# Patient Record
Sex: MALE | Race: WHITE | NOT HISPANIC OR LATINO | Employment: OTHER | URBAN - METROPOLITAN AREA
[De-identification: names, ages, dates, MRNs, and addresses within clinical notes are randomized per-mention and may not be internally consistent; named-entity substitution may affect disease eponyms.]

---

## 2017-02-07 ENCOUNTER — OFFICE VISIT (OUTPATIENT)
Dept: INTERNAL MEDICINE | Facility: CLINIC | Age: 67
End: 2017-02-07

## 2017-02-07 VITALS
HEART RATE: 72 BPM | DIASTOLIC BLOOD PRESSURE: 74 MMHG | SYSTOLIC BLOOD PRESSURE: 134 MMHG | WEIGHT: 315 LBS | BODY MASS INDEX: 44.1 KG/M2 | HEIGHT: 71 IN

## 2017-02-07 DIAGNOSIS — I48.21 PERMANENT ATRIAL FIBRILLATION (HCC): Primary | ICD-10-CM

## 2017-02-07 DIAGNOSIS — J43.8 OTHER EMPHYSEMA (HCC): ICD-10-CM

## 2017-02-07 DIAGNOSIS — G47.33 OSA (OBSTRUCTIVE SLEEP APNEA): ICD-10-CM

## 2017-02-07 DIAGNOSIS — I25.10 CORONARY ARTERY DISEASE INVOLVING NATIVE CORONARY ARTERY OF NATIVE HEART WITHOUT ANGINA PECTORIS: ICD-10-CM

## 2017-02-07 DIAGNOSIS — E11.8 TYPE 2 DIABETES MELLITUS WITH COMPLICATION, WITHOUT LONG-TERM CURRENT USE OF INSULIN (HCC): ICD-10-CM

## 2017-02-07 DIAGNOSIS — E78.49 OTHER HYPERLIPIDEMIA: ICD-10-CM

## 2017-02-07 DIAGNOSIS — I10 ESSENTIAL HYPERTENSION: ICD-10-CM

## 2017-02-07 LAB
BILIRUB BLD-MCNC: ABNORMAL MG/DL
CLARITY, POC: CLEAR
COLOR UR: ABNORMAL
GLUCOSE UR STRIP-MCNC: ABNORMAL MG/DL
HBA1C MFR BLD: 10.9 %
KETONES UR QL: NEGATIVE
LEUKOCYTE EST, POC: NEGATIVE
NITRITE UR-MCNC: NEGATIVE MG/ML
PH UR: 5 [PH] (ref 5–8)
PROT UR STRIP-MCNC: ABNORMAL MG/DL
RBC # UR STRIP: NEGATIVE /UL
SP GR UR: 1.02 (ref 1–1.03)
UROBILINOGEN UR QL: NORMAL

## 2017-02-07 PROCEDURE — 93000 ELECTROCARDIOGRAM COMPLETE: CPT | Performed by: INTERNAL MEDICINE

## 2017-02-07 PROCEDURE — 99204 OFFICE O/P NEW MOD 45 MIN: CPT | Performed by: INTERNAL MEDICINE

## 2017-02-07 PROCEDURE — 83036 HEMOGLOBIN GLYCOSYLATED A1C: CPT | Performed by: INTERNAL MEDICINE

## 2017-02-07 PROCEDURE — 36415 COLL VENOUS BLD VENIPUNCTURE: CPT | Performed by: INTERNAL MEDICINE

## 2017-02-07 PROCEDURE — 81003 URINALYSIS AUTO W/O SCOPE: CPT | Performed by: INTERNAL MEDICINE

## 2017-02-07 RX ORDER — ASPIRIN 325 MG
325 TABLET, DELAYED RELEASE (ENTERIC COATED) ORAL DAILY
Qty: 90 TABLET | Refills: 3 | Status: SHIPPED | OUTPATIENT
Start: 2017-02-07 | End: 2017-07-26 | Stop reason: HOSPADM

## 2017-02-07 RX ORDER — METFORMIN HYDROCHLORIDE 500 MG/1
500 TABLET, EXTENDED RELEASE ORAL 2 TIMES DAILY
Qty: 180 TABLET | Refills: 3 | Status: SHIPPED | OUTPATIENT
Start: 2017-02-07 | End: 2017-03-27 | Stop reason: SDUPTHER

## 2017-02-07 RX ORDER — GLIMEPIRIDE 2 MG/1
2 TABLET ORAL
Qty: 90 TABLET | Refills: 3 | Status: SHIPPED | OUTPATIENT
Start: 2017-02-07 | End: 2017-03-27 | Stop reason: SDUPTHER

## 2017-02-07 RX ORDER — EXENATIDE 2 MG/.65ML
2 INJECTION, SUSPENSION, EXTENDED RELEASE SUBCUTANEOUS WEEKLY
COMMUNITY
Start: 2017-01-26 | End: 2017-05-01

## 2017-02-07 RX ORDER — CARVEDILOL 25 MG/1
25 TABLET ORAL 2 TIMES DAILY WITH MEALS
Qty: 180 TABLET | Refills: 3 | Status: SHIPPED | OUTPATIENT
Start: 2017-02-07 | End: 2019-04-18

## 2017-02-07 RX ORDER — DABIGATRAN ETEXILATE 150 MG/1
150 CAPSULE ORAL 2 TIMES DAILY
Qty: 60 CAPSULE | Refills: 5 | Status: SHIPPED | OUTPATIENT
Start: 2017-02-07 | End: 2017-03-27 | Stop reason: SDUPTHER

## 2017-02-07 RX ORDER — TRAZODONE HYDROCHLORIDE 50 MG/1
50 TABLET ORAL NIGHTLY
COMMUNITY
End: 2017-03-27 | Stop reason: SDUPTHER

## 2017-02-07 RX ORDER — LISINOPRIL 40 MG/1
40 TABLET ORAL DAILY
COMMUNITY
Start: 2016-11-17 | End: 2017-04-24 | Stop reason: HOSPADM

## 2017-02-07 NOTE — PROGRESS NOTES
Patient is a 66 y.o. male who is here to establish care.  Chief Complaint   Patient presents with   • Establish Care         HPI:  Here for est. Care.  Followed by Dr SALGUERO  When he checks his BS they are high.  Does not follow diabetic diet.  Unsure what is best to eat to control his diet.  Has some polyuria and dipsia.  Occasional CP.  Followed by Dr Giron.  No dizziness or lightheadedness.  Breathing is bad.  No longer smoking.  Not using cpap.  Poor sleep.      History:    Patient Active Problem List   Diagnosis   • FAUSTO (obstructive sleep apnea)   • Hypersomnia   • COPD (chronic obstructive pulmonary disease)   • Type 2 diabetes mellitus   • Hypertension   • CAD (coronary artery disease)   • Atrial fibrillation       Past Medical History   Diagnosis Date   • Atrial fibrillation    • COPD (chronic obstructive pulmonary disease)    • Coronary artery disease    • Diabetes mellitus    • Head trauma in child    • Hypertension        Past Surgical History   Procedure Laterality Date   • Appendectomy     • Multiple tooth extractions     • Knee arthroscopy Left    • Hand surgery Left        Current Outpatient Prescriptions on File Prior to Visit   Medication Sig   • hydrochlorothiazide (HYDRODIURIL) 25 MG tablet    • vitamin D (ERGOCALCIFEROL) 84060 UNITS capsule capsule    • [DISCONTINUED] pravastatin (PRAVACHOL) 40 MG tablet      No current facility-administered medications on file prior to visit.        Family History   Problem Relation Age of Onset   • Cancer Mother    • Diabetes Mother    • Diabetes Father    • Heart disease Father        Social History     Social History   • Marital status: Unknown     Spouse name: N/A   • Number of children: N/A   • Years of education: N/A     Occupational History   • Not on file.     Social History Main Topics   • Smoking status: Former Smoker     Packs/day: 1.00     Years: 47.00     Types: Cigarettes     Quit date: 2014   • Smokeless tobacco: Never Used   • Alcohol use No   •  "Drug use: No   • Sexual activity: Not on file     Other Topics Concern   • Not on file     Social History Narrative         ROS:    Review of Systems   Constitutional: Positive for fatigue. Negative for chills, fever and unexpected weight change.   HENT: Negative for congestion, ear pain, hearing loss, rhinorrhea, sinus pressure, sore throat and trouble swallowing.    Eyes: Negative for discharge and itching.   Respiratory: Positive for cough and shortness of breath. Negative for chest tightness.    Cardiovascular: Positive for leg swelling. Negative for chest pain and palpitations.   Gastrointestinal: Negative for abdominal pain, blood in stool, constipation, diarrhea and vomiting.   Endocrine: Negative for polydipsia and polyuria.   Genitourinary: Negative for difficulty urinating, dysuria, enuresis, frequency, hematuria and urgency.   Musculoskeletal: Positive for arthralgias and back pain. Negative for gait problem and joint swelling.   Skin: Negative for rash and wound.   Allergic/Immunologic: Negative for immunocompromised state.   Neurological: Positive for light-headedness and headaches. Negative for dizziness, syncope, weakness and numbness.   Hematological: Does not bruise/bleed easily.   Psychiatric/Behavioral: Positive for sleep disturbance. Negative for behavioral problems and dysphoric mood. The patient is not nervous/anxious.        Visit Vitals   • /74 (BP Location: Left arm, Patient Position: Sitting)   • Pulse 72   • Ht 71\" (180.3 cm)   • Wt (!) 318 lb (144 kg)   • BMI 44.35 kg/m2       Physical Exam:    Physical Exam   Constitutional: He is oriented to person, place, and time. He appears well-developed and well-nourished.   HENT:   Head: Normocephalic and atraumatic.   Right Ear: External ear normal.   Left Ear: External ear normal.   Mouth/Throat: Oropharynx is clear and moist.   Eyes: Conjunctivae and EOM are normal.   Neck: Normal range of motion. Neck supple.   Cardiovascular: Normal " rate and regular rhythm.    Distant heart sounds   Pulmonary/Chest: Effort normal. He has rales (bibasilar).   Distant lung sounds   Abdominal: Soft. Bowel sounds are normal.   Musculoskeletal: Normal range of motion.   Lymphadenopathy:     He has no cervical adenopathy.   Neurological: He is alert and oriented to person, place, and time.   Diminished BS   Skin: Skin is warm and dry.   Psychiatric: He has a normal mood and affect. His behavior is normal. Thought content normal.       Procedure:    ECG 12 Lead  Date/Time: 2/7/2017 9:55 AM  Performed by: NINO BE  Authorized by: NINO BE   Comparison: not compared with previous ECG   Rhythm: atrial fibrillation  Rate: tachycardic  ST Segments: ST segments normal  T Waves: T waves normal  QRS axis: normal  Clinical impression: abnormal ECG  Comments:                 Discussion/Summary:  htn-increase coreg  afib-add pradaxa  Cad-cont rf mod  Hyperlipidemia-labs today  Dm-change to amaryl and metformin  Insomnia-cont trazodone  Copd-stable   chana-f/u sleep clinic    Labs noted and dw patient, will change to lipitor      Current Outpatient Prescriptions:   •  hydrochlorothiazide (HYDRODIURIL) 25 MG tablet, , Disp: , Rfl:   •  lisinopril (PRINIVIL,ZESTRIL) 40 MG tablet, Daily., Disp: , Rfl:   •  traZODone (DESYREL) 50 MG tablet, Take 50 mg by mouth Every Night., Disp: , Rfl:   •  vitamin D (ERGOCALCIFEROL) 64001 UNITS capsule capsule, , Disp: , Rfl:   •  aspirin  MG tablet, Take 1 tablet by mouth Daily., Disp: 90 tablet, Rfl: 3  •  atorvastatin (LIPITOR) 20 MG tablet, Take 1 tablet by mouth Every Night. STOP PRAVASTATIN, Disp: 30 tablet, Rfl: 5  •  BYDUREON 2 MG pen-injector, , Disp: , Rfl:   •  carvedilol (COREG) 25 MG tablet, Take 1 tablet by mouth 2 (Two) Times a Day With Meals., Disp: 180 tablet, Rfl: 3  •  dabigatran etexilate (PRADAXA) 150 MG capsu, Take 1 capsule by mouth 2 (Two) Times a Day., Disp: 60 capsule, Rfl: 5  •  glimepiride (AMARYL)  2 MG tablet, Take 1 tablet by mouth Every Morning Before Breakfast., Disp: 90 tablet, Rfl: 3  •  metFORMIN ER (GLUCOPHAGE-XR) 500 MG 24 hr tablet, Take 1 tablet by mouth 2 (Two) Times a Day., Disp: 180 tablet, Rfl: 3        Chito was seen today for establish care.    Diagnoses and all orders for this visit:    Permanent atrial fibrillation  -     carvedilol (COREG) 25 MG tablet; Take 1 tablet by mouth 2 (Two) Times a Day With Meals.  -     dabigatran etexilate (PRADAXA) 150 MG capsu; Take 1 capsule by mouth 2 (Two) Times a Day.  -     ECG 12 Lead    Coronary artery disease involving native coronary artery of native heart without angina pectoris  -     aspirin  MG tablet; Take 1 tablet by mouth Daily.    Essential hypertension  -     CBC (No Diff)  -     carvedilol (COREG) 25 MG tablet; Take 1 tablet by mouth 2 (Two) Times a Day With Meals.  -     ECG 12 Lead    Other emphysema    FAUSTO (obstructive sleep apnea)  -     Ambulatory Referral to Sleep Medicine    Type 2 diabetes mellitus with complication, without long-term current use of insulin  -     Cancel: Hemoglobin A1c  -     TSH  -     Vitamin B12  -     POC Urinalysis Dipstick, Automated  -     Microalbumin / Creatinine Urine Ratio  -     POC Glycosylated Hemoglobin (Hb A1C)  -     metFORMIN ER (GLUCOPHAGE-XR) 500 MG 24 hr tablet; Take 1 tablet by mouth 2 (Two) Times a Day.  -     glimepiride (AMARYL) 2 MG tablet; Take 1 tablet by mouth Every Morning Before Breakfast.    Other hyperlipidemia  -     Comprehensive Metabolic Panel  -     Lipid Panel  -     atorvastatin (LIPITOR) 20 MG tablet; Take 1 tablet by mouth Every Night. STOP PRAVASTATIN

## 2017-02-08 LAB
ALBUMIN SERPL-MCNC: 4.4 G/DL (ref 3.2–4.8)
ALBUMIN/CREAT UR: 15.5 MG/G CREAT (ref 0–30)
ALBUMIN/GLOB SERPL: 1.4 G/DL (ref 1.5–2.5)
ALP SERPL-CCNC: 82 U/L (ref 25–100)
ALT SERPL-CCNC: 41 U/L (ref 7–40)
AST SERPL-CCNC: 36 U/L (ref 0–33)
BILIRUB SERPL-MCNC: 0.5 MG/DL (ref 0.3–1.2)
BUN SERPL-MCNC: 27 MG/DL (ref 9–23)
BUN/CREAT SERPL: 24.5 (ref 7–25)
CALCIUM SERPL-MCNC: 9.5 MG/DL (ref 8.7–10.4)
CHLORIDE SERPL-SCNC: 99 MMOL/L (ref 99–109)
CHOLEST SERPL-MCNC: 188 MG/DL (ref 0–200)
CO2 SERPL-SCNC: 26 MMOL/L (ref 20–31)
CREAT SERPL-MCNC: 1.1 MG/DL (ref 0.6–1.3)
CREAT UR-MCNC: 140.4 MG/DL
ERYTHROCYTE [DISTWIDTH] IN BLOOD BY AUTOMATED COUNT: 13.9 % (ref 11.3–14.5)
GLOBULIN SER CALC-MCNC: 3.2 GM/DL
GLUCOSE SERPL-MCNC: 311 MG/DL (ref 70–100)
HCT VFR BLD AUTO: 48.3 % (ref 38.9–50.9)
HDLC SERPL-MCNC: 47 MG/DL (ref 40–60)
HGB BLD-MCNC: 15.3 G/DL (ref 13.1–17.5)
LDLC SERPL CALC-MCNC: 116 MG/DL (ref 0–100)
MCH RBC QN AUTO: 31 PG (ref 27–31)
MCHC RBC AUTO-ENTMCNC: 31.7 G/DL (ref 32–36)
MCV RBC AUTO: 97.8 FL (ref 80–99)
MICROALBUMIN UR-MCNC: 21.7 UG/ML
PLATELET # BLD AUTO: 198 10*3/MM3 (ref 150–450)
POTASSIUM SERPL-SCNC: 5.4 MMOL/L (ref 3.5–5.5)
PROT SERPL-MCNC: 7.6 G/DL (ref 5.7–8.2)
RBC # BLD AUTO: 4.94 10*6/MM3 (ref 4.2–5.76)
SODIUM SERPL-SCNC: 137 MMOL/L (ref 132–146)
TRIGL SERPL-MCNC: 127 MG/DL (ref 0–150)
TSH SERPL DL<=0.005 MIU/L-ACNC: 1.81 MIU/ML (ref 0.35–5.35)
VIT B12 SERPL-MCNC: 516 PG/ML (ref 211–911)
VLDLC SERPL CALC-MCNC: 25.4 MG/DL
WBC # BLD AUTO: 8.11 10*3/MM3 (ref 3.5–10.8)

## 2017-02-08 RX ORDER — ATORVASTATIN CALCIUM 20 MG/1
20 TABLET, FILM COATED ORAL NIGHTLY
Qty: 30 TABLET | Refills: 5 | Status: SHIPPED | OUTPATIENT
Start: 2017-02-08 | End: 2017-03-27 | Stop reason: SDUPTHER

## 2017-02-27 ENCOUNTER — OFFICE VISIT (OUTPATIENT)
Dept: INTERNAL MEDICINE | Facility: CLINIC | Age: 67
End: 2017-02-27

## 2017-02-27 VITALS
HEART RATE: 72 BPM | SYSTOLIC BLOOD PRESSURE: 100 MMHG | WEIGHT: 315 LBS | DIASTOLIC BLOOD PRESSURE: 70 MMHG | BODY MASS INDEX: 44.1 KG/M2 | HEIGHT: 71 IN

## 2017-02-27 DIAGNOSIS — J43.8 OTHER EMPHYSEMA (HCC): ICD-10-CM

## 2017-02-27 DIAGNOSIS — E11.8 TYPE 2 DIABETES MELLITUS WITH COMPLICATION, WITHOUT LONG-TERM CURRENT USE OF INSULIN (HCC): ICD-10-CM

## 2017-02-27 DIAGNOSIS — I10 ESSENTIAL HYPERTENSION: ICD-10-CM

## 2017-02-27 DIAGNOSIS — G47.10 HYPERSOMNIA: ICD-10-CM

## 2017-02-27 DIAGNOSIS — I25.10 CORONARY ARTERY DISEASE INVOLVING NATIVE CORONARY ARTERY OF NATIVE HEART WITHOUT ANGINA PECTORIS: ICD-10-CM

## 2017-02-27 DIAGNOSIS — K21.9 GASTROESOPHAGEAL REFLUX DISEASE WITHOUT ESOPHAGITIS: ICD-10-CM

## 2017-02-27 DIAGNOSIS — I48.19 PERSISTENT ATRIAL FIBRILLATION (HCC): Primary | ICD-10-CM

## 2017-02-27 PROCEDURE — 99214 OFFICE O/P EST MOD 30 MIN: CPT | Performed by: INTERNAL MEDICINE

## 2017-02-27 RX ORDER — OMEPRAZOLE 40 MG/1
40 CAPSULE, DELAYED RELEASE ORAL DAILY
Qty: 30 CAPSULE | Refills: 2 | Status: SHIPPED | OUTPATIENT
Start: 2017-02-27 | End: 2017-07-06 | Stop reason: SDUPTHER

## 2017-02-27 NOTE — PROGRESS NOTES
Patient is a 66 y.o. male who is here for a follow up of chronic conditions and is having chest pain after laying down.  Chief Complaint   Patient presents with   • Hypertension   • Diabetes         HPI:  Here for f/u.  Patient c/o GERD, worst at night.  Improved by sitting up in bed.  Makes him SOB.  No CP.  No nausea or emesis.  Not using the bydureon.  Patient having polyuria and dipsia and nocturia.  Seeing his cardiologist 4/11.    History:    Patient Active Problem List   Diagnosis   • FAUSTO (obstructive sleep apnea)   • Hypersomnia   • COPD (chronic obstructive pulmonary disease)   • Type 2 diabetes mellitus   • Hypertension   • CAD (coronary artery disease)   • Atrial fibrillation   • Gastroesophageal reflux disease without esophagitis       Past Medical History   Diagnosis Date   • Atrial fibrillation    • COPD (chronic obstructive pulmonary disease)    • Coronary artery disease    • Diabetes mellitus    • Head trauma in child    • Hypertension        Past Surgical History   Procedure Laterality Date   • Appendectomy     • Multiple tooth extractions     • Knee arthroscopy Left    • Hand surgery Left        Current Outpatient Prescriptions on File Prior to Visit   Medication Sig   • aspirin  MG tablet Take 1 tablet by mouth Daily.   • atorvastatin (LIPITOR) 20 MG tablet Take 1 tablet by mouth Every Night. STOP PRAVASTATIN   • BYDUREON 2 MG pen-injector    • carvedilol (COREG) 25 MG tablet Take 1 tablet by mouth 2 (Two) Times a Day With Meals.   • dabigatran etexilate (PRADAXA) 150 MG capsu Take 1 capsule by mouth 2 (Two) Times a Day.   • glimepiride (AMARYL) 2 MG tablet Take 1 tablet by mouth Every Morning Before Breakfast.   • hydrochlorothiazide (HYDRODIURIL) 25 MG tablet    • lisinopril (PRINIVIL,ZESTRIL) 40 MG tablet Daily.   • metFORMIN ER (GLUCOPHAGE-XR) 500 MG 24 hr tablet Take 1 tablet by mouth 2 (Two) Times a Day.   • traZODone (DESYREL) 50 MG tablet Take 50 mg by mouth Every Night.   • vitamin  D (ERGOCALCIFEROL) 82183 UNITS capsule capsule      No current facility-administered medications on file prior to visit.        Family History   Problem Relation Age of Onset   • Cancer Mother    • Diabetes Mother    • Diabetes Father    • Heart disease Father        Social History     Social History   • Marital status: Unknown     Spouse name: N/A   • Number of children: N/A   • Years of education: N/A     Occupational History   • Not on file.     Social History Main Topics   • Smoking status: Former Smoker     Packs/day: 1.00     Years: 47.00     Types: Cigarettes     Quit date: 2014   • Smokeless tobacco: Never Used   • Alcohol use No   • Drug use: No   • Sexual activity: Not on file     Other Topics Concern   • Not on file     Social History Narrative         ROS:    Review of Systems   Constitutional: Positive for fatigue. Negative for chills, fever and unexpected weight change.   HENT: Negative for congestion, ear pain, hearing loss, rhinorrhea, sinus pressure, sore throat and trouble swallowing.    Eyes: Negative for discharge and itching.   Respiratory: Positive for cough and shortness of breath. Negative for chest tightness.    Cardiovascular: Positive for leg swelling. Negative for chest pain and palpitations.   Gastrointestinal: Negative for abdominal pain, blood in stool, constipation, diarrhea and vomiting.        Nocturnal GERD   Endocrine: Negative for polydipsia and polyuria.   Genitourinary: Negative for difficulty urinating, dysuria, enuresis, frequency, hematuria and urgency.   Musculoskeletal: Positive for arthralgias and back pain. Negative for gait problem and joint swelling.   Skin: Negative for rash and wound.   Allergic/Immunologic: Negative for immunocompromised state.   Neurological: Positive for light-headedness and headaches. Negative for dizziness, syncope, weakness and numbness.   Hematological: Does not bruise/bleed easily.   Psychiatric/Behavioral: Positive for sleep disturbance.  "Negative for behavioral problems and dysphoric mood. The patient is not nervous/anxious.        Visit Vitals   • /70   • Pulse 72   • Ht 71\" (180.3 cm)   • Wt (!) 319 lb (145 kg)   • BMI 44.49 kg/m2       Physical Exam:    Physical Exam   Constitutional: He is oriented to person, place, and time. He appears well-developed and well-nourished.   HENT:   Head: Normocephalic and atraumatic.   Right Ear: External ear normal.   Left Ear: External ear normal.   Mouth/Throat: Oropharynx is clear and moist.   Eyes: Conjunctivae and EOM are normal.   Neck: Normal range of motion. Neck supple.   Cardiovascular: Normal rate.    Distant heart sounds  IRIR   Pulmonary/Chest: Effort normal. He has rales (bibasilar).   Distant lung sounds   Abdominal: Soft. Bowel sounds are normal.   Musculoskeletal: Normal range of motion.   Lymphadenopathy:     He has no cervical adenopathy.   Neurological: He is alert and oriented to person, place, and time.   Diminished BS   Skin: Skin is warm and dry.   Psychiatric: He has a normal mood and affect. His behavior is normal. Thought content normal.       Procedure:      Discussion/Summary:  htn-improved  afib-cont pradaxa  Cad-cont rf mod, schedule stress test  Hyperlipidemia-labs noted  Dm-change to amaryl and metformin and cont bydureon  Insomnia-cont trazodone  Copd-stable   chana-f/u sleep clinic  GERD-trial of ppi     Labs noted and dw patient      Current Outpatient Prescriptions:   •  aspirin  MG tablet, Take 1 tablet by mouth Daily., Disp: 90 tablet, Rfl: 3  •  atorvastatin (LIPITOR) 20 MG tablet, Take 1 tablet by mouth Every Night. STOP PRAVASTATIN, Disp: 30 tablet, Rfl: 5  •  BYDUREON 2 MG pen-injector, , Disp: , Rfl:   •  carvedilol (COREG) 25 MG tablet, Take 1 tablet by mouth 2 (Two) Times a Day With Meals., Disp: 180 tablet, Rfl: 3  •  dabigatran etexilate (PRADAXA) 150 MG capsu, Take 1 capsule by mouth 2 (Two) Times a Day., Disp: 60 capsule, Rfl: 5  •  glimepiride (AMARYL) " 2 MG tablet, Take 1 tablet by mouth Every Morning Before Breakfast., Disp: 90 tablet, Rfl: 3  •  hydrochlorothiazide (HYDRODIURIL) 25 MG tablet, , Disp: , Rfl:   •  lisinopril (PRINIVIL,ZESTRIL) 40 MG tablet, Daily., Disp: , Rfl:   •  metFORMIN ER (GLUCOPHAGE-XR) 500 MG 24 hr tablet, Take 1 tablet by mouth 2 (Two) Times a Day., Disp: 180 tablet, Rfl: 3  •  omeprazole (priLOSEC) 40 MG capsule, Take 1 capsule by mouth Daily., Disp: 30 capsule, Rfl: 2  •  traZODone (DESYREL) 50 MG tablet, Take 50 mg by mouth Every Night., Disp: , Rfl:   •  vitamin D (ERGOCALCIFEROL) 32819 UNITS capsule capsule, , Disp: , Rfl:         Chito was seen today for hypertension and diabetes.    Diagnoses and all orders for this visit:    Persistent atrial fibrillation    Coronary artery disease involving native coronary artery of native heart without angina pectoris  -     Stress Test With Myocardial Perfusion - One Day    Essential hypertension    Other emphysema    Gastroesophageal reflux disease without esophagitis  -     omeprazole (priLOSEC) 40 MG capsule; Take 1 capsule by mouth Daily.    Type 2 diabetes mellitus with complication, without long-term current use of insulin    Hypersomnia

## 2017-03-27 ENCOUNTER — OFFICE VISIT (OUTPATIENT)
Dept: INTERNAL MEDICINE | Facility: CLINIC | Age: 67
End: 2017-03-27

## 2017-03-27 VITALS
WEIGHT: 315 LBS | BODY MASS INDEX: 44.1 KG/M2 | HEIGHT: 71 IN | DIASTOLIC BLOOD PRESSURE: 70 MMHG | HEART RATE: 90 BPM | SYSTOLIC BLOOD PRESSURE: 115 MMHG

## 2017-03-27 DIAGNOSIS — E11.8 TYPE 2 DIABETES MELLITUS WITH COMPLICATION, WITHOUT LONG-TERM CURRENT USE OF INSULIN (HCC): ICD-10-CM

## 2017-03-27 DIAGNOSIS — I25.10 CORONARY ARTERY DISEASE INVOLVING NATIVE CORONARY ARTERY OF NATIVE HEART WITHOUT ANGINA PECTORIS: ICD-10-CM

## 2017-03-27 DIAGNOSIS — J43.8 OTHER EMPHYSEMA (HCC): ICD-10-CM

## 2017-03-27 DIAGNOSIS — I48.21 PERMANENT ATRIAL FIBRILLATION (HCC): ICD-10-CM

## 2017-03-27 DIAGNOSIS — K21.9 GASTROESOPHAGEAL REFLUX DISEASE WITHOUT ESOPHAGITIS: ICD-10-CM

## 2017-03-27 DIAGNOSIS — E78.49 OTHER HYPERLIPIDEMIA: ICD-10-CM

## 2017-03-27 DIAGNOSIS — I10 ESSENTIAL HYPERTENSION: Primary | ICD-10-CM

## 2017-03-27 PROCEDURE — 93000 ELECTROCARDIOGRAM COMPLETE: CPT | Performed by: INTERNAL MEDICINE

## 2017-03-27 PROCEDURE — 99214 OFFICE O/P EST MOD 30 MIN: CPT | Performed by: INTERNAL MEDICINE

## 2017-03-27 RX ORDER — GLIMEPIRIDE 2 MG/1
2 TABLET ORAL
Qty: 90 TABLET | Refills: 3 | Status: ON HOLD | OUTPATIENT
Start: 2017-03-27 | End: 2019-04-24 | Stop reason: SDUPTHER

## 2017-03-27 RX ORDER — TRAZODONE HYDROCHLORIDE 50 MG/1
50 TABLET ORAL NIGHTLY
Qty: 30 TABLET | Refills: 5 | Status: SHIPPED | OUTPATIENT
Start: 2017-03-27 | End: 2017-10-04 | Stop reason: SDUPTHER

## 2017-03-27 RX ORDER — METFORMIN HYDROCHLORIDE 500 MG/1
500 TABLET, EXTENDED RELEASE ORAL 2 TIMES DAILY
Qty: 180 TABLET | Refills: 3 | Status: SHIPPED | OUTPATIENT
Start: 2017-03-27 | End: 2019-04-24 | Stop reason: HOSPADM

## 2017-03-27 RX ORDER — DABIGATRAN ETEXILATE 150 MG/1
150 CAPSULE ORAL 2 TIMES DAILY
Qty: 60 CAPSULE | Refills: 5 | Status: SHIPPED | OUTPATIENT
Start: 2017-03-27 | End: 2017-05-22 | Stop reason: SDUPTHER

## 2017-03-27 RX ORDER — ATORVASTATIN CALCIUM 20 MG/1
20 TABLET, FILM COATED ORAL NIGHTLY
Qty: 30 TABLET | Refills: 5 | Status: SHIPPED | OUTPATIENT
Start: 2017-03-27 | End: 2017-05-18 | Stop reason: SDUPTHER

## 2017-03-27 NOTE — PATIENT INSTRUCTIONS
Fall Prevention in the Home   Falls can cause injuries. They can happen to people of all ages. There are many things you can do to make your home safe and to help prevent falls.   WHAT CAN I DO ON THE OUTSIDE OF MY HOME?  · Regularly fix the edges of walkways and driveways and fix any cracks.  · Remove anything that might make you trip as you walk through a door, such as a raised step or threshold.  · Trim any bushes or trees on the path to your home.  · Use bright outdoor lighting.  · Clear any walking paths of anything that might make someone trip, such as rocks or tools.  · Regularly check to see if handrails are loose or broken. Make sure that both sides of any steps have handrails.  · Any raised decks and porches should have guardrails on the edges.  · Have any leaves, snow, or ice cleared regularly.  · Use sand or salt on walking paths during winter.  · Clean up any spills in your garage right away. This includes oil or grease spills.  WHAT CAN I DO IN THE BATHROOM?   · Use night lights.  · Install grab bars by the toilet and in the tub and shower. Do not use towel bars as grab bars.  · Use non-skid mats or decals in the tub or shower.  · If you need to sit down in the shower, use a plastic, non-slip stool.  · Keep the floor dry. Clean up any water that spills on the floor as soon as it happens.  · Remove soap buildup in the tub or shower regularly.  · Attach bath mats securely with double-sided non-slip rug tape.  · Do not have throw rugs and other things on the floor that can make you trip.  WHAT CAN I DO IN THE BEDROOM?  · Use night lights.  · Make sure that you have a light by your bed that is easy to reach.  · Do not use any sheets or blankets that are too big for your bed. They should not hang down onto the floor.  · Have a firm chair that has side arms. You can use this for support while you get dressed.  · Do not have throw rugs and other things on the floor that can make you trip.  WHAT CAN I DO IN  THE KITCHEN?  · Clean up any spills right away.  · Avoid walking on wet floors.  · Keep items that you use a lot in easy-to-reach places.  · If you need to reach something above you, use a strong step stool that has a grab bar.  · Keep electrical cords out of the way.  · Do not use floor polish or wax that makes floors slippery. If you must use wax, use non-skid floor wax.  · Do not have throw rugs and other things on the floor that can make you trip.  WHAT CAN I DO WITH MY STAIRS?  · Do not leave any items on the stairs.  · Make sure that there are handrails on both sides of the stairs and use them. Fix handrails that are broken or loose. Make sure that handrails are as long as the stairways.  · Check any carpeting to make sure that it is firmly attached to the stairs. Fix any carpet that is loose or worn.  · Avoid having throw rugs at the top or bottom of the stairs. If you do have throw rugs, attach them to the floor with carpet tape.  · Make sure that you have a light switch at the top of the stairs and the bottom of the stairs. If you do not have them, ask someone to add them for you.  WHAT ELSE CAN I DO TO HELP PREVENT FALLS?  · Wear shoes that:    Do not have high heels.    Have rubber bottoms.    Are comfortable and fit you well.    Are closed at the toe. Do not wear sandals.  · If you use a stepladder:    Make sure that it is fully opened. Do not climb a closed stepladder.    Make sure that both sides of the stepladder are locked into place.    Ask someone to hold it for you, if possible.  · Clearly joana and make sure that you can see:    Any grab bars or handrails.    First and last steps.    Where the edge of each step is.  · Use tools that help you move around (mobility aids) if they are needed. These include:    Canes.    Walkers.    Scooters.    Crutches.  · Turn on the lights when you go into a dark area. Replace any light bulbs as soon as they burn out.  · Set up your furniture so you have a clear  path. Avoid moving your furniture around.  · If any of your floors are uneven, fix them.  · If there are any pets around you, be aware of where they are.  · Review your medicines with your doctor. Some medicines can make you feel dizzy. This can increase your chance of falling.  Ask your doctor what other things that you can do to help prevent falls.     This information is not intended to replace advice given to you by your health care provider. Make sure you discuss any questions you have with your health care provider.     Document Released: 10/14/2010 Document Revised: 05/03/2016 Document Reviewed: 01/22/2016  [employer] Interactive Patient Education ©2016 [employer]

## 2017-03-27 NOTE — PROGRESS NOTES
Patient is a 66 y.o. male who is here for a follow up of chronic conditions.  Chief Complaint   Patient presents with   • Hypertension   • Atrial Fibrillation         HPI:  Here for f/u.  Did not get the stress test bc unable to stay entire time due to transportation.  He is unsure if he is on his oral diabetic meds.  Having ACOSTA.  No palpitations.  No nausea or emeis.  Sleep is not the best.    History:    Patient Active Problem List   Diagnosis   • FAUSTO (obstructive sleep apnea)   • Hypersomnia   • COPD (chronic obstructive pulmonary disease)   • Type 2 diabetes mellitus   • Hypertension   • CAD (coronary artery disease)   • Atrial fibrillation   • Gastroesophageal reflux disease without esophagitis       Past Medical History:   Diagnosis Date   • Atrial fibrillation    • COPD (chronic obstructive pulmonary disease)    • Coronary artery disease    • Diabetes mellitus    • Head trauma in child    • Hypertension        Past Surgical History:   Procedure Laterality Date   • APPENDECTOMY     • HAND SURGERY Left    • KNEE ARTHROSCOPY Left    • MULTIPLE TOOTH EXTRACTIONS         Current Outpatient Prescriptions on File Prior to Visit   Medication Sig   • aspirin  MG tablet Take 1 tablet by mouth Daily.   • BYDUREON 2 MG pen-injector    • carvedilol (COREG) 25 MG tablet Take 1 tablet by mouth 2 (Two) Times a Day With Meals.   • hydrochlorothiazide (HYDRODIURIL) 25 MG tablet    • lisinopril (PRINIVIL,ZESTRIL) 40 MG tablet Daily.   • omeprazole (priLOSEC) 40 MG capsule Take 1 capsule by mouth Daily.   • vitamin D (ERGOCALCIFEROL) 46873 UNITS capsule capsule    • [DISCONTINUED] atorvastatin (LIPITOR) 20 MG tablet Take 1 tablet by mouth Every Night. STOP PRAVASTATIN   • [DISCONTINUED] dabigatran etexilate (PRADAXA) 150 MG capsu Take 1 capsule by mouth 2 (Two) Times a Day.   • [DISCONTINUED] glimepiride (AMARYL) 2 MG tablet Take 1 tablet by mouth Every Morning Before Breakfast.   • [DISCONTINUED] metFORMIN ER  (GLUCOPHAGE-XR) 500 MG 24 hr tablet Take 1 tablet by mouth 2 (Two) Times a Day.   • [DISCONTINUED] traZODone (DESYREL) 50 MG tablet Take 50 mg by mouth Every Night.     No current facility-administered medications on file prior to visit.        Family History   Problem Relation Age of Onset   • Cancer Mother    • Diabetes Mother    • Diabetes Father    • Heart disease Father        Social History     Social History   • Marital status: Unknown     Spouse name: N/A   • Number of children: N/A   • Years of education: N/A     Occupational History   • Not on file.     Social History Main Topics   • Smoking status: Former Smoker     Packs/day: 1.00     Years: 47.00     Types: Cigarettes     Quit date: 2014   • Smokeless tobacco: Never Used   • Alcohol use No   • Drug use: No   • Sexual activity: Not on file     Other Topics Concern   • Not on file     Social History Narrative         ROS:    Review of Systems   Constitutional: Positive for fatigue. Negative for chills, fever and unexpected weight change.   HENT: Negative for congestion, ear pain, hearing loss, rhinorrhea, sinus pressure, sore throat and trouble swallowing.    Eyes: Negative for discharge and itching.   Respiratory: Positive for cough and shortness of breath. Negative for chest tightness.    Cardiovascular: Positive for leg swelling. Negative for chest pain and palpitations.   Gastrointestinal: Negative for abdominal pain, blood in stool, constipation, diarrhea and vomiting.   Endocrine: Negative for polydipsia and polyuria.   Genitourinary: Negative for difficulty urinating, dysuria, enuresis, frequency, hematuria and urgency.   Musculoskeletal: Positive for arthralgias and back pain. Negative for gait problem and joint swelling.   Skin: Negative for rash and wound.   Allergic/Immunologic: Negative for immunocompromised state.   Neurological: Positive for light-headedness and headaches. Negative for dizziness, syncope, weakness and numbness.  "  Hematological: Does not bruise/bleed easily.   Psychiatric/Behavioral: Positive for sleep disturbance. Negative for behavioral problems and dysphoric mood. The patient is not nervous/anxious.        /70  Pulse 90  Ht 71\" (180.3 cm)  Wt (!) 320 lb (145 kg)  BMI 44.63 kg/m2    Physical Exam:    Physical Exam   Constitutional: He is oriented to person, place, and time. He appears well-developed and well-nourished.   HENT:   Head: Normocephalic and atraumatic.   Right Ear: External ear normal.   Left Ear: External ear normal.   Mouth/Throat: Oropharynx is clear and moist.   Eyes: Conjunctivae and EOM are normal.   Neck: Normal range of motion. Neck supple.   Cardiovascular: Normal rate.    Distant heart sounds  IRIR   Pulmonary/Chest: Effort normal. He has rales (bibasilar).   Distant lung sounds   Abdominal: Soft. Bowel sounds are normal.   Musculoskeletal: Normal range of motion.   Lymphadenopathy:     He has no cervical adenopathy.   Neurological: He is alert and oriented to person, place, and time.   Diminished BS   Skin: Skin is warm and dry.   Psychiatric: He has a normal mood and affect. His behavior is normal. Thought content normal.         Procedure:  O2 sats w/o oxygen was 86 % improved to 95 % with deep inspiration      ECG 12 Lead  Date/Time: 3/27/2017 4:14 PM  Performed by: NINO BE  Authorized by: NINO BE   Comparison: compared with previous ECG from 2/8/2017  Similar to previous ECG  Rhythm: atrial fibrillation  Rate: tachycardic  ST Segments: ST segments normal  T Waves: T waves normal  QRS axis: normal  Clinical impression: abnormal ECG          Discussion/Summary:    htn-improved  afib-cont pradaxa  Cad-cont rf mod, advised stress test  Hyperlipidemia-labs noted  Dm-cont amaryl and metformin and cont bydureon  Insomnia-cont trazodone  Copd-stable   chana-f/u sleep clinic  GERD-stable off meds      Labs noted and dw patient  Will need  Portable O2    Have extensively " counseled on his meds and use of each one as directed and have given samples of bydureon         Current Outpatient Prescriptions:   •  aspirin  MG tablet, Take 1 tablet by mouth Daily., Disp: 90 tablet, Rfl: 3  •  atorvastatin (LIPITOR) 20 MG tablet, Take 1 tablet by mouth Every Night. STOP PRAVASTATIN, Disp: 30 tablet, Rfl: 5  •  BYDUREON 2 MG pen-injector, , Disp: , Rfl:   •  carvedilol (COREG) 25 MG tablet, Take 1 tablet by mouth 2 (Two) Times a Day With Meals., Disp: 180 tablet, Rfl: 3  •  dabigatran etexilate (PRADAXA) 150 MG capsu, Take 1 capsule by mouth 2 (Two) Times a Day., Disp: 60 capsule, Rfl: 5  •  glimepiride (AMARYL) 2 MG tablet, Take 1 tablet by mouth Every Morning Before Breakfast., Disp: 90 tablet, Rfl: 3  •  hydrochlorothiazide (HYDRODIURIL) 25 MG tablet, , Disp: , Rfl:   •  lisinopril (PRINIVIL,ZESTRIL) 40 MG tablet, Daily., Disp: , Rfl:   •  metFORMIN ER (GLUCOPHAGE-XR) 500 MG 24 hr tablet, Take 1 tablet by mouth 2 (Two) Times a Day., Disp: 180 tablet, Rfl: 3  •  omeprazole (priLOSEC) 40 MG capsule, Take 1 capsule by mouth Daily., Disp: 30 capsule, Rfl: 2  •  traZODone (DESYREL) 50 MG tablet, Take 1 tablet by mouth Every Night., Disp: 30 tablet, Rfl: 5  •  vitamin D (ERGOCALCIFEROL) 40160 UNITS capsule capsule, , Disp: , Rfl:         Chito was seen today for hypertension and atrial fibrillation.    Diagnoses and all orders for this visit:    Essential hypertension    Type 2 diabetes mellitus with complication, without long-term current use of insulin  -     metFORMIN ER (GLUCOPHAGE-XR) 500 MG 24 hr tablet; Take 1 tablet by mouth 2 (Two) Times a Day.  -     glimepiride (AMARYL) 2 MG tablet; Take 1 tablet by mouth Every Morning Before Breakfast.    Permanent atrial fibrillation  -     dabigatran etexilate (PRADAXA) 150 MG capsu; Take 1 capsule by mouth 2 (Two) Times a Day.  -     ECG 12 Lead    Other hyperlipidemia  -     atorvastatin (LIPITOR) 20 MG tablet; Take 1 tablet by mouth Every  Night. STOP PRAVASTATIN    Coronary artery disease involving native coronary artery of native heart without angina pectoris    Gastroesophageal reflux disease without esophagitis    Other emphysema    Other orders  -     traZODone (DESYREL) 50 MG tablet; Take 1 tablet by mouth Every Night.

## 2017-04-18 ENCOUNTER — APPOINTMENT (OUTPATIENT)
Dept: GENERAL RADIOLOGY | Facility: HOSPITAL | Age: 67
End: 2017-04-18

## 2017-04-18 ENCOUNTER — HOSPITAL ENCOUNTER (INPATIENT)
Facility: HOSPITAL | Age: 67
LOS: 6 days | Discharge: HOME HEALTH | End: 2017-04-24
Attending: EMERGENCY MEDICINE | Admitting: INTERNAL MEDICINE

## 2017-04-18 DIAGNOSIS — R73.9 HYPERGLYCEMIA: ICD-10-CM

## 2017-04-18 DIAGNOSIS — I50.9 ACUTE ON CHRONIC CONGESTIVE HEART FAILURE, UNSPECIFIED CONGESTIVE HEART FAILURE TYPE: Primary | ICD-10-CM

## 2017-04-18 DIAGNOSIS — R06.03 RESPIRATORY DISTRESS: ICD-10-CM

## 2017-04-18 DIAGNOSIS — R06.89 HYPERCAPNIA: ICD-10-CM

## 2017-04-18 DIAGNOSIS — E11.8 TYPE 2 DIABETES MELLITUS WITH COMPLICATION, WITHOUT LONG-TERM CURRENT USE OF INSULIN (HCC): ICD-10-CM

## 2017-04-18 DIAGNOSIS — R09.02 HYPOXIA: ICD-10-CM

## 2017-04-18 DIAGNOSIS — R06.02 SHORTNESS OF BREATH: ICD-10-CM

## 2017-04-18 DIAGNOSIS — J44.1 COPD EXACERBATION (HCC): ICD-10-CM

## 2017-04-18 PROBLEM — E66.01 MORBID OBESITY (HCC): Status: ACTIVE | Noted: 2017-04-18

## 2017-04-18 PROBLEM — Z91.199 MEDICALLY NONCOMPLIANT: Status: ACTIVE | Noted: 2017-04-18

## 2017-04-18 PROBLEM — R79.89 ELEVATED LFTS: Status: ACTIVE | Noted: 2017-04-18

## 2017-04-18 LAB
ALBUMIN SERPL-MCNC: 4.3 G/DL (ref 3.2–4.8)
ALBUMIN/GLOB SERPL: 1.3 G/DL (ref 1.5–2.5)
ALP SERPL-CCNC: 71 U/L (ref 25–100)
ALT SERPL W P-5'-P-CCNC: 70 U/L (ref 7–40)
ANION GAP SERPL CALCULATED.3IONS-SCNC: 0 MMOL/L (ref 3–11)
ARTERIAL PATENCY WRIST A: POSITIVE
AST SERPL-CCNC: 68 U/L (ref 0–33)
ATMOSPHERIC PRESS: ABNORMAL MMHG
BASE EXCESS BLDA CALC-SCNC: 5.5 MMOL/L (ref 0–2)
BASOPHILS # BLD AUTO: 0.01 10*3/MM3 (ref 0–0.2)
BASOPHILS NFR BLD AUTO: 0.1 % (ref 0–1)
BDY SITE: ABNORMAL
BILIRUB SERPL-MCNC: 0.4 MG/DL (ref 0.3–1.2)
BNP SERPL-MCNC: 194 PG/ML (ref 0–100)
BUN BLD-MCNC: 13 MG/DL (ref 9–23)
BUN/CREAT SERPL: 11.8 (ref 7–25)
CALCIUM SPEC-SCNC: 9.1 MG/DL (ref 8.7–10.4)
CHLORIDE SERPL-SCNC: 98 MMOL/L (ref 99–109)
CO2 BLDA-SCNC: 37.3 MMOL/L (ref 22–33)
CO2 SERPL-SCNC: 41 MMOL/L (ref 20–31)
COHGB MFR BLD: 1.5 % (ref 0–2)
CREAT BLD-MCNC: 1.1 MG/DL (ref 0.6–1.3)
D-LACTATE SERPL-SCNC: 1.2 MMOL/L (ref 0.5–2)
DEPRECATED RDW RBC AUTO: 55.9 FL (ref 37–54)
EOSINOPHIL # BLD AUTO: 0.16 10*3/MM3 (ref 0.1–0.3)
EOSINOPHIL NFR BLD AUTO: 2.2 % (ref 0–3)
ERYTHROCYTE [DISTWIDTH] IN BLOOD BY AUTOMATED COUNT: 14.8 % (ref 11.3–14.5)
GFR SERPL CREATININE-BSD FRML MDRD: 67 ML/MIN/1.73
GLOBULIN UR ELPH-MCNC: 3.4 GM/DL
GLUCOSE BLD-MCNC: 288 MG/DL (ref 70–100)
GLUCOSE BLDC GLUCOMTR-MCNC: 234 MG/DL (ref 70–130)
HBA1C MFR BLD: 10.2 % (ref 4.8–5.6)
HCO3 BLDA-SCNC: 34.7 MMOL/L (ref 20–26)
HCT VFR BLD AUTO: 49.1 % (ref 38.9–50.9)
HCT VFR BLD CALC: 45.9 %
HGB BLD-MCNC: 14.6 G/DL (ref 13.1–17.5)
HGB BLDA-MCNC: 15 G/DL (ref 13.5–17.5)
HOLD SPECIMEN: NORMAL
HOLD SPECIMEN: NORMAL
HOROWITZ INDEX BLD+IHG-RTO: 70 %
IMM GRANULOCYTES # BLD: 0.03 10*3/MM3 (ref 0–0.03)
IMM GRANULOCYTES NFR BLD: 0.4 % (ref 0–0.6)
LYMPHOCYTES # BLD AUTO: 2.09 10*3/MM3 (ref 0.6–4.8)
LYMPHOCYTES NFR BLD AUTO: 28.5 % (ref 24–44)
MACROCYTES BLD QL SMEAR: NORMAL
MCH RBC QN AUTO: 31.2 PG (ref 27–31)
MCHC RBC AUTO-ENTMCNC: 29.7 G/DL (ref 32–36)
MCV RBC AUTO: 104.9 FL (ref 80–99)
METHGB BLD QL: 1 % (ref 0–1.5)
MODALITY: ABNORMAL
MONOCYTES # BLD AUTO: 0.91 10*3/MM3 (ref 0–1)
MONOCYTES NFR BLD AUTO: 12.4 % (ref 0–12)
NEUTROPHILS # BLD AUTO: 4.14 10*3/MM3 (ref 1.5–8.3)
NEUTROPHILS NFR BLD AUTO: 56.4 % (ref 41–71)
OXYHGB MFR BLDV: 96.1 % (ref 94–99)
PCO2 BLDA: 83.5 MM HG (ref 35–48)
PH BLDA: 7.23 PH UNITS (ref 7.35–7.45)
PLAT MORPH BLD: NORMAL
PLATELET # BLD AUTO: 195 10*3/MM3 (ref 150–450)
PMV BLD AUTO: 11.5 FL (ref 6–12)
PO2 BLDA: 137 MM HG (ref 83–108)
POTASSIUM BLD-SCNC: 5 MMOL/L (ref 3.5–5.5)
PROT SERPL-MCNC: 7.7 G/DL (ref 5.7–8.2)
RBC # BLD AUTO: 4.68 10*6/MM3 (ref 4.2–5.76)
SODIUM BLD-SCNC: 139 MMOL/L (ref 132–146)
TROPONIN I SERPL-MCNC: 0 NG/ML (ref 0–0.07)
TROPONIN I SERPL-MCNC: 0.03 NG/ML (ref 0–0.07)
TROPONIN I SERPL-MCNC: <0.006 NG/ML
WBC MORPH BLD: NORMAL
WBC NRBC COR # BLD: 7.34 10*3/MM3 (ref 3.5–10.8)
WHOLE BLOOD HOLD SPECIMEN: NORMAL
WHOLE BLOOD HOLD SPECIMEN: NORMAL

## 2017-04-18 PROCEDURE — 84484 ASSAY OF TROPONIN QUANT: CPT | Performed by: INTERNAL MEDICINE

## 2017-04-18 PROCEDURE — 94799 UNLISTED PULMONARY SVC/PX: CPT

## 2017-04-18 PROCEDURE — 94760 N-INVAS EAR/PLS OXIMETRY 1: CPT

## 2017-04-18 PROCEDURE — 71010 HC CHEST PA OR AP: CPT

## 2017-04-18 PROCEDURE — 36600 WITHDRAWAL OF ARTERIAL BLOOD: CPT | Performed by: EMERGENCY MEDICINE

## 2017-04-18 PROCEDURE — 94640 AIRWAY INHALATION TREATMENT: CPT

## 2017-04-18 PROCEDURE — 93005 ELECTROCARDIOGRAM TRACING: CPT | Performed by: EMERGENCY MEDICINE

## 2017-04-18 PROCEDURE — 85007 BL SMEAR W/DIFF WBC COUNT: CPT | Performed by: EMERGENCY MEDICINE

## 2017-04-18 PROCEDURE — 80053 COMPREHEN METABOLIC PANEL: CPT | Performed by: EMERGENCY MEDICINE

## 2017-04-18 PROCEDURE — 25010000002 METHYLPREDNISOLONE PER 125 MG: Performed by: EMERGENCY MEDICINE

## 2017-04-18 PROCEDURE — 25010000002 FUROSEMIDE PER 20 MG: Performed by: EMERGENCY MEDICINE

## 2017-04-18 PROCEDURE — 82962 GLUCOSE BLOOD TEST: CPT

## 2017-04-18 PROCEDURE — 63710000001 INSULIN LISPRO (HUMAN) PER 5 UNITS: Performed by: INTERNAL MEDICINE

## 2017-04-18 PROCEDURE — 85025 COMPLETE CBC W/AUTO DIFF WBC: CPT | Performed by: EMERGENCY MEDICINE

## 2017-04-18 PROCEDURE — 99223 1ST HOSP IP/OBS HIGH 75: CPT | Performed by: INTERNAL MEDICINE

## 2017-04-18 PROCEDURE — 84484 ASSAY OF TROPONIN QUANT: CPT

## 2017-04-18 PROCEDURE — 99284 EMERGENCY DEPT VISIT MOD MDM: CPT

## 2017-04-18 PROCEDURE — 83036 HEMOGLOBIN GLYCOSYLATED A1C: CPT | Performed by: INTERNAL MEDICINE

## 2017-04-18 PROCEDURE — 83880 ASSAY OF NATRIURETIC PEPTIDE: CPT | Performed by: EMERGENCY MEDICINE

## 2017-04-18 PROCEDURE — 83605 ASSAY OF LACTIC ACID: CPT | Performed by: EMERGENCY MEDICINE

## 2017-04-18 PROCEDURE — 5A09357 ASSISTANCE WITH RESPIRATORY VENTILATION, LESS THAN 24 CONSECUTIVE HOURS, CONTINUOUS POSITIVE AIRWAY PRESSURE: ICD-10-PCS | Performed by: INTERNAL MEDICINE

## 2017-04-18 PROCEDURE — 82805 BLOOD GASES W/O2 SATURATION: CPT | Performed by: EMERGENCY MEDICINE

## 2017-04-18 PROCEDURE — 25010000002 METHYLPREDNISOLONE PER 40 MG: Performed by: INTERNAL MEDICINE

## 2017-04-18 RX ORDER — DEXTROSE MONOHYDRATE 25 G/50ML
25 INJECTION, SOLUTION INTRAVENOUS
Status: DISCONTINUED | OUTPATIENT
Start: 2017-04-18 | End: 2017-04-24 | Stop reason: HOSPADM

## 2017-04-18 RX ORDER — NICOTINE POLACRILEX 4 MG
15 LOZENGE BUCCAL
Status: DISCONTINUED | OUTPATIENT
Start: 2017-04-18 | End: 2017-04-24 | Stop reason: HOSPADM

## 2017-04-18 RX ORDER — METHYLPREDNISOLONE SODIUM SUCCINATE 40 MG/ML
40 INJECTION, POWDER, LYOPHILIZED, FOR SOLUTION INTRAMUSCULAR; INTRAVENOUS EVERY 12 HOURS
Status: DISCONTINUED | OUTPATIENT
Start: 2017-04-19 | End: 2017-04-19

## 2017-04-18 RX ORDER — SODIUM CHLORIDE 0.9 % (FLUSH) 0.9 %
1-10 SYRINGE (ML) INJECTION AS NEEDED
Status: DISCONTINUED | OUTPATIENT
Start: 2017-04-18 | End: 2017-04-24 | Stop reason: HOSPADM

## 2017-04-18 RX ORDER — FUROSEMIDE 10 MG/ML
60 INJECTION INTRAMUSCULAR; INTRAVENOUS ONCE
Status: COMPLETED | OUTPATIENT
Start: 2017-04-18 | End: 2017-04-18

## 2017-04-18 RX ORDER — BISACODYL 10 MG
10 SUPPOSITORY, RECTAL RECTAL DAILY PRN
Status: DISCONTINUED | OUTPATIENT
Start: 2017-04-18 | End: 2017-04-24 | Stop reason: HOSPADM

## 2017-04-18 RX ORDER — METHYLPREDNISOLONE SODIUM SUCCINATE 40 MG/ML
40 INJECTION, POWDER, LYOPHILIZED, FOR SOLUTION INTRAMUSCULAR; INTRAVENOUS EVERY 8 HOURS SCHEDULED
Status: DISCONTINUED | OUTPATIENT
Start: 2017-04-18 | End: 2017-04-18

## 2017-04-18 RX ORDER — IPRATROPIUM BROMIDE AND ALBUTEROL SULFATE 2.5; .5 MG/3ML; MG/3ML
3 SOLUTION RESPIRATORY (INHALATION) ONCE
Status: COMPLETED | OUTPATIENT
Start: 2017-04-18 | End: 2017-04-18

## 2017-04-18 RX ORDER — ASPIRIN 325 MG
325 TABLET, DELAYED RELEASE (ENTERIC COATED) ORAL DAILY
Status: DISCONTINUED | OUTPATIENT
Start: 2017-04-19 | End: 2017-04-24 | Stop reason: HOSPADM

## 2017-04-18 RX ORDER — LORAZEPAM 1 MG/1
1 TABLET ORAL EVERY 6 HOURS PRN
Status: DISCONTINUED | OUTPATIENT
Start: 2017-04-18 | End: 2017-04-24 | Stop reason: HOSPADM

## 2017-04-18 RX ORDER — IPRATROPIUM BROMIDE AND ALBUTEROL SULFATE 2.5; .5 MG/3ML; MG/3ML
3 SOLUTION RESPIRATORY (INHALATION)
Status: DISCONTINUED | OUTPATIENT
Start: 2017-04-18 | End: 2017-04-22

## 2017-04-18 RX ORDER — HYDRALAZINE HYDROCHLORIDE 20 MG/ML
20 INJECTION INTRAMUSCULAR; INTRAVENOUS EVERY 6 HOURS PRN
Status: DISCONTINUED | OUTPATIENT
Start: 2017-04-18 | End: 2017-04-24 | Stop reason: HOSPADM

## 2017-04-18 RX ORDER — ALBUTEROL SULFATE 2.5 MG/3ML
2.5 SOLUTION RESPIRATORY (INHALATION) ONCE
Status: COMPLETED | OUTPATIENT
Start: 2017-04-18 | End: 2017-04-18

## 2017-04-18 RX ORDER — CARVEDILOL 12.5 MG/1
25 TABLET ORAL 2 TIMES DAILY WITH MEALS
Status: DISCONTINUED | OUTPATIENT
Start: 2017-04-19 | End: 2017-04-24 | Stop reason: HOSPADM

## 2017-04-18 RX ORDER — FUROSEMIDE 10 MG/ML
40 INJECTION INTRAMUSCULAR; INTRAVENOUS 2 TIMES DAILY
Status: DISCONTINUED | OUTPATIENT
Start: 2017-04-19 | End: 2017-04-22

## 2017-04-18 RX ORDER — ACETAMINOPHEN 325 MG/1
650 TABLET ORAL EVERY 6 HOURS PRN
Status: DISCONTINUED | OUTPATIENT
Start: 2017-04-18 | End: 2017-04-24 | Stop reason: HOSPADM

## 2017-04-18 RX ORDER — BISACODYL 5 MG/1
5 TABLET, DELAYED RELEASE ORAL DAILY PRN
Status: DISCONTINUED | OUTPATIENT
Start: 2017-04-18 | End: 2017-04-24 | Stop reason: HOSPADM

## 2017-04-18 RX ORDER — IPRATROPIUM BROMIDE AND ALBUTEROL SULFATE 2.5; .5 MG/3ML; MG/3ML
SOLUTION RESPIRATORY (INHALATION)
Status: DISPENSED
Start: 2017-04-18 | End: 2017-04-19

## 2017-04-18 RX ORDER — SODIUM CHLORIDE 0.9 % (FLUSH) 0.9 %
10 SYRINGE (ML) INJECTION AS NEEDED
Status: DISCONTINUED | OUTPATIENT
Start: 2017-04-18 | End: 2017-04-24 | Stop reason: HOSPADM

## 2017-04-18 RX ORDER — METHYLPREDNISOLONE SODIUM SUCCINATE 125 MG/2ML
125 INJECTION, POWDER, LYOPHILIZED, FOR SOLUTION INTRAMUSCULAR; INTRAVENOUS ONCE
Status: COMPLETED | OUTPATIENT
Start: 2017-04-18 | End: 2017-04-18

## 2017-04-18 RX ORDER — ALBUTEROL SULFATE 2.5 MG/3ML
2.5 SOLUTION RESPIRATORY (INHALATION) EVERY 6 HOURS PRN
Status: DISCONTINUED | OUTPATIENT
Start: 2017-04-18 | End: 2017-04-24 | Stop reason: HOSPADM

## 2017-04-18 RX ADMIN — FUROSEMIDE 60 MG: 10 INJECTION, SOLUTION INTRAMUSCULAR; INTRAVENOUS at 12:29

## 2017-04-18 RX ADMIN — ALBUTEROL SULFATE 2.5 MG: 2.5 SOLUTION RESPIRATORY (INHALATION) at 17:29

## 2017-04-18 RX ADMIN — IPRATROPIUM BROMIDE AND ALBUTEROL SULFATE 3 ML: .5; 3 SOLUTION RESPIRATORY (INHALATION) at 20:53

## 2017-04-18 RX ADMIN — METHYLPREDNISOLONE SODIUM SUCCINATE 40 MG: 40 INJECTION, POWDER, FOR SOLUTION INTRAMUSCULAR; INTRAVENOUS at 23:02

## 2017-04-18 RX ADMIN — INSULIN LISPRO 3 UNITS: 100 INJECTION, SOLUTION INTRAVENOUS; SUBCUTANEOUS at 23:03

## 2017-04-18 RX ADMIN — METHYLPREDNISOLONE SODIUM SUCCINATE 125 MG: 125 INJECTION, POWDER, FOR SOLUTION INTRAMUSCULAR; INTRAVENOUS at 15:17

## 2017-04-18 RX ADMIN — IPRATROPIUM BROMIDE AND ALBUTEROL SULFATE 3 ML: .5; 3 SOLUTION RESPIRATORY (INHALATION) at 12:06

## 2017-04-18 RX ADMIN — NITROGLYCERIN 2 INCH: 20 OINTMENT TOPICAL at 12:03

## 2017-04-18 NOTE — ED PROVIDER NOTES
Subjective   HPI Comments: This patient is a very nice 66-year-old gentleman who states that he has a history of diabetes, CHF, COPD and CAD reports that he's been short of breath for several months but worse over the last 2 days.  He states that he came in today because he was cooking breakfast began having worsening shortness of breath.  His oxygen saturations were 87% at home.  He tells me he has recently filled a Lasix prescription but has not been taking his water pill as compliantly as he should.  He denies hemoptysis, hematemesis, chest pain, or new edema.  He states that at baseline he is generally short of breath and that a complaint of shortness of breath is not new but the degree of shortness of breath is new.  He denies fevers or chills.  He reports no real cough.  Although the patient was not initially sure whether or not he had COPD, review the past medical Dr. mentation indicates that he does.  Patient's primary care physician and cardiologist are at [facility] in [facility] but he prefers [facility] and tells us this is the reason for his presentation here today.  In summary this is a 66-year-old gentleman with shortness of breath chronically comes in today with a worsening of his shortness of breath over the last 1-2 days that is now rendered him nearly unable to ambulate without extreme shortness of breath.  Of interesting note, shortness of breath is much worse at night and when he lays flat.      Past medical history:  Positive for COPD, CHF, DM, HTN, CAD, atrial fibrillation.    Family history:  Positive for diabetes (mother and father), cardiac disease (father), cancer (mother).      Patient is a 66 y.o. male presenting with shortness of breath.   History provided by:  Patient  Shortness of Breath   Severity:  Moderate  Onset quality:  Sudden  Timing:  Constant  Progression:  Unchanged  Chronicity:  Recurrent  Relieved by:  Nothing  Ineffective treatments:  Rest and  oxygen  Associated symptoms: no abdominal pain, no chest pain, no cough, no fever, no headaches, no neck pain, no sputum production and no vomiting    Risk factors: obesity        Review of Systems   Constitutional: Negative for fatigue, fever and unexpected weight change.   HENT: Negative for dental problem, hearing loss and sinus pressure.    Eyes: Negative for visual disturbance.   Respiratory: Positive for shortness of breath. Negative for cough, sputum production and chest tightness.    Cardiovascular: Positive for leg swelling. Negative for chest pain and palpitations.   Gastrointestinal: Negative for abdominal pain, blood in stool, diarrhea, nausea and vomiting.   Genitourinary: Negative for difficulty urinating, dysuria, frequency, hematuria and urgency.   Musculoskeletal: Negative for myalgias, neck pain and neck stiffness.   Neurological: Negative for seizures, syncope, speech difficulty, light-headedness and headaches.   Psychiatric/Behavioral: Negative for confusion.   All other systems reviewed and are negative.      Past Medical History:   Diagnosis Date   • Atrial fibrillation    • CHF (congestive heart failure)    • COPD (chronic obstructive pulmonary disease)    • Coronary artery disease    • Diabetes mellitus    • GERD (gastroesophageal reflux disease)    • Head trauma in child    • Hyperlipemia    • Hypertension    • Insomnia    • FAUSTO (obstructive sleep apnea)        No Known Allergies    Past Surgical History:   Procedure Laterality Date   • APPENDECTOMY     • CORONARY STENT PLACEMENT  2010   • HAND SURGERY Left    • KNEE ARTHROSCOPY Left    • MULTIPLE TOOTH EXTRACTIONS         Family History   Problem Relation Age of Onset   • Cancer Mother    • Diabetes Mother    • Diabetes Father    • Heart disease Father        Social History     Social History   • Marital status: Unknown     Spouse name: N/A   • Number of children: N/A   • Years of education: N/A     Social History Main Topics   • Smoking  status: Former Smoker     Packs/day: 1.00     Years: 47.00     Types: Cigarettes     Quit date: 2014   • Smokeless tobacco: Never Used   • Alcohol use No   • Drug use: No   • Sexual activity: Defer     Other Topics Concern   • None     Social History Narrative   • None         Objective   Physical Exam   Constitutional: He is oriented to person, place, and time. He appears well-developed.  Non-toxic appearance. He appears distressed.   HENT:   Head: Normocephalic and atraumatic.   Right Ear: Tympanic membrane, external ear and ear canal normal.   Left Ear: Tympanic membrane, external ear and ear canal normal.   Nose: Nose normal. No nasal septal hematoma.   Mouth/Throat: Oropharynx is clear and moist. Mucous membranes are not dry. No oral lesions. No trismus in the jaw. No dental abscesses or uvula swelling. No posterior oropharyngeal erythema or tonsillar abscesses. No tonsillar exudate.   Eyes: EOM are normal. Pupils are equal, round, and reactive to light. Right conjunctiva is injected. Left conjunctiva is injected.   Neck: Normal range of motion. Neck supple. No JVD present. No tracheal tenderness present. No rigidity. Normal range of motion present.   Cardiovascular: Regular rhythm and normal heart sounds.  Tachycardia present.  Exam reveals no gallop and no friction rub.    Pulmonary/Chest: Tachypnea noted. He is in respiratory distress (mild). He has wheezes. He has rales. He exhibits no tenderness.   Abdominal: Soft. Bowel sounds are normal. He exhibits no distension, no pulsatile midline mass and no mass. There is no tenderness. There is no rigidity, no rebound, no guarding and no tenderness at McBurney's point.   No signs of acute abdomen.  No pain at McBurney's point.  No pulsatile abdominal mass.   Musculoskeletal: Normal range of motion. He exhibits edema (2+ edema in the bilateral lower extremities). He exhibits no tenderness or deformity.   Legs symmetrical in size.   Lymphadenopathy:     He has no  cervical adenopathy.   Neurological: He is alert and oriented to person, place, and time. He has normal strength. He displays no tremor. No cranial nerve deficit. He exhibits normal muscle tone.   5/5 strength bilaterally with flexion and extension of fingers, wrist, elbows, knees and hips as well as plantar and dorsiflexion of the foot.   Skin: Skin is warm. No rash noted. He is diaphoretic. No erythema.   Psychiatric: He has a normal mood and affect. His speech is normal and behavior is normal. Judgment and thought content normal. He is attentive.   Nursing note and vitals reviewed.      Procedures         ED Course  ED Course   Value Comment By Time    I had a long and detailed conversation with the patient about expected course as well as the potential differential diagnoses.  I think CHF exacerbation is very likely in this patient given the story.  I will order nitroglycerin paste to the patient's chest and also Lasix.  I have obtained a chest x-ray which shows positive vascular congestion, cardiomegaly, as well as CHF.  We'll see how the patient does with diuresis before making ultimate disposition determination.  Patient agreeable to the plan. Alexandre James MD 04/18 1209   Base Excess, Arterial: (!) 5.5 (Reviewed) Romeo Vargas 04/18 1355    I've been back to the patient's bedside on numerous occasions.  I've got to the laboratory studies as well as my clinical judgment medical decision making process in detail with the patient and his 2 daughters.  Although the patient is feeling much improved, he had prolonged hypoxia with oxygen saturations in the mid to low 80% range with oxygenation via nasal cannula at 4 L.  The patient finally leveled with us and let me know as well as his daughters, that he has not been taking any was medications compliantly for quite some time.  He tells me he has not been taking his medications for diabetes, CHF, or hypertension.  At this point, with the chest x-ray shows  early congestive heart failure coupled with an ABG that shows marketed elevation of pCO2 in addition to the patient's clinical picture, I think her dealing most likely with a mixed COPD/CHF exacerbation.  The patient is diuresing nicely here and is subjectively feeling better.  That being said, he continues to be hypoxic off of oxygenation and claims not to have oxygen at home.  I the best course of care is to bring him in for respiratory therapy, and some time to ensure that his medications are appropriate.  Patient and family feel comfortable with this plan and patient has been very appreciative for care.  Patient will be admitted to Dr. Nicole Cooper for COPD exacerbation, hypoxia, hypercapnia and hyperglycemia. Alexandre James MD 04/18 1714     Recent Results (from the past 24 hour(s))   Basic Metabolic Panel    Collection Time: 04/19/17  5:55 AM   Result Value Ref Range    Glucose 325 (H) 70 - 100 mg/dL    BUN 21 9 - 23 mg/dL    Creatinine 1.00 0.60 - 1.30 mg/dL    Sodium 139 132 - 146 mmol/L    Potassium 4.6 3.5 - 5.5 mmol/L    Chloride 97 (L) 99 - 109 mmol/L    CO2 34.0 (H) 20.0 - 31.0 mmol/L    Calcium 8.9 8.7 - 10.4 mg/dL    eGFR Non African Amer 75 >60 mL/min/1.73    BUN/Creatinine Ratio 21.0 7.0 - 25.0    Anion Gap 8.0 3.0 - 11.0 mmol/L   CBC Auto Differential    Collection Time: 04/19/17  5:55 AM   Result Value Ref Range    WBC 6.09 3.50 - 10.80 10*3/mm3    RBC 4.74 4.20 - 5.76 10*6/mm3    Hemoglobin 14.3 13.1 - 17.5 g/dL    Hematocrit 49.7 38.9 - 50.9 %    .9 (H) 80.0 - 99.0 fL    MCH 30.2 27.0 - 31.0 pg    MCHC 28.8 (L) 32.0 - 36.0 g/dL    RDW 14.4 11.3 - 14.5 %    RDW-SD 55.6 (H) 37.0 - 54.0 fl    MPV 11.3 6.0 - 12.0 fL    Platelets 183 150 - 450 10*3/mm3    Neutrophil % 80.1 (H) 41.0 - 71.0 %    Lymphocyte % 17.9 (L) 24.0 - 44.0 %    Monocyte % 1.3 0.0 - 12.0 %    Eosinophil % 0.0 0.0 - 3.0 %    Basophil % 0.2 0.0 - 1.0 %    Immature Grans % 0.5 0.0 - 0.6 %    Neutrophils, Absolute 4.88  1.50 - 8.30 10*3/mm3    Lymphocytes, Absolute 1.09 0.60 - 4.80 10*3/mm3    Monocytes, Absolute 0.08 0.00 - 1.00 10*3/mm3    Eosinophils, Absolute 0.00 (L) 0.10 - 0.30 10*3/mm3    Basophils, Absolute 0.01 0.00 - 0.20 10*3/mm3    Immature Grans, Absolute 0.03 0.00 - 0.03 10*3/mm3   TSH    Collection Time: 04/19/17  5:55 AM   Result Value Ref Range    TSH 0.423 0.350 - 5.350 mIU/mL   Vitamin B12    Collection Time: 04/19/17  5:55 AM   Result Value Ref Range    Vitamin B-12 513 211 - 911 pg/mL   Folate    Collection Time: 04/19/17  5:55 AM   Result Value Ref Range    Folate 15.93 3.20 - 20.00 ng/mL   Lipid Panel    Collection Time: 04/19/17  5:55 AM   Result Value Ref Range    Total Cholesterol 135 0 - 200 mg/dL    Triglycerides 86 0 - 150 mg/dL    HDL Cholesterol 39 (L) 40 - 60 mg/dL    LDL Cholesterol  92 0 - 130 mg/dL   POC Glucose Fingerstick    Collection Time: 04/19/17  7:54 AM   Result Value Ref Range    Glucose 317 (H) 70 - 130 mg/dL   Adult Transthoracic Echo Complete With Contrast    Collection Time: 04/19/17 11:45 AM   Result Value Ref Range    BSA 2.6 m^2    IVSd 1.3 cm    LVIDd 4.5 cm    LVIDs 3.1 cm    LVPWd 1.2 cm    IVS/LVPW 1.1     FS 30.2 %    EDV(Teich) 91.2 ml    ESV(Teich) 38.6 ml    EF(Teich) 57.7 %    EDV(cubed) 89.6 ml    ESV(cubed) 30.5 ml    EF(cubed) 66.0 %    LV mass(C)d 205.7 grams    LV mass(C)dI 79.6 grams/m^2    SV(Teich) 52.6 ml    SI(Teich) 20.3 ml/m^2    SV(cubed) 59.1 ml    SI(cubed) 22.9 ml/m^2    Ao root diam 2.8 cm    Ao root area 6.1 cm^2    LA dimension 4.8 cm    LA/Ao 1.7     Ao root area (BSA corrected) 1.1     MV E max cyndi 101.2 cm/sec    MV P1/2t max cyndi 120.5 cm/sec    MV P1/2t 82.3 msec    MVA(P1/2t) 2.7 cm^2    MV dec slope 428.7 cm/sec^2    MV dec time 0.17 sec    PA acc slope 611.1 cm/sec^2    PA acc time 0.14 sec    RV V1 max PG 2.3 mmHg    RV V1 max 76.5 cm/sec    PA pr(Accel) 17.2 mmHg    MVA P1/2T LCG 1.8 cm^2    BH CV ECHO MATT - BZI_BMI 45.0 kilograms/m^2    BH CV  [facility] 2.8 m^2     CV ECHO MATT - BZI_METRIC_WEIGHT 146.5 kg    BH CV ECHO MATT - BZI_METRIC_HEIGHT 180.3 cm    RV Base 3.70 cm    RV Length 6.60 cm    RV Mid 3.10 cm    LA volume 37.0 cm3    LA Volume Index 14.3 mL/m2    Echo EF Estimated 70 %   POC Glucose Fingerstick    Collection Time: 04/19/17 12:01 PM   Result Value Ref Range    Glucose 422 (H) 70 - 130 mg/dL   POC Glucose Fingerstick    Collection Time: 04/19/17  4:52 PM   Result Value Ref Range    Glucose 331 (H) 70 - 130 mg/dL   POC Glucose Fingerstick    Collection Time: 04/19/17  9:19 PM   Result Value Ref Range    Glucose 349 (H) 70 - 130 mg/dL     Note: In addition to lab results from this visit, the labs listed above may include labs taken at another facility or during a different encounter within the last 24 hours. Please correlate lab times with ED admission and discharge times for further clarification of the services performed during this visit.    XR Chest 1 View   Final Result   Cardiomegaly is noted with vascular congestive edema   diffusely and cephalization of the upper lobe veins. Early congestive   heart failure is favored.       D:  04/18/2017   E:  04/18/2017       This report was finalized on 4/18/2017 3:12 PM by Dr. Raul Green MD.            Vitals:    04/19/17 0800 04/19/17 1254 04/19/17 1932 04/19/17 2148   BP:    105/61   BP Location:       Patient Position:       Pulse:  108 111 98   Resp:  18 20 18   Temp:    97.5 °F (36.4 °C)   TempSrc:    Oral   SpO2: (!) 83% (!) 88% (!) 81%    Weight:       Height:         Medications   sodium chloride 0.9 % flush 10 mL (not administered)   sodium chloride 0.9 % flush 1-10 mL (not administered)   dextrose (GLUTOSE) oral gel 15 g (not administered)   dextrose (D50W) solution 25 g (not administered)   glucagon (GLUCAGEN) injection 1 mg (not administered)   acetaminophen (TYLENOL) tablet 650 mg (not administered)   bisacodyl (DULCOLAX) EC tablet 5 mg (not administered)    bisacodyl (DULCOLAX) suppository 10 mg (not administered)   LORazepam (ATIVAN) tablet 1 mg (not administered)   ipratropium-albuterol (DUO-NEB) nebulizer solution 3 mL (3 mL Nebulization Given 4/19/17 1930)   albuterol (PROVENTIL) nebulizer solution 0.083% 2.5 mg/3mL (not administered)   furosemide (LASIX) injection 40 mg (40 mg Intravenous Given 4/19/17 1833)   insulin lispro (humaLOG) injection 5 Units (5 Units Subcutaneous Given 4/19/17 1834)   hydrALAZINE (APRESOLINE) injection 20 mg (not administered)   nitroglycerin (NITROSTAT) ointment 0.5 inch (not administered)   aspirin EC tablet 325 mg (325 mg Oral Given 4/19/17 0955)   carvedilol (COREG) tablet 25 mg (25 mg Oral Given 4/19/17 1833)   insulin lispro (humaLOG) injection 0-14 Units (10 Units Subcutaneous Given 4/19/17 2153)   dabigatran etexilate (PRADAXA) capsule 150 mg (not administered)   Pharmacy Consult - MTM ( Does not apply Not Given 4/19/17 1658)   ipratropium-albuterol (DUO-NEB) nebulizer solution 3 mL (3 mL Nebulization Given 4/18/17 1206)   nitroglycerin (NITROSTAT) ointment 2 inch (2 inches Topical Given 4/18/17 1203)   furosemide (LASIX) injection 60 mg (60 mg Intravenous Given 4/18/17 1229)   methylPREDNISolone sodium succinate (SOLU-Medrol) injection 125 mg (125 mg Intravenous Given 4/18/17 1517)   albuterol (PROVENTIL) nebulizer solution 0.083% 2.5 mg/3mL (2.5 mg Nebulization Given 4/18/17 1729)   Sulfur Hexafluoride Microsph 60.7-25 MG reconstituted suspension 5 mL (5 mL Intravenous Given 4/19/17 1130)   digoxin (LANOXIN) injection 125 mcg (125 mcg Intravenous Given 4/19/17 1833)     ECG/EMG Results (last 24 hours)     Procedure Component Value Units Date/Time    ECG 12 Lead [[id number]] Collected:  04/18/17 1153     Updated:  04/18/17 1155                       [facility]    Final diagnoses:   Acute on chronic congestive heart failure, unspecified congestive heart failure type   Respiratory distress   Shortness of breath   COPD exacerbation    Hypoxia   Hypercapnia   Hyperglycemia       Documentation assistance provided by elise Jarvis.  Information recorded by the elise was done at my direction and has been verified and validated by me.     Brian Jarvis  04/18/17 1205       Brian Jarvis  04/18/17 1318       Brian Jarvis  04/18/17 1556       Alexandre James MD  04/20/17 0141

## 2017-04-18 NOTE — H&P
"    [facility] Medicine Services  HISTORY AND PHYSICAL    Primary Care Physician: Javan Shankar MD; Dr. So, Pulmonary; Dr. Giron; Cardiology    Subjective     Chief Complaint:  Shortness of air    History of Present Illness:     65 yo male presented to ED with c/o shortness of air.  Pertinent history of DM, HTN, CAD, CHF, COPD, with shortness of air on home oxygen prn at baseline.  Significant worsening of shortness of over last 1-2 days with increased lower extremity edema; having to use oxygen all the time.  Shortness of air is worse when laying flat with intermittent chest pain. Positive GERD.  Has not been compliant with majority of his home medications \"due to cost\".  Has not had his lisinopril, HCTZ for several months; denies having lasix prescribed.  Describes not taking his metformin for months and he is noncompliant with his diet, as he is currently eating fried chicken that his family brought to him, and morbidly obese.  History of coronary artery disease with prior coronary stents X 2 in 2010.  Has seen Dr. Underwood, cardiology in the past, and currently sees Dr. Giron.  Atypical chest pain noted per PCP in Feb 2017 and was scheduled for stress test, but patient did not get this completed due to transportation issues.  Positive atrial fib; has not taken his pradaxa for several months, but thinks he has been taking his coreg and daily ASA.    65 yo with HO of CHF, DM and FAUSTO who has stopped taking his medications and has had worsening shortness of breath, he present in acute mixed respiratory failure but has responded well to nebs, steroids and diuresis in the ER he says he felt great when he had his FAUSTO  Review of Systems   Constitutional: Positive for activity change and chills. Negative for fever.        Decreased activity.   HENT: Positive for congestion and sore throat. Negative for postnasal drip and rhinorrhea.    Eyes: Negative for visual disturbance. "   Respiratory: Positive for cough, shortness of breath and wheezing.    Cardiovascular: Positive for chest pain, palpitations and leg swelling.   Gastrointestinal: Positive for constipation and diarrhea. Negative for abdominal pain, blood in stool, nausea and vomiting.   Endocrine: Positive for polyphagia and polyuria.   Genitourinary: Negative for difficulty urinating and dysuria.   Skin: Negative for rash and wound.   Neurological: Negative for seizures and headaches.   Psychiatric/Behavioral: Negative for confusion and hallucinations.      Otherwise complete ROS performed and negative except as mentioned in the HPI.    Past Medical History:   Diagnosis Date   • Atrial fibrillation    • CHF (congestive heart failure)    • COPD (chronic obstructive pulmonary disease)    • Coronary artery disease    • Diabetes mellitus    • GERD (gastroesophageal reflux disease)    • Head trauma in child    • Hyperlipemia    • Hypertension    • Insomnia    • FAUSTO (obstructive sleep apnea)        Past Surgical History:   Procedure Laterality Date   • APPENDECTOMY     • CORONARY STENT PLACEMENT  2010   • HAND SURGERY Left    • KNEE ARTHROSCOPY Left    • MULTIPLE TOOTH EXTRACTIONS         Family History   Problem Relation Age of Onset   • Cancer Mother    • Diabetes Mother    • Diabetes Father    • Heart disease Father        Social History     Social History   • Marital status: Single       Social History Main Topics   • Smoking status: Former Smoker     Packs/day: 1.00     Years: 47.00     Types: Cigarettes     Quit date: 2014   • Smokeless tobacco: Never Used   • Alcohol use No   • Drug use: No     Lives alone.    Medications:   Outpatient Medications     aspirin  MG tablet     Has not taken for several months. atorvastatin (LIPITOR) 20 MG tablet      BYDUREON 2 MG pen-injector      carvedilol (COREG) 25 MG tablet      Has not taken for several months. dabigatran etexilate (PRADAXA) 150 MG capsu      Has not taken for several  "months. glimepiride (AMARYL) 2 MG tablet      Has not taken for several months. hydrochlorothiazide (HYDRODIURIL) 25 MG tablet      Has not taken for several months. lisinopril (PRINIVIL,ZESTRIL) 40 MG tablet      Has not taken for several months. metFORMIN ER (GLUCOPHAGE-XR) 500 MG 24 hr tablet      Has not taken for several months. omeprazole (priLOSEC) 40 MG capsule      Has not taken for several months. traZODone (DESYREL) 50 MG tablet      Has not taken for several months. vitamin D (ERGOCALCIFEROL) 35569 UNITS capsule capsule        Allergies:  No Known Allergies      Objective     Physical Exam:  Vital Signs: /73  Pulse 105  Temp 98.5 °F (36.9 °C) (Axillary)   Resp 25  Ht 72\" (182.9 cm)  Wt (!) 321 lb (146 kg)  SpO2 (!) 85%  BMI 43.54 kg/m2  Physical Exam  Patient is alert and talkative in no distress at rest on 5LPM of oxygen at 8pm.  Neck is without mass or JVD  Heart is Reg wo murmur distant  Lungs have good air entry  Abd is soft without HSM or mass  MAEW 2+ edema  Skin is without rash  Neurologic exam in nonfocal   Mood is appropriate he's eating [facility]        Results Reviewed:  Lab Results (last 24 hours)     Procedure Component Value Units Date/Time    POC Troponin, Rapid [[id number]]  (Normal) Collected:  04/18/17 1211     Troponin I 0.03 ng/mL     Lactic Acid, Plasma [[id number]]  (Normal) Collected:  04/18/17 1204     Lactate 1.2 mmol/L     BNP [[id number]]  (Abnormal) Collected:  04/18/17 1204    Specimen:  Blood Updated:  04/18/17 1249     .0 (H) pg/mL     CBC Auto Differential [[id number]]  (Abnormal) Collected:  04/18/17 1204    Specimen:  Blood Updated:  04/18/17 1310     WBC 7.34 10*3/mm3      RBC 4.68 10*6/mm3      Hemoglobin 14.6 g/dL      Hematocrit 49.1 %      .9 (H) fL      MCH 31.2 (H) pg      MCHC 29.7 (L) g/dL      RDW 14.8 (H) %      RDW-SD 55.9 (H) fl      MPV 11.5 fL      Platelets 195 10*3/mm3      Neutrophil % 56.4 %      Lymphocyte % 28.5 %      Monocyte % 12.4 " (H) %      Eosinophil % 2.2 %      Basophil % 0.1 %      Immature Grans % 0.4 %      Neutrophils, Absolute 4.14 10*3/mm3      Lymphocytes, Absolute 2.09 10*3/mm3      Monocytes, Absolute 0.91 10*3/mm3      Eosinophils, Absolute 0.16 10*3/mm3      Basophils, Absolute 0.01 10*3/mm3      Immature Grans, Absolute 0.03 10*3/mm3     Scan Slide [[id number]] Collected:  04/18/17 1204    Specimen:  Blood Updated:  04/18/17 1310     Macrocytes Slight/1+     WBC Morphology Normal     Platelet Morphology Normal    Comprehensive Metabolic Panel [[id number]]  (Abnormal) Collected:  04/18/17 1204    Specimen:  Blood Updated:  04/18/17 1322     Glucose 288 (H) mg/dL      BUN 13 mg/dL      Creatinine 1.10 mg/dL      Sodium 139 mmol/L      Potassium 5.0 mmol/L      Chloride 98 (L) mmol/L      CO2 41.0 (C) mmol/L       Verified by repeat analysis.         Calcium 9.1 mg/dL      Total Protein 7.7 g/dL      Albumin 4.30 g/dL      ALT (SGPT) 70 (H) U/L      AST (SGOT) 68 (H) U/L      Alkaline Phosphatase 71 U/L      Total Bilirubin 0.4 mg/dL      eGFR Non African Amer 67 mL/min/1.73      Globulin 3.4 gm/dL      A/G Ratio 1.3 (L) g/dL      BUN/Creatinine Ratio 11.8     Anion Gap 0.0 (L) mmol/L     Blood Gas, Arterial [[id number]]  (Abnormal) Collected:  04/18/17 1337     Site Arterial: left radial     Ej's Test Positive     pH, Arterial 7.227 (L) pH units      pCO2, Arterial 83.5 (C) mm Hg      pO2, Arterial 137.0 (H) mm Hg      HCO3, Arterial 34.7 (H) mmol/L      Base Excess, Arterial 5.5 (H) mmol/L      Hemoglobin, Blood Gas 15.0 g/dL      Hematocrit, Blood Gas 45.9 %      Oxyhemoglobin 96.1 %      Methemoglobin 1.0 %      Carboxyhemoglobin 1.5 %      CO2 Content 37.3 (H)     Barometric Pressure for Blood Gas -- mmHg       N/A        Modality Mask - Nonbreather     FIO2 70 %     POC Troponin, Rapid [[id number]]  (Normal) Collected:  04/18/17 1430    Specimen:  Blood Updated:  04/18/17 1450     Troponin I 0.00 ng/mL       Serial Number:  [id number]    : [id number]        Imaging Results (last 24 hours)     Procedure Component Value Units Date/Time    XR Chest 1 View [[id number]] Collected:  04/18/17 1512     Updated:  04/18/17 1514    Narrative:       EXAMINATION: XR CHEST 1 VIEW-04/18/2017:      INDICATION: SOA, triage protocol.      COMPARISON: NONE.     FINDINGS: Cardiomegaly is noted. There is prominence of the hilar  vascular markings, cephalization of the upper lobe veins and there is  diffuse vascular plethora throughout the lungs. These findings are all  new when compared to 04/26/2014. Early congestive heart failure is the  favored consideration.           Impression:       Cardiomegaly is noted with vascular congestive edema  diffusely and cephalization of the upper lobe veins. Early congestive  heart failure is favored.     D:  04/18/2017  E:  04/18/2017     This report was finalized on 4/18/2017 3:12 PM by Dr. Raul Green MD.           I have personally reviewed and interpreted available lab data, radiology studies and ECG obtained at time of admission.     Assessment / Plan     Assessment/Problem List:   Hospital Problem List     * (Principal)Acute on chronic congestive heart failure    Chronic obstructive pulmonary disease with acute exacerbation    FAUSTO (obstructive sleep apnea)    Type 2 diabetes mellitus    Hypertension    history CAD (coronary artery disease)    Atrial fibrillation    GERD (gastroesophageal reflux disease)    Elevated LFTs    Medically noncompliant    Morbid obesity      Plan:  67 yo with acute on chronic respiratory failure due to acute on chronic heart failure-  Will diurese and give short burst of steroids, will give insulin, use bipap for sleep, follow plan per clinical course.  DVT prophylaxis:lovenox for now  Code Status: full  Admission Status: Patient will be admitted to inpatient for his multi organ failure.  Grace Romeo MD 04/18/17 11:52 PM

## 2017-04-18 NOTE — PROGRESS NOTES
Discharge Planning Assessment  [facility]     Patient Name: Chito Rod  MRN: [id number]  Today's Date: 4/18/2017    Admit Date: 4/18/2017          Discharge Needs Assessment       04/18/17 1820    Living Environment    Lives With alone    Living Arrangements house    Provides Primary Care For no one    Quality Of Family Relationships supportive    Discharge Needs Assessment    Concerns To Be Addressed discharge planning concerns    Concerns Comments Pt oxygen provider ([employer]) is out of network and pt wants to select a new provider, please arrange this prior to DC    Equipment Currently Used at Home oxygen   cont. oxygen @ 2L/NC     Equipment Needed After Discharge none    Discharge Facility/Level Of Care Needs home with home health    Transportation Available family or friend will provide    Discharge Disposition still a patient    Discharge Contact Information if Applicable [phone]            Discharge Plan       04/18/17 1822    Case Management/Social Work Plan    Plan Home with     Patient/Family In Agreement With Plan yes    Additional Comments Spoke with pt. at . Pt lives alone in [place]. Pt. is independent with ADL's and mobility. Pt has  cont. oxygen @ 2L/NC provided by [employer], however pt wants to switch providers prior to DC. Pt has no other DME or HH. Pt is agreeable to HH at [place] if he qualifies. His  PCP is Obdulio Nunez and medications are covered by insurance. Plan for discharge is to return home possibly with HH. Pt  to transport home with family, when medically ready. Case Management will follow and assist with any discharge planning needs.        Discharge Placement     No information found                Demographic Summary       04/18/17 1812    Referral Information    Arrived From home or self-care    Reason For Consult discharge planning    Contact Information    Permission Granted to Share Information With family/designee    Comments Kristyn Luque (sister) [phone]     Primary Care Physician Information    Name Obdulio Nunez            Functional Status       04/18/17 1819    Functional Status Prior    Ambulation 0-->independent    Transferring 0-->independent    Toileting 0-->independent    Bathing 0-->independent    Dressing 0-->independent    Eating 0-->independent    Communication 0-->understands/communicates without difficulty    Swallowing 0-->swallows foods/liquids without difficulty    IADL    Medications independent    Meal Preparation independent    Housekeeping independent    Laundry independent    Shopping independent    Oral Care independent    Activity Tolerance    Current Activity Limitations none    Usual Activity Tolerance fair    Current Activity Tolerance poor    Employment/Financial    Employment/Finance Comments Healthcare and medication coverage: [employer]            Psychosocial     None            Abuse/Neglect     None            Legal     None            Substance Abuse     None            Patient Forms     None          Mariana Frias RN

## 2017-04-19 ENCOUNTER — APPOINTMENT (OUTPATIENT)
Dept: CARDIOLOGY | Facility: HOSPITAL | Age: 67
End: 2017-04-19
Attending: INTERNAL MEDICINE

## 2017-04-19 PROBLEM — I50.33 ACUTE ON CHRONIC DIASTOLIC CONGESTIVE HEART FAILURE: Status: ACTIVE | Noted: 2017-04-18

## 2017-04-19 PROBLEM — J96.01 ACUTE RESPIRATORY FAILURE WITH HYPOXIA AND HYPERCAPNIA (HCC): Status: ACTIVE | Noted: 2017-04-19

## 2017-04-19 PROBLEM — J96.02 ACUTE RESPIRATORY FAILURE WITH HYPOXIA AND HYPERCAPNIA (HCC): Status: ACTIVE | Noted: 2017-04-19

## 2017-04-19 LAB
ANION GAP SERPL CALCULATED.3IONS-SCNC: 8 MMOL/L (ref 3–11)
ARTICHOKE IGE QN: 92 MG/DL (ref 0–130)
BASOPHILS # BLD AUTO: 0.01 10*3/MM3 (ref 0–0.2)
BASOPHILS NFR BLD AUTO: 0.2 % (ref 0–1)
BH CV ECHO MEAS - AO ROOT AREA (BSA CORRECTED): 1.1
BH CV ECHO MEAS - AO ROOT AREA: 6.1 CM^2
BH CV ECHO MEAS - AO ROOT DIAM: 2.8 CM
BH CV ECHO MEAS - BSA(HAYCOCK): 2.8 M^2
BH CV ECHO MEAS - BSA: 2.6 M^2
BH CV ECHO MEAS - BZI_BMI: 45 KILOGRAMS/M^2
BH CV ECHO MEAS - BZI_METRIC_HEIGHT: 180.3 CM
BH CV ECHO MEAS - BZI_METRIC_WEIGHT: 146.5 KG
BH CV ECHO MEAS - EDV(CUBED): 89.6 ML
BH CV ECHO MEAS - EDV(TEICH): 91.2 ML
BH CV ECHO MEAS - EF(CUBED): 66 %
BH CV ECHO MEAS - EF(TEICH): 57.7 %
BH CV ECHO MEAS - ESV(CUBED): 30.5 ML
BH CV ECHO MEAS - ESV(TEICH): 38.6 ML
BH CV ECHO MEAS - FS: 30.2 %
BH CV ECHO MEAS - IVS/LVPW: 1.1
BH CV ECHO MEAS - IVSD: 1.3 CM
BH CV ECHO MEAS - LA DIMENSION: 4.8 CM
BH CV ECHO MEAS - LA/AO: 1.7
BH CV ECHO MEAS - LV MASS(C)D: 205.7 GRAMS
BH CV ECHO MEAS - LV MASS(C)DI: 79.6 GRAMS/M^2
BH CV ECHO MEAS - LVIDD: 4.5 CM
BH CV ECHO MEAS - LVIDS: 3.1 CM
BH CV ECHO MEAS - LVPWD: 1.2 CM
BH CV ECHO MEAS - MV DEC SLOPE: 428.7 CM/SEC^2
BH CV ECHO MEAS - MV DEC TIME: 0.17 SEC
BH CV ECHO MEAS - MV E MAX VEL: 101.2 CM/SEC
BH CV ECHO MEAS - MV P1/2T MAX VEL: 120.5 CM/SEC
BH CV ECHO MEAS - MV P1/2T: 82.3 MSEC
BH CV ECHO MEAS - MVA P1/2T LCG: 1.8 CM^2
BH CV ECHO MEAS - MVA(P1/2T): 2.7 CM^2
BH CV ECHO MEAS - PA ACC SLOPE: 611.1 CM/SEC^2
BH CV ECHO MEAS - PA ACC TIME: 0.14 SEC
BH CV ECHO MEAS - PA PR(ACCEL): 17.2 MMHG
BH CV ECHO MEAS - RV MAX PG: 2.3 MMHG
BH CV ECHO MEAS - RV V1 MAX: 76.5 CM/SEC
BH CV ECHO MEAS - SI(CUBED): 22.9 ML/M^2
BH CV ECHO MEAS - SI(TEICH): 20.3 ML/M^2
BH CV ECHO MEAS - SV(CUBED): 59.1 ML
BH CV ECHO MEAS - SV(TEICH): 52.6 ML
BH CV XLRA - RV BASE: 3.7 CM
BH CV XLRA - RV LENGTH: 6.6 CM
BH CV XLRA - RV MID: 3.1 CM
BUN BLD-MCNC: 21 MG/DL (ref 9–23)
BUN/CREAT SERPL: 21 (ref 7–25)
CALCIUM SPEC-SCNC: 8.9 MG/DL (ref 8.7–10.4)
CHLORIDE SERPL-SCNC: 97 MMOL/L (ref 99–109)
CHOLEST SERPL-MCNC: 135 MG/DL (ref 0–200)
CO2 SERPL-SCNC: 34 MMOL/L (ref 20–31)
CREAT BLD-MCNC: 1 MG/DL (ref 0.6–1.3)
DEPRECATED RDW RBC AUTO: 55.6 FL (ref 37–54)
EOSINOPHIL # BLD AUTO: 0 10*3/MM3 (ref 0.1–0.3)
EOSINOPHIL NFR BLD AUTO: 0 % (ref 0–3)
ERYTHROCYTE [DISTWIDTH] IN BLOOD BY AUTOMATED COUNT: 14.4 % (ref 11.3–14.5)
FOLATE SERPL-MCNC: 15.93 NG/ML (ref 3.2–20)
GFR SERPL CREATININE-BSD FRML MDRD: 75 ML/MIN/1.73
GLUCOSE BLD-MCNC: 325 MG/DL (ref 70–100)
GLUCOSE BLDC GLUCOMTR-MCNC: 317 MG/DL (ref 70–130)
GLUCOSE BLDC GLUCOMTR-MCNC: 331 MG/DL (ref 70–130)
GLUCOSE BLDC GLUCOMTR-MCNC: 349 MG/DL (ref 70–130)
GLUCOSE BLDC GLUCOMTR-MCNC: 422 MG/DL (ref 70–130)
HCT VFR BLD AUTO: 49.7 % (ref 38.9–50.9)
HDLC SERPL-MCNC: 39 MG/DL (ref 40–60)
HGB BLD-MCNC: 14.3 G/DL (ref 13.1–17.5)
IMM GRANULOCYTES # BLD: 0.03 10*3/MM3 (ref 0–0.03)
IMM GRANULOCYTES NFR BLD: 0.5 % (ref 0–0.6)
LEFT ATRIUM VOLUME INDEX: 14.3 ML/M2
LEFT ATRIUM VOLUME: 37 CM3
LV EF 2D ECHO EST: 70 %
LYMPHOCYTES # BLD AUTO: 1.09 10*3/MM3 (ref 0.6–4.8)
LYMPHOCYTES NFR BLD AUTO: 17.9 % (ref 24–44)
MCH RBC QN AUTO: 30.2 PG (ref 27–31)
MCHC RBC AUTO-ENTMCNC: 28.8 G/DL (ref 32–36)
MCV RBC AUTO: 104.9 FL (ref 80–99)
MONOCYTES # BLD AUTO: 0.08 10*3/MM3 (ref 0–1)
MONOCYTES NFR BLD AUTO: 1.3 % (ref 0–12)
NEUTROPHILS # BLD AUTO: 4.88 10*3/MM3 (ref 1.5–8.3)
NEUTROPHILS NFR BLD AUTO: 80.1 % (ref 41–71)
PLATELET # BLD AUTO: 183 10*3/MM3 (ref 150–450)
PMV BLD AUTO: 11.3 FL (ref 6–12)
POTASSIUM BLD-SCNC: 4.6 MMOL/L (ref 3.5–5.5)
RBC # BLD AUTO: 4.74 10*6/MM3 (ref 4.2–5.76)
SODIUM BLD-SCNC: 139 MMOL/L (ref 132–146)
TRIGL SERPL-MCNC: 86 MG/DL (ref 0–150)
TSH SERPL DL<=0.05 MIU/L-ACNC: 0.42 MIU/ML (ref 0.35–5.35)
VIT B12 BLD-MCNC: 513 PG/ML (ref 211–911)
WBC NRBC COR # BLD: 6.09 10*3/MM3 (ref 3.5–10.8)

## 2017-04-19 PROCEDURE — 94660 CPAP INITIATION&MGMT: CPT

## 2017-04-19 PROCEDURE — 25010000002 DIGOXIN PER 500 MCG: Performed by: INTERNAL MEDICINE

## 2017-04-19 PROCEDURE — 63710000001 INSULIN LISPRO (HUMAN) PER 5 UNITS: Performed by: INTERNAL MEDICINE

## 2017-04-19 PROCEDURE — 99233 SBSQ HOSP IP/OBS HIGH 50: CPT | Performed by: INTERNAL MEDICINE

## 2017-04-19 PROCEDURE — 25010000002 FUROSEMIDE PER 20 MG: Performed by: INTERNAL MEDICINE

## 2017-04-19 PROCEDURE — 94760 N-INVAS EAR/PLS OXIMETRY 1: CPT

## 2017-04-19 PROCEDURE — 94799 UNLISTED PULMONARY SVC/PX: CPT

## 2017-04-19 PROCEDURE — 82607 VITAMIN B-12: CPT | Performed by: INTERNAL MEDICINE

## 2017-04-19 PROCEDURE — 25010000002 SULFUR HEXAFLUORIDE MICROSPH 60.7-25 MG RECONSTITUTED SUSPENSION: Performed by: INTERNAL MEDICINE

## 2017-04-19 PROCEDURE — 84443 ASSAY THYROID STIM HORMONE: CPT | Performed by: INTERNAL MEDICINE

## 2017-04-19 PROCEDURE — C8929 TTE W OR WO FOL WCON,DOPPLER: HCPCS

## 2017-04-19 PROCEDURE — 82746 ASSAY OF FOLIC ACID SERUM: CPT | Performed by: INTERNAL MEDICINE

## 2017-04-19 PROCEDURE — 25010000002 ENOXAPARIN PER 10 MG: Performed by: INTERNAL MEDICINE

## 2017-04-19 PROCEDURE — 80061 LIPID PANEL: CPT | Performed by: INTERNAL MEDICINE

## 2017-04-19 PROCEDURE — 82962 GLUCOSE BLOOD TEST: CPT

## 2017-04-19 PROCEDURE — 93306 TTE W/DOPPLER COMPLETE: CPT | Performed by: INTERNAL MEDICINE

## 2017-04-19 PROCEDURE — 94640 AIRWAY INHALATION TREATMENT: CPT

## 2017-04-19 PROCEDURE — 85025 COMPLETE CBC W/AUTO DIFF WBC: CPT | Performed by: INTERNAL MEDICINE

## 2017-04-19 PROCEDURE — 25010000002 METHYLPREDNISOLONE PER 40 MG: Performed by: INTERNAL MEDICINE

## 2017-04-19 PROCEDURE — 80048 BASIC METABOLIC PNL TOTAL CA: CPT | Performed by: INTERNAL MEDICINE

## 2017-04-19 RX ORDER — DIGOXIN 0.25 MG/ML
125 INJECTION INTRAMUSCULAR; INTRAVENOUS ONCE
Status: COMPLETED | OUTPATIENT
Start: 2017-04-19 | End: 2017-04-19

## 2017-04-19 RX ORDER — DABIGATRAN ETEXILATE 150 MG/1
150 CAPSULE ORAL EVERY 12 HOURS SCHEDULED
Status: DISCONTINUED | OUTPATIENT
Start: 2017-04-20 | End: 2017-04-24 | Stop reason: HOSPADM

## 2017-04-19 RX ADMIN — ASPIRIN 325 MG: 325 TABLET, DELAYED RELEASE ORAL at 09:55

## 2017-04-19 RX ADMIN — INSULIN LISPRO 5 UNITS: 100 INJECTION, SOLUTION INTRAVENOUS; SUBCUTANEOUS at 18:34

## 2017-04-19 RX ADMIN — ENOXAPARIN SODIUM 40 MG: 40 INJECTION SUBCUTANEOUS at 09:55

## 2017-04-19 RX ADMIN — INSULIN LISPRO 5 UNITS: 100 INJECTION, SOLUTION INTRAVENOUS; SUBCUTANEOUS at 09:54

## 2017-04-19 RX ADMIN — INSULIN LISPRO 10 UNITS: 100 INJECTION, SOLUTION INTRAVENOUS; SUBCUTANEOUS at 18:34

## 2017-04-19 RX ADMIN — FUROSEMIDE 40 MG: 10 INJECTION, SOLUTION INTRAMUSCULAR; INTRAVENOUS at 18:33

## 2017-04-19 RX ADMIN — FUROSEMIDE 40 MG: 10 INJECTION, SOLUTION INTRAMUSCULAR; INTRAVENOUS at 09:53

## 2017-04-19 RX ADMIN — METHYLPREDNISOLONE SODIUM SUCCINATE 40 MG: 40 INJECTION, POWDER, FOR SOLUTION INTRAMUSCULAR; INTRAVENOUS at 12:42

## 2017-04-19 RX ADMIN — IPRATROPIUM BROMIDE AND ALBUTEROL SULFATE 3 ML: .5; 3 SOLUTION RESPIRATORY (INHALATION) at 19:30

## 2017-04-19 RX ADMIN — SULFUR HEXAFLUORIDE 5 ML: KIT at 11:30

## 2017-04-19 RX ADMIN — IPRATROPIUM BROMIDE AND ALBUTEROL SULFATE 3 ML: .5; 3 SOLUTION RESPIRATORY (INHALATION) at 12:54

## 2017-04-19 RX ADMIN — CARVEDILOL 25 MG: 12.5 TABLET, FILM COATED ORAL at 18:33

## 2017-04-19 RX ADMIN — IPRATROPIUM BROMIDE AND ALBUTEROL SULFATE 3 ML: .5; 3 SOLUTION RESPIRATORY (INHALATION) at 07:23

## 2017-04-19 RX ADMIN — INSULIN LISPRO 14 UNITS: 100 INJECTION, SOLUTION INTRAVENOUS; SUBCUTANEOUS at 12:48

## 2017-04-19 RX ADMIN — CARVEDILOL 25 MG: 12.5 TABLET, FILM COATED ORAL at 09:55

## 2017-04-19 RX ADMIN — INSULIN LISPRO 10 UNITS: 100 INJECTION, SOLUTION INTRAVENOUS; SUBCUTANEOUS at 21:53

## 2017-04-19 RX ADMIN — DIGOXIN 125 MCG: 0.25 INJECTION INTRAMUSCULAR; INTRAVENOUS at 18:33

## 2017-04-19 RX ADMIN — INSULIN LISPRO 5 UNITS: 100 INJECTION, SOLUTION INTRAVENOUS; SUBCUTANEOUS at 12:42

## 2017-04-19 NOTE — PROGRESS NOTES
Adult Nutrition  Assessment/PES    Patient Name:  Chito Rod  YOB: 1950  MRN: [id number]  Admit Date:  4/18/2017    Assessment Date:  4/19/2017   Comments:  RD assessment r/t need for education indicated by MST. Diabetic nutrition education provided today. RD to continue to follow per protocol.    Additional Recommendations:  1. Consider adjustment to insulin regimen (target FSBG 140-180mg/dL)           Reason for Assessment       04/19/17 1522    Reason for Assessment    Reason For Assessment/Visit education   MST indicates pt in need of education    Identified At Risk By Screening Criteria other (see comments)   Edu    Time Spent (min) 30    Diagnosis Diagnosis    Cardiac CAD;HTN;CHF   A/C CHF, aFIB    Endocrine DM Type 2    Pulmonary/Critical Care Acute respiratory failure;COPD   FAUSTO, acute exacerbation COPD   MD diagnosis list  Principal Problem:    Acute on chronic diastolic congestive heart failure  Active Problems:    FAUSTO (obstructive sleep apnea)    Chronic obstructive pulmonary disease with acute exacerbation    Type 2 diabetes mellitus    Hypertension    history CAD (coronary artery disease)    Rapid atrial fibrillation    Elevated LFTs    Medically noncompliant    Morbid obesity    Acute respiratory failure with hypoxia and hypercapnia               Nutrition/Diet History       04/19/17 1525    Nutrition/Diet History    Factors Affecting Nutritional Intake Factors    Reported/Observed By MD;Other    Other MD notes medical noncompliance. Pt states he has not been taking medication as precribed and eats large amounts of high carbohydrate foods. Upon speaking with pt it seems that patient would greatly benefit from one on one outpt nutrition counseling regarding T2DM as well as CHF nutrition education. RD provided breif over view of carb counting but  provided specfic instructions to take medication as precribed and to limit  or omit high volume high carbohydrate foods he reports consuming  "(cake, cookies etc. ). RD provided RD contact information , printed material and outpt nutrition education contact information.   Note:Pt states he has not been eating outside food though previous MD note includes this-this is obviously affecting FSBG levels            Anthropometrics       04/19/17 1532    Anthropometrics    RD Documented Current Weight  147 kg (323 lb)    Anthropometrics (Special Considerations)    Height Used for Calculations 1.803 m (5' 11\")    Body Mass Index (BMI)    BMI Grade greater than 40 - obesity grade III            Labs/Tests/Procedures/Meds       04/19/17 1533    Labs/Tests/Procedures/Meds    Labs/Tests Review Reviewed;Hgb A1C;Glucose    Medication Review Reviewed, pertinent   SSI, Lasix     Results from last 7 days  Lab Units 04/19/17  0555 04/18/17  1204   SODIUM mmol/L 139 139   POTASSIUM mmol/L 4.6 5.0   CHLORIDE mmol/L 97* 98*   TOTAL CO2 mmol/L 34.0* 41.0*   BUN mg/dL 21 13   CREATININE mg/dL 1.00 1.10   CALCIUM mg/dL 8.9 9.1   BILIRUBIN mg/dL  --  0.4   ALK PHOS U/L  --  71   ALT (SGPT) U/L  --  70*   AST (SGOT) U/L  --  68*   GLUCOSE mg/dL 325* 288*                      Nutrition Prescription Ordered       04/19/17 1533    Nutrition Prescription PO    Current PO Diet Regular    Common Modifiers Cardiac;Consistent Carbohydrate                Problem/Interventions:        Problem 1       04/19/17 1533    Nutrition Diagnoses Problem 1    Problem 1 Knowledge Deficit    Etiology (related to) --   T2DM    Signs/Symptoms (evidenced by) Other (comment)   Pt reports no previous nutrition education received            Problem 2       04/19/17 1535    Nutrition Diagnoses Problem 2    Problem 2 Altered Nutrition Related to Labs    Etiology (related to) --   T2DM, clinical status, dietary and lifestyle choices    Signs/Symptoms (evidenced by) --   HgbA1C 10.2%, FSBG 422/317/325                  Intervention Goal       04/19/17 1536    Intervention Goal    General Nutrition support " treatment;Improved nutrition related lab(s)   FSBG 140-180mg/dL HgbA1C <7%            Nutrition Intervention       04/19/17 1536    Nutrition Intervention    RD/Tech Action Care plan reviewd;Follow Tx progress   edu provided              Education/Evaluation       04/19/17 1537    Education    Education Education topics;Provided education regarding    Provided education regarding Healthy eating for diabetes;Diet rationale;Key food habit change    Education Topics CHO counting;Diabetes    Monitor/Evaluation    Monitor Per protocol;Pertinent labs            Electronically signed by:  Dania Baugh RDN, HANNAH  04/19/17 3:37 PM

## 2017-04-19 NOTE — DISCHARGE PLACEMENT REQUEST
"Chito Rod (66 y.o. Male)     To   From [place] [id number]      [address] [address]  Phone: [phone]  Fax:         Patient:     Chito Rod MRN: [id number]   [address]  [facility] [address] : 1950  SSN:    Phone: [phone] Sex: M      INSURANCE PAYOR PLAN GROUP # SUBSCRIBER ID   Primary: HU[employer]NA MEDICARE REPLACEMENT [id number]   Admitting Diagnosis: Acute on chronic congestive heart failure, unspecified congestive heart failure type [I50.9]  Order Date: 2017               Inpatient Consult to Case Management     (Order ID: [id number])   Diagnosis:       Priority: Routine Expected Date:   Expiration Date:        Interval:  Count:    Reason for Consult? can we get him CPAP for home?     Specimen Type:   Specimen Source:   Specimen Taken Date:   Specimen Taken Time:                         Authorizing Provider:Erwin Cazares MD  Order Entered By: Erwin Cazares MD 2017 2:02 PM     Electronically signed by: Erwin Cazares MD (NPI: [id number]) 2017 2:02 PM              Date of Birth Social Security Number Address Home Phone MRN    1950  [address] [place] [address] [phone] [id number]    Orthodox Marital Status          Unknown Unknown       Admission Date Admission Type Admitting Provider Attending Provider Department, Room/Bed    17 Emergency Erwin Cazares MD Dossett, Lee M, MD [address], S559/1    Discharge Date Discharge Disposition Discharge Destination                      Attending Provider: Erwin Cazares MD     Allergies:  No Known Allergies    Isolation:  None   Infection:  None   Code Status:  FULL    Ht:  71\" (180.3 cm)   Wt:  323 lb 3.2 oz (147 kg)    Admission Cmt:  None   Principal Problem:  Acute on chronic diastolic congestive heart failure [I50.33]                 Active Insurance as of 2017     " "Primary Coverage     Payor Plan Insurance Group Employer/Plan Group    HUMANA MEDICARE REPLACEMENT HUMANA MEDICARE REPL [id number]     Payor Plan Address Payor Plan Phone Number Effective From Effective To    [address] [address] [phone] 1/1/2014     [place] [address]       Subscriber Name Subscriber Birth Date Member ID       KHOI ROD 1950 [id number]                 Emergency Contacts      (Rel.) Home Phone Work Phone Mobile Phone    Kristyn Luque (Sister) [phone] -- --            Insurance Information                HUMANA MEDICARE REPLACEMENT/HUMANA MEDICARE REPL Phone: [phone]    Subscriber: Khoi Rod Subscriber#: [id number]    Group#: [id number] Precert#:              History & Physical      Grace Romeo MD at 4/18/2017  6:42 PM              [facility] Medicine Services  HISTORY AND PHYSICAL    Primary Care Physician: Javan Shankar MD; Dr. So, Pulmonary; Dr. Giron; Cardiology    Subjective     Chief Complaint:  Shortness of air    History of Present Illness:     67 yo male presented to ED with c/o shortness of air.  Pertinent history of DM, HTN, CAD, CHF, COPD, with shortness of air on home oxygen prn at baseline.  Significant worsening of shortness of over last 1-2 days with increased lower extremity edema; having to use oxygen all the time.  Shortness of air is worse when laying flat with intermittent chest pain. Positive GERD.  Has not been compliant with majority of his home medications \"due to cost\".  Has not had his lisinopril, HCTZ for several months; denies having lasix prescribed.  Describes not taking his metformin for months and he is noncompliant with his diet, as he is currently eating fried chicken that his family brought to him, and morbidly obese.  History of coronary artery disease with prior coronary stents X 2 in 2010.  Has seen Dr. Underwood, cardiology in the past, and currently sees Dr. Giron.  Atypical chest pain " noted per PCP in Feb 2017 and was scheduled for stress test, but patient did not get this completed due to transportation issues.  Positive atrial fib; has not taken his pradaxa for several months, but thinks he has been taking his coreg and daily ASA.    65 yo with HO of CHF, DM and FAUSTO who has stopped taking his medications and has had worsening shortness of breath, he present in acute mixed respiratory failure but has responded well to nebs, steroids and diuresis in the ER he says he felt great when he had his FAUSTO  Review of Systems   Constitutional: Positive for activity change and chills. Negative for fever.        Decreased activity.   HENT: Positive for congestion and sore throat. Negative for postnasal drip and rhinorrhea.    Eyes: Negative for visual disturbance.   Respiratory: Positive for cough, shortness of breath and wheezing.    Cardiovascular: Positive for chest pain, palpitations and leg swelling.   Gastrointestinal: Positive for constipation and diarrhea. Negative for abdominal pain, blood in stool, nausea and vomiting.   Endocrine: Positive for polyphagia and polyuria.   Genitourinary: Negative for difficulty urinating and dysuria.   Skin: Negative for rash and wound.   Neurological: Negative for seizures and headaches.   Psychiatric/Behavioral: Negative for confusion and hallucinations.      Otherwise complete ROS performed and negative except as mentioned in the HPI.    Past Medical History:   Diagnosis Date   • Atrial fibrillation    • CHF (congestive heart failure)    • COPD (chronic obstructive pulmonary disease)    • Coronary artery disease    • Diabetes mellitus    • GERD (gastroesophageal reflux disease)    • Head trauma in child    • Hyperlipemia    • Hypertension    • Insomnia    • FAUSTO (obstructive sleep apnea)        Past Surgical History:   Procedure Laterality Date   • APPENDECTOMY     • CORONARY STENT PLACEMENT  2010   • HAND SURGERY Left    • KNEE ARTHROSCOPY Left    • MULTIPLE TOOTH  "EXTRACTIONS         Family History   Problem Relation Age of Onset   • Cancer Mother    • Diabetes Mother    • Diabetes Father    • Heart disease Father        Social History     Social History   • Marital status: Single       Social History Main Topics   • Smoking status: Former Smoker     Packs/day: 1.00     Years: 47.00     Types: Cigarettes     Quit date: 2014   • Smokeless tobacco: Never Used   • Alcohol use No   • Drug use: No     Lives alone.    Medications:   Outpatient Medications     aspirin  MG tablet     Has not taken for several months. atorvastatin (LIPITOR) 20 MG tablet      BYDUREON 2 MG pen-injector      carvedilol (COREG) 25 MG tablet      Has not taken for several months. dabigatran etexilate (PRADAXA) 150 MG capsu      Has not taken for several months. glimepiride (AMARYL) 2 MG tablet      Has not taken for several months. hydrochlorothiazide (HYDRODIURIL) 25 MG tablet      Has not taken for several months. lisinopril (PRINIVIL,ZESTRIL) 40 MG tablet      Has not taken for several months. metFORMIN ER (GLUCOPHAGE-XR) 500 MG 24 hr tablet      Has not taken for several months. omeprazole (priLOSEC) 40 MG capsule      Has not taken for several months. traZODone (DESYREL) 50 MG tablet      Has not taken for several months. vitamin D (ERGOCALCIFEROL) 99693 UNITS capsule capsule        Allergies:  No Known Allergies      Objective     Physical Exam:  Vital Signs: /73  Pulse 105  Temp 98.5 °F (36.9 °C) (Axillary)   Resp 25  Ht 72\" (182.9 cm)  Wt (!) 321 lb (146 kg)  SpO2 (!) 85%  BMI 43.54 kg/m2  Physical Exam  Patient is alert and talkative in no distress at rest on 5LPM of oxygen at 8pm.  Neck is without mass or JVD  Heart is Reg wo murmur distant  Lungs have good air entry  Abd is soft without HSM or mass  MAEW 2+ edema  Skin is without rash  Neurologic exam in nonfocal   Mood is appropriate he's eating [facility]        Results Reviewed:  Lab Results (last 24 hours)     Procedure " Component Value Units Date/Time    POC Troponin, Rapid [[id number]]  (Normal) Collected:  04/18/17 1211     Troponin I 0.03 ng/mL     Lactic Acid, Plasma [[id number]]  (Normal) Collected:  04/18/17 1204     Lactate 1.2 mmol/L     BNP [[id number]]  (Abnormal) Collected:  04/18/17 1204    Specimen:  Blood Updated:  04/18/17 1249     .0 (H) pg/mL     CBC Auto Differential [[id number]]  (Abnormal) Collected:  04/18/17 1204    Specimen:  Blood Updated:  04/18/17 1310     WBC 7.34 10*3/mm3      RBC 4.68 10*6/mm3      Hemoglobin 14.6 g/dL      Hematocrit 49.1 %      .9 (H) fL      MCH 31.2 (H) pg      MCHC 29.7 (L) g/dL      RDW 14.8 (H) %      RDW-SD 55.9 (H) fl      MPV 11.5 fL      Platelets 195 10*3/mm3      Neutrophil % 56.4 %      Lymphocyte % 28.5 %      Monocyte % 12.4 (H) %      Eosinophil % 2.2 %      Basophil % 0.1 %      Immature Grans % 0.4 %      Neutrophils, Absolute 4.14 10*3/mm3      Lymphocytes, Absolute 2.09 10*3/mm3      Monocytes, Absolute 0.91 10*3/mm3      Eosinophils, Absolute 0.16 10*3/mm3      Basophils, Absolute 0.01 10*3/mm3      Immature Grans, Absolute 0.03 10*3/mm3     Scan Slide [[id number]] Collected:  04/18/17 1204    Specimen:  Blood Updated:  04/18/17 1310     Macrocytes Slight/1+     WBC Morphology Normal     Platelet Morphology Normal    Comprehensive Metabolic Panel [[id number]]  (Abnormal) Collected:  04/18/17 1204    Specimen:  Blood Updated:  04/18/17 1322     Glucose 288 (H) mg/dL      BUN 13 mg/dL      Creatinine 1.10 mg/dL      Sodium 139 mmol/L      Potassium 5.0 mmol/L      Chloride 98 (L) mmol/L      CO2 41.0 (C) mmol/L       Verified by repeat analysis.         Calcium 9.1 mg/dL      Total Protein 7.7 g/dL      Albumin 4.30 g/dL      ALT (SGPT) 70 (H) U/L      AST (SGOT) 68 (H) U/L      Alkaline Phosphatase 71 U/L      Total Bilirubin 0.4 mg/dL      eGFR Non African Amer 67 mL/min/1.73      Globulin 3.4 gm/dL      A/G Ratio 1.3 (L) g/dL      BUN/Creatinine Ratio 11.8      Anion Gap 0.0 (L) mmol/L     Blood Gas, Arterial [[id number]]  (Abnormal) Collected:  04/18/17 1337     Site Arterial: left radial     Ej's Test Positive     pH, Arterial 7.227 (L) pH units      pCO2, Arterial 83.5 (C) mm Hg      pO2, Arterial 137.0 (H) mm Hg      HCO3, Arterial 34.7 (H) mmol/L      Base Excess, Arterial 5.5 (H) mmol/L      Hemoglobin, Blood Gas 15.0 g/dL      Hematocrit, Blood Gas 45.9 %      Oxyhemoglobin 96.1 %      Methemoglobin 1.0 %      Carboxyhemoglobin 1.5 %      CO2 Content 37.3 (H)     Barometric Pressure for Blood Gas -- mmHg       N/A        Modality Mask - Nonbreather     FIO2 70 %     POC Troponin, Rapid [[id number]]  (Normal) Collected:  04/18/17 1430    Specimen:  Blood Updated:  04/18/17 1450     Troponin I 0.00 ng/mL       Serial Number: [id number]    : [id number]        Imaging Results (last 24 hours)     Procedure Component Value Units Date/Time    XR Chest 1 View [[id number]] Collected:  04/18/17 1512     Updated:  04/18/17 1514    Narrative:       EXAMINATION: XR CHEST 1 VIEW-04/18/2017:      INDICATION: SOA, triage protocol.      COMPARISON: NONE.     FINDINGS: Cardiomegaly is noted. There is prominence of the hilar  vascular markings, cephalization of the upper lobe veins and there is  diffuse vascular plethora throughout the lungs. These findings are all  new when compared to 04/26/2014. Early congestive heart failure is the  favored consideration.           Impression:       Cardiomegaly is noted with vascular congestive edema  diffusely and cephalization of the upper lobe veins. Early congestive  heart failure is favored.     D:  04/18/2017  E:  04/18/2017     This report was finalized on 4/18/2017 3:12 PM by Dr. Raul Green MD.           I have personally reviewed and interpreted available lab data, radiology studies and ECG obtained at time of admission.     Assessment / Plan     Assessment/Problem List:   Hospital Problem List     * (Principal)Acute on chronic  congestive heart failure    Chronic obstructive pulmonary disease with acute exacerbation    FAUSTO (obstructive sleep apnea)    Type 2 diabetes mellitus    Hypertension    history CAD (coronary artery disease)    Atrial fibrillation    GERD (gastroesophageal reflux disease)    Elevated LFTs    Medically noncompliant    Morbid obesity      Plan:  67 yo with acute on chronic respiratory failure due to acute on chronic heart failure-  Will diurese and give short burst of steroids, will give insulin, use bipap for sleep, follow plan per clinical course.  DVT prophylaxis:lovenox for now  Code Status: full  Admission Status: Patient will be admitted to inpatient for his multi organ failure.  Grace Romeo MD 04/18/17 11:52 PM          Electronically signed by Grace Romeo MD at 4/18/2017 11:52 PM          Lab Results (last 24 hours)     Procedure Component Value Units Date/Time    POC Troponin, Rapid [[id number]]  (Normal) Collected:  04/18/17 1430    Specimen:  Blood Updated:  04/18/17 1450     Troponin I 0.00 ng/mL       Serial Number: [id number]    : [id number]       [place] Draw [[id number]] Collected:  04/18/17 1204    Specimen:  Blood Updated:  04/18/17 1601    Narrative:       The following orders were created for panel order [place] Draw.  Procedure                               Abnormality         Status                     ---------                               -----------         ------                     Light Blue Top[[id number]]                                    Final result               Green Top (Gel)[[id number]]                                   Final result               Lavender Top[[id number]]                                      Final result               Gold Top - SST[[id number]]                                    Final result               Green Top (No Gel)[[id number]]                                                             Please view results for these tests on the individual orders.    Light  Blue Top [[id number]] Collected:  04/18/17 1204    Specimen:  Blood Updated:  04/18/17 1601     Extra Tube hold for add-on      Auto resulted       Green Top (Gel) [[id number]] Collected:  04/18/17 1204    Specimen:  Blood Updated:  04/18/17 1601     Extra Tube Hold for add-ons.      Auto resulted.       Lavender Top [[id number]] Collected:  04/18/17 1204    Specimen:  Blood Updated:  04/18/17 1601     Extra Tube hold for add-on      Auto resulted       Gold Top - SST [[id number]] Collected:  04/18/17 1204    Specimen:  Blood Updated:  04/18/17 1601     Extra Tube Hold for add-ons.      Auto resulted.       POC Glucose Fingerstick [[id number]]  (Abnormal) Collected:  04/18/17 1938    Specimen:  Blood Updated:  04/18/17 1942     Glucose 234 (H) mg/dL     Narrative:       Meter: [id number] : [id number] BLANKENSHIP    Hemoglobin A1c [[id number]]  (Abnormal) Collected:  04/18/17 1204    Specimen:  Blood Updated:  04/18/17 2001     Hemoglobin A1C 10.20 (H) %     Narrative:       The American Diabetes Association recommends maintenance of Hemoglobin A1C at 7.0% or lower. Goals for Hemoglobin A1C reduction may need to be modified if hypoglycemia is a problem.    Troponin [[id number]]  (Normal) Collected:  04/18/17 2143    Specimen:  Blood Updated:  04/18/17 2220     Troponin I <0.006 ng/mL     Narrative:       Ultra Troponin I Reference Range:    <=0.039 ng/mL: Negative  0.04-0.779 ng/mL: Indeterminate Range. Clinical correlation required.  >=0.78  ng/mL: Consistent with myocardial injury. Clinical correlation required.    Lipid Panel [[id number]]  (Abnormal) Collected:  04/19/17 0555    Specimen:  Blood Updated:  04/19/17 0644     Total Cholesterol 135 mg/dL      Triglycerides 86 mg/dL      HDL Cholesterol 39 (L) mg/dL      LDL Cholesterol  92 mg/dL     Narrative:       Cholesterol Reference Ranges:   Desirable       < 200 mg/dL   Borderline    200-239 mg/dL   High Risk       > 239 mg/dL    Triglyceride Reference Ranges:    Normal          < 150 mg/dL   Borderline    150-199 mg/dL   High          200-499 mg/dL   Very High       > 499 mg/dL    HDL Reference Ranges:   Low              < 40 mg/dL   High             > 59 mg/dL    LDL Reference Ranges:   Optimal         < 100 mg/dL   Near Optimal  100-129 mg/dL   Borderline    130-159 mg/dL   High          160-189 mg/dL   Very High       > 189 mg/dL    CBC Auto Differential [[id number]]  (Abnormal) Collected:  04/19/17 0555    Specimen:  Blood Updated:  04/19/17 0653     WBC 6.09 10*3/mm3      RBC 4.74 10*6/mm3      Hemoglobin 14.3 g/dL      Hematocrit 49.7 %      .9 (H) fL      MCH 30.2 pg      MCHC 28.8 (L) g/dL      RDW 14.4 %      RDW-SD 55.6 (H) fl      MPV 11.3 fL      Platelets 183 10*3/mm3      Neutrophil % 80.1 (H) %      Lymphocyte % 17.9 (L) %      Monocyte % 1.3 %      Eosinophil % 0.0 %      Basophil % 0.2 %      Immature Grans % 0.5 %      Neutrophils, Absolute 4.88 10*3/mm3      Lymphocytes, Absolute 1.09 10*3/mm3      Monocytes, Absolute 0.08 10*3/mm3      Eosinophils, Absolute 0.00 (L) 10*3/mm3      Basophils, Absolute 0.01 10*3/mm3      Immature Grans, Absolute 0.03 10*3/mm3     TSH [[id number]]  (Normal) Collected:  04/19/17 0555    Specimen:  Blood Updated:  04/19/17 0728     TSH 0.423 mIU/mL     Basic Metabolic Panel [[id number]]  (Abnormal) Collected:  04/19/17 0555    Specimen:  Blood Updated:  04/19/17 0729     Glucose 325 (H) mg/dL      BUN 21 mg/dL      Creatinine 1.00 mg/dL      Sodium 139 mmol/L      Potassium 4.6 mmol/L      Chloride 97 (L) mmol/L      CO2 34.0 (H) mmol/L      Calcium 8.9 mg/dL      eGFR Non African Amer 75 mL/min/1.73      BUN/Creatinine Ratio 21.0     Anion Gap 8.0 mmol/L     Narrative:       National Kidney Foundation Guidelines    Stage                           Description                             GFR                      1                               Normal or High                          90+  2                               Mild  decrease                            60-89  3                               Moderate decrease                   30-59  4                               Severe decrease                       15-29  5                               Kidney failure                             <15    POC Glucose Fingerstick [[id number]]  (Abnormal) Collected:  04/19/17 0754    Specimen:  Blood Updated:  04/19/17 0757     Glucose 317 (H) mg/dL     Narrative:       Meter: [id number] : [id number] Greta Nichols    Vitamin B12 [[id number]]  (Normal) Collected:  04/19/17 0555    Specimen:  Blood Updated:  04/19/17 0847     Vitamin B-12 513 pg/mL     Folate [[id number]]  (Normal) Collected:  04/19/17 0555    Specimen:  Blood Updated:  04/19/17 1037     Folate 15.93 ng/mL     Narrative:         Folate Reference Ranges:    Deficient:            Less than 1.2 ng/mL  Indeterminant:        1.2-3.1 ng/mL  Normal:               3.2-20.0 ng/mL    POC Glucose Fingerstick [[id number]]  (Abnormal) Collected:  04/19/17 1201    Specimen:  Blood Updated:  04/19/17 1202     Glucose 422 (H) mg/dL     Narrative:       Confirmed by Repeat Meter: [id number] : [id number] Greta Nichols           Physician Progress Notes (last 24 hours) (Notes from 4/18/2017  2:35 PM through 4/19/2017  2:35 PM)      Erwin Cazares MD at 4/19/2017  2:01 PM  Version 1 of 1             [facility] Medicine Services  INPATIENT PROGRESS NOTE    Date of Admission: 4/18/2017  Length of Stay: 1  Primary Care Physician: Javan Shankar MD    Subjective   CC: shortness of breath    HPI:  Feels better than yesterday.  Admits he still gets winded with activity.  Has not taken meds in months it sounds like.  Quit smoking 2 years ago.    Review Of Systems:   Review of Systems   Constitutional: Negative for fever.   Respiratory: Positive for shortness of breath.    Cardiovascular: Negative for chest pain.   Gastrointestinal: Negative for abdominal pain.   All other systems  reviewed and are negative.        Objective      Temp:  [97.8 °F (36.6 °C)-100.2 °F (37.9 °C)] 97.8 °F (36.6 °C)  Heart Rate:  [] 108  Resp:  [17-25] 18  BP: ()/() 130/93  Physical Exam   Constitutional: He is oriented to person, place, and time. He appears well-developed and well-nourished.   Ambulating around room and does appear dyspnic   HENT:   Head: Normocephalic and atraumatic.   Eyes: Pupils are equal, round, and reactive to light.   Cardiovascular:   No murmur heard.  Tachy, irregular   Pulmonary/Chest:   Lung pretty clear bilaterally with no wheeze   Abdominal: Soft. Bowel sounds are normal.   obses   Musculoskeletal:   Trace LE edema   Neurological: He is alert and oriented to person, place, and time.   Skin: Skin is warm and dry. No rash noted.   Psychiatric: He has a normal mood and affect.   Vitals reviewed.    Results Review:    I have reviewed the labs, radiology results and diagnostic studies.      Results from last 7 days  Lab Units 04/19/17  0555   WBC 10*3/mm3 6.09   HEMOGLOBIN g/dL 14.3   PLATELETS 10*3/mm3 183       Results from last 7 days  Lab Units 04/19/17  0555   SODIUM mmol/L 139   POTASSIUM mmol/L 4.6   CHLORIDE mmol/L 97*   TOTAL CO2 mmol/L 34.0*   BUN mg/dL 21   CREATININE mg/dL 1.00   GLUCOSE mg/dL 325*   CALCIUM mg/dL 8.9       Radiology Data:   CXR (I viewed) - early CHF    Echo - EF=70%    I have reviewed the medications.    Assessment/Plan     Problem List  Hospital Problem List     * (Principal)Acute on chronic diastolic congestive heart failure    Acute respiratory failure with hypoxia and hypercapnia    FAUSTO (obstructive sleep apnea)    Chronic obstructive pulmonary disease with acute exacerbation    Type 2 diabetes mellitus    Hypertension    history CAD (coronary artery disease)    Rapid atrial fibrillation    Elevated LFTs    Medically noncompliant    Morbid obesity        Assessment/Plan:  - clinically improved with diuresis.  Will continue IV diuretics for  now.  - Echo is normal EF, but clincally volume overloaded.  Also in rapid a-fib for undetermined amount of time that may have cause some tachycardia induced CHF.  -  Resumed Coreg this AM.  Will give dose of dig and see how it does.  If remains tachy, could add CCB.  Will resume pradaxa for anti-coagulation.  Sees outside cardiologist.    - Biggest issue is compliance.  He knows he needs to take meds, but just doesn't.    - Clearly has FAUSTO, will see if we can get him home CPAP  - currently no wheezing, will d/c steroids  - adjust basal/bolus insulin, have educator see.  Needs insulin at home, but clearly not ready for that.  - Will try to get tuned up as much as possible before discharge, but explained that if he is non-compliant same issues will recur.  Home 1-2 days      DVT prophylaxis: pradaxa    Erwin Cazares MD   04/19/17   2:01 PM    Please note that portions of this note may have been completed with a voice recognition program. Efforts were made to edit the dictations, but occasionally words are mistranscribed.       Electronically signed by Erwin Cazares MD at 4/19/2017  2:13 PM

## 2017-04-19 NOTE — PROGRESS NOTES
[facility] Medicine Services  INPATIENT PROGRESS NOTE    Date of Admission: 4/18/2017  Length of Stay: 1  Primary Care Physician: Javan Shankar MD    Subjective   CC: shortness of breath    HPI:  Feels better than yesterday.  Admits he still gets winded with activity.  Has not taken meds in months it sounds like.  Quit smoking 2 years ago.    Review Of Systems:   Review of Systems   Constitutional: Negative for fever.   Respiratory: Positive for shortness of breath.    Cardiovascular: Negative for chest pain.   Gastrointestinal: Negative for abdominal pain.   All other systems reviewed and are negative.        Objective      Temp:  [97.8 °F (36.6 °C)-100.2 °F (37.9 °C)] 97.8 °F (36.6 °C)  Heart Rate:  [] 108  Resp:  [17-25] 18  BP: ()/() 130/93  Physical Exam   Constitutional: He is oriented to person, place, and time. He appears well-developed and well-nourished.   Ambulating around room and does appear dyspnic   HENT:   Head: Normocephalic and atraumatic.   Eyes: Pupils are equal, round, and reactive to light.   Cardiovascular:   No murmur heard.  Tachy, irregular   Pulmonary/Chest:   Lung pretty clear bilaterally with no wheeze   Abdominal: Soft. Bowel sounds are normal.   obses   Musculoskeletal:   Trace LE edema   Neurological: He is alert and oriented to person, place, and time.   Skin: Skin is warm and dry. No rash noted.   Psychiatric: He has a normal mood and affect.   Vitals reviewed.    Results Review:    I have reviewed the labs, radiology results and diagnostic studies.      Results from last 7 days  Lab Units 04/19/17  0555   WBC 10*3/mm3 6.09   HEMOGLOBIN g/dL 14.3   PLATELETS 10*3/mm3 183       Results from last 7 days  Lab Units 04/19/17  0555   SODIUM mmol/L 139   POTASSIUM mmol/L 4.6   CHLORIDE mmol/L 97*   TOTAL CO2 mmol/L 34.0*   BUN mg/dL 21   CREATININE mg/dL 1.00   GLUCOSE mg/dL 325*   CALCIUM mg/dL 8.9       Radiology Data:   CXR (I viewed) -  early CHF    Echo - EF=70%    I have reviewed the medications.    Assessment/Plan     Problem List  Hospital Problem List     * (Principal)Acute on chronic diastolic congestive heart failure    Acute respiratory failure with hypoxia and hypercapnia    FAUSTO (obstructive sleep apnea)    Chronic obstructive pulmonary disease with acute exacerbation    Type 2 diabetes mellitus    Hypertension    history CAD (coronary artery disease)    Rapid atrial fibrillation    Elevated LFTs    Medically noncompliant    Morbid obesity        Assessment/Plan:  - clinically improved with diuresis.  Will continue IV diuretics for now.  - Echo is normal EF, but clincally volume overloaded.  Also in rapid a-fib for undetermined amount of time that may have cause some tachycardia induced CHF.  -  Resumed Coreg this AM.  Will give dose of dig and see how it does.  If remains tachy, could add CCB.  Will resume pradaxa for anti-coagulation.  Sees outside cardiologist.    - Biggest issue is compliance.  He knows he needs to take meds, but just doesn't.    - Clearly has FAUSTO, will see if we can get him home CPAP  - currently no wheezing, will d/c steroids  - adjust basal/bolus insulin, have educator see.  Needs insulin at home, but clearly not ready for that.  - Will try to get tuned up as much as possible before discharge, but explained that if he is non-compliant same issues will recur.  Home 1-2 days      DVT prophylaxis: joelxa    Erwin Cazares MD   04/19/17   2:01 PM    Please note that portions of this note may have been completed with a voice recognition program. Efforts were made to edit the dictations, but occasionally words are mistranscribed.

## 2017-04-19 NOTE — PLAN OF CARE
Problem: Cardiac: Heart Failure (Adult)  Intervention: Gradually Correct Positive Fluid Balance    04/19/17 0500   Skin Interventions   Skin Protection skin to skin areas padded;transparent dressing maintained   Positioning   Positioning semi-Fowlers

## 2017-04-19 NOTE — DISCHARGE PLACEMENT REQUEST
"Khoi Rod (66 y.o. Male)   To [facility]  From [place] [place] [phone]    RA sat at rest 83% 4-19-17    Date of Birth Social Security Number Address Home Phone MRN    1950  [address] [facility] [address] [phone] [id number]    Congregational Marital Status          Unknown Unknown       Admission Date Admission Type Admitting Provider Attending Provider Department, Room/Bed    4/18/17 Emergency Erwin Cazares MD Dossett, Lee M, MD [facility] 5G, S559/1    Discharge Date Discharge Disposition Discharge Destination                      Attending Provider: Erwin Cazares MD     Allergies:  No Known Allergies    Isolation:  None   Infection:  None   Code Status:  FULL    Ht:  71\" (180.3 cm)   Wt:  323 lb 3.2 oz (147 kg)    Admission Cmt:  None   Principal Problem:  Acute on chronic diastolic congestive heart failure [I50.33]                 Active Insurance as of 4/18/2017     Primary Coverage     Payor Plan Insurance Group Employer/Plan Group    HUMANA MEDICARE REPLACEMENT HUMANA MEDICARE REPL [id number]     Payor Plan Address Payor Plan Phone Number Effective From Effective To    [address] [address] [phone] 1/1/2014     [place] [address]       Subscriber Name Subscriber Birth Date Member ID       KHOI ROD 1950 [id number]                 Emergency Contacts      (Rel.) Home Phone Work Phone Mobile Phone    Kristyn Luque (Sister) [phone] -- --            Insurance Information                HUMANA MEDICARE REPLACEMENT/HUMANA MEDICARE REPL Phone: [phone]    Subscriber: Khoi Rod Subscriber#: [id number]    Group#: [id number] Precert#:              History & Physical      Grace Romeo MD at 4/18/2017  6:42 PM              [facility] Medicine Services  HISTORY AND PHYSICAL    Primary Care Physician: Javan Shankar MD; Dr. So, Pulmonary; Dr. Giron; Cardiology    Subjective     Chief Complaint:  Shortness of " "air    History of Present Illness:     67 yo male presented to ED with c/o shortness of air.  Pertinent history of DM, HTN, CAD, CHF, COPD, with shortness of air on home oxygen prn at baseline.  Significant worsening of shortness of over last 1-2 days with increased lower extremity edema; having to use oxygen all the time.  Shortness of air is worse when laying flat with intermittent chest pain. Positive GERD.  Has not been compliant with majority of his home medications \"due to cost\".  Has not had his lisinopril, HCTZ for several months; denies having lasix prescribed.  Describes not taking his metformin for months and he is noncompliant with his diet, as he is currently eating fried chicken that his family brought to him, and morbidly obese.  History of coronary artery disease with prior coronary stents X 2 in 2010.  Has seen Dr. Underwood, cardiology in the past, and currently sees Dr. Giron.  Atypical chest pain noted per PCP in Feb 2017 and was scheduled for stress test, but patient did not get this completed due to transportation issues.  Positive atrial fib; has not taken his pradaxa for several months, but thinks he has been taking his coreg and daily ASA.    67 yo with HO of CHF, DM and FAUSTO who has stopped taking his medications and has had worsening shortness of breath, he present in acute mixed respiratory failure but has responded well to nebs, steroids and diuresis in the ER he says he felt great when he had his FAUSTO  Review of Systems   Constitutional: Positive for activity change and chills. Negative for fever.        Decreased activity.   HENT: Positive for congestion and sore throat. Negative for postnasal drip and rhinorrhea.    Eyes: Negative for visual disturbance.   Respiratory: Positive for cough, shortness of breath and wheezing.    Cardiovascular: Positive for chest pain, palpitations and leg swelling.   Gastrointestinal: Positive for constipation and diarrhea. Negative for abdominal pain, blood " in stool, nausea and vomiting.   Endocrine: Positive for polyphagia and polyuria.   Genitourinary: Negative for difficulty urinating and dysuria.   Skin: Negative for rash and wound.   Neurological: Negative for seizures and headaches.   Psychiatric/Behavioral: Negative for confusion and hallucinations.      Otherwise complete ROS performed and negative except as mentioned in the HPI.    Past Medical History:   Diagnosis Date   • Atrial fibrillation    • CHF (congestive heart failure)    • COPD (chronic obstructive pulmonary disease)    • Coronary artery disease    • Diabetes mellitus    • GERD (gastroesophageal reflux disease)    • Head trauma in child    • Hyperlipemia    • Hypertension    • Insomnia    • FAUSTO (obstructive sleep apnea)        Past Surgical History:   Procedure Laterality Date   • APPENDECTOMY     • CORONARY STENT PLACEMENT  2010   • HAND SURGERY Left    • KNEE ARTHROSCOPY Left    • MULTIPLE TOOTH EXTRACTIONS         Family History   Problem Relation Age of Onset   • Cancer Mother    • Diabetes Mother    • Diabetes Father    • Heart disease Father        Social History     Social History   • Marital status: Single       Social History Main Topics   • Smoking status: Former Smoker     Packs/day: 1.00     Years: 47.00     Types: Cigarettes     Quit date: 2014   • Smokeless tobacco: Never Used   • Alcohol use No   • Drug use: No     Lives alone.    Medications:   Outpatient Medications     aspirin  MG tablet     Has not taken for several months. atorvastatin (LIPITOR) 20 MG tablet      BYDUREON 2 MG pen-injector      carvedilol (COREG) 25 MG tablet      Has not taken for several months. dabigatran etexilate (PRADAXA) 150 MG capsu      Has not taken for several months. glimepiride (AMARYL) 2 MG tablet      Has not taken for several months. hydrochlorothiazide (HYDRODIURIL) 25 MG tablet      Has not taken for several months. lisinopril (PRINIVIL,ZESTRIL) 40 MG tablet      Has not taken for several  "months. metFORMIN ER (GLUCOPHAGE-XR) 500 MG 24 hr tablet      Has not taken for several months. omeprazole (priLOSEC) 40 MG capsule      Has not taken for several months. traZODone (DESYREL) 50 MG tablet      Has not taken for several months. vitamin D (ERGOCALCIFEROL) 50104 UNITS capsule capsule        Allergies:  No Known Allergies      Objective     Physical Exam:  Vital Signs: /73  Pulse 105  Temp 98.5 °F (36.9 °C) (Axillary)   Resp 25  Ht 72\" (182.9 cm)  Wt (!) 321 lb (146 kg)  SpO2 (!) 85%  BMI 43.54 kg/m2  Physical Exam  Patient is alert and talkative in no distress at rest on 5LPM of oxygen at 8pm.  Neck is without mass or JVD  Heart is Reg wo murmur distant  Lungs have good air entry  Abd is soft without HSM or mass  MAEW 2+ edema  Skin is without rash  Neurologic exam in nonfocal   Mood is appropriate he's eating [facility]        Results Reviewed:  Lab Results (last 24 hours)     Procedure Component Value Units Date/Time    POC Troponin, Rapid [[id number]]  (Normal) Collected:  04/18/17 1211     Troponin I 0.03 ng/mL     Lactic Acid, Plasma [[id number]]  (Normal) Collected:  04/18/17 1204     Lactate 1.2 mmol/L     BNP [[id number]]  (Abnormal) Collected:  04/18/17 1204    Specimen:  Blood Updated:  04/18/17 1249     .0 (H) pg/mL     CBC Auto Differential [[id number]]  (Abnormal) Collected:  04/18/17 1204    Specimen:  Blood Updated:  04/18/17 1310     WBC 7.34 10*3/mm3      RBC 4.68 10*6/mm3      Hemoglobin 14.6 g/dL      Hematocrit 49.1 %      .9 (H) fL      MCH 31.2 (H) pg      MCHC 29.7 (L) g/dL      RDW 14.8 (H) %      RDW-SD 55.9 (H) fl      MPV 11.5 fL      Platelets 195 10*3/mm3      Neutrophil % 56.4 %      Lymphocyte % 28.5 %      Monocyte % 12.4 (H) %      Eosinophil % 2.2 %      Basophil % 0.1 %      Immature Grans % 0.4 %      Neutrophils, Absolute 4.14 10*3/mm3      Lymphocytes, Absolute 2.09 10*3/mm3      Monocytes, Absolute 0.91 10*3/mm3      Eosinophils, Absolute 0.16 10*3/mm3  "     Basophils, Absolute 0.01 10*3/mm3      Immature Grans, Absolute 0.03 10*3/mm3     Scan Slide [[id number]] Collected:  04/18/17 1204    Specimen:  Blood Updated:  04/18/17 1310     Macrocytes Slight/1+     WBC Morphology Normal     Platelet Morphology Normal    Comprehensive Metabolic Panel [[id number]]  (Abnormal) Collected:  04/18/17 1204    Specimen:  Blood Updated:  04/18/17 1322     Glucose 288 (H) mg/dL      BUN 13 mg/dL      Creatinine 1.10 mg/dL      Sodium 139 mmol/L      Potassium 5.0 mmol/L      Chloride 98 (L) mmol/L      CO2 41.0 (C) mmol/L       Verified by repeat analysis.         Calcium 9.1 mg/dL      Total Protein 7.7 g/dL      Albumin 4.30 g/dL      ALT (SGPT) 70 (H) U/L      AST (SGOT) 68 (H) U/L      Alkaline Phosphatase 71 U/L      Total Bilirubin 0.4 mg/dL      eGFR Non African Amer 67 mL/min/1.73      Globulin 3.4 gm/dL      A/G Ratio 1.3 (L) g/dL      BUN/Creatinine Ratio 11.8     Anion Gap 0.0 (L) mmol/L     Blood Gas, Arterial [[id number]]  (Abnormal) Collected:  04/18/17 1337     Site Arterial: left radial     Ej's Test Positive     pH, Arterial 7.227 (L) pH units      pCO2, Arterial 83.5 (C) mm Hg      pO2, Arterial 137.0 (H) mm Hg      HCO3, Arterial 34.7 (H) mmol/L      Base Excess, Arterial 5.5 (H) mmol/L      Hemoglobin, Blood Gas 15.0 g/dL      Hematocrit, Blood Gas 45.9 %      Oxyhemoglobin 96.1 %      Methemoglobin 1.0 %      Carboxyhemoglobin 1.5 %      CO2 Content 37.3 (H)     Barometric Pressure for Blood Gas -- mmHg       N/A        Modality Mask - Nonbreather     FIO2 70 %     POC Troponin, Rapid [[id number]]  (Normal) Collected:  04/18/17 1430    Specimen:  Blood Updated:  04/18/17 1450     Troponin I 0.00 ng/mL       Serial Number: [id number]    : [id number]        Imaging Results (last 24 hours)     Procedure Component Value Units Date/Time    XR Chest 1 View [[id number]] Collected:  04/18/17 1512     Updated:  04/18/17 1514    Narrative:       EXAMINATION: XR CHEST 1  VIEW-04/18/2017:      INDICATION: SOA, triage protocol.      COMPARISON: NONE.     FINDINGS: Cardiomegaly is noted. There is prominence of the hilar  vascular markings, cephalization of the upper lobe veins and there is  diffuse vascular plethora throughout the lungs. These findings are all  new when compared to 04/26/2014. Early congestive heart failure is the  favored consideration.           Impression:       Cardiomegaly is noted with vascular congestive edema  diffusely and cephalization of the upper lobe veins. Early congestive  heart failure is favored.     D:  04/18/2017  E:  04/18/2017     This report was finalized on 4/18/2017 3:12 PM by Dr. Raul Green MD.           I have personally reviewed and interpreted available lab data, radiology studies and ECG obtained at time of admission.     Assessment / Plan     Assessment/Problem List:   Hospital Problem List     * (Principal)Acute on chronic congestive heart failure    Chronic obstructive pulmonary disease with acute exacerbation    FAUSTO (obstructive sleep apnea)    Type 2 diabetes mellitus    Hypertension    history CAD (coronary artery disease)    Atrial fibrillation    GERD (gastroesophageal reflux disease)    Elevated LFTs    Medically noncompliant    Morbid obesity      Plan:  67 yo with acute on chronic respiratory failure due to acute on chronic heart failure-  Will diurese and give short burst of steroids, will give insulin, use bipap for sleep, follow plan per clinical course.  DVT prophylaxis:lovenox for now  Code Status: full  Admission Status: Patient will be admitted to inpatient for his multi organ failure.  Grace Romeo MD 04/18/17 11:52 PM          Electronically signed by Grace Romeo MD at 4/18/2017 11:52 PM           Physician Progress Notes (last 24 hours) (Notes from 4/18/2017  2:32 PM through 4/19/2017  2:32 PM)      Erwin Cazares MD at 4/19/2017  2:01 PM  Version 1 of 1             [facility]  Medicine Services  INPATIENT PROGRESS NOTE    Date of Admission: 4/18/2017  Length of Stay: 1  Primary Care Physician: Javan Shankar MD    Subjective   CC: shortness of breath    HPI:  Feels better than yesterday.  Admits he still gets winded with activity.  Has not taken meds in months it sounds like.  Quit smoking 2 years ago.    Review Of Systems:   Review of Systems   Constitutional: Negative for fever.   Respiratory: Positive for shortness of breath.    Cardiovascular: Negative for chest pain.   Gastrointestinal: Negative for abdominal pain.   All other systems reviewed and are negative.        Objective      Temp:  [97.8 °F (36.6 °C)-100.2 °F (37.9 °C)] 97.8 °F (36.6 °C)  Heart Rate:  [] 108  Resp:  [17-25] 18  BP: ()/() 130/93  Physical Exam   Constitutional: He is oriented to person, place, and time. He appears well-developed and well-nourished.   Ambulating around room and does appear dyspnic   HENT:   Head: Normocephalic and atraumatic.   Eyes: Pupils are equal, round, and reactive to light.   Cardiovascular:   No murmur heard.  Tachy, irregular   Pulmonary/Chest:   Lung pretty clear bilaterally with no wheeze   Abdominal: Soft. Bowel sounds are normal.   obses   Musculoskeletal:   Trace LE edema   Neurological: He is alert and oriented to person, place, and time.   Skin: Skin is warm and dry. No rash noted.   Psychiatric: He has a normal mood and affect.   Vitals reviewed.    Results Review:    I have reviewed the labs, radiology results and diagnostic studies.      Results from last 7 days  Lab Units 04/19/17  0555   WBC 10*3/mm3 6.09   HEMOGLOBIN g/dL 14.3   PLATELETS 10*3/mm3 183       Results from last 7 days  Lab Units 04/19/17  0555   SODIUM mmol/L 139   POTASSIUM mmol/L 4.6   CHLORIDE mmol/L 97*   TOTAL CO2 mmol/L 34.0*   BUN mg/dL 21   CREATININE mg/dL 1.00   GLUCOSE mg/dL 325*   CALCIUM mg/dL 8.9       Radiology Data:   CXR (I viewed) - early CHF    Echo - EF=70%    I have  reviewed the medications.    Assessment/Plan     Problem List  Hospital Problem List     * (Principal)Acute on chronic diastolic congestive heart failure    Acute respiratory failure with hypoxia and hypercapnia    FAUSTO (obstructive sleep apnea)    Chronic obstructive pulmonary disease with acute exacerbation    Type 2 diabetes mellitus    Hypertension    history CAD (coronary artery disease)    Rapid atrial fibrillation    Elevated LFTs    Medically noncompliant    Morbid obesity        Assessment/Plan:  - clinically improved with diuresis.  Will continue IV diuretics for now.  - Echo is normal EF, but clincally volume overloaded.  Also in rapid a-fib for undetermined amount of time that may have cause some tachycardia induced CHF.  -  Resumed Coreg this AM.  Will give dose of dig and see how it does.  If remains tachy, could add CCB.  Will resume pradaxa for anti-coagulation.  Sees outside cardiologist.    - Biggest issue is compliance.  He knows he needs to take meds, but just doesn't.    - Clearly has FAUSTO, will see if we can get him home CPAP  - currently no wheezing, will d/c steroids  - adjust basal/bolus insulin, have educator see.  Needs insulin at home, but clearly not ready for that.  - Will try to get tuned up as much as possible before discharge, but explained that if he is non-compliant same issues will recur.  Home 1-2 days      DVT prophylaxis: pradaxa    Erwin Cazares MD   04/19/17   2:01 PM    Please note that portions of this note may have been completed with a voice recognition program. Efforts were made to edit the dictations, but occasionally words are mistranscribed.       Electronically signed by Erwin Cazares MD at 4/19/2017  2:13 PM

## 2017-04-19 NOTE — PROGRESS NOTES
Continued Stay Note  [facility]     Patient Name: Chito Rod  MRN: [id number]  Today's Date: 4/19/2017    Admit Date: 4/18/2017          Discharge Plan       04/19/17 1425    Case Management/Social Work Plan    Plan Home    Patient/Family In Agreement With Plan yes    Additional Comments I spoke with [employer]. They are no longer in network with the pts insurance. [facility] per pt choice of DME companies who are in network. I spoke with Joshua at [employer] [phone] who feels the pt will qualify for a c pap. I am faxing info to him at [phone]. He will need to know the c pap settings for home.              Discharge Codes     None        Expected Discharge Date and Time     Expected Discharge Date Expected Discharge Time    Apr 22, 2017             Ngoc Hinton RN

## 2017-04-19 NOTE — PLAN OF CARE
Problem: Cardiac: Heart Failure (Adult)  Intervention: Monitor/Manage Cardiac Rhythm Disturbance    04/19/17 1583   Safety Interventions   Emergency Measures airway opened;noninvasive ventilation provided   Cardiac Interventions   Dysrhythmia Management body temperature cooling provided;forceful cough encouraged

## 2017-04-20 LAB
ANION GAP SERPL CALCULATED.3IONS-SCNC: 4 MMOL/L (ref 3–11)
BUN BLD-MCNC: 20 MG/DL (ref 9–23)
BUN/CREAT SERPL: 20 (ref 7–25)
CALCIUM SPEC-SCNC: 8.8 MG/DL (ref 8.7–10.4)
CHLORIDE SERPL-SCNC: 96 MMOL/L (ref 99–109)
CO2 SERPL-SCNC: 37 MMOL/L (ref 20–31)
CREAT BLD-MCNC: 1 MG/DL (ref 0.6–1.3)
GFR SERPL CREATININE-BSD FRML MDRD: 75 ML/MIN/1.73
GLUCOSE BLD-MCNC: 274 MG/DL (ref 70–100)
GLUCOSE BLDC GLUCOMTR-MCNC: 260 MG/DL (ref 70–130)
GLUCOSE BLDC GLUCOMTR-MCNC: 276 MG/DL (ref 70–130)
GLUCOSE BLDC GLUCOMTR-MCNC: 302 MG/DL (ref 70–130)
GLUCOSE BLDC GLUCOMTR-MCNC: 380 MG/DL (ref 70–130)
POTASSIUM BLD-SCNC: 4.4 MMOL/L (ref 3.5–5.5)
SODIUM BLD-SCNC: 137 MMOL/L (ref 132–146)

## 2017-04-20 PROCEDURE — 94660 CPAP INITIATION&MGMT: CPT

## 2017-04-20 PROCEDURE — 94760 N-INVAS EAR/PLS OXIMETRY 1: CPT

## 2017-04-20 PROCEDURE — 99233 SBSQ HOSP IP/OBS HIGH 50: CPT | Performed by: HOSPITALIST

## 2017-04-20 PROCEDURE — 94640 AIRWAY INHALATION TREATMENT: CPT

## 2017-04-20 PROCEDURE — 63710000001 INSULIN LISPRO (HUMAN) PER 5 UNITS: Performed by: HOSPITALIST

## 2017-04-20 PROCEDURE — 94799 UNLISTED PULMONARY SVC/PX: CPT

## 2017-04-20 PROCEDURE — G0108 DIAB MANAGE TRN  PER INDIV: HCPCS

## 2017-04-20 PROCEDURE — 80048 BASIC METABOLIC PNL TOTAL CA: CPT | Performed by: INTERNAL MEDICINE

## 2017-04-20 PROCEDURE — 82962 GLUCOSE BLOOD TEST: CPT

## 2017-04-20 PROCEDURE — 25010000002 FUROSEMIDE PER 20 MG: Performed by: INTERNAL MEDICINE

## 2017-04-20 PROCEDURE — 63710000001 INSULIN DETEMIR PER 5 UNITS: Performed by: HOSPITALIST

## 2017-04-20 RX ADMIN — INSULIN LISPRO 8 UNITS: 100 INJECTION, SOLUTION INTRAVENOUS; SUBCUTANEOUS at 21:34

## 2017-04-20 RX ADMIN — INSULIN LISPRO 12 UNITS: 100 INJECTION, SOLUTION INTRAVENOUS; SUBCUTANEOUS at 12:49

## 2017-04-20 RX ADMIN — IPRATROPIUM BROMIDE AND ALBUTEROL SULFATE 3 ML: .5; 3 SOLUTION RESPIRATORY (INHALATION) at 19:26

## 2017-04-20 RX ADMIN — BISACODYL 5 MG: 5 TABLET, COATED ORAL at 09:09

## 2017-04-20 RX ADMIN — IPRATROPIUM BROMIDE AND ALBUTEROL SULFATE 3 ML: .5; 3 SOLUTION RESPIRATORY (INHALATION) at 07:32

## 2017-04-20 RX ADMIN — CARVEDILOL 25 MG: 12.5 TABLET, FILM COATED ORAL at 17:51

## 2017-04-20 RX ADMIN — LORAZEPAM 1 MG: 1 TABLET ORAL at 08:56

## 2017-04-20 RX ADMIN — CARVEDILOL 25 MG: 12.5 TABLET, FILM COATED ORAL at 08:55

## 2017-04-20 RX ADMIN — INSULIN LISPRO 8 UNITS: 100 INJECTION, SOLUTION INTRAVENOUS; SUBCUTANEOUS at 08:56

## 2017-04-20 RX ADMIN — INSULIN DETEMIR 30 UNITS: 100 INJECTION, SOLUTION SUBCUTANEOUS at 21:35

## 2017-04-20 RX ADMIN — DABIGATRAN ETEXILATE MESYLATE 150 MG: 150 CAPSULE ORAL at 08:56

## 2017-04-20 RX ADMIN — FUROSEMIDE 40 MG: 10 INJECTION, SOLUTION INTRAMUSCULAR; INTRAVENOUS at 17:51

## 2017-04-20 RX ADMIN — INSULIN LISPRO 10 UNITS: 100 INJECTION, SOLUTION INTRAVENOUS; SUBCUTANEOUS at 17:52

## 2017-04-20 RX ADMIN — IPRATROPIUM BROMIDE AND ALBUTEROL SULFATE 3 ML: .5; 3 SOLUTION RESPIRATORY (INHALATION) at 12:22

## 2017-04-20 RX ADMIN — INSULIN LISPRO 5 UNITS: 100 INJECTION, SOLUTION INTRAVENOUS; SUBCUTANEOUS at 08:57

## 2017-04-20 RX ADMIN — INSULIN LISPRO 12 UNITS: 100 INJECTION, SOLUTION INTRAVENOUS; SUBCUTANEOUS at 17:51

## 2017-04-20 RX ADMIN — FUROSEMIDE 40 MG: 10 INJECTION, SOLUTION INTRAMUSCULAR; INTRAVENOUS at 08:56

## 2017-04-20 RX ADMIN — DABIGATRAN ETEXILATE MESYLATE 150 MG: 150 CAPSULE ORAL at 21:34

## 2017-04-20 RX ADMIN — INSULIN LISPRO 5 UNITS: 100 INJECTION, SOLUTION INTRAVENOUS; SUBCUTANEOUS at 12:50

## 2017-04-20 RX ADMIN — ASPIRIN 325 MG: 325 TABLET, DELAYED RELEASE ORAL at 08:55

## 2017-04-20 NOTE — PLAN OF CARE
Problem: Cardiac: Heart Failure (Adult)  Intervention: Provide Oxygenation/Ventilation/Perfusion Support    04/20/17 0453   Positioning   Head Of Bed (HOB) Position HOB at 20 degrees   Activity   Activity Type activity encouraged;sitting, edge of bed   Respiratory Interventions   Airway/Ventilation Management humidification applied

## 2017-04-20 NOTE — PLAN OF CARE
"Problem: Patient Care Overview (Adult)  Goal: Plan of Care Review  Outcome: Ongoing (interventions implemented as appropriate)    04/20/17 1645   Coping/Psychosocial Response Interventions   Plan Of Care Reviewed With patient   Patient Care Overview   Progress improving   Outcome Evaluation   Outcome Summary/Follow up Plan Patient has good urinary output after lasix administrations. Has lost weight and states \"my arms look less puffy than when I first came in\"       Goal: Adult Individualization and Mutuality  Outcome: Ongoing (interventions implemented as appropriate)    Problem: Cardiac: Heart Failure (Adult)  Goal: Signs and Symptoms of Listed Potential Problems Will be Absent or Manageable (Cardiac: Heart Failure)  Outcome: Ongoing (interventions implemented as appropriate)    04/20/17 1645   Cardiac: Heart Failure   Problems Assessed (Heart Failure) all   Problems Present (Heart Failure) dysrhythmia/arrhythmia;fluid/electrolyte imbalance;functional decline/self-care deficit;respiratory compromise;situational response;sleep-disordered breathing           "

## 2017-04-20 NOTE — PROGRESS NOTES
[facility] Medicine Services  INPATIENT PROGRESS NOTE    Date of Admission: 4/18/2017  Length of Stay: 2  Primary Care Physician: Javan Shankar MD    Subjective   CC: f/u dyspnea  HPI:  Pt sitting up in chair when seen. He reports stable dry cough and dyspnea with exertion but none at rest. No chest pain or palpitations. HR in 100s when seen. Sleeps well with BiPAP. No fevers or chills.  No abdominal pain, nausea, or vomiting.     Review Of Systems:   Review of Systems   Constitutional: Negative for chills and fever.   Respiratory: Positive for cough and shortness of breath. Negative for chest tightness.    Cardiovascular: Negative for palpitations.   Gastrointestinal: Negative for abdominal pain, diarrhea, nausea and vomiting.     Objective      Temp:  [97.5 °F (36.4 °C)] 97.5 °F (36.4 °C)  Heart Rate:  [] 95  Resp:  [16-20] 18  BP: ()/(61-63) 106/61  Physical Exam  Constitutional: no acute distress, awake, alert  Respiratory: Diminished breath sounds, faint crackles at bases, poor air movement with mild increased work of breathing  Cardiovascular: irreg irreg rate and rhythm with HR in 100s, no murmurs, rubs, or gallops, palpable pedal pulses bilaterally  Gastrointestinal: Positive bowel sounds, soft, nontender, nondistended, obese   Musculoskeletal: Mild bilateral ankle edema  Psychiatric: oriented x 3, appropriate affect, cooperative  Neurologic: Strength symmetric in all extremities     Results Review:    I have reviewed the labs, radiology results and diagnostic studies.      Results from last 7 days  Lab Units 04/19/17  0555   WBC 10*3/mm3 6.09   HEMOGLOBIN g/dL 14.3   PLATELETS 10*3/mm3 183       Results from last 7 days  Lab Units 04/20/17  0521   SODIUM mmol/L 137   POTASSIUM mmol/L 4.4   CHLORIDE mmol/L 96*   TOTAL CO2 mmol/L 37.0*   BUN mg/dL 20   CREATININE mg/dL 1.00   GLUCOSE mg/dL 274*   CALCIUM mg/dL 8.8       Culture Data: Cultures: n/a     Radiology  Data: No new    Echo     Interpretation Summary      · Left atrial cavity size is mildly dilated.  · Left ventricular function is normal. Estimated EF = 70%.  · Left ventricular wall thickness is consistent with hypertrophy.     I have reviewed the medications.    Assessment/Plan   Mr. Rod is a 66 year-old M with a past hx of T2DM, HTN, CAD s/p PCI 2010, CHF, FAUSTO, COPD, atrial fibrillation, and chronic hypoxia who has been noncompliant with all medications for several months. He presented to the [facility] ER on 4/18 with acute dyspnea, diagnosed with acute COPD and diastolic HF with acute mixed respiratory failure.     Problem List  Hospital Problem List     * (Principal)Acute on chronic diastolic congestive heart failure    FAUSTO (obstructive sleep apnea)    Chronic obstructive pulmonary disease with acute exacerbation    Type 2 diabetes mellitus    Hypertension    history CAD (coronary artery disease)    Rapid atrial fibrillation    Elevated LFTs    Medically noncompliant    Morbid obesity    Acute respiratory failure with hypoxia and hypercapnia        Assessment/Plan:  - Pt continues to require significant amounts of supplemental O2 (6LNC when seen). Needs to be comfortable and mobile on 4LNC prior to dc    - Continue IV diuresis, BiPAP qhs and prn  - Need to optimize rate control as his atrial fib may be driving factor of his HF  - Continue carvedilol and pradaxa  - Add diltiazem tomorrow if rate control not improved   - Continue aspirin  - D/w CM, will arrange home BiPAP at nighttime due to chronic hypercapneic respiratory failure due to COPD and associated with FAUSTO  - Continue nebs, pt off steroids   - FSBS reviewed and uncontrolled, titrate basal-bolus insulin today. DM Educator has seen   - Long term prognosis is entirely dependent on pt's willingness to be compliant with his chronic medical therapy     High Risk/High complexity     DVT prophylaxis: pradaxa  Discharge Planning: I expect patient to be discharged  to home in 1-2 days.    Yaron Encarnacion MD   04/20/17   4:30 PM    Please note that portions of this note may have been completed with a voice recognition program. Efforts were made to edit the dictations, but occasionally words are mistranscribed.

## 2017-04-20 NOTE — PROGRESS NOTES
Continued Stay Note  [facility]     Patient Name: Chito Rod  MRN: [id number]  Today's Date: 4/20/2017    Admit Date: 4/18/2017          Discharge Plan       04/20/17 1147    Case Management/Social Work Plan    Additional Comments I spoke with Kendrick in Resp. The pt is kermit on a bi pap with settings 18/9. I had an order for a home cpap if pt qualifies. I will follow and see what the pt will need closer to DC.              Discharge Codes     None        Expected Discharge Date and Time     Expected Discharge Date Expected Discharge Time    Apr 22, 2017             Ngoc Hinton RN

## 2017-04-20 NOTE — PLAN OF CARE
Problem: Cardiac: Heart Failure (Adult)  Intervention: Gradually Correct Positive Fluid Balance    04/20/17 0452   Positioning   Positioning semi-Fowlers;weight shift assistance provided

## 2017-04-20 NOTE — CONSULTS
"Diabetes Education  Assessment/Teaching    Patient Name:  Chito Rod  YOB: 1950  MRN: [id number]  Admit Date:  4/18/2017      Assessment Date:  4/20/2017       Most Recent Value    General Information      Referral From:  A1c, Blood glucose, MD order    Height  5' 11\" (1.803 m)    Height Method  Stated    Weight  (!)  318 lb 12.8 oz (145 kg)    Weight Method  Standing scale    Pregnancy Assessment     Diabetes History     Length of Diabetes Diagnosis  6 - 10 years [7 years ago per pt]    Current DM knowledge  poor    Have you had diabetes education/teaching in the past?  no [Pt scheduled for an inpatient f/u class]    Do you test your blood sugar at home?  yes    Frequency of checks  5 times weekly    Meter type  does not remember name    Who performs the test?  self    Typical readings  190 - 314    Have you had low blood sugar? (<70mg/dl)  no    Have you had high blood sugar? (>140mg/dl)  yes    How often do you have high blood sugar?  frequently    Education Preferences     What areas of diabetes would you like to learn about?  avoiding high blood sugar, diabetes complications, avoiding low blood sugar, diet information, exercise information, medications for diabetes, understanding diabetes, testing my blood sugar at home, stress/coping skills, resources on diabetes    Nutrition Information     Assessment Topics     Healthy Eating - Assessment  Needs education    Being Active - Assessment  Needs education    Taking Medication - Assessment  Needs education    Problem Solving - Assessment  Needs education    Reducing Risk - Assessment  Competent    Healthy Coping - Assessment  Needs education    Monitoring - Assessment  Needs education    DM Goals     Healthy Eating - Goal  0-7 days from discharge    Being Active - Goal  0-30 days from discharge    Problem Solving - Goal  0-7 days from discharge    Healthy Coping - Goal  0-7 days from discharge    Monitoring - Goal  0-7 days from discharge    "            Most Recent Value    DM Education Needs     Meter  Has own    Meter Type  Other (comment) [Pt cannot remember name]    Frequency of Testing  3 times a day    Blood Glucose Target Range      Medication  Oral, Other injectables    Problem Solving  Hypoglycemia, Hyperglycemia, Sick days, Signs, Symptoms, Treatment    Reducing Risks  A1C testing, Blood pressure, Eye exam, Immunizations, Cardiovascular    Healthy Eating  RD consult [general review on diet completed]    Physical Activity  Walking    Physical Activity Frequency  Rarely    Healthy Coping  Appropriate    Motivation  Engaged, Moderate    Teaching Method  Explanation, Discussion, Handouts, Teach back    Patient Response  Verbalized understanding            Other Comments:    Education completed with pt and daughter. Diet consult ordered and per pt, a home health nurse comes every week and does his medications for him. Pt scheduled for a f/u class but was also provided with information to present to his PCP for referral for a full comprehensive class.  Discussed and taught patient about type 2 diabetes self-management, risk factors, and importance of blood glucose control to reduce complications. Target blood glucose readings and A1c goals per ADA were reviewed. Reviewed with patient current A1c and discussed its significance. Signs, symptoms and treatment of hyperglycemia and hypoglycemia were discussed. Lifestyle changes such as physical activity with MD approval and healthy eating were encouraged. Stressed the importance of strict blood sugar control  to prevent complications.  Pt instructed to  check blood sugar 2-3 times per day and to call PCP if  Blood glucose is higher than 180 two times or more.  Patient was encouraged to keep record of blood glucose readings to take to follow up appointment with PCP.  Pt was offered a free op follow up appointment and is scheduled. Questions answered and pt advised to call us with any other questions  or concerns.          Electronically signed by:  Heber Moran RN  04/20/17 11:56 AM

## 2017-04-20 NOTE — PLAN OF CARE
Problem: Cardiac: Heart Failure (Adult)  Intervention: Prevent/Manage DVT/VTE Risk    04/20/17 0449   Support Surgical/Anesthesia Recovery   Venous Thromboembolism Prevent/Manage bilateral;compression stockings on;ambulation promoted

## 2017-04-20 NOTE — CONSULTS
Adult Nutrition  Assessment/PES    Patient Name:  Chito Rod  YOB: 1950  MRN: [id number]  Admit Date:  4/18/2017    Assessment Date:  4/20/2017        Reason for Assessment       04/20/17 1428    Reason for Assessment    Reason For Assessment/Visit education    Time Spent (min) 30    Diagnosis --   per notes this admission                  Labs/Tests/Procedures/Meds       04/20/17 1428    Labs/Tests/Procedures/Meds    Labs/Tests Review Reviewed;Glucose                Nutrition Prescription Ordered       04/20/17 1428    Nutrition Prescription PO    Current PO Diet Regular    Common Modifiers Cardiac;Consistent Carbohydrate            Evaluation of Received Nutrient/Fluid Intake       04/20/17 1428    PO Evaluation    Number of Meals 2    % PO Intake 100              Problem/Interventions:        Problem 1       04/20/17 1429    Nutrition Diagnoses Problem 1    Problem 1 Knowledge Deficit    Etiology (related to) --   clinical condition    Signs/Symptoms (evidenced by) Reported  Information Deficit                    Intervention Goal       04/20/17 1429    Intervention Goal    General Nutrition support treatment            Nutrition Intervention       04/20/17 1429    Nutrition Intervention    RD/Tech Action Follow Tx progress    assisting pt. with menu selections.              Education/Evaluation       04/20/17 1430    Education    Education Education topics    Education Topics CHO counting   Advised pt. about cabohydrate counting, pt. successfully planned a meal. Continue to follow per reinforcement.    Monitor/Evaluation    Monitor Per protocol            Electronically signed by:  Skyla Curiel RD  04/20/17 2:32 PM

## 2017-04-21 LAB
ANION GAP SERPL CALCULATED.3IONS-SCNC: 7 MMOL/L (ref 3–11)
BUN BLD-MCNC: 19 MG/DL (ref 9–23)
BUN/CREAT SERPL: 19 (ref 7–25)
CALCIUM SPEC-SCNC: 8.8 MG/DL (ref 8.7–10.4)
CHLORIDE SERPL-SCNC: 94 MMOL/L (ref 99–109)
CO2 SERPL-SCNC: 37 MMOL/L (ref 20–31)
CREAT BLD-MCNC: 1 MG/DL (ref 0.6–1.3)
DEPRECATED RDW RBC AUTO: 52 FL (ref 37–54)
ERYTHROCYTE [DISTWIDTH] IN BLOOD BY AUTOMATED COUNT: 14.2 % (ref 11.3–14.5)
GFR SERPL CREATININE-BSD FRML MDRD: 75 ML/MIN/1.73
GLUCOSE BLD-MCNC: 160 MG/DL (ref 70–100)
GLUCOSE BLDC GLUCOMTR-MCNC: 147 MG/DL (ref 70–130)
GLUCOSE BLDC GLUCOMTR-MCNC: 214 MG/DL (ref 70–130)
GLUCOSE BLDC GLUCOMTR-MCNC: 238 MG/DL (ref 70–130)
GLUCOSE BLDC GLUCOMTR-MCNC: 240 MG/DL (ref 70–130)
HCT VFR BLD AUTO: 47.2 % (ref 38.9–50.9)
HGB BLD-MCNC: 14.1 G/DL (ref 13.1–17.5)
MAGNESIUM SERPL-MCNC: 2.1 MG/DL (ref 1.3–2.7)
MCH RBC QN AUTO: 30.3 PG (ref 27–31)
MCHC RBC AUTO-ENTMCNC: 29.9 G/DL (ref 32–36)
MCV RBC AUTO: 101.5 FL (ref 80–99)
PLATELET # BLD AUTO: 178 10*3/MM3 (ref 150–450)
PMV BLD AUTO: 11.4 FL (ref 6–12)
POTASSIUM BLD-SCNC: 4.1 MMOL/L (ref 3.5–5.5)
RBC # BLD AUTO: 4.65 10*6/MM3 (ref 4.2–5.76)
SODIUM BLD-SCNC: 138 MMOL/L (ref 132–146)
WBC NRBC COR # BLD: 8.41 10*3/MM3 (ref 3.5–10.8)

## 2017-04-21 PROCEDURE — 25010000002 FUROSEMIDE PER 20 MG: Performed by: INTERNAL MEDICINE

## 2017-04-21 PROCEDURE — 94799 UNLISTED PULMONARY SVC/PX: CPT

## 2017-04-21 PROCEDURE — 94760 N-INVAS EAR/PLS OXIMETRY 1: CPT

## 2017-04-21 PROCEDURE — 83735 ASSAY OF MAGNESIUM: CPT | Performed by: HOSPITALIST

## 2017-04-21 PROCEDURE — 82962 GLUCOSE BLOOD TEST: CPT

## 2017-04-21 PROCEDURE — 94660 CPAP INITIATION&MGMT: CPT

## 2017-04-21 PROCEDURE — 63710000001 INSULIN DETEMIR PER 5 UNITS: Performed by: HOSPITALIST

## 2017-04-21 PROCEDURE — 80048 BASIC METABOLIC PNL TOTAL CA: CPT | Performed by: HOSPITALIST

## 2017-04-21 PROCEDURE — 94640 AIRWAY INHALATION TREATMENT: CPT

## 2017-04-21 PROCEDURE — 99233 SBSQ HOSP IP/OBS HIGH 50: CPT | Performed by: HOSPITALIST

## 2017-04-21 PROCEDURE — 85027 COMPLETE CBC AUTOMATED: CPT | Performed by: HOSPITALIST

## 2017-04-21 RX ADMIN — INSULIN LISPRO 10 UNITS: 100 INJECTION, SOLUTION INTRAVENOUS; SUBCUTANEOUS at 17:00

## 2017-04-21 RX ADMIN — DILTIAZEM HYDROCHLORIDE 30 MG: 30 TABLET, FILM COATED ORAL at 17:00

## 2017-04-21 RX ADMIN — BISACODYL 5 MG: 5 TABLET, COATED ORAL at 09:00

## 2017-04-21 RX ADMIN — INSULIN LISPRO 10 UNITS: 100 INJECTION, SOLUTION INTRAVENOUS; SUBCUTANEOUS at 08:52

## 2017-04-21 RX ADMIN — DABIGATRAN ETEXILATE MESYLATE 150 MG: 150 CAPSULE ORAL at 21:23

## 2017-04-21 RX ADMIN — IPRATROPIUM BROMIDE AND ALBUTEROL SULFATE 3 ML: .5; 3 SOLUTION RESPIRATORY (INHALATION) at 07:17

## 2017-04-21 RX ADMIN — FUROSEMIDE 40 MG: 10 INJECTION, SOLUTION INTRAMUSCULAR; INTRAVENOUS at 17:00

## 2017-04-21 RX ADMIN — CARVEDILOL 25 MG: 12.5 TABLET, FILM COATED ORAL at 08:51

## 2017-04-21 RX ADMIN — INSULIN DETEMIR 36 UNITS: 100 INJECTION, SOLUTION SUBCUTANEOUS at 21:00

## 2017-04-21 RX ADMIN — FUROSEMIDE 40 MG: 10 INJECTION, SOLUTION INTRAMUSCULAR; INTRAVENOUS at 08:51

## 2017-04-21 RX ADMIN — INSULIN LISPRO 10 UNITS: 100 INJECTION, SOLUTION INTRAVENOUS; SUBCUTANEOUS at 12:28

## 2017-04-21 RX ADMIN — CARVEDILOL 25 MG: 12.5 TABLET, FILM COATED ORAL at 17:01

## 2017-04-21 RX ADMIN — INSULIN LISPRO 5 UNITS: 100 INJECTION, SOLUTION INTRAVENOUS; SUBCUTANEOUS at 17:02

## 2017-04-21 RX ADMIN — ASPIRIN 325 MG: 325 TABLET, DELAYED RELEASE ORAL at 08:51

## 2017-04-21 RX ADMIN — INSULIN LISPRO 5 UNITS: 100 INJECTION, SOLUTION INTRAVENOUS; SUBCUTANEOUS at 21:22

## 2017-04-21 RX ADMIN — INSULIN LISPRO 5 UNITS: 100 INJECTION, SOLUTION INTRAVENOUS; SUBCUTANEOUS at 12:27

## 2017-04-21 RX ADMIN — DABIGATRAN ETEXILATE MESYLATE 150 MG: 150 CAPSULE ORAL at 10:19

## 2017-04-21 RX ADMIN — IPRATROPIUM BROMIDE AND ALBUTEROL SULFATE 3 ML: .5; 3 SOLUTION RESPIRATORY (INHALATION) at 19:03

## 2017-04-21 NOTE — PROGRESS NOTES
"    [facility] Medicine Services  INPATIENT PROGRESS NOTE    Date of Admission: 4/18/2017  Length of Stay: 3  Primary Care Physician: Javan Shankar MD    Subjective   CC: f/u dyspnea  HPI:  Pt laying in bed when seen with HR in low 100s, sat in low 90s on 6LNC. He reports feeling \"better\" overall but denies specific reason why. Cough and dyspnea are stable. No chest pain. Exertional limitation still significant. Reports good diuresis. No fevers or chills.     Net negative 11.43L since admission     Review Of Systems:   Review of Systems   Constitutional: Negative for chills and fever.   Respiratory: Positive for cough and shortness of breath. Negative for chest tightness.    Cardiovascular: Negative for palpitations.   Gastrointestinal: Negative for abdominal pain, diarrhea, nausea and vomiting.     Objective      Temp:  [97.7 °F (36.5 °C)-98.2 °F (36.8 °C)] 97.7 °F (36.5 °C)  Heart Rate:  [] 103  Resp:  [16-18] 18  BP: (106-107)/(47-49) 106/47  Physical Exam  Constitutional: no acute distress, awake, alert  Respiratory: Diminished breath sounds, No crackles or wheezing, nonlabored breathing  Cardiovascular: irreg irreg rate and rhythm with HR in 100s, no murmurs, rubs, or gallops, palpable pedal pulses bilaterally  Gastrointestinal: Positive bowel sounds, soft, nontender, nondistended, obese   Musculoskeletal: 1+ pitting bilateral ankle edema  Psychiatric: oriented x 3, appropriate affect, cooperative  Neurologic: Strength symmetric in all extremities     Results Review:    I have reviewed the labs, radiology results and diagnostic studies.      Results from last 7 days  Lab Units 04/21/17  0655   WBC 10*3/mm3 8.41   HEMOGLOBIN g/dL 14.1   PLATELETS 10*3/mm3 178       Results from last 7 days  Lab Units 04/21/17  0655   SODIUM mmol/L 138   POTASSIUM mmol/L 4.1   CHLORIDE mmol/L 94*   TOTAL CO2 mmol/L 37.0*   BUN mg/dL 19   CREATININE mg/dL 1.00   GLUCOSE mg/dL 160*   CALCIUM mg/dL " 8.8       Culture Data: Cultures: n/a     Radiology Data: No new    Echo     Interpretation Summary      · Left atrial cavity size is mildly dilated.  · Left ventricular function is normal. Estimated EF = 70%.  · Left ventricular wall thickness is consistent with hypertrophy.     I have reviewed the medications.    Assessment/Plan   Mr. Rod is a 66 year-old M with a past hx of T2DM, HTN, CAD s/p PCI 2010, CHF, FAUSTO, COPD, atrial fibrillation, and chronic hypoxia who has been noncompliant with all medications for several months. He presented to the [facility] ER on 4/18 with acute dyspnea, diagnosed with acute COPD and diastolic HF with acute mixed respiratory failure.     Problem List  Hospital Problem List     * (Principal)Acute on chronic diastolic congestive heart failure    FAUSTO (obstructive sleep apnea)    Chronic obstructive pulmonary disease with acute exacerbation    Type 2 diabetes mellitus    Hypertension    history CAD (coronary artery disease)    Rapid atrial fibrillation    Elevated LFTs    Medically noncompliant    Morbid obesity    Acute respiratory failure with hypoxia and hypercapnia        Assessment/Plan:  - Pt continues to require significant amounts of supplemental O2 (6LNC) in spite of subjective improvement and diuresis. Needs to be comfortable and mobile on 4LNC prior to dc  - Will transition to PO diuretics in next 24-48 hours if he can decrease his O2 needs    - Continue BiPAP qhs and prn  - Need to optimize rate control as his atrial fib may be driving factor of his HF, add diltiazem today and monitor response  - Continue carvedilol (on max dose) and pradaxa  - If rate control does not improve, will consult cardiology for consideration of antiarrhythmic  - Continue aspirin  - Arranged home BiPAP at nighttime due to chronic hypercapneic respiratory failure due to COPD and associated with FAUSTO  - Continue nebs, pt off steroids   - Check ABG in AM if O2 requirements no better  - FSBS reviewed and  control improved, will increase basal-bolus insulin again today. DM Educator has seen   - Long term prognosis is entirely dependent on pt's willingness to be compliant with his chronic medical therapy     High Risk/High complexity     DVT prophylaxis: pradaxa  Discharge Planning: I expect patient to be discharged to home in 1-2 days.    Yaron Encarnacion MD   04/21/17   1:15 PM    Please note that portions of this note may have been completed with a voice recognition program. Efforts were made to edit the dictations, but occasionally words are mistranscribed.

## 2017-04-21 NOTE — PROGRESS NOTES
Continued Stay Note   [place]     Patient Name: Chito Rod  MRN: [id number]  Today's Date: 4/21/2017    Admit Date: 4/18/2017          Discharge Plan       04/21/17 1618    Case Management/Social Work Plan    Plan Home with     Patient/Family In Agreement With Plan yes    Additional Comments Pt will need to be down to 4L of 02 to DC home. At DC call Joshua with [employer] at [phone] so he can arrange home 02 and bipap. Paperwork is done except liter flow. Please order HH. Pt wants to use [facility].              Discharge Codes     None        Expected Discharge Date and Time     Expected Discharge Date Expected Discharge Time    Apr 22, 2017             Ngoc Hinton RN

## 2017-04-21 NOTE — PLAN OF CARE
Problem: Respiratory Insufficiency (Adult)  Goal: Identify Related Risk Factors and Signs and Symptoms  Outcome: Ongoing (interventions implemented as appropriate)    04/21/17 0428   Respiratory Insufficiency   Related Risk Factors (Respiratory Insufficiency) activity intolerance  (CHF)   Signs and Symptoms (Respiratory Insufficiency) blood pressure/heart rate changes;breathing pattern changes;dyspnea;respiratory rate alterations;shortness of breath;sleep apnea/periodic apnea       Goal: Acid/Base Balance  Outcome: Ongoing (interventions implemented as appropriate)  Goal: Effective Ventilation  Outcome: Ongoing (interventions implemented as appropriate)

## 2017-04-21 NOTE — PLAN OF CARE
"Problem: Patient Care Overview (Adult)  Goal: Plan of Care Review  Outcome: Ongoing (interventions implemented as appropriate)    04/21/17 1633   Coping/Psychosocial Response Interventions   Plan Of Care Reviewed With patient   Patient Care Overview   Progress progress toward functional goals is gradual   Outcome Evaluation   Outcome Summary/Follow up Plan Pt has had good UOP. VSS. No c/o of pain. No c/o SOA.        Goal: Adult Individualization and Mutuality  Outcome: Ongoing (interventions implemented as appropriate)    04/20/17 1645 04/21/17 1633   Individualization   Patient Specific Preferences --  likes to be tucked in with extra covers   Patient Specific Goals \"to improve breathing\" --    Patient Specific Interventions Nasal cannula at 6L temporarily --    Mutuality/Individual Preferences   What Anxieties, Fears or Concerns Do You Have About Your Health or Care? --  need to be weaned to 4L NC by discharge       Goal: Discharge Needs Assessment  Outcome: Ongoing (interventions implemented as appropriate)    04/18/17 1820 04/18/17 1943   Discharge Needs Assessment   Concerns To Be Addressed discharge planning concerns --    Concerns Comments Pt oxygen provider ([employer]) is out of network and pt wants to select a new provider, please arrange this prior to DC --    Equipment Needed After Discharge none --    Discharge Facility/Level Of Care Needs home with home health --    Discharge Disposition still a patient --    Living Environment   Transportation Available --  car;family or friend will provide  (sometimes has difficulty swallowing pills)   Self-Care   Equipment Currently Used at Home oxygen  (cont. oxygen @ 2L/NC ) --          Problem: Cardiac: Heart Failure (Adult)  Goal: Signs and Symptoms of Listed Potential Problems Will be Absent or Manageable (Cardiac: Heart Failure)  Outcome: Ongoing (interventions implemented as appropriate)    04/21/17 1633   Cardiac: Heart Failure   Problems Assessed (Heart " Failure) all   Problems Present (Heart Failure) functional decline/self-care deficit;dysrhythmia/arrhythmia;respiratory compromise         Problem: Respiratory Insufficiency (Adult)  Goal: Identify Related Risk Factors and Signs and Symptoms  Outcome: Ongoing (interventions implemented as appropriate)    04/21/17 0428   Respiratory Insufficiency   Related Risk Factors (Respiratory Insufficiency) activity intolerance  (CHF)   Signs and Symptoms (Respiratory Insufficiency) blood pressure/heart rate changes;breathing pattern changes;dyspnea;respiratory rate alterations;shortness of breath;sleep apnea/periodic apnea       Goal: Acid/Base Balance  Outcome: Ongoing (interventions implemented as appropriate)    04/21/17 1633   Respiratory Insufficiency (Adult)   Acid/Base Balance making progress toward outcome       Goal: Effective Ventilation  Outcome: Ongoing (interventions implemented as appropriate)    04/21/17 1633   Respiratory Insufficiency (Adult)   Effective Ventilation making progress toward outcome

## 2017-04-21 NOTE — PROGRESS NOTES
Adult Nutrition  Assessment/PES    Patient Name:  Chito Rod  YOB: 1950  MRN: [id number]  Admit Date:  4/18/2017    Assessment Date:  4/21/2017        Reason for Assessment       04/21/17 1401    Reason for Assessment    Reason For Assessment/Visit follow up protocol;education    Time Spent (min) 10                              Problem/Interventions:        Problem 1       04/21/17 1401    Nutrition Diagnoses Problem 1    Problem 1 Knowledge Deficit    Etiology (related to) --   clinical condition    Signs/Symptoms (evidenced by) Reported  Information Deficit                    Intervention Goal       04/21/17 1402    Intervention Goal    General Nutrition support treatment                Education/Evaluation       04/21/17 1402    Education    Education --   Patient was sleepy at time of visit. He voiced no questions at this time. Continue to follow.     Monitor/Evaluation    Monitor Per protocol            Electronically signed by:  Skyla Curiel RD  04/21/17 2:03 PM

## 2017-04-22 ENCOUNTER — APPOINTMENT (OUTPATIENT)
Dept: GENERAL RADIOLOGY | Facility: HOSPITAL | Age: 67
End: 2017-04-22

## 2017-04-22 LAB
ANION GAP SERPL CALCULATED.3IONS-SCNC: 6 MMOL/L (ref 3–11)
BNP SERPL-MCNC: 49 PG/ML (ref 0–100)
BUN BLD-MCNC: 14 MG/DL (ref 9–23)
BUN/CREAT SERPL: 17.5 (ref 7–25)
CALCIUM SPEC-SCNC: 9.2 MG/DL (ref 8.7–10.4)
CHLORIDE SERPL-SCNC: 92 MMOL/L (ref 99–109)
CO2 SERPL-SCNC: 39 MMOL/L (ref 20–31)
CREAT BLD-MCNC: 0.8 MG/DL (ref 0.6–1.3)
GFR SERPL CREATININE-BSD FRML MDRD: 97 ML/MIN/1.73
GLUCOSE BLD-MCNC: 160 MG/DL (ref 70–100)
GLUCOSE BLDC GLUCOMTR-MCNC: 181 MG/DL (ref 70–130)
GLUCOSE BLDC GLUCOMTR-MCNC: 251 MG/DL (ref 70–130)
GLUCOSE BLDC GLUCOMTR-MCNC: 252 MG/DL (ref 70–130)
GLUCOSE BLDC GLUCOMTR-MCNC: 277 MG/DL (ref 70–130)
POTASSIUM BLD-SCNC: 4 MMOL/L (ref 3.5–5.5)
SODIUM BLD-SCNC: 137 MMOL/L (ref 132–146)

## 2017-04-22 PROCEDURE — 80048 BASIC METABOLIC PNL TOTAL CA: CPT | Performed by: HOSPITALIST

## 2017-04-22 PROCEDURE — 83880 ASSAY OF NATRIURETIC PEPTIDE: CPT | Performed by: HOSPITALIST

## 2017-04-22 PROCEDURE — 99233 SBSQ HOSP IP/OBS HIGH 50: CPT | Performed by: HOSPITALIST

## 2017-04-22 PROCEDURE — 63710000001 INSULIN LISPRO (HUMAN) PER 5 UNITS: Performed by: HOSPITALIST

## 2017-04-22 PROCEDURE — 71020 HC CHEST PA AND LATERAL: CPT

## 2017-04-22 PROCEDURE — 82962 GLUCOSE BLOOD TEST: CPT

## 2017-04-22 PROCEDURE — 25010000002 FUROSEMIDE PER 20 MG: Performed by: INTERNAL MEDICINE

## 2017-04-22 PROCEDURE — 94799 UNLISTED PULMONARY SVC/PX: CPT

## 2017-04-22 PROCEDURE — 63710000001 INSULIN DETEMIR PER 5 UNITS: Performed by: HOSPITALIST

## 2017-04-22 RX ORDER — FUROSEMIDE 40 MG/1
40 TABLET ORAL 2 TIMES DAILY
Status: DISCONTINUED | OUTPATIENT
Start: 2017-04-22 | End: 2017-04-24 | Stop reason: HOSPADM

## 2017-04-22 RX ORDER — IPRATROPIUM BROMIDE AND ALBUTEROL SULFATE 2.5; .5 MG/3ML; MG/3ML
3 SOLUTION RESPIRATORY (INHALATION) EVERY 6 HOURS PRN
Status: DISCONTINUED | OUTPATIENT
Start: 2017-04-22 | End: 2017-04-24 | Stop reason: HOSPADM

## 2017-04-22 RX ORDER — DILTIAZEM HCL 90 MG
45 TABLET ORAL EVERY 6 HOURS SCHEDULED
Status: DISCONTINUED | OUTPATIENT
Start: 2017-04-22 | End: 2017-04-23

## 2017-04-22 RX ADMIN — FUROSEMIDE 40 MG: 10 INJECTION, SOLUTION INTRAMUSCULAR; INTRAVENOUS at 08:10

## 2017-04-22 RX ADMIN — INSULIN LISPRO 8 UNITS: 100 INJECTION, SOLUTION INTRAVENOUS; SUBCUTANEOUS at 12:14

## 2017-04-22 RX ADMIN — INSULIN DETEMIR 45 UNITS: 100 INJECTION, SOLUTION SUBCUTANEOUS at 21:17

## 2017-04-22 RX ADMIN — DILTIAZEM HYDROCHLORIDE 45 MG: 90 TABLET, FILM COATED ORAL at 17:07

## 2017-04-22 RX ADMIN — ASPIRIN 325 MG: 325 TABLET, DELAYED RELEASE ORAL at 08:10

## 2017-04-22 RX ADMIN — INSULIN LISPRO 3 UNITS: 100 INJECTION, SOLUTION INTRAVENOUS; SUBCUTANEOUS at 08:14

## 2017-04-22 RX ADMIN — CARVEDILOL 25 MG: 12.5 TABLET, FILM COATED ORAL at 08:10

## 2017-04-22 RX ADMIN — FUROSEMIDE 40 MG: 40 TABLET ORAL at 17:07

## 2017-04-22 RX ADMIN — DILTIAZEM HYDROCHLORIDE 30 MG: 30 TABLET, FILM COATED ORAL at 00:23

## 2017-04-22 RX ADMIN — DABIGATRAN ETEXILATE MESYLATE 150 MG: 150 CAPSULE ORAL at 08:10

## 2017-04-22 RX ADMIN — INSULIN LISPRO 15 UNITS: 100 INJECTION, SOLUTION INTRAVENOUS; SUBCUTANEOUS at 17:25

## 2017-04-22 RX ADMIN — DABIGATRAN ETEXILATE MESYLATE 150 MG: 150 CAPSULE ORAL at 21:17

## 2017-04-22 RX ADMIN — INSULIN LISPRO 15 UNITS: 100 INJECTION, SOLUTION INTRAVENOUS; SUBCUTANEOUS at 08:14

## 2017-04-22 RX ADMIN — INSULIN LISPRO 8 UNITS: 100 INJECTION, SOLUTION INTRAVENOUS; SUBCUTANEOUS at 21:17

## 2017-04-22 RX ADMIN — CARVEDILOL 25 MG: 12.5 TABLET, FILM COATED ORAL at 17:07

## 2017-04-22 RX ADMIN — INSULIN LISPRO 8 UNITS: 100 INJECTION, SOLUTION INTRAVENOUS; SUBCUTANEOUS at 17:07

## 2017-04-22 RX ADMIN — INSULIN LISPRO 15 UNITS: 100 INJECTION, SOLUTION INTRAVENOUS; SUBCUTANEOUS at 12:15

## 2017-04-22 NOTE — PLAN OF CARE
"Problem: Patient Care Overview (Adult)  Goal: Plan of Care Review    04/22/17 0504   Coping/Psychosocial Response Interventions   Plan Of Care Reviewed With patient   Patient Care Overview   Progress progress toward functional goals is gradual       Goal: Adult Individualization and Mutuality  Outcome: Ongoing (interventions implemented as appropriate)    04/20/17 1645 04/21/17 1633 04/22/17 0504   Individualization   Patient Specific Preferences --  likes to be tucked in with extra covers --    Patient Specific Goals \"to improve breathing\" --  --    Patient Specific Interventions --  --  Lowered O2 to 5L; tolerated well   Mutuality/Individual Preferences   What Anxieties, Fears or Concerns Do You Have About Your Health or Care? --  need to be weaned to 4L NC by discharge --        Goal: Discharge Needs Assessment  Outcome: Ongoing (interventions implemented as appropriate)    04/18/17 1820 04/18/17 1943 04/22/17 0504   Discharge Needs Assessment   Concerns To Be Addressed discharge planning concerns --  --    Concerns Comments Pt oxygen provider ([employer]) is out of network and pt wants to select a new provider, please arrange this prior to DC --  --    Readmission Within The Last 30 Days --  --  no previous admission in last 30 days   Equipment Needed After Discharge none --  --    Discharge Facility/Level Of Care Needs home with home health --  --    Discharge Disposition still a patient --  --    Living Environment   Transportation Available --  car;family or friend will provide  (sometimes has difficulty swallowing pills) --    Self-Care   Equipment Currently Used at Home oxygen  (cont. oxygen @ 2L/NC ) --  --          Problem: Cardiac: Heart Failure (Adult)  Goal: Signs and Symptoms of Listed Potential Problems Will be Absent or Manageable (Cardiac: Heart Failure)  Outcome: Ongoing (interventions implemented as appropriate)    04/21/17 1633 04/22/17 0504   Cardiac: Heart Failure   Problems Assessed (Heart " Failure) --  all   Problems Present (Heart Failure) functional decline/self-care deficit;dysrhythmia/arrhythmia;respiratory compromise --          Problem: Respiratory Insufficiency (Adult)  Goal: Identify Related Risk Factors and Signs and Symptoms  Outcome: Ongoing (interventions implemented as appropriate)    04/21/17 0428 04/22/17 0504   Respiratory Insufficiency   Related Risk Factors (Respiratory Insufficiency) --  activity intolerance   Signs and Symptoms (Respiratory Insufficiency) blood pressure/heart rate changes;breathing pattern changes;dyspnea;respiratory rate alterations;shortness of breath;sleep apnea/periodic apnea --        Goal: Acid/Base Balance  Outcome: Ongoing (interventions implemented as appropriate)    04/22/17 0504   Respiratory Insufficiency (Adult)   Acid/Base Balance making progress toward outcome       Goal: Effective Ventilation  Outcome: Ongoing (interventions implemented as appropriate)    04/21/17 1633   Respiratory Insufficiency (Adult)   Effective Ventilation making progress toward outcome         Problem: NPPV/CPAP (Adult)  Goal: Signs and Symptoms of Listed Potential Problems Will be Absent or Manageable (NPPV/CPAP)  Outcome: Ongoing (interventions implemented as appropriate)    04/22/17 0504   NPPV/CPAP   Problems Assessed (NPPV/CPAP) situational response;dry mucous membranes   Problems Present (NPPV/CPAP) none

## 2017-04-22 NOTE — PLAN OF CARE
"Problem: Patient Care Overview (Adult)  Goal: Plan of Care Review  Outcome: Ongoing (interventions implemented as appropriate)    04/22/17 1431   Coping/Psychosocial Response Interventions   Plan Of Care Reviewed With patient   Patient Care Overview   Progress progress toward functional goals as expected       Goal: Adult Individualization and Mutuality  Outcome: Ongoing (interventions implemented as appropriate)    04/20/17 1645 04/21/17 1633 04/22/17 0504   Individualization   Patient Specific Preferences --  likes to be tucked in with extra covers --    Patient Specific Goals \"to improve breathing\" --  --    Patient Specific Interventions --  --  Lowered O2 to 5L; tolerated well   Mutuality/Individual Preferences   What Anxieties, Fears or Concerns Do You Have About Your Health or Care? --  need to be weaned to 4L NC by discharge --        Goal: Discharge Needs Assessment  Outcome: Ongoing (interventions implemented as appropriate)    04/18/17 1820 04/18/17 1943 04/22/17 0504   Discharge Needs Assessment   Concerns To Be Addressed discharge planning concerns --  --    Concerns Comments Pt oxygen provider ([employer]) is out of network and pt wants to select a new provider, please arrange this prior to DC --  --    Readmission Within The Last 30 Days --  --  no previous admission in last 30 days   Equipment Needed After Discharge none --  --    Discharge Facility/Level Of Care Needs home with home health --  --    Discharge Disposition still a patient --  --    Living Environment   Transportation Available --  car;family or friend will provide  (sometimes has difficulty swallowing pills) --    Self-Care   Equipment Currently Used at Home oxygen  (cont. oxygen @ 2L/NC ) --  --          Problem: Cardiac: Heart Failure (Adult)  Goal: Signs and Symptoms of Listed Potential Problems Will be Absent or Manageable (Cardiac: Heart Failure)  Outcome: Ongoing (interventions implemented as appropriate)    04/21/17 1633 04/22/17 " 0504   Cardiac: Heart Failure   Problems Assessed (Heart Failure) --  all   Problems Present (Heart Failure) functional decline/self-care deficit;dysrhythmia/arrhythmia;respiratory compromise --          Problem: Respiratory Insufficiency (Adult)  Goal: Identify Related Risk Factors and Signs and Symptoms  Outcome: Ongoing (interventions implemented as appropriate)    04/21/17 0428 04/22/17 0504   Respiratory Insufficiency   Related Risk Factors (Respiratory Insufficiency) --  activity intolerance   Signs and Symptoms (Respiratory Insufficiency) blood pressure/heart rate changes;breathing pattern changes;dyspnea;respiratory rate alterations;shortness of breath;sleep apnea/periodic apnea --        Goal: Acid/Base Balance  Outcome: Ongoing (interventions implemented as appropriate)    04/22/17 1431   Respiratory Insufficiency (Adult)   Acid/Base Balance making progress toward outcome       Goal: Effective Ventilation  Outcome: Ongoing (interventions implemented as appropriate)    04/22/17 1431   Respiratory Insufficiency (Adult)   Effective Ventilation making progress toward outcome

## 2017-04-22 NOTE — PROGRESS NOTES
[facility]  INPATIENT PROGRESS NOTE    Date of Admission: 4/18/2017  Length of Stay: 4  Primary Care Physician: Javan Shankar MD    Subjective   CC: f/u dyspnea  HPI:  Pt sitting up in chair. He notes continued slow improvement to dyspnea with no chest pain, fevers, or chills. Notes cough productive of clear sputum. He notes occasional lightheadedness but feels that his exercise tolerance is improved.    Wt down from 321lb to 302lb  Net negative 12.13L since admission     Review Of Systems:   Review of Systems   Constitutional: Negative for chills and fever.   Respiratory: Positive for cough and shortness of breath. Negative for chest tightness.    Cardiovascular: Negative for palpitations.   Gastrointestinal: Negative for abdominal pain, diarrhea, nausea and vomiting.     Objective      Temp:  [97.7 °F (36.5 °C)] 97.7 °F (36.5 °C)  Heart Rate:  [] 102  Resp:  [18] 18  BP: (107-113)/(73-90) 108/76  Physical Exam  Constitutional: no acute distress, awake, alert  Respiratory: Diminished breath sounds, No crackles or wheezing, nonlabored breathing  Cardiovascular: irreg irreg rate and rhythm with HR in 90s-110s, no murmurs, rubs, or gallops, palpable pedal pulses bilaterally  Gastrointestinal: Positive bowel sounds, soft, nontender, nondistended, obese   Musculoskeletal: Mild pitting bilateral ankle edema (improved)  Psychiatric: oriented x 3, appropriate affect, cooperative  Neurologic: Strength symmetric in all extremities     Results Review:    I have reviewed the labs, radiology results and diagnostic studies.      Results from last 7 days  Lab Units 04/21/17  0655   WBC 10*3/mm3 8.41   HEMOGLOBIN g/dL 14.1   PLATELETS 10*3/mm3 178       Results from last 7 days  Lab Units 04/22/17  0648   SODIUM mmol/L 137   POTASSIUM mmol/L 4.0   CHLORIDE mmol/L 92*   TOTAL CO2 mmol/L 39.0*   BUN mg/dL 14   CREATININE mg/dL 0.80   GLUCOSE mg/dL 160*   CALCIUM mg/dL 9.2      BNP 49    Culture Data: Cultures: n/a     Radiology Data: Repeat XR PA and Lateral obtained today with much improved aeration of lung fields and large decrease in pulmonary vascular congestion on my personal review and interp    Echo     Interpretation Summary      · Left atrial cavity size is mildly dilated.  · Left ventricular function is normal. Estimated EF = 70%.  · Left ventricular wall thickness is consistent with hypertrophy.     I have reviewed the medications.    Assessment/Plan   Mr. Rod is a 66 year-old M with a past hx of T2DM, HTN, CAD s/p PCI 2010, CHF, FAUSTO, COPD, atrial fibrillation, and chronic hypoxia who has been noncompliant with all medications for several months. He presented to the [facility] ER on 4/18 with acute dyspnea, diagnosed with acute COPD and diastolic HF with acute mixed respiratory failure.     Problem List  Hospital Problem List     * (Principal)Acute on chronic diastolic congestive heart failure    FAUSTO (obstructive sleep apnea)    Chronic obstructive pulmonary disease with acute exacerbation    Type 2 diabetes mellitus    Hypertension    history CAD (coronary artery disease)    Rapid atrial fibrillation    Elevated LFTs    Medically noncompliant    Morbid obesity    Acute respiratory failure with hypoxia and hypercapnia        Assessment/Plan:  - Pt responding to current treatment, hopefully O2 requirements will continue to decrease over the next 24-48 hours. Goal is 4LNC prior to discharge  - CXR, BNP, and Weight support that pt has been appropriately diuresed  - Change to PO lasix   - Increase dose of oral diltiazem as, without rate control, he is at high risk for recurrent DHF  - Continue BiPAP qhs and prn  - Continue carvedilol (on max dose) and pradaxa  - If rate control does not improve or he cannot tolerate diltiazem, will consult cardiology for consideration of antiarrhythmic vs. ECV  - Continue aspirin  - Arranged home BiPAP at nighttime due to chronic hypercapneic  respiratory failure due to COPD and associated with FAUSTO  - Continue nebs, pt off steroids and has no whezing  - Low threshold to repeat ABG   - FSBS reviewed, increase basal-bolus insulin again today (control overall improved). DM Educator has seen   - Long term prognosis is entirely dependent on pt's willingness to be compliant with his chronic medical therapy     High Risk/High complexity     DVT prophylaxis: pradaxa  Discharge Planning: I expect patient to be discharged to home in ~2 days.    Yaron Encarnacion MD   04/22/17   2:55 PM    Please note that portions of this note may have been completed with a voice recognition program. Efforts were made to edit the dictations, but occasionally words are mistranscribed.

## 2017-04-23 LAB
ANION GAP SERPL CALCULATED.3IONS-SCNC: 8 MMOL/L (ref 3–11)
ARTERIAL PATENCY WRIST A: ABNORMAL
ATMOSPHERIC PRESS: ABNORMAL MMHG
BASE EXCESS BLDA CALC-SCNC: 14.6 MMOL/L (ref 0–2)
BDY SITE: ABNORMAL
BUN BLD-MCNC: 16 MG/DL (ref 9–23)
BUN/CREAT SERPL: 17.8 (ref 7–25)
CALCIUM SPEC-SCNC: 9.2 MG/DL (ref 8.7–10.4)
CHLORIDE SERPL-SCNC: 91 MMOL/L (ref 99–109)
CO2 BLDA-SCNC: 43.8 MMOL/L (ref 22–33)
CO2 SERPL-SCNC: 39 MMOL/L (ref 20–31)
COHGB MFR BLD: 1.3 % (ref 0–2)
CREAT BLD-MCNC: 0.9 MG/DL (ref 0.6–1.3)
GFR SERPL CREATININE-BSD FRML MDRD: 85 ML/MIN/1.73
GLUCOSE BLD-MCNC: 192 MG/DL (ref 70–100)
GLUCOSE BLDC GLUCOMTR-MCNC: 189 MG/DL (ref 70–130)
GLUCOSE BLDC GLUCOMTR-MCNC: 262 MG/DL (ref 70–130)
GLUCOSE BLDC GLUCOMTR-MCNC: 300 MG/DL (ref 70–130)
GLUCOSE BLDC GLUCOMTR-MCNC: 346 MG/DL (ref 70–130)
HCO3 BLDA-SCNC: 41.7 MMOL/L (ref 20–26)
HCT VFR BLD CALC: 47.3 %
HGB BLDA-MCNC: 15.4 G/DL (ref 13.5–17.5)
HOROWITZ INDEX BLD+IHG-RTO: 36 %
METHGB BLD QL: 0.9 % (ref 0–1.5)
MODALITY: ABNORMAL
OXYHGB MFR BLDV: 86.6 % (ref 94–99)
PCO2 BLDA: 67.7 MM HG (ref 35–48)
PH BLDA: 7.4 PH UNITS (ref 7.35–7.45)
PO2 BLDA: 56.8 MM HG (ref 83–108)
POTASSIUM BLD-SCNC: 3.7 MMOL/L (ref 3.5–5.5)
SODIUM BLD-SCNC: 138 MMOL/L (ref 132–146)

## 2017-04-23 PROCEDURE — 94799 UNLISTED PULMONARY SVC/PX: CPT

## 2017-04-23 PROCEDURE — 94660 CPAP INITIATION&MGMT: CPT

## 2017-04-23 PROCEDURE — 94640 AIRWAY INHALATION TREATMENT: CPT

## 2017-04-23 PROCEDURE — 82962 GLUCOSE BLOOD TEST: CPT

## 2017-04-23 PROCEDURE — 99233 SBSQ HOSP IP/OBS HIGH 50: CPT | Performed by: HOSPITALIST

## 2017-04-23 PROCEDURE — 94760 N-INVAS EAR/PLS OXIMETRY 1: CPT

## 2017-04-23 PROCEDURE — 36600 WITHDRAWAL OF ARTERIAL BLOOD: CPT | Performed by: HOSPITALIST

## 2017-04-23 PROCEDURE — 63710000001 INSULIN DETEMIR PER 5 UNITS: Performed by: HOSPITALIST

## 2017-04-23 PROCEDURE — 63710000001 INSULIN LISPRO (HUMAN) PER 5 UNITS: Performed by: HOSPITALIST

## 2017-04-23 PROCEDURE — 80048 BASIC METABOLIC PNL TOTAL CA: CPT | Performed by: HOSPITALIST

## 2017-04-23 PROCEDURE — 82805 BLOOD GASES W/O2 SATURATION: CPT | Performed by: HOSPITALIST

## 2017-04-23 RX ORDER — DILTIAZEM HYDROCHLORIDE 60 MG/1
60 TABLET, FILM COATED ORAL EVERY 6 HOURS SCHEDULED
Status: DISCONTINUED | OUTPATIENT
Start: 2017-04-23 | End: 2017-04-24 | Stop reason: HOSPADM

## 2017-04-23 RX ORDER — BUDESONIDE AND FORMOTEROL FUMARATE DIHYDRATE 160; 4.5 UG/1; UG/1
2 AEROSOL RESPIRATORY (INHALATION)
Status: DISCONTINUED | OUTPATIENT
Start: 2017-04-23 | End: 2017-04-24 | Stop reason: HOSPADM

## 2017-04-23 RX ADMIN — DILTIAZEM HYDROCHLORIDE 45 MG: 90 TABLET, FILM COATED ORAL at 11:54

## 2017-04-23 RX ADMIN — CARVEDILOL 25 MG: 12.5 TABLET, FILM COATED ORAL at 08:19

## 2017-04-23 RX ADMIN — BUDESONIDE AND FORMOTEROL FUMARATE DIHYDRATE 2 PUFF: 160; 4.5 AEROSOL RESPIRATORY (INHALATION) at 20:47

## 2017-04-23 RX ADMIN — DABIGATRAN ETEXILATE MESYLATE 150 MG: 150 CAPSULE ORAL at 21:09

## 2017-04-23 RX ADMIN — CARVEDILOL 25 MG: 12.5 TABLET, FILM COATED ORAL at 17:01

## 2017-04-23 RX ADMIN — FUROSEMIDE 40 MG: 40 TABLET ORAL at 17:01

## 2017-04-23 RX ADMIN — INSULIN DETEMIR 55 UNITS: 100 INJECTION, SOLUTION SUBCUTANEOUS at 21:20

## 2017-04-23 RX ADMIN — INSULIN LISPRO 15 UNITS: 100 INJECTION, SOLUTION INTRAVENOUS; SUBCUTANEOUS at 08:23

## 2017-04-23 RX ADMIN — DABIGATRAN ETEXILATE MESYLATE 150 MG: 150 CAPSULE ORAL at 08:19

## 2017-04-23 RX ADMIN — INSULIN LISPRO 3 UNITS: 100 INJECTION, SOLUTION INTRAVENOUS; SUBCUTANEOUS at 08:20

## 2017-04-23 RX ADMIN — DILTIAZEM HYDROCHLORIDE 45 MG: 90 TABLET, FILM COATED ORAL at 00:06

## 2017-04-23 RX ADMIN — INSULIN LISPRO 5 UNITS: 100 INJECTION, SOLUTION INTRAVENOUS; SUBCUTANEOUS at 17:00

## 2017-04-23 RX ADMIN — FUROSEMIDE 40 MG: 40 TABLET ORAL at 08:20

## 2017-04-23 RX ADMIN — INSULIN LISPRO 10 UNITS: 100 INJECTION, SOLUTION INTRAVENOUS; SUBCUTANEOUS at 21:10

## 2017-04-23 RX ADMIN — INSULIN LISPRO 15 UNITS: 100 INJECTION, SOLUTION INTRAVENOUS; SUBCUTANEOUS at 11:53

## 2017-04-23 RX ADMIN — DILTIAZEM HYDROCHLORIDE 60 MG: 60 TABLET, FILM COATED ORAL at 17:01

## 2017-04-23 RX ADMIN — ASPIRIN 325 MG: 325 TABLET, DELAYED RELEASE ORAL at 08:20

## 2017-04-23 RX ADMIN — INSULIN LISPRO 3 UNITS: 100 INJECTION, SOLUTION INTRAVENOUS; SUBCUTANEOUS at 11:55

## 2017-04-23 RX ADMIN — INSULIN LISPRO 18 UNITS: 100 INJECTION, SOLUTION INTRAVENOUS; SUBCUTANEOUS at 17:00

## 2017-04-23 NOTE — PLAN OF CARE
"Problem: Patient Care Overview (Adult)  Goal: Plan of Care Review  Outcome: Ongoing (interventions implemented as appropriate)    04/23/17 1521   Coping/Psychosocial Response Interventions   Plan Of Care Reviewed With patient   Patient Care Overview   Progress progress toward functional goals as expected       Goal: Adult Individualization and Mutuality  Outcome: Ongoing (interventions implemented as appropriate)    04/20/17 1645 04/21/17 1633 04/23/17 0438   Individualization   Patient Specific Preferences --  likes to be tucked in with extra covers --    Patient Specific Goals \"to improve breathing\" --  --    Patient Specific Interventions --  --  Still at 5L tonight; desatting any lower   Mutuality/Individual Preferences   What Anxieties, Fears or Concerns Do You Have About Your Health or Care? --  need to be weaned to 4L NC by discharge --        Goal: Discharge Needs Assessment  Outcome: Ongoing (interventions implemented as appropriate)    04/18/17 1820 04/18/17 1943 04/22/17 0504   Discharge Needs Assessment   Concerns To Be Addressed discharge planning concerns --  --    Concerns Comments Pt oxygen provider ([employer]) is out of network and pt wants to select a new provider, please arrange this prior to DC --  --    Readmission Within The Last 30 Days --  --  no previous admission in last 30 days   Equipment Needed After Discharge none --  --    Discharge Facility/Level Of Care Needs home with home health --  --    Discharge Disposition still a patient --  --    Living Environment   Transportation Available --  car;family or friend will provide  (sometimes has difficulty swallowing pills) --    Self-Care   Equipment Currently Used at Home oxygen  (cont. oxygen @ 2L/NC ) --  --          Problem: Cardiac: Heart Failure (Adult)  Goal: Signs and Symptoms of Listed Potential Problems Will be Absent or Manageable (Cardiac: Heart Failure)  Outcome: Ongoing (interventions implemented as appropriate)    04/21/17 1633 " 04/22/17 0504   Cardiac: Heart Failure   Problems Assessed (Heart Failure) --  all   Problems Present (Heart Failure) functional decline/self-care deficit;dysrhythmia/arrhythmia;respiratory compromise --          Problem: Respiratory Insufficiency (Adult)  Goal: Identify Related Risk Factors and Signs and Symptoms  Outcome: Ongoing (interventions implemented as appropriate)    04/21/17 0428 04/22/17 0504   Respiratory Insufficiency   Related Risk Factors (Respiratory Insufficiency) --  activity intolerance   Signs and Symptoms (Respiratory Insufficiency) blood pressure/heart rate changes;breathing pattern changes;dyspnea;respiratory rate alterations;shortness of breath;sleep apnea/periodic apnea --        Goal: Acid/Base Balance  Outcome: Ongoing (interventions implemented as appropriate)    04/23/17 1521   Respiratory Insufficiency (Adult)   Acid/Base Balance making progress toward outcome       Goal: Effective Ventilation  Outcome: Ongoing (interventions implemented as appropriate)    04/23/17 1521   Respiratory Insufficiency (Adult)   Effective Ventilation making progress toward outcome

## 2017-04-23 NOTE — PLAN OF CARE
Problem: Patient Care Overview (Adult)  Goal: Plan of Care Review  Outcome: Ongoing (interventions implemented as appropriate)    04/23/17 0438   Coping/Psychosocial Response Interventions   Plan Of Care Reviewed With patient   Patient Care Overview   Progress progress toward functional goals as expected       Goal: Adult Individualization and Mutuality  Outcome: Ongoing (interventions implemented as appropriate)    04/21/17 1633 04/23/17 0438   Individualization   Patient Specific Interventions --  Still at 5L tonight; desatting any lower   Mutuality/Individual Preferences   What Anxieties, Fears or Concerns Do You Have About Your Health or Care? need to be weaned to 4L NC by discharge --        Goal: Discharge Needs Assessment  Outcome: Ongoing (interventions implemented as appropriate)    04/18/17 1820 04/18/17 1943 04/22/17 0504   Discharge Needs Assessment   Concerns To Be Addressed discharge planning concerns --  --    Concerns Comments Pt oxygen provider ([employer]) is out of network and pt wants to select a new provider, please arrange this prior to DC --  --    Readmission Within The Last 30 Days --  --  no previous admission in last 30 days   Equipment Needed After Discharge none --  --    Discharge Facility/Level Of Care Needs home with home health --  --    Discharge Disposition still a patient --  --    Living Environment   Transportation Available --  car;family or friend will provide  (sometimes has difficulty swallowing pills) --    Self-Care   Equipment Currently Used at Home oxygen  (cont. oxygen @ 2L/NC ) --  --          Problem: Cardiac: Heart Failure (Adult)  Goal: Signs and Symptoms of Listed Potential Problems Will be Absent or Manageable (Cardiac: Heart Failure)  Outcome: Ongoing (interventions implemented as appropriate)    04/21/17 1633 04/22/17 0504   Cardiac: Heart Failure   Problems Assessed (Heart Failure) --  all   Problems Present (Heart Failure) functional decline/self-care  deficit;dysrhythmia/arrhythmia;respiratory compromise --          Problem: Respiratory Insufficiency (Adult)  Goal: Identify Related Risk Factors and Signs and Symptoms  Outcome: Ongoing (interventions implemented as appropriate)    04/21/17 0428 04/22/17 0504   Respiratory Insufficiency   Related Risk Factors (Respiratory Insufficiency) --  activity intolerance   Signs and Symptoms (Respiratory Insufficiency) blood pressure/heart rate changes;breathing pattern changes;dyspnea;respiratory rate alterations;shortness of breath;sleep apnea/periodic apnea --        Goal: Acid/Base Balance  Outcome: Ongoing (interventions implemented as appropriate)    04/22/17 1431   Respiratory Insufficiency (Adult)   Acid/Base Balance making progress toward outcome       Goal: Effective Ventilation  Outcome: Ongoing (interventions implemented as appropriate)    04/23/17 0438   Respiratory Insufficiency (Adult)   Effective Ventilation making progress toward outcome         Problem: NPPV/CPAP (Adult)  Goal: Signs and Symptoms of Listed Potential Problems Will be Absent or Manageable (NPPV/CPAP)  Outcome: Ongoing (interventions implemented as appropriate)    04/23/17 0438   NPPV/CPAP   Problems Assessed (NPPV/CPAP) dry mucous membranes;situational response   Problems Present (NPPV/CPAP) none

## 2017-04-23 NOTE — PROGRESS NOTES
[facility]  INPATIENT PROGRESS NOTE    Date of Admission: 4/18/2017  Length of Stay: 5  Primary Care Physician: Javan Shankar MD    Subjective   CC: f/u dyspnea  HPI:  Pt continues to improve. He is walking in room when seen, with HR in 100s-110s. He reports improved dyspnea without chest pressure or significant cough. Sats in low 90s on 4LNC. No fevers or chills. Has been tolerating walking in hallway.     Wt down from 321lb (admission) to 296lb  Net negative 14L since admission     Review Of Systems:   Review of Systems   Constitutional: Negative for chills and fever.   Respiratory: Negative for cough, chest tightness and shortness of breath.    Cardiovascular: Negative for palpitations.   Gastrointestinal: Negative for abdominal pain, diarrhea, nausea and vomiting.     Objective      Temp:  [97.3 °F (36.3 °C)] 97.3 °F (36.3 °C)  Heart Rate:  [] 92  Resp:  [18] 18  BP: (111-124)/(74-88) 120/79  Physical Exam  Constitutional: no acute distress, awake, alert  Respiratory: Diminished breath sounds, No crackles or wheezing, nonlabored breathing  Cardiovascular: irreg irreg rate and rhythm with HR in 80s-110s, no murmurs, rubs, or gallops, palpable pedal pulses bilaterally  Gastrointestinal: Positive bowel sounds, soft, nontender, nondistended, obese   Musculoskeletal: Mild pitting bilateral ankle edema (improved)  Psychiatric: oriented x 3, appropriate affect, cooperative  Neurologic: Strength symmetric in all extremities     Results Review:    I have reviewed the labs, radiology results and diagnostic studies.      Results from last 7 days  Lab Units 04/21/17  0655   WBC 10*3/mm3 8.41   HEMOGLOBIN g/dL 14.1   PLATELETS 10*3/mm3 178       Results from last 7 days  Lab Units 04/23/17  0621 04/22/17  0648 04/21/17  0655   SODIUM mmol/L 138 137 138   POTASSIUM mmol/L 3.7 4.0 4.1   CHLORIDE mmol/L 91* 92* 94*   TOTAL CO2 mmol/L 39.0* 39.0* 37.0*   BUN mg/dL 16 14 19    CREATININE mg/dL 0.90 0.80 1.00   GLUCOSE mg/dL 192* 160* 160*   CALCIUM mg/dL 9.2 9.2 8.8       Results from last 7 days  Lab Units 04/23/17  0901   PH, ARTERIAL pH units 7.398   PO2 ART mm Hg 56.8*   PCO2, ARTERIAL mm Hg 67.7*   HCO3 ART mmol/L 41.7*   FiO2 36%    BNP 49 4/22    Culture Data: Cultures: n/a     Radiology Data: No new    Echo     Interpretation Summary      · Left atrial cavity size is mildly dilated.  · Left ventricular function is normal. Estimated EF = 70%.  · Left ventricular wall thickness is consistent with hypertrophy.     I have reviewed the medications.    Assessment/Plan   Mr. Rod is a 66 year-old M with a past hx of T2DM, HTN, CAD s/p PCI 2010, CHF, FAUSTO, COPD, atrial fibrillation, and chronic hypoxia who has been noncompliant with all medications for several months. He presented to the [facility] ER on 4/18 with acute dyspnea, diagnosed with acute COPD and diastolic HF with acute mixed respiratory failure.     Problem List  Hospital Problem List     * (Principal)Acute on chronic diastolic congestive heart failure    FAUSTO (obstructive sleep apnea)    Chronic obstructive pulmonary disease with acute exacerbation    Type 2 diabetes mellitus    Hypertension    history CAD (coronary artery disease)    Rapid atrial fibrillation    Elevated LFTs    Medically noncompliant    Morbid obesity    Acute respiratory failure with hypoxia and hypercapnia        Assessment/Plan:  - Pt responding to current treatment, progress remains slow but he appears to be able to tolerate 4-5L  - ABG supports advanced chronic hypercapneic hypoxemic respiratory failure   - Goal is 4LNC prior to discharge  - CXR, BNP, and Weight support that pt has been appropriately diuresed, now on PO lasix  - Increase dose of diltiazem today, if tolerates, will change to controlled release formulation in morning   - Continue BiPAP qhs and prn  - Add symbicort  - Continue carvedilol (on max dose) and pradaxa  - If rate control does not  improve or he cannot tolerate diltiazem, will consult cardiology for consideration of antiarrhythmic vs. ECV. Holding off for now as I suspect his chronic lung disease is more the cause of his persistent hypoxia.   - Check PFTs tomorrow AM to get a sense of the severity of his COPD  - Continue aspirin  - Arranged home BiPAP at nighttime due to chronic hypercapneic respiratory failure due to COPD and associated with FAUSTO  - Continue prn nebs, pt off steroids and has no whezing  - FSBS reviewed, increase basal-bolus insulin again today, pt will likely require 1u/kg/24 hrs. DM Educator has seen   - Long term prognosis is entirely dependent on pt's willingness to be compliant with his chronic medical therapy   - Needs follow up with his Cardiologist. He does not know his name.     High Risk/High complexity     DVT prophylaxis: pradaxa  Discharge Planning: I expect patient to be discharged to home in ~1-2 days.    Yaron Encarnacion MD   04/23/17   12:14 PM    Please note that portions of this note may have been completed with a voice recognition program. Efforts were made to edit the dictations, but occasionally words are mistranscribed.

## 2017-04-24 ENCOUNTER — APPOINTMENT (OUTPATIENT)
Dept: PULMONOLOGY | Facility: HOSPITAL | Age: 67
End: 2017-04-24
Attending: HOSPITALIST

## 2017-04-24 VITALS
WEIGHT: 311.2 LBS | OXYGEN SATURATION: 90 % | BODY MASS INDEX: 43.57 KG/M2 | HEIGHT: 71 IN | SYSTOLIC BLOOD PRESSURE: 97 MMHG | HEART RATE: 96 BPM | RESPIRATION RATE: 18 BRPM | TEMPERATURE: 98 F | DIASTOLIC BLOOD PRESSURE: 64 MMHG

## 2017-04-24 LAB
ANION GAP SERPL CALCULATED.3IONS-SCNC: 10 MMOL/L (ref 3–11)
BUN BLD-MCNC: 21 MG/DL (ref 9–23)
BUN/CREAT SERPL: 21 (ref 7–25)
CALCIUM SPEC-SCNC: 9.8 MG/DL (ref 8.7–10.4)
CHLORIDE SERPL-SCNC: 89 MMOL/L (ref 99–109)
CO2 SERPL-SCNC: 39 MMOL/L (ref 20–31)
CREAT BLD-MCNC: 1 MG/DL (ref 0.6–1.3)
GFR SERPL CREATININE-BSD FRML MDRD: 75 ML/MIN/1.73
GLUCOSE BLD-MCNC: 210 MG/DL (ref 70–100)
GLUCOSE BLDC GLUCOMTR-MCNC: 214 MG/DL (ref 70–130)
GLUCOSE BLDC GLUCOMTR-MCNC: 238 MG/DL (ref 70–130)
GLUCOSE BLDC GLUCOMTR-MCNC: 310 MG/DL (ref 70–130)
POTASSIUM BLD-SCNC: 3.7 MMOL/L (ref 3.5–5.5)
SODIUM BLD-SCNC: 138 MMOL/L (ref 132–146)

## 2017-04-24 PROCEDURE — 94729 DIFFUSING CAPACITY: CPT | Performed by: INTERNAL MEDICINE

## 2017-04-24 PROCEDURE — 94727 GAS DIL/WSHOT DETER LNG VOL: CPT

## 2017-04-24 PROCEDURE — 94727 GAS DIL/WSHOT DETER LNG VOL: CPT | Performed by: INTERNAL MEDICINE

## 2017-04-24 PROCEDURE — 94060 EVALUATION OF WHEEZING: CPT

## 2017-04-24 PROCEDURE — 99239 HOSP IP/OBS DSCHRG MGMT >30: CPT | Performed by: NURSE PRACTITIONER

## 2017-04-24 PROCEDURE — 94729 DIFFUSING CAPACITY: CPT

## 2017-04-24 PROCEDURE — 80048 BASIC METABOLIC PNL TOTAL CA: CPT | Performed by: HOSPITALIST

## 2017-04-24 PROCEDURE — 94060 EVALUATION OF WHEEZING: CPT | Performed by: INTERNAL MEDICINE

## 2017-04-24 PROCEDURE — 82962 GLUCOSE BLOOD TEST: CPT

## 2017-04-24 RX ORDER — FUROSEMIDE 40 MG/1
40 TABLET ORAL 2 TIMES DAILY
Qty: 60 TABLET | Refills: 0 | Status: SHIPPED | OUTPATIENT
Start: 2017-04-24 | End: 2017-07-20

## 2017-04-24 RX ORDER — DILTIAZEM HYDROCHLORIDE 60 MG/1
60 TABLET, FILM COATED ORAL EVERY 6 HOURS SCHEDULED
Qty: 120 TABLET | Refills: 0 | Status: SHIPPED | OUTPATIENT
Start: 2017-04-24 | End: 2017-05-23 | Stop reason: SDUPTHER

## 2017-04-24 RX ORDER — BUDESONIDE AND FORMOTEROL FUMARATE DIHYDRATE 160; 4.5 UG/1; UG/1
2 AEROSOL RESPIRATORY (INHALATION)
Qty: 1 INHALER | Refills: 12 | Status: SHIPPED | OUTPATIENT
Start: 2017-04-24 | End: 2019-04-18

## 2017-04-24 RX ADMIN — INSULIN LISPRO 10 UNITS: 100 INJECTION, SOLUTION INTRAVENOUS; SUBCUTANEOUS at 12:12

## 2017-04-24 RX ADMIN — INSULIN LISPRO 18 UNITS: 100 INJECTION, SOLUTION INTRAVENOUS; SUBCUTANEOUS at 12:10

## 2017-04-24 RX ADMIN — INSULIN LISPRO 18 UNITS: 100 INJECTION, SOLUTION INTRAVENOUS; SUBCUTANEOUS at 08:27

## 2017-04-24 RX ADMIN — INSULIN LISPRO 5 UNITS: 100 INJECTION, SOLUTION INTRAVENOUS; SUBCUTANEOUS at 08:28

## 2017-04-24 RX ADMIN — DABIGATRAN ETEXILATE MESYLATE 150 MG: 150 CAPSULE ORAL at 08:33

## 2017-04-24 RX ADMIN — ASPIRIN 325 MG: 325 TABLET, DELAYED RELEASE ORAL at 08:33

## 2017-04-24 RX ADMIN — FUROSEMIDE 40 MG: 40 TABLET ORAL at 08:28

## 2017-04-24 NOTE — PLAN OF CARE
"Problem: Patient Care Overview (Adult)  Goal: Plan of Care Review  Outcome: Ongoing (interventions implemented as appropriate)  Goal: Adult Individualization and Mutuality  Outcome: Ongoing (interventions implemented as appropriate)    04/24/17 0323   Individualization   Patient Specific Interventions Weaned to 4L 02; sats in the 90's overnight         04/20/17 1645 04/21/17 1633 04/24/17 0323   Individualization   Patient Specific Preferences --  likes to be tucked in with extra covers --    Patient Specific Goals \"to improve breathing\" --  --    Patient Specific Interventions --  --  Weaned to 4L 02; sats in the 90's overnight       Goal: Discharge Needs Assessment  Outcome: Ongoing (interventions implemented as appropriate)    04/18/17 1820 04/18/17 1943 04/22/17 0504   Discharge Needs Assessment   Concerns To Be Addressed discharge planning concerns --  --    Concerns Comments --  --  --    Readmission Within The Last 30 Days --  --  no previous admission in last 30 days   Equipment Needed After Discharge none --  --    Discharge Facility/Level Of Care Needs home with home health --  --    Discharge Disposition still a patient --  --    Living Environment   Transportation Available --  car;family or friend will provide  (sometimes has difficulty swallowing pills) --      04/24/17 0323   Discharge Needs Assessment   Concerns To Be Addressed --    Concerns Comments Needs reenforcement on diabetic teachings and nutritional teachings; charts states these are completed but patient does not recall any of the teachings   Readmission Within The Last 30 Days --    Equipment Needed After Discharge --    Discharge Facility/Level Of Care Needs --    Discharge Disposition --    Living Environment   Transportation Available --          Problem: Cardiac: Heart Failure (Adult)  Goal: Signs and Symptoms of Listed Potential Problems Will be Absent or Manageable (Cardiac: Heart Failure)  Outcome: Ongoing (interventions implemented " as appropriate)    04/24/17 0323   Cardiac: Heart Failure   Problems Assessed (Heart Failure) decreased quality of life;fluid/electrolyte imbalance;respiratory compromise;situational response;sleep-disordered breathing   Problems Present (Heart Failure) dysrhythmia/arrhythmia;situational response         Problem: Respiratory Insufficiency (Adult)  Goal: Identify Related Risk Factors and Signs and Symptoms  Outcome: Ongoing (interventions implemented as appropriate)    04/24/17 0323   Respiratory Insufficiency   Related Risk Factors (Respiratory Insufficiency) activity intolerance;sleep disturbance   Signs and Symptoms (Respiratory Insufficiency) sleeplessness/fatigue       Goal: Acid/Base Balance  Outcome: Ongoing (interventions implemented as appropriate)    04/24/17 0323   Respiratory Insufficiency (Adult)   Acid/Base Balance making progress toward outcome       Goal: Effective Ventilation  Outcome: Ongoing (interventions implemented as appropriate)    04/24/17 0323   Respiratory Insufficiency (Adult)   Effective Ventilation making progress toward outcome         Problem: NPPV/CPAP (Adult)  Goal: Signs and Symptoms of Listed Potential Problems Will be Absent or Manageable (NPPV/CPAP)  Outcome: Ongoing (interventions implemented as appropriate)    04/24/17 0323   NPPV/CPAP   Problems Assessed (NPPV/CPAP) dry mucous membranes;situational response   Problems Present (NPPV/CPAP) situational response

## 2017-04-24 NOTE — PROGRESS NOTES
Adult Nutrition  Assessment/PES    Patient Name:  Chito Rod  YOB: 1950  MRN: [id number]  Admit Date:  4/18/2017    Assessment Date:  4/24/2017        Reason for Assessment       04/24/17 1305    Reason for Assessment    Reason For Assessment/Visit follow up protocol    Time Spent (min) 10    Diagnosis --   per notes this admission                  Labs/Tests/Procedures/Meds       04/24/17 1305    Labs/Tests/Procedures/Meds    Labs/Tests Review Reviewed                Nutrition Prescription Ordered       04/24/17 1305    Nutrition Prescription PO    Current PO Diet Regular    Common Modifiers Cardiac;Consistent Carbohydrate            Evaluation of Received Nutrient/Fluid Intake       04/24/17 1305    PO Evaluation    Number of Meals 1    % PO Intake 100   Patient advised typically eats 3/4 of his meals. RD observed at lunch today he consumed brunt of meal except 1/2 of  fruit and 1/2 dessert.              Problem/Interventions:            Problem 3       04/24/17 1306    Nutrition Diagnoses Problem 3    Problem 3 Inadequate Intake/Infusion    Inadequate Intake Type Oral    Etiology (related to) Factors Affecting Nutrition    Appetite Good    Signs/Symptoms (evidenced by) Report/Observation    Reported/Observed By Patient;RD/Tech                Intervention Goal       04/24/17 1307    Intervention Goal    General Nutrition support treatment    PO Continue positive trend            Nutrition Intervention       04/24/17 1308    Nutrition Intervention    RD/Tech Action Follow Tx progress    assisting pt. with menu selections.              Education/Evaluation       04/24/17 1309    Education    Education Previous education by RD/LD   Patient had no additional questions. Advised him RD available via phone as needed.    Monitor/Evaluation    Monitor Per protocol            Electronically signed by:  Skyla Curiel RD  04/24/17 1:09 PM

## 2017-04-24 NOTE — DISCHARGE PLACEMENT REQUEST
"Khoi Rod (66 y.o. Male)     Date of Birth Social Security Number Address Home Phone MRN    1950  [address] [place] [address] [phone] [id number]    Gnosticist Marital Status          Unknown Unknown       Admission Date Admission Type Admitting Provider Attending Provider Department, Room/Bed    17 Emergency Yaron Encarnacion MD Repass, Gregory L, MD [facility] 5G, S559/1    Discharge Date Discharge Disposition Discharge Destination         Home or Self Care             Attending Provider: Yaron Encarnacion MD     Allergies:  No Known Allergies    Isolation:  None   Infection:  None   Code Status:  FULL    Ht:  71\" (180.3 cm)   Wt:  311 lb 3.2 oz (141 kg)    Admission Cmt:  None   Principal Problem:  Acute on chronic diastolic congestive heart failure [I50.33]                 Active Insurance as of 2017     Primary Coverage     Payor Plan Insurance Group Employer/Plan Group    HUMANA MEDICARE REPLACEMENT HUMANA MEDICARE REPL [id number]     Payor Plan Address Payor Plan Phone Number Effective From Effective To    [address] [address] [phone] 2014     [place] [address]       Subscriber Name Subscriber Birth Date Member ID       KHOI ROD 1950 [id number]                 Emergency Contacts      (Rel.) Home Phone Work Phone Mobile Phone    Kristyn Luque (Sister) [phone] -- --        [address] [address]  Phone: [phone]  Fax:  Date Ordered: 2017         Patient: Khoi Rod MRN: [id number]   [address] [place] [address] : 1950  SSN:    Phone: [phone] Sex: M     Weight: 311 lb 3.2 oz (141 kg)         Ht Readings from Last 1 Encounters:   17 71\" (180.3 cm)         Oxygen Therapy (Order ID: [id number])   Diagnosis: Acute on chronic congestive heart failure, unspecified congestive heart failure type (I50.9 [ICD-10-CM] 428.0 " "[ICD-9-CM])   Quantity: 1     Delivery Modality: Nasal Cannula  Liters Per Minute: 4  Duration: Continuous  Equipment:  Oxygen Concentrator &  &  Portable Gaseous Oxygen System & Portable Oxygen Contents Gaseous &  Conserving Regulator  Length of Need (99 Months = Lifetime): 99 Months = Lifetime            Authorizing Provider:Yaron Encarnacion MD  Order Entered By: Carolyn Ortega RN 4/24/2017 2:52 PM     Electronically signed by: Yaron Encarnacion MD (NPI: [id number]) 4/24/2017 2:52 PM              Insurance Information                HUMANA MEDICARE REPLACEMENT/HUMANA MEDICARE REPL Phone: [phone]    Subscriber: Chito Rod Subscriber#: [id number]    Group#: [id number] Precert#:              History & Physical      Grace Romeo MD at 4/18/2017  6:42 PM              [facility] Medicine Services  HISTORY AND PHYSICAL    Primary Care Physician: Javan Shankar MD; Dr. So, Pulmonary; Dr. Giron; Cardiology    Subjective     Chief Complaint:  Shortness of air    History of Present Illness:     65 yo male presented to ED with c/o shortness of air.  Pertinent history of DM, HTN, CAD, CHF, COPD, with shortness of air on home oxygen prn at baseline.  Significant worsening of shortness of over last 1-2 days with increased lower extremity edema; having to use oxygen all the time.  Shortness of air is worse when laying flat with intermittent chest pain. Positive GERD.  Has not been compliant with majority of his home medications \"due to cost\".  Has not had his lisinopril, HCTZ for several months; denies having lasix prescribed.  Describes not taking his metformin for months and he is noncompliant with his diet, as he is currently eating fried chicken that his family brought to him, and morbidly obese.  History of coronary artery disease with prior coronary stents X 2 in 2010.  Has seen Dr. Underwood, cardiology in the past, and currently sees Dr. Giron.  Atypical " chest pain noted per PCP in Feb 2017 and was scheduled for stress test, but patient did not get this completed due to transportation issues.  Positive atrial fib; has not taken his pradaxa for several months, but thinks he has been taking his coreg and daily ASA.    67 yo with HO of CHF, DM and FAUSTO who has stopped taking his medications and has had worsening shortness of breath, he present in acute mixed respiratory failure but has responded well to nebs, steroids and diuresis in the ER he says he felt great when he had his FAUSTO  Review of Systems   Constitutional: Positive for activity change and chills. Negative for fever.        Decreased activity.   HENT: Positive for congestion and sore throat. Negative for postnasal drip and rhinorrhea.    Eyes: Negative for visual disturbance.   Respiratory: Positive for cough, shortness of breath and wheezing.    Cardiovascular: Positive for chest pain, palpitations and leg swelling.   Gastrointestinal: Positive for constipation and diarrhea. Negative for abdominal pain, blood in stool, nausea and vomiting.   Endocrine: Positive for polyphagia and polyuria.   Genitourinary: Negative for difficulty urinating and dysuria.   Skin: Negative for rash and wound.   Neurological: Negative for seizures and headaches.   Psychiatric/Behavioral: Negative for confusion and hallucinations.      Otherwise complete ROS performed and negative except as mentioned in the HPI.    Past Medical History:   Diagnosis Date   • Atrial fibrillation    • CHF (congestive heart failure)    • COPD (chronic obstructive pulmonary disease)    • Coronary artery disease    • Diabetes mellitus    • GERD (gastroesophageal reflux disease)    • Head trauma in child    • Hyperlipemia    • Hypertension    • Insomnia    • FAUSTO (obstructive sleep apnea)        Past Surgical History:   Procedure Laterality Date   • APPENDECTOMY     • CORONARY STENT PLACEMENT  2010   • HAND SURGERY Left    • KNEE ARTHROSCOPY Left    •  "MULTIPLE TOOTH EXTRACTIONS         Family History   Problem Relation Age of Onset   • Cancer Mother    • Diabetes Mother    • Diabetes Father    • Heart disease Father        Social History     Social History   • Marital status: Single       Social History Main Topics   • Smoking status: Former Smoker     Packs/day: 1.00     Years: 47.00     Types: Cigarettes     Quit date: 2014   • Smokeless tobacco: Never Used   • Alcohol use No   • Drug use: No     Lives alone.    Medications:   Outpatient Medications     aspirin  MG tablet     Has not taken for several months. atorvastatin (LIPITOR) 20 MG tablet      BYDUREON 2 MG pen-injector      carvedilol (COREG) 25 MG tablet      Has not taken for several months. dabigatran etexilate (PRADAXA) 150 MG capsu      Has not taken for several months. glimepiride (AMARYL) 2 MG tablet      Has not taken for several months. hydrochlorothiazide (HYDRODIURIL) 25 MG tablet      Has not taken for several months. lisinopril (PRINIVIL,ZESTRIL) 40 MG tablet      Has not taken for several months. metFORMIN ER (GLUCOPHAGE-XR) 500 MG 24 hr tablet      Has not taken for several months. omeprazole (priLOSEC) 40 MG capsule      Has not taken for several months. traZODone (DESYREL) 50 MG tablet      Has not taken for several months. vitamin D (ERGOCALCIFEROL) 64237 UNITS capsule capsule        Allergies:  No Known Allergies      Objective     Physical Exam:  Vital Signs: /73  Pulse 105  Temp 98.5 °F (36.9 °C) (Axillary)   Resp 25  Ht 72\" (182.9 cm)  Wt (!) 321 lb (146 kg)  SpO2 (!) 85%  BMI 43.54 kg/m2  Physical Exam  Patient is alert and talkative in no distress at rest on 5LPM of oxygen at 8pm.  Neck is without mass or JVD  Heart is Reg wo murmur distant  Lungs have good air entry  Abd is soft without HSM or mass  MAEW 2+ edema  Skin is without rash  Neurologic exam in nonfocal   Mood is appropriate he's eating [facility]        Results Reviewed:  Lab Results (last 24 hours)     " Procedure Component Value Units Date/Time    POC Troponin, Rapid [[id number]]  (Normal) Collected:  04/18/17 1211     Troponin I 0.03 ng/mL     Lactic Acid, Plasma [[id number]]  (Normal) Collected:  04/18/17 1204     Lactate 1.2 mmol/L     BNP [[id number]]  (Abnormal) Collected:  04/18/17 1204    Specimen:  Blood Updated:  04/18/17 1249     .0 (H) pg/mL     CBC Auto Differential [[id number]]  (Abnormal) Collected:  04/18/17 1204    Specimen:  Blood Updated:  04/18/17 1310     WBC 7.34 10*3/mm3      RBC 4.68 10*6/mm3      Hemoglobin 14.6 g/dL      Hematocrit 49.1 %      .9 (H) fL      MCH 31.2 (H) pg      MCHC 29.7 (L) g/dL      RDW 14.8 (H) %      RDW-SD 55.9 (H) fl      MPV 11.5 fL      Platelets 195 10*3/mm3      Neutrophil % 56.4 %      Lymphocyte % 28.5 %      Monocyte % 12.4 (H) %      Eosinophil % 2.2 %      Basophil % 0.1 %      Immature Grans % 0.4 %      Neutrophils, Absolute 4.14 10*3/mm3      Lymphocytes, Absolute 2.09 10*3/mm3      Monocytes, Absolute 0.91 10*3/mm3      Eosinophils, Absolute 0.16 10*3/mm3      Basophils, Absolute 0.01 10*3/mm3      Immature Grans, Absolute 0.03 10*3/mm3     Scan Slide [[id number]] Collected:  04/18/17 1204    Specimen:  Blood Updated:  04/18/17 1310     Macrocytes Slight/1+     WBC Morphology Normal     Platelet Morphology Normal    Comprehensive Metabolic Panel [[id number]]  (Abnormal) Collected:  04/18/17 1204    Specimen:  Blood Updated:  04/18/17 1322     Glucose 288 (H) mg/dL      BUN 13 mg/dL      Creatinine 1.10 mg/dL      Sodium 139 mmol/L      Potassium 5.0 mmol/L      Chloride 98 (L) mmol/L      CO2 41.0 (C) mmol/L       Verified by repeat analysis.         Calcium 9.1 mg/dL      Total Protein 7.7 g/dL      Albumin 4.30 g/dL      ALT (SGPT) 70 (H) U/L      AST (SGOT) 68 (H) U/L      Alkaline Phosphatase 71 U/L      Total Bilirubin 0.4 mg/dL      eGFR Non African Amer 67 mL/min/1.73      Globulin 3.4 gm/dL      A/G Ratio 1.3 (L) g/dL      BUN/Creatinine Ratio  11.8     Anion Gap 0.0 (L) mmol/L     Blood Gas, Arterial [[id number]]  (Abnormal) Collected:  04/18/17 1337     Site Arterial: left radial     Ej's Test Positive     pH, Arterial 7.227 (L) pH units      pCO2, Arterial 83.5 (C) mm Hg      pO2, Arterial 137.0 (H) mm Hg      HCO3, Arterial 34.7 (H) mmol/L      Base Excess, Arterial 5.5 (H) mmol/L      Hemoglobin, Blood Gas 15.0 g/dL      Hematocrit, Blood Gas 45.9 %      Oxyhemoglobin 96.1 %      Methemoglobin 1.0 %      Carboxyhemoglobin 1.5 %      CO2 Content 37.3 (H)     Barometric Pressure for Blood Gas -- mmHg       N/A        Modality Mask - Nonbreather     FIO2 70 %     POC Troponin, Rapid [[id number]]  (Normal) Collected:  04/18/17 1430    Specimen:  Blood Updated:  04/18/17 1450     Troponin I 0.00 ng/mL       Serial Number: [id number]    : [id number]        Imaging Results (last 24 hours)     Procedure Component Value Units Date/Time    XR Chest 1 View [[id number]] Collected:  04/18/17 1512     Updated:  04/18/17 1514    Narrative:       EXAMINATION: XR CHEST 1 VIEW-04/18/2017:      INDICATION: SOA, triage protocol.      COMPARISON: NONE.     FINDINGS: Cardiomegaly is noted. There is prominence of the hilar  vascular markings, cephalization of the upper lobe veins and there is  diffuse vascular plethora throughout the lungs. These findings are all  new when compared to 04/26/2014. Early congestive heart failure is the  favored consideration.           Impression:       Cardiomegaly is noted with vascular congestive edema  diffusely and cephalization of the upper lobe veins. Early congestive  heart failure is favored.     D:  04/18/2017  E:  04/18/2017     This report was finalized on 4/18/2017 3:12 PM by Dr. Raul Green MD.           I have personally reviewed and interpreted available lab data, radiology studies and ECG obtained at time of admission.     Assessment / Plan     Assessment/Problem List:   Hospital Problem List     * (Principal)Acute on  "chronic congestive heart failure    Chronic obstructive pulmonary disease with acute exacerbation    FAUSTO (obstructive sleep apnea)    Type 2 diabetes mellitus    Hypertension    history CAD (coronary artery disease)    Atrial fibrillation    GERD (gastroesophageal reflux disease)    Elevated LFTs    Medically noncompliant    Morbid obesity      Plan:  65 yo with acute on chronic respiratory failure due to acute on chronic heart failure-  Will diurese and give short burst of steroids, will give insulin, use bipap for sleep, follow plan per clinical course.  DVT prophylaxis:lovenox for now  Code Status: full  Admission Status: Patient will be admitted to inpatient for his multi organ failure.  Grace Romeo MD 17 11:52 PM          Electronically signed by Grace Romeo MD at 2017 11:52 PM        [address] [address]  Phone: [phone]  Fax:  Date Ordered: 2017         Patient: Chito Rod MRN: [id number]   [address]  [facility] [address] : 1950  SSN:    Phone: [phone] Sex: M     Weight: 311 lb 3.2 oz (141 kg)         Ht Readings from Last 1 Encounters:   17 71\" (180.3 cm)         Oxygen Therapy (Order ID: [id number])   Diagnosis: Acute on chronic congestive heart failure, unspecified congestive heart failure type (I50.9 [ICD-10-CM] 428.0 [ICD-9-CM])   Quantity: 1     Delivery Modality: Nasal Cannula  Liters Per Minute: 4  Duration: Continuous  Equipment:  Oxygen Concentrator &  &  Portable Gaseous Oxygen System & Portable Oxygen Contents Gaseous &  Conserving Regulator  Length of Need (99 Months = Lifetime): 99 Months = Lifetime            Authorizing Provider:Yaron Encarnacion MD  Order Entered By: Carolyn Ortega RN 2017 2:52 PM     Electronically signed by: Yaron Encarnacion MD (NPI: [id number]) 2017 2:52 PM        2017 2030  SpO2    84 %            Pulse " Oximetry Type    Continuous           SpO2: Pre-Ductal (Right Hand)               SpO2: Post-Ductal (Left Hand/Foot)               O2 Device    room air;nasal  cannula  nasal cannula    nasal cannula         patient not  wearing at this  time.           Flow (L/min)    4

## 2017-04-24 NOTE — DISCHARGE SUMMARY
[facility] Medicine Services  DISCHARGE SUMMARY       Date of Admission: 4/18/2017  Date of Discharge:  4/24/2017  Primary Care Physician: Javan Shankar MD  Consulting Physician(s)     Provider Relationship    Radha Joy MD Consulting Physician          Discharge Diagnoses:  Active Hospital Problems (** Indicates Principal Problem)    Diagnosis Date Noted   • **Acute on chronic diastolic congestive heart failure [I50.33] 04/18/2017   • Acute respiratory failure with hypoxia and hypercapnia [J96.01, J96.02] 04/19/2017   • Elevated LFTs [R79.89] 04/18/2017   • Medically noncompliant [Z91.19] 04/18/2017   • Morbid obesity [E66.01] 04/18/2017   • FAUSTO (obstructive sleep apnea) [G47.33] 08/02/2016   • Chronic obstructive pulmonary disease with acute exacerbation [J44.1] 08/02/2016   • Type 2 diabetes mellitus [E11.9] 08/02/2016   • Hypertension [I10] 08/02/2016   • Rapid atrial fibrillation [I48.91] 08/02/2016   • history CAD (coronary artery disease) [I25.10] 08/02/2016      Resolved Hospital Problems    Diagnosis Date Noted Date Resolved   No resolved problems to display.     Presenting Problem/History of Present Illness  Acute on chronic congestive heart failure, unspecified congestive heart failure type [I50.9]     Chief Complaint on Day of Discharge:   Patient has no complaints today, but does want his nails clipped prior to going home.  Patient states he is excited about going home.    Hospital Course  Mr. Rod is a 66-year-old male with a past medical history of DM, HTN, CAD, CHF, COPD who presented to [facility] ED on 4/18/17 with complaints of shortness of air on his home O2.  Patient states that his baseline significantly worsened over the 1-2 days prior to admission with increased lower extremity edema.  Patient states that the shortness of air gets worse when he lays flat and that he has intermittent chest pain, positive GERD noncompliance with medicines including DM 2  medications.  Patient states he is not taking his metformin for months prior to admission and he is noncompliant with his diet.  Patient was admitted to the hospitalist service for further evaluation and treatment of his DM, CHF and renal function.    Over the course of patient's admission, he has improved with insulin.  Diabetic educator did a consult and spoke with the daughter as well as the patient.  Patient will be sent home on insulin, which he states he may or may not take.  Explained that he be oral medications are not working, because his hemoglobin A1c is 10.6.  Encourage patient to check his blood sugar often.  Patient will also be sent with 4 L of oxygen by nasal cannula.  Patient's medications were adjusted.  Patient needs to have follow-up with his cardiologist, but does not remember his name.  Patient will also be set up with a pulmonologist as well as his family doctor.  Patient will be sent home with BiPAP.  Patient is ready for discharge.    Procedures PerformedNone   None    Consults:   Consults     Date and Time Order Name Status Description    4/24/2017 1036 Inpatient Consult to Orthopedic Surgery          Pertinent Test Results:  Imaging Results (all)     Procedure Component Value Units Date/Time    XR Chest 1 View [[id number]] Collected:  04/18/17 1512     Updated:  04/18/17 1514    Narrative:       EXAMINATION: XR CHEST 1 VIEW-04/18/2017:      INDICATION: SOA, triage protocol.      COMPARISON: NONE.     FINDINGS: Cardiomegaly is noted. There is prominence of the hilar  vascular markings, cephalization of the upper lobe veins and there is  diffuse vascular plethora throughout the lungs. These findings are all  new when compared to 04/26/2014. Early congestive heart failure is the  favored consideration.           Impression:       Cardiomegaly is noted with vascular congestive edema  diffusely and cephalization of the upper lobe veins. Early congestive  heart failure is favored.     D:   04/18/2017  E:  04/18/2017     This report was finalized on 4/18/2017 3:12 PM by Dr. Raul Green MD.       XR Chest PA & Lateral [[id number]] Collected:  04/22/17 1925     Updated:  04/23/17 0949    Narrative:          EXAMINATION: XR CHEST PA AND LATERAL - 04/22/2017     INDICATION:  I50.9-Heart failure, unspecified; R06.00-Dyspnea,  unspecified; R06.02-Shortness of breath; J44.1-Chronic obstructive  pulmonary disease with (acute) exacerbation; R09.02-Hypoxemia;  R06.89-Other abnormalities of breathing; R73.9-Hyperglycemia,  unspecified.     COMPARISON: None.     FINDINGS: Cardiac silhouette is large. There is mild central vascular  prominence. The appearance has not changed significantly since  4/18/2017. There is no consolidation, mass or effusion.           Impression:       Cardiomegaly with mild vascular prominence, insignificantly  changed since 4/18/2017.     DICTATED:     04/22/2017  EDITED:         04/22/2017     This report was finalized on 4/23/2017 9:47 AM by Dr. Obdulio Lopes MD.           Lab Results (most recent)     Procedure Component Value Units Date/Time    POC Troponin, Rapid [[id number]]  (Normal) Collected:  04/18/17 1211    Specimen:  Blood Updated:  04/18/17 1225     Troponin I 0.03 ng/mL       Serial Number: [id number]    : [id number]       Lactic Acid, Plasma [[id number]]  (Normal) Collected:  04/18/17 1204    Specimen:  Blood Updated:  04/18/17 1234     Lactate 1.2 mmol/L       Falsely depressed results may occur on samples drawn from patients receiving N-Acetylcysteine (NAC) or Metamizole.       BNP [[id number]]  (Abnormal) Collected:  04/18/17 1204    Specimen:  Blood Updated:  04/18/17 1249     .0 (H) pg/mL     CBC & Differential [[id number]] Collected:  04/18/17 1204    Specimen:  Blood Updated:  04/18/17 1310    Narrative:       The following orders were created for panel order CBC & Differential.  Procedure                               Abnormality         Status                      ---------                               -----------         ------                     Scan Slide[[id number]]                                        Final result               CBC Auto Differential[[id number]]         Abnormal            Final result                 Please view results for these tests on the individual orders.    CBC Auto Differential [[id number]]  (Abnormal) Collected:  04/18/17 1204    Specimen:  Blood Updated:  04/18/17 1310     WBC 7.34 10*3/mm3      RBC 4.68 10*6/mm3      Hemoglobin 14.6 g/dL      Hematocrit 49.1 %      .9 (H) fL      MCH 31.2 (H) pg      MCHC 29.7 (L) g/dL      RDW 14.8 (H) %      RDW-SD 55.9 (H) fl      MPV 11.5 fL      Platelets 195 10*3/mm3      Neutrophil % 56.4 %      Lymphocyte % 28.5 %      Monocyte % 12.4 (H) %      Eosinophil % 2.2 %      Basophil % 0.1 %      Immature Grans % 0.4 %      Neutrophils, Absolute 4.14 10*3/mm3      Lymphocytes, Absolute 2.09 10*3/mm3      Monocytes, Absolute 0.91 10*3/mm3      Eosinophils, Absolute 0.16 10*3/mm3      Basophils, Absolute 0.01 10*3/mm3      Immature Grans, Absolute 0.03 10*3/mm3     Narrative:       Appended report.  These results have been appended to a previously verified report.    Scan Slide [[id number]] Collected:  04/18/17 1204    Specimen:  Blood Updated:  04/18/17 1310     Macrocytes Slight/1+     WBC Morphology Normal     Platelet Morphology Normal    Comprehensive Metabolic Panel [[id number]]  (Abnormal) Collected:  04/18/17 1204    Specimen:  Blood Updated:  04/18/17 1322     Glucose 288 (H) mg/dL      BUN 13 mg/dL      Creatinine 1.10 mg/dL      Sodium 139 mmol/L      Potassium 5.0 mmol/L      Chloride 98 (L) mmol/L      CO2 41.0 (C) mmol/L       Verified by repeat analysis.         Calcium 9.1 mg/dL      Total Protein 7.7 g/dL      Albumin 4.30 g/dL      ALT (SGPT) 70 (H) U/L      AST (SGOT) 68 (H) U/L      Alkaline Phosphatase 71 U/L      Total Bilirubin 0.4 mg/dL      eGFR Non African Amer 67 mL/min/1.73       Globulin 3.4 gm/dL      A/G Ratio 1.3 (L) g/dL      BUN/Creatinine Ratio 11.8     Anion Gap 0.0 (L) mmol/L     Narrative:       National Kidney Foundation Guidelines    Stage                           Description                             GFR                      1                               Normal or High                          90+  2                               Mild decrease                            60-89  3                               Moderate decrease                   30-59  4                               Severe decrease                       15-29  5                               Kidney failure                             <15    Blood Gas, Arterial [[id number]]  (Abnormal) Collected:  04/18/17 1337    Specimen:  Arterial Blood Updated:  04/18/17 1347     Site Arterial: left radial     Ej's Test Positive     pH, Arterial 7.227 (L) pH units      pCO2, Arterial 83.5 (C) mm Hg      pO2, Arterial 137.0 (H) mm Hg      HCO3, Arterial 34.7 (H) mmol/L      Base Excess, Arterial 5.5 (H) mmol/L      Hemoglobin, Blood Gas 15.0 g/dL      Hematocrit, Blood Gas 45.9 %      Oxyhemoglobin 96.1 %      Methemoglobin 1.0 %      Carboxyhemoglobin 1.5 %      CO2 Content 37.3 (H)     Barometric Pressure for Blood Gas -- mmHg       N/A        Modality Mask - Nonbreather     FIO2 70 %     POC Troponin, Rapid [[id number]]  (Normal) Collected:  04/18/17 1430    Specimen:  Blood Updated:  04/18/17 1450     Troponin I 0.00 ng/mL     POC Glucose Fingerstick [[id number]]  (Abnormal) Collected:  04/18/17 1938    Specimen:  Blood Updated:  04/18/17 1942     Glucose 234 (H) mg/dL     Narrative:       Meter: [id number] : [id number] Valeriano BLANKENSHIP    Hemoglobin A1c [[id number]]  (Abnormal) Collected:  04/18/17 1204    Specimen:  Blood Updated:  04/18/17 2001     Hemoglobin A1C 10.20 (H) %     Troponin [[id number]]  (Normal) Collected:  04/18/17 2143    Specimen:  Blood Updated:  04/18/17 2220     Troponin I <0.006 ng/mL     Lipid  Panel [[id number]]  (Abnormal) Collected:  04/19/17 0555    Specimen:  Blood Updated:  04/19/17 0644     Total Cholesterol 135 mg/dL      Triglycerides 86 mg/dL      HDL Cholesterol 39 (L) mg/dL      LDL Cholesterol  92 mg/dL     CBC Auto Differential [[id number]]  (Abnormal) Collected:  04/19/17 0555    Specimen:  Blood Updated:  04/19/17 0653     WBC 6.09 10*3/mm3      RBC 4.74 10*6/mm3      Hemoglobin 14.3 g/dL      Hematocrit 49.7 %      .9 (H) fL      MCH 30.2 pg      MCHC 28.8 (L) g/dL      RDW 14.4 %      RDW-SD 55.6 (H) fl      MPV 11.3 fL      Platelets 183 10*3/mm3      Neutrophil % 80.1 (H) %      Lymphocyte % 17.9 (L) %      Monocyte % 1.3 %      Eosinophil % 0.0 %      Basophil % 0.2 %      Immature Grans % 0.5 %      Neutrophils, Absolute 4.88 10*3/mm3      Lymphocytes, Absolute 1.09 10*3/mm3      Monocytes, Absolute 0.08 10*3/mm3      Eosinophils, Absolute 0.00 (L) 10*3/mm3      Basophils, Absolute 0.01 10*3/mm3      Immature Grans, Absolute 0.03 10*3/mm3     TSH [[id number]]  (Normal) Collected:  04/19/17 0555    Specimen:  Blood Updated:  04/19/17 0728     TSH 0.423 mIU/mL     Basic Metabolic Panel [[id number]]  (Abnormal) Collected:  04/19/17 0555    Specimen:  Blood Updated:  04/19/17 0729     Glucose 325 (H) mg/dL      BUN 21 mg/dL      Creatinine 1.00 mg/dL      Sodium 139 mmol/L      Potassium 4.6 mmol/L      Chloride 97 (L) mmol/L      CO2 34.0 (H) mmol/L      Calcium 8.9 mg/dL      eGFR Non African Amer 75 mL/min/1.73      BUN/Creatinine Ratio 21.0     Anion Gap 8.0 mmol/L     Narrative:       National Kidney Foundation Guidelines    Stage                           Description                             GFR                      1                               Normal or High                          90+  2                               Mild decrease                            60-89  3                               Moderate decrease                   30-59  4                                Severe decrease                       15-29  5                               Kidney failure                             <15    POC Glucose Fingerstick [[id number]]  (Abnormal) Collected:  04/19/17 0754    Specimen:  Blood Updated:  04/19/17 0757     Glucose 317 (H) mg/dL     Narrative:       Meter: [id number] : [id number] Coomyusra Magdalena    Vitamin B12 [[id number]]  (Normal) Collected:  04/19/17 0555    Specimen:  Blood Updated:  04/19/17 0847     Vitamin B-12 513 pg/mL     Folate [[id number]]  (Normal) Collected:  04/19/17 0555    Specimen:  Blood Updated:  04/19/17 1037     Folate 15.93 ng/mL     Narrative:         Folate Reference Ranges:    Deficient:            Less than 1.2 ng/mL  Indeterminant:        1.2-3.1 ng/mL  Normal:               3.2-20.0 ng/mL    POC Glucose Fingerstick [[id number]]  (Abnormal) Collected:  04/19/17 1201    Specimen:  Blood Updated:  04/19/17 1202     Glucose 422 (H) mg/dL     Narrative:       Confirmed by Repeat Meter: [id number] : [id number] Coomer Magdalena    POC Glucose Fingerstick [[id number]]  (Abnormal) Collected:  04/19/17 1652    Specimen:  Blood Updated:  04/19/17 1654     Glucose 331 (H) mg/dL     Narrative:       Meter: [id number] : [id number] Coomer Magdalena    POC Glucose Fingerstick [[id number]]  (Abnormal) Collected:  04/19/17 2119    Specimen:  Blood Updated:  04/19/17 2121     Glucose 349 (H) mg/dL     Narrative:       Meter: [id number] : [id number] Jose Manuel Cindy    Basic Metabolic Panel [[id number]]  (Abnormal) Collected:  04/20/17 0521    Specimen:  Blood Updated:  04/20/17 0600     Glucose 274 (H) mg/dL      BUN 20 mg/dL      Creatinine 1.00 mg/dL      Sodium 137 mmol/L      Potassium 4.4 mmol/L      Chloride 96 (L) mmol/L      CO2 37.0 (H) mmol/L      Calcium 8.8 mg/dL      eGFR Non African Amer 75 mL/min/1.73      BUN/Creatinine Ratio 20.0     Anion Gap 4.0 mmol/L     Narrative:       National Kidney Foundation Guidelines    Stage                            Description                             GFR                      1                               Normal or High                          90+  2                               Mild decrease                            60-89  3                               Moderate decrease                   30-59  4                               Severe decrease                       15-29  5                               Kidney failure                             <15    POC Glucose Fingerstick [[id number]]  (Abnormal) Collected:  04/20/17 0730    Specimen:  Blood Updated:  04/20/17 0755     Glucose 260 (H) mg/dL     Narrative:       Meter: [id number] : [id number] Luis Carrasquillo    POC Glucose Fingerstick [[id number]]  (Abnormal) Collected:  04/20/17 1248    Specimen:  Blood Updated:  04/20/17 1259     Glucose 380 (H) mg/dL     Narrative:       Meter: [id number] : [id number] Luis Neida    POC Glucose Fingerstick [[id number]]  (Abnormal) Collected:  04/20/17 1654    Specimen:  Blood Updated:  04/20/17 1657     Glucose 302 (H) mg/dL     Narrative:       Meter: [id number] : [id number] Yong Monk    POC Glucose Fingerstick [[id number]]  (Abnormal) Collected:  04/20/17 2043    Specimen:  Blood Updated:  04/20/17 2044     Glucose 276 (H) mg/dL     Narrative:       Meter: [id number] : [id number] Gabriele Mendoza    POC Glucose Fingerstick [[id number]]  (Abnormal) Collected:  04/21/17 0741    Specimen:  Blood Updated:  04/21/17 0742     Glucose 147 (H) mg/dL     Narrative:       Meter: [id number] : [id number] Ham Blankenship    CBC (No Diff) [[id number]]  (Abnormal) Collected:  04/21/17 0655    Specimen:  Blood Updated:  04/21/17 0746     WBC 8.41 10*3/mm3      RBC 4.65 10*6/mm3      Hemoglobin 14.1 g/dL      Hematocrit 47.2 %      .5 (H) fL      MCH 30.3 pg      MCHC 29.9 (L) g/dL      RDW 14.2 %      RDW-SD 52.0 fl      MPV 11.4 fL      Platelets 178 10*3/mm3     Basic Metabolic Panel [[id number]]  (Abnormal) Collected:  04/21/17  0655    Specimen:  Blood Updated:  04/21/17 0843     Glucose 160 (H) mg/dL      BUN 19 mg/dL      Creatinine 1.00 mg/dL      Sodium 138 mmol/L      Potassium 4.1 mmol/L      Chloride 94 (L) mmol/L      CO2 37.0 (H) mmol/L      Calcium 8.8 mg/dL      eGFR Non African Amer 75 mL/min/1.73      BUN/Creatinine Ratio 19.0     Anion Gap 7.0 mmol/L     Narrative:       National Kidney Foundation Guidelines    Stage                           Description                             GFR                      1                               Normal or High                          90+  2                               Mild decrease                            60-89  3                               Moderate decrease                   30-59  4                               Severe decrease                       15-29  5                               Kidney failure                             <15    Magnesium [[id number]]  (Normal) Collected:  04/21/17 0655    Specimen:  Blood Updated:  04/21/17 0843     Magnesium 2.1 mg/dL     POC Glucose Fingerstick [[id number]]  (Abnormal) Collected:  04/21/17 1141    Specimen:  Blood Updated:  04/21/17 1143     Glucose 238 (H) mg/dL     Narrative:       Meter: [id number] : [id number] Ham Pattie    POC Glucose Fingerstick [[id number]]  (Abnormal) Collected:  04/21/17 1605    Specimen:  Blood Updated:  04/21/17 1607     Glucose 214 (H) mg/dL     Narrative:       Meter: [id number] : [id number] Ham Pattie    POC Glucose Fingerstick [[id number]]  (Abnormal) Collected:  04/21/17 2102    Specimen:  Blood Updated:  04/21/17 2103     Glucose 240 (H) mg/dL     Narrative:       Meter: [id number] : [id number] Jes Quick    POC Glucose Fingerstick [[id number]]  (Abnormal) Collected:  04/22/17 0749    Specimen:  Blood Updated:  04/22/17 0753     Glucose 181 (H) mg/dL     Narrative:       Meter: [id number] : [id number] Alexsander Sauceda    Basic Metabolic Panel [[id number]]  (Abnormal) Collected:   04/22/17 0648    Specimen:  Blood Updated:  04/22/17 0830     Glucose 160 (H) mg/dL      BUN 14 mg/dL      Creatinine 0.80 mg/dL      Sodium 137 mmol/L      Potassium 4.0 mmol/L      Chloride 92 (L) mmol/L      CO2 39.0 (H) mmol/L      Calcium 9.2 mg/dL      eGFR Non African Amer 97 mL/min/1.73      BUN/Creatinine Ratio 17.5     Anion Gap 6.0 mmol/L     Narrative:       National Kidney Foundation Guidelines    Stage                           Description                             GFR                      1                               Normal or High                          90+  2                               Mild decrease                            60-89  3                               Moderate decrease                   30-59  4                               Severe decrease                       15-29  5                               Kidney failure                             <15    POC Glucose Fingerstick [[id number]]  (Abnormal) Collected:  04/22/17 1213    Specimen:  Blood Updated:  04/22/17 1215     Glucose 251 (H) mg/dL     Narrative:       Meter: [id number] : [id number] Alexsander Sauceda    BNP [[id number]]  (Normal) Collected:  04/22/17 0648    Specimen:  Blood Updated:  04/22/17 1449     BNP 49.0 pg/mL     POC Glucose Fingerstick [[id number]]  (Abnormal) Collected:  04/22/17 1637    Specimen:  Blood Updated:  04/22/17 1639     Glucose 277 (H) mg/dL     Narrative:       Meter: [id number] : [id number] Alexsander Sauceda    POC Glucose Fingerstick [[id number]]  (Abnormal) Collected:  04/22/17 2043    Specimen:  Blood Updated:  04/22/17 2045     Glucose 252 (H) mg/dL     Narrative:       Meter: [id number] : [id number] Christine Swartz    Basic Metabolic Panel [[id number]]  (Abnormal) Collected:  04/23/17 0621    Specimen:  Blood Updated:  04/23/17 0728     Glucose 192 (H) mg/dL      BUN 16 mg/dL      Creatinine 0.90 mg/dL      Sodium 138 mmol/L      Potassium 3.7 mmol/L      Chloride 91 (L) mmol/L      CO2 39.0 (H) mmol/L       Calcium 9.2 mg/dL      eGFR Non African Amer 85 mL/min/1.73      BUN/Creatinine Ratio 17.8     Anion Gap 8.0 mmol/L     Narrative:       National Kidney Foundation Guidelines    Stage                           Description                             GFR                      1                               Normal or High                          90+  2                               Mild decrease                            60-89  3                               Moderate decrease                   30-59  4                               Severe decrease                       15-29  5                               Kidney failure                             <15    POC Glucose Fingerstick [[id number]]  (Abnormal) Collected:  04/23/17 0736    Specimen:  Blood Updated:  04/23/17 0738     Glucose 189 (H) mg/dL     Narrative:       Meter: [id number] : [id number] [employer]    Blood Gas, Arterial [[id number]]  (Abnormal) Collected:  04/23/17 0901    Specimen:  Arterial Blood Updated:  04/23/17 0918     Site Arterial: right radial     Ej's Test N/A     pH, Arterial 7.398 pH units      pCO2, Arterial 67.7 (C) mm Hg      pO2, Arterial 56.8 (L) mm Hg      HCO3, Arterial 41.7 (H) mmol/L      Base Excess, Arterial 14.6 (H) mmol/L      Hemoglobin, Blood Gas 15.4 g/dL      Hematocrit, Blood Gas 47.3 %      Oxyhemoglobin 86.6 (L) %      Methemoglobin 0.9 %      Carboxyhemoglobin 1.3 %      CO2 Content 43.8 (H)     Barometric Pressure for Blood Gas -- mmHg       N/A        Modality Cannula     FIO2 36 %     POC Glucose Fingerstick [[id number]]  (Abnormal) Collected:  04/23/17 1205    Specimen:  Blood Updated:  04/23/17 1207     Glucose 262 (H) mg/dL     Narrative:       Meter: [id number] : [id number] [employer]    POC Glucose Fingerstick [[id number]]  (Abnormal) Collected:  04/23/17 1632    Specimen:  Blood Updated:  04/23/17 1633     Glucose 300 (H) mg/dL     Narrative:       Meter: [id number] : [id number] [employer]    POC  "Glucose Fingerstick [[id number]]  (Abnormal) Collected:  04/23/17 2052    Specimen:  Blood Updated:  04/23/17 2054     Glucose 346 (H) mg/dL     Narrative:       Meter: [id number] : [id number] Pavan Mathew    Basic Metabolic Panel [[id number]]  (Abnormal) Collected:  04/24/17 0447    Specimen:  Blood Updated:  04/24/17 0702     Glucose 210 (H) mg/dL      BUN 21 mg/dL      Creatinine 1.00 mg/dL      Sodium 138 mmol/L      Potassium 3.7 mmol/L      Chloride 89 (L) mmol/L      CO2 39.0 (H) mmol/L      Calcium 9.8 mg/dL      eGFR Non African Amer 75 mL/min/1.73      BUN/Creatinine Ratio 21.0     Anion Gap 10.0 mmol/L     POC Glucose Fingerstick [[id number]]  (Abnormal) Collected:  04/24/17 0745    Specimen:  Blood Updated:  04/24/17 0756     Glucose 214 (H) mg/dL     Narrative:       Meter: [id number] : [id number] Alexsander Sauceda    POC Glucose Fingerstick [[id number]]  (Abnormal) Collected:  04/24/17 1126    Specimen:  Blood Updated:  04/24/17 1128     Glucose 310 (H) mg/dL     Narrative:       Meter: [id number] : [id number] Alexsander Sauceda        Condition on Discharge:  Stable    Physical Exam on Discharge:BP 97/64 (BP Location: Right arm, Patient Position: Sitting)  Pulse 96  Temp 98 °F (36.7 °C) (Oral)   Resp 18  Ht 71\" (180.3 cm)  Wt (!) 311 lb 3.2 oz (141 kg)  SpO2 90%  BMI 43.4 kg/m2  Physical Exam  General: Well-developed well-nourished obese male in no acute distress    Head: Normocephalic atraumatic    Eyes: PERRLA, EOMI, nonicteric, conjunctiva normal    ENT: Pink, moist mucous membranes    Neck: Supple, nontender, trachea midline without lymphadenopathy, JVD, nuchal rigidity.      Cardiovascular: ir ir rhythm; reg rate  no M/R/G, +2 DP pulses bilaterall    Respiratory: Nonlabored, symmetrical chest expansion, clear to auscultation bilaterally    Abdomen: Morbidly obese, soft, nontender, nondistended,  positive bowel sounds in all 4 quadrants     Extremities: FROM in upper and lower extremities " bilaterally.  +2 pitting edema in lower extremities bilaterally.  Negative calf pain    Skin: Pink/warm/dry.  No rash or lesions noted    Neuro: Alert and oriented to person place time and situation, speech is clear, follows all commands, recent and remote memory intact    Psych: Patient is pleasant and cooperative.  Normal affect.  Negative suicidal ideation or homicidal ideation.    Discharge Disposition  Home or Self Care    Discharge Medications   Chito Rod   Home Medication Instructions SUKI:[id number]    Printed on:04/24/17 4775   Medication Information                      aspirin  MG tablet  Take 1 tablet by mouth Daily.             atorvastatin (LIPITOR) 20 MG tablet  Take 1 tablet by mouth Every Night. STOP PRAVASTATIN             budesonide-formoterol (SYMBICORT) 160-4.5 MCG/ACT inhaler  Inhale 2 puffs 2 (Two) Times a Day.             BYDUREON 2 MG pen-injector  Inject 2 mg as directed 1 (One) Time Per Week.             carvedilol (COREG) 25 MG tablet  Take 1 tablet by mouth 2 (Two) Times a Day With Meals.             dabigatran etexilate (PRADAXA) 150 MG capsu  Take 1 capsule by mouth 2 (Two) Times a Day.             diltiaZEM (CARDIZEM) 60 MG tablet  Take 1 tablet by mouth Every 6 (Six) Hours.             furosemide (LASIX) 40 MG tablet  Take 1 tablet by mouth 2 (Two) Times a Day.             glimepiride (AMARYL) 2 MG tablet  Take 1 tablet by mouth Every Morning Before Breakfast.             insulin detemir (LEVEMIR) 100 UNIT/ML injection  Inject 55 Units under the skin Every Night.             insulin lispro (humaLOG) 100 UNIT/ML injection  Inject 18 Units under the skin 3 (Three) Times a Day With Meals.             Insulin Syringes, Disposable, U-100 1 ML misc  1 syringe 4 (Four) Times a Day After Meals & at Bedtime.             metFORMIN ER (GLUCOPHAGE-XR) 500 MG 24 hr tablet  Take 1 tablet by mouth 2 (Two) Times a Day.             omeprazole (priLOSEC) 40 MG capsule  Take 1 capsule by  mouth Daily.             traZODone (DESYREL) 50 MG tablet  Take 1 tablet by mouth Every Night.             vitamin D (ERGOCALCIFEROL) 02044 UNITS capsule capsule  Take 50,000 Units by mouth 1 (One) Time Per Week.               Discharge Diet:   Diet Instructions     Diet: Regular, Consistent Carbohydrate, Cardiac; Thin Liquids, No Restrictions       Discharge Diet:   Regular  Consistent Carbohydrate  Cardiac      Fluid Consistency:  Thin Liquids, No Restrictions               Discharge Care Plan / Instructions:  Activity at Discharge:   Activity Instructions     Activity as Tolerated           Other Instructions (Specify)       Need to use oxygen 4L at all times ans bipap at bedtime               Follow-up Appointments  Future Appointments  Date Time Provider Department Center   4/27/2017 2:00 PM Jean Pierre Cuellar MD [facility] None   5/10/2017 1:30 PM [facility]   5/12/2017 1:00 PM Javan Shankar MD [facility] None     Additional Instructions for the Follow-ups that You Need to Schedule     Additional Discharge Follow-Up (Specify Provider)    As directed    To:  Follow-up with his cardiologist within 1-2 weeks or as scheduled       Discharge Follow-Up With Specified Provider    As directed    To:  pulmonologist for moderate obstructive and moderate restrictive airway disease   Follow Up:  2 Weeks       Discharge Follow-up with PCP    As directed    Follow Up Details:  Follow-up with Javan Shankar MD Within 1 week of discharge               Test Results Pending at Discharge  None     OTILIA Tran 04/24/17 11:39 AM    Time: Discharge 60 min    Please note that portions of this note may have been completed with a voice recognition program. Efforts were made to edit the dictations, but occasionally words are mistranscribed.

## 2017-04-24 NOTE — PROGRESS NOTES
Continued Stay Note  [facility]     Patient Name: Chito Rod  MRN: [id number]  Today's Date: 4/24/2017    Admit Date: 4/18/2017          Discharge Plan       04/24/17 1456    Case Management/Social Work Plan    Plan home O2 update    Patient/Family In Agreement With Plan other (see comments)    Additional Comments CM contacted [facility] per pt choice. All O2 referral material faxed to [phone]. Karen rep will be bringing home O2 and portable as well as Bi-pap to pt room prior to d/c. Pt has transportation home. No other needs at this time.              Discharge Codes     None        Expected Discharge Date and Time     Expected Discharge Date Expected Discharge Time    Apr 24, 2017             Carolyn Ortega, RN

## 2017-04-26 ENCOUNTER — TELEPHONE (OUTPATIENT)
Dept: CALL CENTER | Facility: HOSPITAL | Age: 67
End: 2017-04-26

## 2017-04-26 ENCOUNTER — TRANSITIONAL CARE MANAGEMENT TELEPHONE ENCOUNTER (OUTPATIENT)
Dept: INTERNAL MEDICINE | Facility: CLINIC | Age: 67
End: 2017-04-26

## 2017-04-26 NOTE — TELEPHONE ENCOUNTER
I spoke with Chito Rod for Day 3 follow-up call.  He reports that he is unsure if home health has been ordered for him and he does need their services. He reports never having been taught to draw up insulin into syringes or giving self-injections using syringes. He previously used an insulin pen for injections. He requires assistance with this from home health as well as help with medication management. I have emailed MSW/CM for assistance.

## 2017-05-01 ENCOUNTER — OFFICE VISIT (OUTPATIENT)
Dept: INTERNAL MEDICINE | Facility: CLINIC | Age: 67
End: 2017-05-01

## 2017-05-01 VITALS
HEART RATE: 92 BPM | WEIGHT: 315 LBS | HEIGHT: 71 IN | BODY MASS INDEX: 44.1 KG/M2 | DIASTOLIC BLOOD PRESSURE: 70 MMHG | SYSTOLIC BLOOD PRESSURE: 124 MMHG

## 2017-05-01 DIAGNOSIS — G47.33 OSA (OBSTRUCTIVE SLEEP APNEA): ICD-10-CM

## 2017-05-01 DIAGNOSIS — R79.89 ELEVATED LFTS: ICD-10-CM

## 2017-05-01 DIAGNOSIS — J96.02 ACUTE RESPIRATORY FAILURE WITH HYPOXIA AND HYPERCAPNIA (HCC): ICD-10-CM

## 2017-05-01 DIAGNOSIS — E11.8 TYPE 2 DIABETES MELLITUS WITH COMPLICATION, WITHOUT LONG-TERM CURRENT USE OF INSULIN (HCC): ICD-10-CM

## 2017-05-01 DIAGNOSIS — I48.91 RAPID ATRIAL FIBRILLATION (HCC): ICD-10-CM

## 2017-05-01 DIAGNOSIS — I50.33 ACUTE ON CHRONIC DIASTOLIC CONGESTIVE HEART FAILURE (HCC): Primary | ICD-10-CM

## 2017-05-01 DIAGNOSIS — G47.10 HYPERSOMNIA: ICD-10-CM

## 2017-05-01 DIAGNOSIS — I10 ESSENTIAL HYPERTENSION: ICD-10-CM

## 2017-05-01 DIAGNOSIS — K21.9 GASTROESOPHAGEAL REFLUX DISEASE WITHOUT ESOPHAGITIS: ICD-10-CM

## 2017-05-01 DIAGNOSIS — J96.01 ACUTE RESPIRATORY FAILURE WITH HYPOXIA AND HYPERCAPNIA (HCC): ICD-10-CM

## 2017-05-01 DIAGNOSIS — J44.1 CHRONIC OBSTRUCTIVE PULMONARY DISEASE WITH ACUTE EXACERBATION (HCC): ICD-10-CM

## 2017-05-01 DIAGNOSIS — Z91.199 MEDICALLY NONCOMPLIANT: ICD-10-CM

## 2017-05-01 PROCEDURE — 99495 TRANSJ CARE MGMT MOD F2F 14D: CPT | Performed by: INTERNAL MEDICINE

## 2017-05-02 ENCOUNTER — TELEPHONE (OUTPATIENT)
Dept: CALL CENTER | Facility: HOSPITAL | Age: 67
End: 2017-05-02

## 2017-05-03 ENCOUNTER — TELEPHONE (OUTPATIENT)
Dept: INTERNAL MEDICINE | Facility: CLINIC | Age: 67
End: 2017-05-03

## 2017-05-11 ENCOUNTER — OUTSIDE FACILITY SERVICE (OUTPATIENT)
Dept: INTERNAL MEDICINE | Facility: CLINIC | Age: 67
End: 2017-05-11

## 2017-05-11 PROCEDURE — G0179 MD RECERTIFICATION HHA PT: HCPCS | Performed by: INTERNAL MEDICINE

## 2017-05-18 DIAGNOSIS — E78.49 OTHER HYPERLIPIDEMIA: ICD-10-CM

## 2017-05-18 RX ORDER — ATORVASTATIN CALCIUM 20 MG/1
20 TABLET, FILM COATED ORAL NIGHTLY
Qty: 90 TABLET | Refills: 2 | Status: SHIPPED | OUTPATIENT
Start: 2017-05-18 | End: 2017-06-08 | Stop reason: SDUPTHER

## 2017-05-22 ENCOUNTER — OFFICE VISIT (OUTPATIENT)
Dept: INTERNAL MEDICINE | Facility: CLINIC | Age: 67
End: 2017-05-22

## 2017-05-22 VITALS
BODY MASS INDEX: 44.1 KG/M2 | WEIGHT: 315 LBS | HEIGHT: 71 IN | SYSTOLIC BLOOD PRESSURE: 120 MMHG | HEART RATE: 80 BPM | DIASTOLIC BLOOD PRESSURE: 74 MMHG

## 2017-05-22 DIAGNOSIS — G47.10 HYPERSOMNIA: ICD-10-CM

## 2017-05-22 DIAGNOSIS — I48.91 RAPID ATRIAL FIBRILLATION (HCC): ICD-10-CM

## 2017-05-22 DIAGNOSIS — I50.33 ACUTE ON CHRONIC DIASTOLIC CONGESTIVE HEART FAILURE (HCC): Primary | ICD-10-CM

## 2017-05-22 DIAGNOSIS — J44.1 CHRONIC OBSTRUCTIVE PULMONARY DISEASE WITH ACUTE EXACERBATION (HCC): ICD-10-CM

## 2017-05-22 DIAGNOSIS — R79.89 ELEVATED LFTS: ICD-10-CM

## 2017-05-22 DIAGNOSIS — K21.9 GASTROESOPHAGEAL REFLUX DISEASE WITHOUT ESOPHAGITIS: ICD-10-CM

## 2017-05-22 DIAGNOSIS — I10 ESSENTIAL HYPERTENSION: ICD-10-CM

## 2017-05-22 DIAGNOSIS — J96.02 ACUTE RESPIRATORY FAILURE WITH HYPOXIA AND HYPERCAPNIA (HCC): ICD-10-CM

## 2017-05-22 DIAGNOSIS — E11.8 TYPE 2 DIABETES MELLITUS WITH COMPLICATION, WITH LONG-TERM CURRENT USE OF INSULIN (HCC): ICD-10-CM

## 2017-05-22 DIAGNOSIS — J96.01 ACUTE RESPIRATORY FAILURE WITH HYPOXIA AND HYPERCAPNIA (HCC): ICD-10-CM

## 2017-05-22 DIAGNOSIS — Z79.4 TYPE 2 DIABETES MELLITUS WITH COMPLICATION, WITH LONG-TERM CURRENT USE OF INSULIN (HCC): ICD-10-CM

## 2017-05-22 DIAGNOSIS — G47.33 OSA (OBSTRUCTIVE SLEEP APNEA): ICD-10-CM

## 2017-05-22 DIAGNOSIS — I48.21 PERMANENT ATRIAL FIBRILLATION (HCC): ICD-10-CM

## 2017-05-22 DIAGNOSIS — Z91.199 MEDICALLY NONCOMPLIANT: ICD-10-CM

## 2017-05-22 PROCEDURE — 99214 OFFICE O/P EST MOD 30 MIN: CPT | Performed by: INTERNAL MEDICINE

## 2017-05-22 RX ORDER — DABIGATRAN ETEXILATE 150 MG/1
150 CAPSULE ORAL 2 TIMES DAILY
Qty: 60 CAPSULE | Refills: 5 | Status: SHIPPED | OUTPATIENT
Start: 2017-05-22 | End: 2019-04-18

## 2017-05-23 RX ORDER — DILTIAZEM HYDROCHLORIDE 60 MG/1
60 TABLET, FILM COATED ORAL EVERY 6 HOURS SCHEDULED
Qty: 120 TABLET | Refills: 2 | Status: SHIPPED | OUTPATIENT
Start: 2017-05-23 | End: 2017-09-04 | Stop reason: SDUPTHER

## 2017-05-25 ENCOUNTER — TELEPHONE (OUTPATIENT)
Dept: INTERNAL MEDICINE | Facility: CLINIC | Age: 67
End: 2017-05-25

## 2017-06-08 DIAGNOSIS — E78.49 OTHER HYPERLIPIDEMIA: ICD-10-CM

## 2017-06-08 RX ORDER — ATORVASTATIN CALCIUM 20 MG/1
20 TABLET, FILM COATED ORAL NIGHTLY
Qty: 90 TABLET | Refills: 2 | Status: SHIPPED | OUTPATIENT
Start: 2017-06-08 | End: 2022-03-11 | Stop reason: HOSPADM

## 2017-06-21 ENCOUNTER — OFFICE VISIT (OUTPATIENT)
Dept: INTERNAL MEDICINE | Facility: CLINIC | Age: 67
End: 2017-06-21

## 2017-06-21 VITALS
SYSTOLIC BLOOD PRESSURE: 120 MMHG | DIASTOLIC BLOOD PRESSURE: 64 MMHG | HEART RATE: 80 BPM | HEIGHT: 71 IN | WEIGHT: 315 LBS | BODY MASS INDEX: 44.1 KG/M2

## 2017-06-21 DIAGNOSIS — J96.02 ACUTE RESPIRATORY FAILURE WITH HYPOXIA AND HYPERCAPNIA (HCC): ICD-10-CM

## 2017-06-21 DIAGNOSIS — E11.8 TYPE 2 DIABETES MELLITUS WITH COMPLICATION, WITH LONG-TERM CURRENT USE OF INSULIN (HCC): ICD-10-CM

## 2017-06-21 DIAGNOSIS — I48.91 RAPID ATRIAL FIBRILLATION (HCC): ICD-10-CM

## 2017-06-21 DIAGNOSIS — G47.10 HYPERSOMNIA: ICD-10-CM

## 2017-06-21 DIAGNOSIS — Z91.199 MEDICALLY NONCOMPLIANT: ICD-10-CM

## 2017-06-21 DIAGNOSIS — I10 ESSENTIAL HYPERTENSION: ICD-10-CM

## 2017-06-21 DIAGNOSIS — G47.33 OSA (OBSTRUCTIVE SLEEP APNEA): ICD-10-CM

## 2017-06-21 DIAGNOSIS — J96.01 ACUTE RESPIRATORY FAILURE WITH HYPOXIA AND HYPERCAPNIA (HCC): ICD-10-CM

## 2017-06-21 DIAGNOSIS — E66.01 MORBID OBESITY, UNSPECIFIED OBESITY TYPE (HCC): ICD-10-CM

## 2017-06-21 DIAGNOSIS — I50.33 ACUTE ON CHRONIC DIASTOLIC CONGESTIVE HEART FAILURE (HCC): Primary | ICD-10-CM

## 2017-06-21 DIAGNOSIS — K21.9 GASTROESOPHAGEAL REFLUX DISEASE WITHOUT ESOPHAGITIS: ICD-10-CM

## 2017-06-21 DIAGNOSIS — J44.1 CHRONIC OBSTRUCTIVE PULMONARY DISEASE WITH ACUTE EXACERBATION (HCC): ICD-10-CM

## 2017-06-21 DIAGNOSIS — R79.89 ELEVATED LFTS: ICD-10-CM

## 2017-06-21 DIAGNOSIS — Z79.4 TYPE 2 DIABETES MELLITUS WITH COMPLICATION, WITH LONG-TERM CURRENT USE OF INSULIN (HCC): ICD-10-CM

## 2017-06-21 PROCEDURE — 99214 OFFICE O/P EST MOD 30 MIN: CPT | Performed by: INTERNAL MEDICINE

## 2017-06-21 NOTE — PROGRESS NOTES
Patient is a 66 y.o. male who is here for a follow up of chronic conditions.  Chief Complaint   Patient presents with   • Hypertension   • Diabetes   • Hyperlipidemia         HPI:  Here for f/u.  Now on 22 units of insulin.  FSBS in the 180-260.  No hypoglycemia.  Breathing is ok.  Sleep is not the best.  Appetite is good.  No abdominal pains.  Some constipation.  No palpitations.  GERD is under control.     History:    Patient Active Problem List   Diagnosis   • FAUSTO (obstructive sleep apnea)   • Hypersomnia   • Chronic obstructive pulmonary disease with acute exacerbation   • Long-term insulin use in type 2 diabetes   • Hypertension   • history CAD (coronary artery disease)   • Rapid atrial fibrillation   • GERD (gastroesophageal reflux disease)   • Acute on chronic diastolic congestive heart failure   • Elevated LFTs   • Medically noncompliant   • Morbid obesity   • Acute respiratory failure with hypoxia and hypercapnia       Past Medical History:   Diagnosis Date   • Atrial fibrillation    • CHF (congestive heart failure)    • COPD (chronic obstructive pulmonary disease)    • Coronary artery disease    • Diabetes mellitus    • GERD (gastroesophageal reflux disease)    • Head trauma in child    • Hyperlipemia    • Hypertension    • Insomnia    • FAUSTO (obstructive sleep apnea)        Past Surgical History:   Procedure Laterality Date   • APPENDECTOMY     • CORONARY STENT PLACEMENT  2010   • HAND SURGERY Left    • KNEE ARTHROSCOPY Left    • MULTIPLE TOOTH EXTRACTIONS         Current Outpatient Prescriptions on File Prior to Visit   Medication Sig   • aspirin  MG tablet Take 1 tablet by mouth Daily.   • atorvastatin (LIPITOR) 20 MG tablet Take 1 tablet by mouth Every Night. STOP PRAVASTATIN   • budesonide-formoterol (SYMBICORT) 160-4.5 MCG/ACT inhaler Inhale 2 puffs 2 (Two) Times a Day.   • carvedilol (COREG) 25 MG tablet Take 1 tablet by mouth 2 (Two) Times a Day With Meals.   • dabigatran etexilate (PRADAXA)  150 MG capsu Take 1 capsule by mouth 2 (Two) Times a Day.   • diltiaZEM (CARDIZEM) 60 MG tablet Take 1 tablet by mouth Every 6 (Six) Hours.   • furosemide (LASIX) 40 MG tablet Take 1 tablet by mouth 2 (Two) Times a Day.   • glimepiride (AMARYL) 2 MG tablet Take 1 tablet by mouth Every Morning Before Breakfast.   • metFORMIN ER (GLUCOPHAGE-XR) 500 MG 24 hr tablet Take 1 tablet by mouth 2 (Two) Times a Day.   • omeprazole (priLOSEC) 40 MG capsule Take 1 capsule by mouth Daily.   • traZODone (DESYREL) 50 MG tablet Take 1 tablet by mouth Every Night.   • vitamin D (ERGOCALCIFEROL) 83949 UNITS capsule capsule Take 50,000 Units by mouth 1 (One) Time Per Week.   • Insulin Pen Needle (INSUPEN PEN NEEDLES) 32G X 4 MM misc Inject once daily   • Insulin Syringes, Disposable, U-100 1 ML misc 1 syringe 4 (Four) Times a Day After Meals & at Bedtime.   • [DISCONTINUED] insulin degludec (TRESIBA FLEXTOUCH) 100 UNIT/ML solution pen-injector injection Inject 10 Units under the skin Every Night.     No current facility-administered medications on file prior to visit.        Family History   Problem Relation Age of Onset   • Cancer Mother    • Diabetes Mother    • Diabetes Father    • Heart disease Father        Social History     Social History   • Marital status: Unknown     Spouse name: N/A   • Number of children: N/A   • Years of education: N/A     Occupational History   • Not on file.     Social History Main Topics   • Smoking status: Former Smoker     Packs/day: 1.00     Years: 47.00     Types: Cigarettes     Quit date: 2014   • Smokeless tobacco: Never Used   • Alcohol use No   • Drug use: No   • Sexual activity: Defer     Other Topics Concern   • Not on file     Social History Narrative         ROS:    Review of Systems   Constitutional: Positive for fatigue. Negative for chills, fever and unexpected weight change.   HENT: Negative for congestion, ear pain, hearing loss, rhinorrhea, sinus pressure, sore throat and trouble  "swallowing.    Eyes: Negative for discharge and itching.   Respiratory: Positive for cough and shortness of breath. Negative for chest tightness.    Cardiovascular: Positive for leg swelling. Negative for chest pain and palpitations.   Gastrointestinal: Negative for abdominal pain, blood in stool, constipation, diarrhea and vomiting.   Endocrine: Negative for polydipsia and polyuria.   Genitourinary: Negative for difficulty urinating, dysuria, enuresis, frequency, hematuria and urgency.   Musculoskeletal: Positive for arthralgias and back pain. Negative for gait problem and joint swelling.   Skin: Negative for rash and wound.   Allergic/Immunologic: Negative for immunocompromised state.   Neurological: Positive for light-headedness and headaches. Negative for dizziness, syncope, weakness and numbness.   Hematological: Does not bruise/bleed easily.   Psychiatric/Behavioral: Positive for sleep disturbance. Negative for behavioral problems and dysphoric mood. The patient is not nervous/anxious.        /64 (BP Location: Left arm, Patient Position: Sitting)  Pulse 80  Ht 71\" (180.3 cm)  Wt (!) 325 lb (147 kg)  BMI 45.33 kg/m2    Physical Exam:    Physical Exam   Constitutional: He is oriented to person, place, and time. He appears well-developed and well-nourished.   HENT:   Head: Normocephalic and atraumatic.   Right Ear: External ear normal.   Left Ear: External ear normal.   Mouth/Throat: Oropharynx is clear and moist.   Eyes: Conjunctivae and EOM are normal.   Neck: Normal range of motion. Neck supple.   Cardiovascular: Normal rate.    Distant heart sounds  IRIR   Pulmonary/Chest: Effort normal. He has rales (bibasilar).   Distant lung sounds   Abdominal: Soft. Bowel sounds are normal.   Musculoskeletal: Normal range of motion.   Lymphadenopathy:     He has no cervical adenopathy.   Neurological: He is alert and oriented to person, place, and time.   Diminished BS   Skin: Skin is warm and dry. "   Psychiatric: He has a normal mood and affect. His behavior is normal. Thought content normal.       Procedure:      Discussion/Summary:    htn-improved  afib-advised compliance with pradaxa  Cad-cont rf mod, advised stress test  Hyperlipidemia-labs noted  Dm-cont amaryl and metformin and increase long acting insulin to 24 units  Insomnia-cont trazodone  Copd-stable   chana-f/u sleep clinic  GERD-stable off meds      Labs noted and dw patient      Current Outpatient Prescriptions:   •  aspirin  MG tablet, Take 1 tablet by mouth Daily., Disp: 90 tablet, Rfl: 3  •  atorvastatin (LIPITOR) 20 MG tablet, Take 1 tablet by mouth Every Night. STOP PRAVASTATIN, Disp: 90 tablet, Rfl: 2  •  budesonide-formoterol (SYMBICORT) 160-4.5 MCG/ACT inhaler, Inhale 2 puffs 2 (Two) Times a Day., Disp: 1 inhaler, Rfl: 12  •  carvedilol (COREG) 25 MG tablet, Take 1 tablet by mouth 2 (Two) Times a Day With Meals., Disp: 180 tablet, Rfl: 3  •  dabigatran etexilate (PRADAXA) 150 MG capsu, Take 1 capsule by mouth 2 (Two) Times a Day., Disp: 60 capsule, Rfl: 5  •  diltiaZEM (CARDIZEM) 60 MG tablet, Take 1 tablet by mouth Every 6 (Six) Hours., Disp: 120 tablet, Rfl: 2  •  furosemide (LASIX) 40 MG tablet, Take 1 tablet by mouth 2 (Two) Times a Day., Disp: 60 tablet, Rfl: 0  •  glimepiride (AMARYL) 2 MG tablet, Take 1 tablet by mouth Every Morning Before Breakfast., Disp: 90 tablet, Rfl: 3  •  Insulin Glargine (TOUJEO SOLOSTAR) 300 UNIT/ML solution pen-injector, Inject 24 Units under the skin Every Night., Disp: , Rfl:   •  metFORMIN ER (GLUCOPHAGE-XR) 500 MG 24 hr tablet, Take 1 tablet by mouth 2 (Two) Times a Day., Disp: 180 tablet, Rfl: 3  •  omeprazole (priLOSEC) 40 MG capsule, Take 1 capsule by mouth Daily., Disp: 30 capsule, Rfl: 2  •  traZODone (DESYREL) 50 MG tablet, Take 1 tablet by mouth Every Night., Disp: 30 tablet, Rfl: 5  •  vitamin D (ERGOCALCIFEROL) 40788 UNITS capsule capsule, Take 50,000 Units by mouth 1 (One) Time Per  Week., Disp: , Rfl:   •  Insulin Pen Needle (INSUPEN PEN NEEDLES) 32G X 4 MM misc, Inject once daily, Disp: 30 each, Rfl: 5  •  Insulin Syringes, Disposable, U-100 1 ML misc, 1 syringe 4 (Four) Times a Day After Meals & at Bedtime., Disp: 200 each, Rfl: 0        Chito was seen today for hypertension, diabetes and hyperlipidemia.    Diagnoses and all orders for this visit:    Acute on chronic diastolic congestive heart failure    Essential hypertension    Rapid atrial fibrillation    Acute respiratory failure with hypoxia and hypercapnia    Chronic obstructive pulmonary disease with acute exacerbation    FAUSTO (obstructive sleep apnea)    Gastroesophageal reflux disease without esophagitis    Morbid obesity, unspecified obesity type    Elevated LFTs    Hypersomnia    Type 2 diabetes mellitus with complication, with long-term current use of insulin    Medically noncompliant

## 2017-07-06 DIAGNOSIS — K21.9 GASTROESOPHAGEAL REFLUX DISEASE WITHOUT ESOPHAGITIS: ICD-10-CM

## 2017-07-06 RX ORDER — OMEPRAZOLE 40 MG/1
CAPSULE, DELAYED RELEASE ORAL
Qty: 30 CAPSULE | Refills: 1 | Status: SHIPPED | OUTPATIENT
Start: 2017-07-06 | End: 2017-10-04 | Stop reason: SDUPTHER

## 2017-07-20 ENCOUNTER — HOSPITAL ENCOUNTER (INPATIENT)
Facility: HOSPITAL | Age: 67
LOS: 6 days | Discharge: HOME | End: 2017-07-26
Attending: EMERGENCY MEDICINE | Admitting: FAMILY MEDICINE

## 2017-07-20 ENCOUNTER — APPOINTMENT (OUTPATIENT)
Dept: GENERAL RADIOLOGY | Facility: HOSPITAL | Age: 67
End: 2017-07-20

## 2017-07-20 DIAGNOSIS — I10 ESSENTIAL HYPERTENSION: ICD-10-CM

## 2017-07-20 DIAGNOSIS — Z91.199 MEDICALLY NONCOMPLIANT: ICD-10-CM

## 2017-07-20 DIAGNOSIS — J96.02 ACUTE RESPIRATORY FAILURE WITH HYPOXIA AND HYPERCAPNIA (HCC): Primary | ICD-10-CM

## 2017-07-20 DIAGNOSIS — J96.01 ACUTE RESPIRATORY FAILURE WITH HYPOXIA AND HYPERCAPNIA (HCC): Primary | ICD-10-CM

## 2017-07-20 DIAGNOSIS — I48.91 ATRIAL FIBRILLATION WITH NORMAL VENTRICULAR RATE (HCC): ICD-10-CM

## 2017-07-20 DIAGNOSIS — Z79.4 TYPE 2 DIABETES MELLITUS WITH COMPLICATION, WITH LONG-TERM CURRENT USE OF INSULIN (HCC): ICD-10-CM

## 2017-07-20 DIAGNOSIS — I50.33 ACUTE ON CHRONIC DIASTOLIC CONGESTIVE HEART FAILURE (HCC): ICD-10-CM

## 2017-07-20 DIAGNOSIS — E66.01 MORBID OBESITY, UNSPECIFIED OBESITY TYPE (HCC): ICD-10-CM

## 2017-07-20 DIAGNOSIS — Z78.9 IMPAIRED MOBILITY AND ADLS: ICD-10-CM

## 2017-07-20 DIAGNOSIS — R06.02 SHORTNESS OF BREATH: ICD-10-CM

## 2017-07-20 DIAGNOSIS — Z74.09 IMPAIRED FUNCTIONAL MOBILITY, BALANCE, GAIT, AND ENDURANCE: ICD-10-CM

## 2017-07-20 DIAGNOSIS — Z74.09 IMPAIRED MOBILITY AND ADLS: ICD-10-CM

## 2017-07-20 DIAGNOSIS — E11.8 TYPE 2 DIABETES MELLITUS WITH COMPLICATION, WITH LONG-TERM CURRENT USE OF INSULIN (HCC): ICD-10-CM

## 2017-07-20 PROBLEM — R79.89 ELEVATED LFTS: Status: RESOLVED | Noted: 2017-04-18 | Resolved: 2017-07-20

## 2017-07-20 LAB
ALBUMIN SERPL-MCNC: 4.1 G/DL (ref 3.2–4.8)
ALBUMIN/GLOB SERPL: 1.3 G/DL (ref 1.5–2.5)
ALP SERPL-CCNC: 79 U/L (ref 25–100)
ALT SERPL W P-5'-P-CCNC: 34 U/L (ref 7–40)
ANION GAP SERPL CALCULATED.3IONS-SCNC: 7 MMOL/L (ref 3–11)
ARTERIAL PATENCY WRIST A: ABNORMAL
AST SERPL-CCNC: 27 U/L (ref 0–33)
ATMOSPHERIC PRESS: ABNORMAL MMHG
BASE EXCESS BLDA CALC-SCNC: 4.3 MMOL/L (ref 0–2)
BASOPHILS # BLD AUTO: 0.03 10*3/MM3 (ref 0–0.2)
BASOPHILS NFR BLD AUTO: 0.3 % (ref 0–1)
BDY SITE: ABNORMAL
BILIRUB SERPL-MCNC: 0.4 MG/DL (ref 0.3–1.2)
BNP SERPL-MCNC: 162 PG/ML (ref 0–100)
BUN BLD-MCNC: 24 MG/DL (ref 9–23)
BUN/CREAT SERPL: 16 (ref 7–25)
CALCIUM SPEC-SCNC: 8.8 MG/DL (ref 8.7–10.4)
CHLORIDE SERPL-SCNC: 96 MMOL/L (ref 99–109)
CO2 BLDA-SCNC: 33.1 MMOL/L (ref 22–33)
CO2 SERPL-SCNC: 30 MMOL/L (ref 20–31)
COHGB MFR BLD: 0.7 % (ref 0–2)
CREAT BLD-MCNC: 1.5 MG/DL (ref 0.6–1.3)
DEPRECATED RDW RBC AUTO: 51.4 FL (ref 37–54)
EOSINOPHIL # BLD AUTO: 0.2 10*3/MM3 (ref 0–0.3)
EOSINOPHIL NFR BLD AUTO: 2.2 % (ref 0–3)
ERYTHROCYTE [DISTWIDTH] IN BLOOD BY AUTOMATED COUNT: 14.3 % (ref 11.3–14.5)
GFR SERPL CREATININE-BSD FRML MDRD: 47 ML/MIN/1.73
GLOBULIN UR ELPH-MCNC: 3.1 GM/DL
GLUCOSE BLD-MCNC: 294 MG/DL (ref 70–100)
GLUCOSE BLDC GLUCOMTR-MCNC: 139 MG/DL (ref 70–130)
GLUCOSE BLDC GLUCOMTR-MCNC: 197 MG/DL (ref 70–130)
HCO3 BLDA-SCNC: 31.2 MMOL/L (ref 20–26)
HCT VFR BLD AUTO: 41.9 % (ref 38.9–50.9)
HCT VFR BLD CALC: 40.3 %
HGB BLD-MCNC: 12.6 G/DL (ref 13.1–17.5)
HGB BLDA-MCNC: 13.1 G/DL (ref 13.5–17.5)
HOLD SPECIMEN: NORMAL
HOLD SPECIMEN: NORMAL
HOROWITZ INDEX BLD+IHG-RTO: 40 %
IMM GRANULOCYTES # BLD: 0.04 10*3/MM3 (ref 0–0.03)
IMM GRANULOCYTES NFR BLD: 0.4 % (ref 0–0.6)
LYMPHOCYTES # BLD AUTO: 2.77 10*3/MM3 (ref 0.6–4.8)
LYMPHOCYTES NFR BLD AUTO: 30.8 % (ref 24–44)
MCH RBC QN AUTO: 29.8 PG (ref 27–31)
MCHC RBC AUTO-ENTMCNC: 30.1 G/DL (ref 32–36)
MCV RBC AUTO: 99.1 FL (ref 80–99)
METHGB BLD QL: 0.9 % (ref 0–1.5)
MODALITY: ABNORMAL
MONOCYTES # BLD AUTO: 0.7 10*3/MM3 (ref 0–1)
MONOCYTES NFR BLD AUTO: 7.8 % (ref 0–12)
NEUTROPHILS # BLD AUTO: 5.26 10*3/MM3 (ref 1.5–8.3)
NEUTROPHILS NFR BLD AUTO: 58.5 % (ref 41–71)
OXYHGB MFR BLDV: 86.3 % (ref 94–99)
PCO2 BLDA: 60.4 MM HG (ref 35–48)
PH BLDA: 7.32 PH UNITS (ref 7.35–7.45)
PLATELET # BLD AUTO: 170 10*3/MM3 (ref 150–450)
PMV BLD AUTO: 11.3 FL (ref 6–12)
PO2 BLDA: 61.4 MM HG (ref 83–108)
POTASSIUM BLD-SCNC: 4.4 MMOL/L (ref 3.5–5.5)
PROT SERPL-MCNC: 7.2 G/DL (ref 5.7–8.2)
RBC # BLD AUTO: 4.23 10*6/MM3 (ref 4.2–5.76)
SODIUM BLD-SCNC: 133 MMOL/L (ref 132–146)
TROPONIN I SERPL-MCNC: 0.03 NG/ML (ref 0–0.07)
TROPONIN I SERPL-MCNC: 0.03 NG/ML (ref 0–0.07)
WBC NRBC COR # BLD: 9 10*3/MM3 (ref 3.5–10.8)
WHOLE BLOOD HOLD SPECIMEN: NORMAL
WHOLE BLOOD HOLD SPECIMEN: NORMAL

## 2017-07-20 PROCEDURE — 93005 ELECTROCARDIOGRAM TRACING: CPT | Performed by: FAMILY MEDICINE

## 2017-07-20 PROCEDURE — 36600 WITHDRAWAL OF ARTERIAL BLOOD: CPT | Performed by: EMERGENCY MEDICINE

## 2017-07-20 PROCEDURE — 82805 BLOOD GASES W/O2 SATURATION: CPT | Performed by: EMERGENCY MEDICINE

## 2017-07-20 PROCEDURE — 83880 ASSAY OF NATRIURETIC PEPTIDE: CPT | Performed by: EMERGENCY MEDICINE

## 2017-07-20 PROCEDURE — 93005 ELECTROCARDIOGRAM TRACING: CPT

## 2017-07-20 PROCEDURE — 99223 1ST HOSP IP/OBS HIGH 75: CPT | Performed by: FAMILY MEDICINE

## 2017-07-20 PROCEDURE — 84484 ASSAY OF TROPONIN QUANT: CPT

## 2017-07-20 PROCEDURE — 93005 ELECTROCARDIOGRAM TRACING: CPT | Performed by: EMERGENCY MEDICINE

## 2017-07-20 PROCEDURE — 25010000002 MAGNESIUM SULFATE IN D5W 1G/100ML (PREMIX) 1-5 GM/100ML-% SOLUTION: Performed by: EMERGENCY MEDICINE

## 2017-07-20 PROCEDURE — 99285 EMERGENCY DEPT VISIT HI MDM: CPT

## 2017-07-20 PROCEDURE — 80053 COMPREHEN METABOLIC PANEL: CPT | Performed by: EMERGENCY MEDICINE

## 2017-07-20 PROCEDURE — 85025 COMPLETE CBC W/AUTO DIFF WBC: CPT | Performed by: EMERGENCY MEDICINE

## 2017-07-20 PROCEDURE — 71010 HC CHEST PA OR AP: CPT

## 2017-07-20 PROCEDURE — 94640 AIRWAY INHALATION TREATMENT: CPT

## 2017-07-20 PROCEDURE — 82962 GLUCOSE BLOOD TEST: CPT

## 2017-07-20 PROCEDURE — 94799 UNLISTED PULMONARY SVC/PX: CPT

## 2017-07-20 RX ORDER — MORPHINE SULFATE 2 MG/ML
1 INJECTION, SOLUTION INTRAMUSCULAR; INTRAVENOUS EVERY 4 HOURS PRN
Status: DISCONTINUED | OUTPATIENT
Start: 2017-07-20 | End: 2017-07-26 | Stop reason: HOSPADM

## 2017-07-20 RX ORDER — BUMETANIDE 0.25 MG/ML
0.5 INJECTION INTRAMUSCULAR; INTRAVENOUS ONCE
Status: COMPLETED | OUTPATIENT
Start: 2017-07-20 | End: 2017-07-20

## 2017-07-20 RX ORDER — METHYLPREDNISOLONE SODIUM SUCCINATE 125 MG/2ML
80 INJECTION, POWDER, LYOPHILIZED, FOR SOLUTION INTRAMUSCULAR; INTRAVENOUS EVERY 12 HOURS
Status: DISCONTINUED | OUTPATIENT
Start: 2017-07-21 | End: 2017-07-21

## 2017-07-20 RX ORDER — DEXTROSE MONOHYDRATE 25 G/50ML
25 INJECTION, SOLUTION INTRAVENOUS
Status: DISCONTINUED | OUTPATIENT
Start: 2017-07-20 | End: 2017-07-26 | Stop reason: HOSPADM

## 2017-07-20 RX ORDER — IPRATROPIUM BROMIDE AND ALBUTEROL SULFATE 2.5; .5 MG/3ML; MG/3ML
3 SOLUTION RESPIRATORY (INHALATION) EVERY 4 HOURS PRN
Status: DISCONTINUED | OUTPATIENT
Start: 2017-07-20 | End: 2017-07-26 | Stop reason: HOSPADM

## 2017-07-20 RX ORDER — DILTIAZEM HYDROCHLORIDE 60 MG/1
60 TABLET, FILM COATED ORAL EVERY 6 HOURS SCHEDULED
Status: DISCONTINUED | OUTPATIENT
Start: 2017-07-21 | End: 2017-07-26 | Stop reason: HOSPADM

## 2017-07-20 RX ORDER — TRAZODONE HYDROCHLORIDE 50 MG/1
50 TABLET ORAL NIGHTLY
Status: DISCONTINUED | OUTPATIENT
Start: 2017-07-21 | End: 2017-07-26 | Stop reason: HOSPADM

## 2017-07-20 RX ORDER — COLCHICINE 0.6 MG/1
0.6 TABLET ORAL 2 TIMES DAILY
COMMUNITY
End: 2017-07-26 | Stop reason: HOSPADM

## 2017-07-20 RX ORDER — ATORVASTATIN CALCIUM 20 MG/1
20 TABLET, FILM COATED ORAL NIGHTLY
Status: DISCONTINUED | OUTPATIENT
Start: 2017-07-21 | End: 2017-07-26 | Stop reason: HOSPADM

## 2017-07-20 RX ORDER — SODIUM CHLORIDE 0.9 % (FLUSH) 0.9 %
1-10 SYRINGE (ML) INJECTION AS NEEDED
Status: DISCONTINUED | OUTPATIENT
Start: 2017-07-20 | End: 2017-07-26 | Stop reason: HOSPADM

## 2017-07-20 RX ORDER — CARVEDILOL 12.5 MG/1
25 TABLET ORAL 2 TIMES DAILY WITH MEALS
Status: DISCONTINUED | OUTPATIENT
Start: 2017-07-21 | End: 2017-07-23

## 2017-07-20 RX ORDER — SODIUM CHLORIDE 0.9 % (FLUSH) 0.9 %
10 SYRINGE (ML) INJECTION AS NEEDED
Status: DISCONTINUED | OUTPATIENT
Start: 2017-07-20 | End: 2017-07-26 | Stop reason: HOSPADM

## 2017-07-20 RX ORDER — MAGNESIUM SULFATE 1 G/100ML
1 INJECTION INTRAVENOUS ONCE
Status: COMPLETED | OUTPATIENT
Start: 2017-07-20 | End: 2017-07-20

## 2017-07-20 RX ORDER — HYDROCODONE BITARTRATE AND ACETAMINOPHEN 7.5; 325 MG/1; MG/1
1 TABLET ORAL EVERY 4 HOURS PRN
Status: DISCONTINUED | OUTPATIENT
Start: 2017-07-20 | End: 2017-07-26 | Stop reason: HOSPADM

## 2017-07-20 RX ORDER — FUROSEMIDE 40 MG/1
40 TABLET ORAL 2 TIMES DAILY
Status: DISCONTINUED | OUTPATIENT
Start: 2017-07-21 | End: 2017-07-21

## 2017-07-20 RX ORDER — OMEGA-3S/DHA/EPA/FISH OIL/D3 300MG-1000
400 CAPSULE ORAL DAILY
COMMUNITY
End: 2019-04-18

## 2017-07-20 RX ORDER — IPRATROPIUM BROMIDE AND ALBUTEROL SULFATE 2.5; .5 MG/3ML; MG/3ML
3 SOLUTION RESPIRATORY (INHALATION)
Status: DISCONTINUED | OUTPATIENT
Start: 2017-07-21 | End: 2017-07-21

## 2017-07-20 RX ORDER — ONDANSETRON 2 MG/ML
4 INJECTION INTRAMUSCULAR; INTRAVENOUS EVERY 6 HOURS PRN
Status: DISCONTINUED | OUTPATIENT
Start: 2017-07-20 | End: 2017-07-26 | Stop reason: HOSPADM

## 2017-07-20 RX ORDER — LISINOPRIL 5 MG/1
5 TABLET ORAL DAILY
Status: DISCONTINUED | OUTPATIENT
Start: 2017-07-21 | End: 2017-07-23

## 2017-07-20 RX ORDER — LORAZEPAM 2 MG/ML
0.5 INJECTION INTRAMUSCULAR EVERY 6 HOURS PRN
Status: DISCONTINUED | OUTPATIENT
Start: 2017-07-20 | End: 2017-07-26 | Stop reason: HOSPADM

## 2017-07-20 RX ORDER — NICOTINE POLACRILEX 4 MG
15 LOZENGE BUCCAL
Status: DISCONTINUED | OUTPATIENT
Start: 2017-07-20 | End: 2017-07-26 | Stop reason: HOSPADM

## 2017-07-20 RX ORDER — NALOXONE HCL 0.4 MG/ML
0.4 VIAL (ML) INJECTION
Status: DISCONTINUED | OUTPATIENT
Start: 2017-07-20 | End: 2017-07-26 | Stop reason: HOSPADM

## 2017-07-20 RX ORDER — DABIGATRAN ETEXILATE 150 MG/1
150 CAPSULE ORAL 2 TIMES DAILY
Status: DISCONTINUED | OUTPATIENT
Start: 2017-07-21 | End: 2017-07-26

## 2017-07-20 RX ORDER — BUDESONIDE AND FORMOTEROL FUMARATE DIHYDRATE 160; 4.5 UG/1; UG/1
2 AEROSOL RESPIRATORY (INHALATION)
Status: DISCONTINUED | OUTPATIENT
Start: 2017-07-21 | End: 2017-07-26 | Stop reason: HOSPADM

## 2017-07-20 RX ORDER — IPRATROPIUM BROMIDE AND ALBUTEROL SULFATE 2.5; .5 MG/3ML; MG/3ML
3 SOLUTION RESPIRATORY (INHALATION) ONCE
Status: COMPLETED | OUTPATIENT
Start: 2017-07-20 | End: 2017-07-20

## 2017-07-20 RX ORDER — ASPIRIN 81 MG/1
81 TABLET ORAL DAILY
Status: DISCONTINUED | OUTPATIENT
Start: 2017-07-21 | End: 2017-07-26 | Stop reason: HOSPADM

## 2017-07-20 RX ORDER — FUROSEMIDE 40 MG/1
40 TABLET ORAL 2 TIMES DAILY
COMMUNITY
End: 2017-08-14 | Stop reason: SDUPTHER

## 2017-07-20 RX ORDER — PANTOPRAZOLE SODIUM 40 MG/1
40 TABLET, DELAYED RELEASE ORAL EVERY MORNING
Status: DISCONTINUED | OUTPATIENT
Start: 2017-07-21 | End: 2017-07-26 | Stop reason: HOSPADM

## 2017-07-20 RX ADMIN — BUMETANIDE 0.5 MG: 0.25 INJECTION INTRAMUSCULAR; INTRAVENOUS at 17:52

## 2017-07-20 RX ADMIN — MAGNESIUM SULFATE HEPTAHYDRATE 1 G: 1 INJECTION, SOLUTION INTRAVENOUS at 16:34

## 2017-07-20 RX ADMIN — IPRATROPIUM BROMIDE AND ALBUTEROL SULFATE 3 ML: .5; 3 SOLUTION RESPIRATORY (INHALATION) at 17:09

## 2017-07-20 RX ADMIN — IPRATROPIUM BROMIDE AND ALBUTEROL SULFATE 3 ML: .5; 3 SOLUTION RESPIRATORY (INHALATION) at 23:35

## 2017-07-20 NOTE — ED PROVIDER NOTES
Subjective   HPI Comments: 66 year old white male presents to the ED with sudden onset shortness of breath. Pt states his episode began around noon. He was visiting at his sister's house and was awoken to paramedics telling him his lips were blue. He is normally on 4.5L of oxygen at home but was not on oxygen today at his sister's house. He as past medical history significant for COPD, Diabetes, and high cholesterol. Patient is also an ex-smoker. Admits to difficulty breathing, but denies chest pain. No nausea, vomiting. Denies any other acute symptoms.  The patient reports his oxygen level typically runs between 88 and 96% on his home oxygen level.    Patient is a 66 y.o. male presenting with shortness of breath.   History provided by:  Patient  Shortness of Breath   Severity:  Moderate  Onset quality:  Sudden  Timing:  Constant  Progression:  Worsening  Chronicity:  Chronic  Context comment:  Normally on 4.5L O2 at home but was not on his oxygen today at his sisters  Worsened by:  Nothing  Associated symptoms: cough    Associated symptoms: no abdominal pain, no chest pain, no headaches and no vomiting    Risk factors: obesity and tobacco use        Review of Systems   Respiratory: Positive for cough and shortness of breath.    Cardiovascular: Negative for chest pain.   Gastrointestinal: Negative for abdominal pain, nausea and vomiting.   Neurological: Negative for dizziness and headaches.   All other systems reviewed and are negative.      Past Medical History:   Diagnosis Date   • Atrial fibrillation    • CAD (coronary artery disease)    • CHF (congestive heart failure)    • Chronic anticoagulation    • CKD (chronic kidney disease) stage 3, GFR 30-59 ml/min    • COPD (chronic obstructive pulmonary disease)    • DM2 (diabetes mellitus, type 2)    • GERD (gastroesophageal reflux disease)    • History of MI (myocardial infarction)    • HLD (hyperlipidemia)    • HTN (hypertension)    • Morbid obesity    •  Noncompliance    • FAUSTO (obstructive sleep apnea)    • Oxygen dependent        No Known Allergies    Past Surgical History:   Procedure Laterality Date   • APPENDECTOMY  1961   • CORONARY STENT PLACEMENT  2008   • HAND SURGERY Left 2002   • KNEE ARTHROSCOPY Left 1965       Family History   Problem Relation Age of Onset   • Diabetes Mother    • Heart disease Mother    • Diabetes Father    • Heart disease Father    • Heart disease Sister        Social History     Social History   • Marital status: Unknown     Spouse name: N/A   • Number of children: 5   • Years of education: H.S.     Occupational History   • Construction Retired     Social History Main Topics   • Smoking status: Former Smoker     Packs/day: 1.00     Years: 47.00     Types: Cigarettes     Quit date: 2014   • Smokeless tobacco: Never Used   • Alcohol use No   • Drug use: No   • Sexual activity: No      Comment:      Other Topics Concern   • None     Social History Narrative    He lives by himself           Objective   Physical Exam   Constitutional: He appears well-developed and well-nourished.   Morbidly obese male.   HENT:   Head: Normocephalic and atraumatic.   Eyes: Conjunctivae are normal. No scleral icterus.   Neck: Normal range of motion. Neck supple.   Cardiovascular: Normal rate, regular rhythm and intact distal pulses.    Pulmonary/Chest: He is in respiratory distress. He has wheezes. He exhibits no tenderness.   Mild increased work of breathing and tachypnea.   Abdominal: Soft. Bowel sounds are normal.   Morbidly obese   Musculoskeletal: Normal range of motion.   Skin: Skin is warm and dry.   Psychiatric: He has a normal mood and affect. His behavior is normal. Thought content normal.   Nursing note and vitals reviewed.      Procedures         ED Course  ED Course   Value Comment By Time   Troponin I: 0.03 (Reviewed) Donovan Marcano MD 07/20 1714   BNP: (!) 162.0 (Reviewed) Donovan Marcano MD 07/20 1714    The patient reports  feeling much better and is resting comfortably.  Patient in no distress.  Patient is going to the bathroom when he returns we will get him back on his home oxygen level and see how he is maintaining.  Patient understands and agrees. Donovan Marcano MD 07/20 1840   pCO2, Arterial: (!) 60.4 (Reviewed) Donovan Marcano MD 07/20 1944   pH, Arterial: (!) 7.322 (Reviewed) Donovan Marcano MD 07/20 1944   pO2, Arterial: (!) 61.4 (Reviewed) Donovan Marcano MD 07/20 1944    The patient continues to be fatigued with hypoxemia.  Even with his home oxygen level is only saturating around 90%.  An ABG was obtained which demonstrated hypercarbia and hypoxemia.  We'll admit the patient for further management and therapy.  Findings and plan discussed with the patient who agrees with the plan.  Case discussed with Dr. Sepulveda who will admit the patient. Donovan Marcano MD 07/20 2000      Recent Results (from the past 24 hour(s))   Comprehensive Metabolic Panel    Collection Time: 07/20/17  2:56 PM   Result Value Ref Range    Glucose 294 (H) 70 - 100 mg/dL    BUN 24 (H) 9 - 23 mg/dL    Creatinine 1.50 (H) 0.60 - 1.30 mg/dL    Sodium 133 132 - 146 mmol/L    Potassium 4.4 3.5 - 5.5 mmol/L    Chloride 96 (L) 99 - 109 mmol/L    CO2 30.0 20.0 - 31.0 mmol/L    Calcium 8.8 8.7 - 10.4 mg/dL    Total Protein 7.2 5.7 - 8.2 g/dL    Albumin 4.10 3.20 - 4.80 g/dL    ALT (SGPT) 34 7 - 40 U/L    AST (SGOT) 27 0 - 33 U/L    Alkaline Phosphatase 79 25 - 100 U/L    Total Bilirubin 0.4 0.3 - 1.2 mg/dL    eGFR Non African Amer 47 (L) >60 mL/min/1.73    Globulin 3.1 gm/dL    A/G Ratio 1.3 (L) 1.5 - 2.5 g/dL    BUN/Creatinine Ratio 16.0 7.0 - 25.0    Anion Gap 7.0 3.0 - 11.0 mmol/L   BNP    Collection Time: 07/20/17  2:56 PM   Result Value Ref Range    .0 (H) 0.0 - 100.0 pg/mL   Light Blue Top    Collection Time: 07/20/17  2:56 PM   Result Value Ref Range    Extra Tube hold for add-on    Green Top (Gel)    Collection Time:  07/20/17  2:56 PM   Result Value Ref Range    Extra Tube Hold for add-ons.    Lavender Top    Collection Time: 07/20/17  2:56 PM   Result Value Ref Range    Extra Tube hold for add-on    Gold Top - SST    Collection Time: 07/20/17  2:56 PM   Result Value Ref Range    Extra Tube Hold for add-ons.    CBC Auto Differential    Collection Time: 07/20/17  2:56 PM   Result Value Ref Range    WBC 9.00 3.50 - 10.80 10*3/mm3    RBC 4.23 4.20 - 5.76 10*6/mm3    Hemoglobin 12.6 (L) 13.1 - 17.5 g/dL    Hematocrit 41.9 38.9 - 50.9 %    MCV 99.1 (H) 80.0 - 99.0 fL    MCH 29.8 27.0 - 31.0 pg    MCHC 30.1 (L) 32.0 - 36.0 g/dL    RDW 14.3 11.3 - 14.5 %    RDW-SD 51.4 37.0 - 54.0 fl    MPV 11.3 6.0 - 12.0 fL    Platelets 170 150 - 450 10*3/mm3    Neutrophil % 58.5 41.0 - 71.0 %    Lymphocyte % 30.8 24.0 - 44.0 %    Monocyte % 7.8 0.0 - 12.0 %    Eosinophil % 2.2 0.0 - 3.0 %    Basophil % 0.3 0.0 - 1.0 %    Immature Grans % 0.4 0.0 - 0.6 %    Neutrophils, Absolute 5.26 1.50 - 8.30 10*3/mm3    Lymphocytes, Absolute 2.77 0.60 - 4.80 10*3/mm3    Monocytes, Absolute 0.70 0.00 - 1.00 10*3/mm3    Eosinophils, Absolute 0.20 0.00 - 0.30 10*3/mm3    Basophils, Absolute 0.03 0.00 - 0.20 10*3/mm3    Immature Grans, Absolute 0.04 (H) 0.00 - 0.03 10*3/mm3   POC Troponin, Rapid    Collection Time: 07/20/17  3:03 PM   Result Value Ref Range    Troponin I 0.03 0.00 - 0.07 ng/mL   POC Glucose Fingerstick    Collection Time: 07/20/17  4:38 PM   Result Value Ref Range    Glucose 197 (H) 70 - 130 mg/dL   POC Troponin, Rapid    Collection Time: 07/20/17  7:16 PM   Result Value Ref Range    Troponin I 0.03 0.00 - 0.07 ng/mL   Blood Gas, Arterial    Collection Time: 07/20/17  7:19 PM   Result Value Ref Range    Site Arterial: left radial     Ej's Test N/A     pH, Arterial 7.322 (L) 7.350 - 7.450 pH units    pCO2, Arterial 60.4 (H) 35.0 - 48.0 mm Hg    pO2, Arterial 61.4 (L) 83.0 - 108.0 mm Hg    HCO3, Arterial 31.2 (H) 20.0 - 26.0 mmol/L    Base Excess,  Arterial 4.3 (H) 0.0 - 2.0 mmol/L    Hemoglobin, Blood Gas 13.1 (L) 13.5 - 17.5 g/dL    Hematocrit, Blood Gas 40.3 %    Oxyhemoglobin 86.3 (L) 94 - 99 %    Methemoglobin 0.9 0 - 1.5 %    Carboxyhemoglobin 0.7 0 - 2 %    CO2 Content 33.1 (H) 22 - 33    Barometric Pressure for Blood Gas  mmHg    Modality Cannula - Nasal     FIO2 40 %   POC Glucose Fingerstick    Collection Time: 07/20/17 11:47 PM   Result Value Ref Range    Glucose 139 (H) 70 - 130 mg/dL   Troponin    Collection Time: 07/21/17  1:07 AM   Result Value Ref Range    Troponin I <0.006 <=0.039 ng/mL     Note: In addition to lab results from this visit, the labs listed above may include labs taken at another facility or during a different encounter within the last 24 hours. Please correlate lab times with ED admission and discharge times for further clarification of the services performed during this visit.    XR Chest 1 View   Final Result   Cardiomegaly with mild central vascular congestion,   unchanged when compared with 04/22/2017.       D:  07/20/2017   E:  07/20/2017       This report was finalized on 7/20/2017 4:25 PM by Dr. Obdulio Lopes MD.            Vitals:    07/20/17 2150 07/20/17 2152 07/20/17 2335 07/21/17 0015   BP: 120/66 114/78  114/78   BP Location: Right arm Right arm     Patient Position: Sitting Standing     Pulse: 97 102 87    Resp:  16     Temp: 97.6 °F (36.4 °C)      TempSrc: Temporal Artery       SpO2: 100% 99% 93%    Weight:       Height:         Medications   sodium chloride 0.9 % flush 10 mL (not administered)   atorvastatin (LIPITOR) tablet 20 mg (not administered)   budesonide-formoterol (SYMBICORT) 160-4.5 MCG/ACT inhaler 2 puff ( Inhalation Canceled Entry 7/20/17 2335)   carvedilol (COREG) tablet 25 mg (25 mg Oral Given 7/21/17 0015)   dabigatran etexilate (PRADAXA) capsule 150 mg (not administered)   diltiaZEM (CARDIZEM) tablet 60 mg (60 mg Oral Given 7/21/17 0015)   furosemide (LASIX) tablet 40 mg (not administered)    pantoprazole (PROTONIX) EC tablet 40 mg (not administered)   traZODone (DESYREL) tablet 50 mg (50 mg Oral Given 7/21/17 0015)   dextrose (GLUTOSE) oral gel 15 g (not administered)   dextrose (D50W) solution 25 g (not administered)   glucagon (GLUCAGEN) injection 1 mg (not administered)   sodium chloride 0.9 % flush 1-10 mL (not administered)   HYDROcodone-acetaminophen (NORCO) 7.5-325 MG per tablet 1 tablet (not administered)   Morphine sulfate (PF) injection 1 mg (not administered)     And   naloxone (NARCAN) injection 0.4 mg (not administered)   ipratropium-albuterol (DUO-NEB) nebulizer solution 3 mL (3 mL Nebulization Given 7/20/17 2335)   ipratropium-albuterol (DUO-NEB) nebulizer solution 3 mL (not administered)   insulin detemir (LEVEMIR) injection 10 Units (10 Units Subcutaneous Given 7/21/17 0011)   insulin lispro (humaLOG) injection 0-9 Units (0 Units Subcutaneous Not Given 7/21/17 0004)   methylPREDNISolone sodium succinate (SOLU-Medrol) injection 80 mg (80 mg Intravenous Given 7/21/17 0011)   LORazepam (ATIVAN) injection 0.5 mg (not administered)   ondansetron (ZOFRAN) injection 4 mg (not administered)   aspirin EC tablet 81 mg (not administered)   lisinopril (PRINIVIL,ZESTRIL) tablet 5 mg (not administered)   ipratropium-albuterol (DUO-NEB) nebulizer solution 3 mL (3 mL Nebulization Given 7/20/17 1709)   magnesium sulfate in D5W 1g/100mL (PREMIX) (0 g Intravenous Stopped 7/20/17 1745)   bumetanide (BUMEX) injection 0.5 mg (0.5 mg Intravenous Given 7/20/17 1752)     ECG/EMG Results (last 24 hours)     Procedure Component Value Units Date/Time    ECG 12 Lead [[id number]] Collected:  07/20/17 1446     Updated:  07/20/17 1623                      MDM  Number of Diagnoses or Management Options  Diagnosis management comments: ECG/EMG Results (last 24 hours)     Procedure Component Value Units Date/Time    ECG 12 Lead ([id number]) Collected:  07/20/17 1908     Updated:  07/20/17 1935    Narrative:       Test  Reason : repeat  Blood Pressure : **/** mmHG  Vent. Rate : 101 BPM     Atrial Rate : 087 BPM     P-R Int : 000 ms          QRS Dur : 076 ms      QT Int : 364 ms       P-R-T Axes : 000 042 051 degrees     QTc Int : 471 ms    Atrial fibrillation  Low voltage QRS  Nonspecific ST and T wave abnormality , probably digitalis effect  Abnormal ECG  When compared with ECG of 20-JUL-2017 14:46, (Unconfirmed)  No significant change was found  Confirmed by BEAN COOK MD ([id number]) on 7/20/2017 7:35:17 PM    Referred By:  COOK           Confirmed By:BEAN COOK MD    ECG 12 Lead ([id number]) Collected:  07/20/17 1446     Updated:  07/20/17 2351    Narrative:       Test Reason : SOA Protocol  Blood Pressure : **/** mmHG  Vent. Rate : 095 BPM     Atrial Rate : 102 BPM     P-R Int : 000 ms          QRS Dur : 072 ms      QT Int : 352 ms       P-R-T Axes : 000 049 026 degrees     QTc Int : 442 ms    Atrial fibrillation  Low voltage QRS  Nonspecific ST and T wave abnormality , probably digitalis effect  Abnormal ECG  When compared with ECG of 18-APR-2017 14:27,  Nonspecific T wave abnormality, improved in Inferior leads  Nonspecific T wave abnormality no longer evident in Lateral leads  Confirmed by BEAN COOK MD ([id number]) on 7/20/2017 11:51:10 PM    Referred By:  MEENA           Confirmed By:BEAN COOK MD    ECG 12 Lead ([id number]) Collected:  07/21/17 0122     Updated:  07/21/17 0122    Narrative:       Test Reason : chest pain  Blood Pressure : **/** mmHG  Vent. Rate : 092 BPM     Atrial Rate : 065 BPM     P-R Int : 000 ms          QRS Dur : 074 ms      QT Int : 364 ms       P-R-T Axes : 000 055 054 degrees     QTc Int : 450 ms    Atrial fibrillation with a competing junctional pacemaker  Low voltage QRS  Cannot rule out Anterior infarct , age undetermined  Abnormal ECG  When compared with ECG of 20-JUL-2017 19:08,  Minimal criteria for Anterior infarct are now present    Referred By:  JAN           Confirmed By:               Amount and/or Complexity of Data Reviewed  Clinical lab tests: reviewed  Tests in the radiology section of CPT®: reviewed        Final diagnoses:   Acute respiratory failure with hypoxia and hypercapnia   Medically noncompliant   Morbid obesity, unspecified obesity type   Type 2 diabetes mellitus with complication, with long-term current use of insulin   Essential hypertension   Shortness of breath   Atrial fibrillation with normal ventricular rate            Graciela Ruffin  07/20/17 1726       Graciela Ruffin  07/20/17 1726       Graciela Ruffin  07/20/17 2001       Donovan Marcano MD  07/21/17 0147

## 2017-07-21 ENCOUNTER — APPOINTMENT (OUTPATIENT)
Dept: CARDIOLOGY | Facility: HOSPITAL | Age: 67
End: 2017-07-21
Attending: FAMILY MEDICINE

## 2017-07-21 LAB
ANION GAP SERPL CALCULATED.3IONS-SCNC: 11 MMOL/L (ref 3–11)
BH CV ECHO MEAS - AO ROOT AREA (BSA CORRECTED): 1.1
BH CV ECHO MEAS - AO ROOT AREA: 6.6 CM^2
BH CV ECHO MEAS - AO ROOT DIAM: 2.9 CM
BH CV ECHO MEAS - BSA(HAYCOCK): 2.8 M^2
BH CV ECHO MEAS - BSA: 2.6 M^2
BH CV ECHO MEAS - BZI_BMI: 46.3 KILOGRAMS/M^2
BH CV ECHO MEAS - BZI_METRIC_HEIGHT: 180.3 CM
BH CV ECHO MEAS - BZI_METRIC_WEIGHT: 150.6 KG
BH CV ECHO MEAS - CONTRAST EF 4CH: 64.6 ML/M^2
BH CV ECHO MEAS - EDV(CUBED): 147.2 ML
BH CV ECHO MEAS - EDV(MOD-SP4): 99 ML
BH CV ECHO MEAS - EDV(TEICH): 134.2 ML
BH CV ECHO MEAS - EF(CUBED): 67.8 %
BH CV ECHO MEAS - EF(MOD-SP4): 64.6 %
BH CV ECHO MEAS - EF(TEICH): 58.9 %
BH CV ECHO MEAS - ESV(CUBED): 47.4 ML
BH CV ECHO MEAS - ESV(MOD-SP4): 35 ML
BH CV ECHO MEAS - ESV(TEICH): 55.2 ML
BH CV ECHO MEAS - FS: 31.4 %
BH CV ECHO MEAS - IVS/LVPW: 1.2
BH CV ECHO MEAS - IVSD: 1.3 CM
BH CV ECHO MEAS - LA DIMENSION: 4.8 CM
BH CV ECHO MEAS - LA/AO: 1.7
BH CV ECHO MEAS - LAT PEAK E' VEL: 11.6 CM/SEC
BH CV ECHO MEAS - LV DIASTOLIC VOL/BSA (35-75): 37.8 ML/M^2
BH CV ECHO MEAS - LV MASS(C)D: 256.5 GRAMS
BH CV ECHO MEAS - LV MASS(C)DI: 98.1 GRAMS/M^2
BH CV ECHO MEAS - LV SYSTOLIC VOL/BSA (12-30): 13.4 ML/M^2
BH CV ECHO MEAS - LVIDD: 5.3 CM
BH CV ECHO MEAS - LVIDS: 3.6 CM
BH CV ECHO MEAS - LVLD AP4: 6.7 CM
BH CV ECHO MEAS - LVLS AP4: 4.9 CM
BH CV ECHO MEAS - LVOT AREA (M): 3.1 CM^2
BH CV ECHO MEAS - LVOT AREA: 3.1 CM^2
BH CV ECHO MEAS - LVOT DIAM: 2 CM
BH CV ECHO MEAS - LVPWD: 1.1 CM
BH CV ECHO MEAS - MED PEAK E' VEL: 9.43 CM/SEC
BH CV ECHO MEAS - PA ACC SLOPE: 617 CM/SEC^2
BH CV ECHO MEAS - PA ACC TIME: 0.11 SEC
BH CV ECHO MEAS - PA PR(ACCEL): 27.7 MMHG
BH CV ECHO MEAS - RVDD: 2.6 CM
BH CV ECHO MEAS - SI(CUBED): 38.1 ML/M^2
BH CV ECHO MEAS - SI(MOD-SP4): 24.5 ML/M^2
BH CV ECHO MEAS - SI(TEICH): 30.2 ML/M^2
BH CV ECHO MEAS - SV(CUBED): 99.8 ML
BH CV ECHO MEAS - SV(MOD-SP4): 64 ML
BH CV ECHO MEAS - SV(TEICH): 79 ML
BH CV ECHO MEAS - TAPSE (>1.6): 1.7 CM2
BH CV XLRA - RV BASE: 3.7 CM
BH CV XLRA - RV LENGTH: 5 CM
BH CV XLRA - RV MID: 3.4 CM
BH CV XLRA - TDI S': 13.9 CM/SEC
BUN BLD-MCNC: 15 MG/DL (ref 9–23)
BUN/CREAT SERPL: 13.6 (ref 7–25)
CALCIUM SPEC-SCNC: 8.8 MG/DL (ref 8.7–10.4)
CHLORIDE SERPL-SCNC: 94 MMOL/L (ref 99–109)
CO2 SERPL-SCNC: 28 MMOL/L (ref 20–31)
CREAT BLD-MCNC: 1.1 MG/DL (ref 0.6–1.3)
GFR SERPL CREATININE-BSD FRML MDRD: 67 ML/MIN/1.73
GLUCOSE BLD-MCNC: 445 MG/DL (ref 70–100)
GLUCOSE BLDC GLUCOMTR-MCNC: 347 MG/DL (ref 70–130)
GLUCOSE BLDC GLUCOMTR-MCNC: 387 MG/DL (ref 70–130)
GLUCOSE BLDC GLUCOMTR-MCNC: 406 MG/DL (ref 70–130)
GLUCOSE BLDC GLUCOMTR-MCNC: 416 MG/DL (ref 70–130)
GLUCOSE BLDC GLUCOMTR-MCNC: 456 MG/DL (ref 70–130)
GLUCOSE BLDC GLUCOMTR-MCNC: 459 MG/DL (ref 70–130)
GLUCOSE BLDC GLUCOMTR-MCNC: 499 MG/DL (ref 70–130)
HBA1C MFR BLD: 10.4 % (ref 4.8–5.6)
LEFT ATRIUM VOLUME INDEX: 23.3 ML/M2
POTASSIUM BLD-SCNC: 5.1 MMOL/L (ref 3.5–5.5)
SODIUM BLD-SCNC: 133 MMOL/L (ref 132–146)
TROPONIN I SERPL-MCNC: <0.006 NG/ML

## 2017-07-21 PROCEDURE — 63710000001 INSULIN LISPRO (HUMAN) PER 5 UNITS: Performed by: FAMILY MEDICINE

## 2017-07-21 PROCEDURE — 83036 HEMOGLOBIN GLYCOSYLATED A1C: CPT | Performed by: NURSE PRACTITIONER

## 2017-07-21 PROCEDURE — 93005 ELECTROCARDIOGRAM TRACING: CPT | Performed by: FAMILY MEDICINE

## 2017-07-21 PROCEDURE — 84484 ASSAY OF TROPONIN QUANT: CPT | Performed by: FAMILY MEDICINE

## 2017-07-21 PROCEDURE — 94660 CPAP INITIATION&MGMT: CPT

## 2017-07-21 PROCEDURE — 80048 BASIC METABOLIC PNL TOTAL CA: CPT | Performed by: NURSE PRACTITIONER

## 2017-07-21 PROCEDURE — 93010 ELECTROCARDIOGRAM REPORT: CPT | Performed by: INTERNAL MEDICINE

## 2017-07-21 PROCEDURE — 63710000001 INSULIN DETEMIR PER 5 UNITS: Performed by: INTERNAL MEDICINE

## 2017-07-21 PROCEDURE — C8929 TTE W OR WO FOL WCON,DOPPLER: HCPCS

## 2017-07-21 PROCEDURE — 94799 UNLISTED PULMONARY SVC/PX: CPT

## 2017-07-21 PROCEDURE — 25010000002 FUROSEMIDE PER 20 MG: Performed by: INTERNAL MEDICINE

## 2017-07-21 PROCEDURE — 63710000001 INSULIN LISPRO (HUMAN) PER 5 UNITS: Performed by: INTERNAL MEDICINE

## 2017-07-21 PROCEDURE — 25010000002 SULFUR HEXAFLUORIDE MICROSPH 60.7-25 MG RECONSTITUTED SUSPENSION: Performed by: INTERNAL MEDICINE

## 2017-07-21 PROCEDURE — 82962 GLUCOSE BLOOD TEST: CPT

## 2017-07-21 PROCEDURE — 99222 1ST HOSP IP/OBS MODERATE 55: CPT | Performed by: INTERNAL MEDICINE

## 2017-07-21 PROCEDURE — 25010000002 ONDANSETRON PER 1 MG: Performed by: FAMILY MEDICINE

## 2017-07-21 PROCEDURE — 94760 N-INVAS EAR/PLS OXIMETRY 1: CPT

## 2017-07-21 PROCEDURE — 94640 AIRWAY INHALATION TREATMENT: CPT

## 2017-07-21 PROCEDURE — 99233 SBSQ HOSP IP/OBS HIGH 50: CPT | Performed by: INTERNAL MEDICINE

## 2017-07-21 PROCEDURE — 93308 TTE F-UP OR LMTD: CPT | Performed by: INTERNAL MEDICINE

## 2017-07-21 PROCEDURE — 25010000002 METHYLPREDNISOLONE PER 125 MG: Performed by: FAMILY MEDICINE

## 2017-07-21 PROCEDURE — 63710000001 INSULIN DETEMIR PER 5 UNITS: Performed by: FAMILY MEDICINE

## 2017-07-21 RX ORDER — BUMETANIDE 0.25 MG/ML
2 INJECTION INTRAMUSCULAR; INTRAVENOUS ONCE
Status: COMPLETED | OUTPATIENT
Start: 2017-07-21 | End: 2017-07-21

## 2017-07-21 RX ORDER — FUROSEMIDE 10 MG/ML
40 INJECTION INTRAMUSCULAR; INTRAVENOUS 2 TIMES DAILY
Status: DISCONTINUED | OUTPATIENT
Start: 2017-07-21 | End: 2017-07-23

## 2017-07-21 RX ORDER — IPRATROPIUM BROMIDE AND ALBUTEROL SULFATE 2.5; .5 MG/3ML; MG/3ML
3 SOLUTION RESPIRATORY (INHALATION)
Status: DISCONTINUED | OUTPATIENT
Start: 2017-07-21 | End: 2017-07-26 | Stop reason: HOSPADM

## 2017-07-21 RX ORDER — GABAPENTIN 100 MG/1
100 CAPSULE ORAL EVERY 12 HOURS SCHEDULED
Status: DISCONTINUED | OUTPATIENT
Start: 2017-07-21 | End: 2017-07-26 | Stop reason: HOSPADM

## 2017-07-21 RX ADMIN — FUROSEMIDE 40 MG: 40 TABLET ORAL at 08:11

## 2017-07-21 RX ADMIN — INSULIN LISPRO 9 UNITS: 100 INJECTION, SOLUTION INTRAVENOUS; SUBCUTANEOUS at 20:52

## 2017-07-21 RX ADMIN — GABAPENTIN 100 MG: 100 CAPSULE ORAL at 20:53

## 2017-07-21 RX ADMIN — IPRATROPIUM BROMIDE AND ALBUTEROL SULFATE 3 ML: .5; 3 SOLUTION RESPIRATORY (INHALATION) at 15:53

## 2017-07-21 RX ADMIN — INSULIN DETEMIR 30 UNITS: 100 INJECTION, SOLUTION SUBCUTANEOUS at 20:52

## 2017-07-21 RX ADMIN — ONDANSETRON 4 MG: 2 INJECTION INTRAMUSCULAR; INTRAVENOUS at 08:19

## 2017-07-21 RX ADMIN — DILTIAZEM HYDROCHLORIDE 60 MG: 60 TABLET, FILM COATED ORAL at 16:51

## 2017-07-21 RX ADMIN — BUDESONIDE AND FORMOTEROL FUMARATE DIHYDRATE 2 PUFF: 160; 4.5 AEROSOL RESPIRATORY (INHALATION) at 18:56

## 2017-07-21 RX ADMIN — METHYLPREDNISOLONE SODIUM SUCCINATE 80 MG: 125 INJECTION, POWDER, FOR SOLUTION INTRAMUSCULAR; INTRAVENOUS at 13:40

## 2017-07-21 RX ADMIN — INSULIN DETEMIR 10 UNITS: 100 INJECTION, SOLUTION SUBCUTANEOUS at 00:11

## 2017-07-21 RX ADMIN — ASPIRIN 81 MG: 81 TABLET, COATED ORAL at 08:11

## 2017-07-21 RX ADMIN — CARVEDILOL 25 MG: 12.5 TABLET, FILM COATED ORAL at 08:11

## 2017-07-21 RX ADMIN — TRAZODONE HYDROCHLORIDE 50 MG: 50 TABLET ORAL at 20:53

## 2017-07-21 RX ADMIN — DABIGATRAN ETEXILATE MESYLATE 150 MG: 150 CAPSULE ORAL at 08:11

## 2017-07-21 RX ADMIN — INSULIN LISPRO 10 UNITS: 100 INJECTION, SOLUTION INTRAVENOUS; SUBCUTANEOUS at 16:52

## 2017-07-21 RX ADMIN — IPRATROPIUM BROMIDE AND ALBUTEROL SULFATE 3 ML: .5; 3 SOLUTION RESPIRATORY (INHALATION) at 07:29

## 2017-07-21 RX ADMIN — CARVEDILOL 25 MG: 12.5 TABLET, FILM COATED ORAL at 16:51

## 2017-07-21 RX ADMIN — TRAZODONE HYDROCHLORIDE 50 MG: 50 TABLET ORAL at 00:15

## 2017-07-21 RX ADMIN — INSULIN LISPRO 7 UNITS: 100 INJECTION, SOLUTION INTRAVENOUS; SUBCUTANEOUS at 08:11

## 2017-07-21 RX ADMIN — FUROSEMIDE 40 MG: 10 INJECTION, SOLUTION INTRAMUSCULAR; INTRAVENOUS at 18:48

## 2017-07-21 RX ADMIN — DILTIAZEM HYDROCHLORIDE 60 MG: 60 TABLET, FILM COATED ORAL at 00:15

## 2017-07-21 RX ADMIN — DILTIAZEM HYDROCHLORIDE 60 MG: 60 TABLET, FILM COATED ORAL at 23:22

## 2017-07-21 RX ADMIN — IPRATROPIUM BROMIDE AND ALBUTEROL SULFATE 3 ML: .5; 3 SOLUTION RESPIRATORY (INHALATION) at 03:39

## 2017-07-21 RX ADMIN — ATORVASTATIN CALCIUM 20 MG: 20 TABLET, FILM COATED ORAL at 20:53

## 2017-07-21 RX ADMIN — DABIGATRAN ETEXILATE MESYLATE 150 MG: 150 CAPSULE ORAL at 16:59

## 2017-07-21 RX ADMIN — INSULIN LISPRO 9 UNITS: 100 INJECTION, SOLUTION INTRAVENOUS; SUBCUTANEOUS at 13:40

## 2017-07-21 RX ADMIN — PANTOPRAZOLE SODIUM 40 MG: 40 TABLET, DELAYED RELEASE ORAL at 08:11

## 2017-07-21 RX ADMIN — IPRATROPIUM BROMIDE AND ALBUTEROL SULFATE 3 ML: .5; 3 SOLUTION RESPIRATORY (INHALATION) at 12:25

## 2017-07-21 RX ADMIN — INSULIN LISPRO 8 UNITS: 100 INJECTION, SOLUTION INTRAVENOUS; SUBCUTANEOUS at 16:52

## 2017-07-21 RX ADMIN — DILTIAZEM HYDROCHLORIDE 60 MG: 60 TABLET, FILM COATED ORAL at 13:40

## 2017-07-21 RX ADMIN — IPRATROPIUM BROMIDE AND ALBUTEROL SULFATE 3 ML: .5; 3 SOLUTION RESPIRATORY (INHALATION) at 18:56

## 2017-07-21 RX ADMIN — METHYLPREDNISOLONE SODIUM SUCCINATE 80 MG: 125 INJECTION, POWDER, FOR SOLUTION INTRAMUSCULAR; INTRAVENOUS at 00:11

## 2017-07-21 RX ADMIN — BUDESONIDE AND FORMOTEROL FUMARATE DIHYDRATE 2 PUFF: 160; 4.5 AEROSOL RESPIRATORY (INHALATION) at 07:30

## 2017-07-21 RX ADMIN — LISINOPRIL 5 MG: 5 TABLET ORAL at 08:11

## 2017-07-21 RX ADMIN — BUMETANIDE 2 MG: 0.25 INJECTION, SOLUTION INTRAMUSCULAR; INTRAVENOUS at 16:50

## 2017-07-21 RX ADMIN — SULFUR HEXAFLUORIDE 3 ML: KIT at 10:50

## 2017-07-21 RX ADMIN — CARVEDILOL 25 MG: 12.5 TABLET, FILM COATED ORAL at 00:15

## 2017-07-21 NOTE — PLAN OF CARE
Problem: Patient Care Overview (Adult)  Goal: Plan of Care Review    07/21/17 1146   Coping/Psychosocial Response Interventions   Plan Of Care Reviewed With patient   Patient Care Overview   Progress improving

## 2017-07-21 NOTE — H&P
[facility] Medicine Services  HISTORY AND PHYSICAL    Primary Care Physician: Javan Shankar MD    Subjective     Chief Complaint:  Trouble breathing    History of Present Illness:  This is a 66 year old  male that presents to [facility] ED secondary to difficulty breathing that started this afternoon.  He describes a chronic history of COPD, FAUSTO and Oxygen dependence.  He was visiting his sister and fell asleep without his oxygen.  He reports being aroused by EMS secondary to his skin coloration.  He was brought to [facility] ED secondary to his decreased oxygen saturations of < 88%.  Even with 6 L of O2 in the ED his saturations were only 92%.  His ABG identified a pH 7.322 pCO2 60.4 and pO2 61.  He describes shortness of air to his chest with no associated retrosternal chest pain.  The dyspnea is constant and was made worse today with not using his oxygen.  The oxygen and nebulizer in the ED modified his symptoms.  He describes his dyspnea as severe but improving with ED care.  He reports some confusion.  There is no associated fever, chills, n/v, rashes, syncope or falls.  His MAURO index = 6 (57%).  His [id number] score = 5.    Review of Systems   Constitutional: Positive for activity change, diaphoresis and fatigue. Negative for appetite change, chills, fever and unexpected weight change.   HENT: Positive for congestion. Negative for ear pain, facial swelling, nosebleeds, postnasal drip, sore throat and trouble swallowing.    Eyes: Negative.  Negative for discharge and visual disturbance.   Respiratory: Positive for cough, chest tightness, shortness of breath and wheezing. Negative for choking.    Cardiovascular: Positive for palpitations and leg swelling. Negative for chest pain.   Gastrointestinal: Negative for abdominal pain, blood in stool, diarrhea, nausea and vomiting.   Endocrine: Negative for polydipsia, polyphagia and polyuria.   Genitourinary: Negative for difficulty  urinating and hematuria.   Musculoskeletal: Positive for back pain and myalgias.   Skin: Positive for color change. Negative for rash.   Neurological: Positive for light-headedness. Negative for dizziness, syncope and speech difficulty.   Hematological: Does not bruise/bleed easily.   Psychiatric/Behavioral: Positive for confusion and sleep disturbance. The patient is nervous/anxious.    All other systems reviewed and are negative.     Past Medical History:   Diagnosis Date   • Atrial fibrillation    • CAD (coronary artery disease)    • CHF (congestive heart failure)    • Chronic anticoagulation    • CKD (chronic kidney disease) stage 3, GFR 30-59 ml/min    • COPD (chronic obstructive pulmonary disease)    • DM2 (diabetes mellitus, type 2)    • GERD (gastroesophageal reflux disease)    • History of MI (myocardial infarction)    • HLD (hyperlipidemia)    • HTN (hypertension)    • Morbid obesity    • Noncompliance    • FAUSTO (obstructive sleep apnea)    • Oxygen dependent      Past Surgical History:   Procedure Laterality Date   • APPENDECTOMY  1961   • CORONARY STENT PLACEMENT  2008   • HAND SURGERY Left 2002   • KNEE ARTHROSCOPY Left 1965     Family History   Problem Relation Age of Onset   • Diabetes Mother    • Heart disease Mother    • Diabetes Father    • Heart disease Father    • Heart disease Sister      Social History     Social History   • Marital status: Unknown     Spouse name: N/A   • Number of children: 5   • Years of education: H.S.     Occupational History   • Construction Retired     Social History Main Topics   • Smoking status: Former Smoker     Packs/day: 1.00     Years: 47.00     Types: Cigarettes     Quit date: 2014   • Smokeless tobacco: Never Used   • Alcohol use No   • Drug use: No   • Sexual activity: No      Comment:      Social History Narrative    He lives by himself     Medications:  Reviewed and reconciled    Allergies:  No Known Allergies    Objective     Vital Signs: /83   "Pulse 93  Temp 98.7 °F (37.1 °C) (Oral)   Resp 28  Ht 71\" (180.3 cm)  Wt (!) 333 lb (151 kg)  SpO2 92%  BMI 46.44 kg/m2  Body mass index is 46.44 kg/(m^2).    Physical Exam   Constitutional: He is oriented to person, place, and time. He appears well-developed and well-nourished. He is cooperative.   Disheveled Pickwickian appearance     HENT:   Head: Normocephalic.   Right Ear: Hearing and external ear normal.   Left Ear: Hearing and external ear normal.   Nose: Mucosal edema present.   Mouth/Throat: Mucous membranes are dry.   Eyes: Conjunctivae and EOM are normal. Pupils are equal, round, and reactive to light. No scleral icterus.   Neck: Trachea normal and normal range of motion. Neck supple. No JVD present. Carotid bruit is not present. No thyromegaly present.   Neck diameter > 20\"   Cardiovascular: Normal rate, normal heart sounds and intact distal pulses.  An irregular rhythm present.   Pulmonary/Chest: Accessory muscle usage present. Tachypnea noted. He has decreased breath sounds. He has wheezes.   Abdominal: Soft. Bowel sounds are normal. There is no hepatosplenomegaly. There is no tenderness.   Musculoskeletal: Normal range of motion.   Lymphadenopathy:     He has no cervical adenopathy.   Neurological: He is alert and oriented to person, place, and time. He has normal strength and normal reflexes. No sensory deficit.   Skin: Skin is warm and dry.   Psychiatric: His behavior is normal. Judgment and thought content normal. His mood appears anxious. His speech is rapid and/or pressured. Cognition and memory are normal.   Nursing note and vitals reviewed.    Results Reviewed:     Results from last 7 days  Lab Units 07/20/17  1456   WBC 10*3/mm3 9.00   HEMOGLOBIN g/dL 12.6*   PLATELETS 10*3/mm3 170       Results from last 7 days  Lab Units 07/20/17  1456   SODIUM mmol/L 133   POTASSIUM mmol/L 4.4   CO2 mmol/L 30.0   CREATININE mg/dL 1.50*   GLUCOSE mg/dL 294*   CALCIUM mg/dL 8.8   BNP pg/mL 162.0*     I " have personally reviewed and interpreted available lab data dated 07/20/17.  I have personally reviewed cxr reports available and dated 07/20/17 and 04/22/2017.  I have personally reviewed ECG report dated 07/20/17.   I have personally reviewed and summarized old records.    Assessment / Plan     Assessment/Problem List:   Principal Problem:    Chronic obstructive pulmonary disease with acute exacerbation  Active Problems:    Acute on chronic diastolic congestive heart failure    Acute respiratory failure with hypoxia and hypercapnia    Oxygen dependent    FAUSTO (obstructive sleep apnea)    Hypertension    GERD (gastroesophageal reflux disease)    Morbid obesity    Atrial fibrillation    CAD (coronary artery disease)    Chronic anticoagulation    DM2 (diabetes mellitus, type 2)    HLD (hyperlipidemia)    CKD (chronic kidney disease) stage 3, GFR 30-59 ml/min    Plan:  We will admit as an inpatient to telemetry.  We will continue with oxygen therapy and add scheduled nebulizer care.  We will add IV steroids and continue with inhaled corticosteroids.  With his chest x-ray findings, We will consult Cardiology.  An echo has been ordered.  We will continue with diuretics and cardioselective beta blocker and start a low dose ace inhibitor.  We will continue to monitor his creatinine and electrolytes.  We will continue to trend troponins and acquire an ECG with any reported chest pain.  We will continue to anticoagulate his atrial fibrillation with his [id number] score = 5.  We will follow his blood pressure response.  We will continue to monitor his blood sugars and cover with basal and sliding scale insulin.  We will continue with anti-reflux measures and PPI therapy.  We will continue to provide pain control with morphine prn, anti-emetics and anxiolytics.  The plan has been discussed with the patient and his daughter who is at the bedside.    DVT prophylaxis:  Pradaxa, teds, scuds  Code Status: Full  Admission Status:  Patient will be admitted to [facility] secondary to the abrupt decompensation in his respiratory status and findings of acute congestive heart failure.  I believe this patient meets INPATIENT status due to the need for care which can only be reasonably provided in an hospital setting such as aggressive/expedited ancillary services and/or consultation services and the necessity for IV medications, close physician monitoring and/or the possible need for procedures.  In such, I feel patient’s risk for adverse outcomes and need for care warrant INPATIENT evaluation and predict the patient’s care encounter to likely last beyond 2 midnights.     Dominik Sepulveda MD 07/20/17 8:55 PM

## 2017-07-21 NOTE — PROGRESS NOTES
Discharge Planning Assessment  [facility]     Patient Name: Chito Rod  MRN: [id number]  Today's Date: 7/21/2017    Admit Date: 7/20/2017          Discharge Needs Assessment       07/21/17 1350    Living Environment    Lives With alone    Living Arrangements house   Lives in [facility]    Home Accessibility no concerns    Stair Railings at Home none    Type of Financial/Environmental Concern none    Transportation Available car;family or friend will provide    Living Environment    Provides Primary Care For no one    Quality Of Family Relationships involved    Able to Return to Prior Living Arrangements yes    Discharge Needs Assessment    Concerns To Be Addressed other (see comments)   Pt may need cont oxygen    Anticipated Changes Related to Illness none    Equipment Currently Used at Home oxygen;bipap/ cpap   Pt has cpap and oxygen at night with [facility]. He states he has portables, but per Mati at [facility], if pt needs a portable brought to the hospital, he will require a new order for continous oxygen.     Equipment Needed After Discharge none    Current Discharge Risk lives alone    Discharge Disposition still a patient    Discharge Contact Information if Applicable [phone]            Discharge Plan       07/21/17 1356    Case Management/Social Work Plan    Plan Home    Patient/Family In Agreement With Plan yes    Additional Comments Met with pt at . He is independent of ADL's. He has a girl who comes to help clean 1x per week. He had cpap and o2 at night with [facility]. If he needs a portable from [facility], he will need a new cont. order. Pt reports he has portables at home but unsure if he can get them here. Pt denies HH. He has [employer] Medicare with script coverage. He has been on a few meds in the past that he couldn't afford and did not get filled. One med is Prodaxa, he gets samples from his PCP for that. He is unsure of the other medications he couldn't afford.  Will notify  that pt may  need med assistance. Plan is to go home. CM will follow.         Discharge Placement     No information found        Expected Discharge Date and Time     Expected Discharge Date Expected Discharge Time    Jul 22, 2017               Demographic Summary       07/21/17 7089    Referral Information    Referral Source admission list    Contact Information    Permission Granted to Share Information With     Primary Care Physician Information    Name Dr. Jorje Shankar            Functional Status       07/21/17 0501    Functional Status Prior    Ambulation 0-->independent    Transferring 0-->independent    Toileting 0-->independent    Bathing 0-->independent    Dressing 0-->independent    Eating 0-->independent    Communication 0-->understands/communicates without difficulty    Swallowing 0-->swallows foods/liquids without difficulty    IADL    Medications independent    Meal Preparation independent    Housekeeping independent    Laundry independent    Shopping independent    Oral Care independent    Activity Tolerance    Current Activity Limitations weakness severe, generalized    Usual Activity Tolerance fair    Current Activity Tolerance poor            Psychosocial     None            Abuse/Neglect     None            Legal     None            Substance Abuse     None            Patient Forms     None          Earline Borden

## 2017-07-21 NOTE — CONSULTS
[facility]   Consult Note    Referring Provider: Dr. Dominik Sepulveda    Reason for Consultation: CHF and atrial fibrillation    Patient Care Team:  Javan Shankar MD as PCP - General (Internal Medicine)     Problem List:  1. Congestive heart failure, diastolic, acute on chronic  1. April 2017 echocardiogram with an EF of 70%.  Left atrial cavity size mildly dilated.  No significant valve disease.  2. Atrial fibrillation, chronic  1. CHADSVASC= 5, on chronic Pradaxa therapy  3. Hypertension  4. Dyslipidemia  5. Diabetes mellitus II  6. COPD  7. GERD  8. Obstructive sleep apnea, states wears CPAP device at home.  9. Morbid obesity  10. Surgeries:  1. Appendectomy  2. Left hand surgery  3. Left knee surgery      No Known Allergies        Current Facility-Administered Medications:   •  aspirin EC tablet 81 mg, 81 mg, Oral, Daily, Dominik Sepulveda MD, 81 mg at 07/21/17 0811  •  atorvastatin (LIPITOR) tablet 20 mg, 20 mg, Oral, Nightly, Dominik Sepulveda MD  •  budesonide-formoterol (SYMBICORT) 160-4.5 MCG/ACT inhaler 2 puff, 2 puff, Inhalation, BID - RT, Dominik Sepulveda MD, 2 puff at 07/21/17 0730  •  carvedilol (COREG) tablet 25 mg, 25 mg, Oral, BID With Meals, Dominik Sepulveda MD, 25 mg at 07/21/17 0811  •  dabigatran etexilate (PRADAXA) capsule 150 mg, 150 mg, Oral, BID, Dominik Sepulveda MD, 150 mg at 07/21/17 0811  •  dextrose (D50W) solution 25 g, 25 g, Intravenous, Q15 Min PRN, Dominik Sepulveda MD  •  dextrose (GLUTOSE) oral gel 15 g, 15 g, Oral, Q15 Min PRN, Dominik Sepulveda MD  •  diltiaZEM (CARDIZEM) tablet 60 mg, 60 mg, Oral, Q6H, Dominik Sepulveda MD, Stopped at 07/21/17 0600  •  furosemide (LASIX) tablet 40 mg, 40 mg, Oral, BID, Dominik Sepulveda MD, 40 mg at 07/21/17 0811  •  glucagon (GLUCAGEN) injection 1 mg, 1 mg, Subcutaneous, Q15 Min PRN, Dominik Sepulveda MD  •  HYDROcodone-acetaminophen (NORCO) 7.5-325 MG per tablet 1 tablet, 1 tablet, Oral, Q4H PRN, Dominik Sepulveda MD  •  insulin detemir (LEVEMIR) injection  10 Units, 10 Units, Subcutaneous, Nightly, Dominik Sepulveda MD, 10 Units at 07/21/17 0011  •  insulin lispro (humaLOG) injection 0-9 Units, 0-9 Units, Subcutaneous, 4x Daily AC & at Bedtime, Dominik Sepulveda MD, 7 Units at 07/21/17 0811  •  ipratropium-albuterol (DUO-NEB) nebulizer solution 3 mL, 3 mL, Nebulization, Q4H - RT, Dominik Sepulveda MD, 3 mL at 07/21/17 0729  •  ipratropium-albuterol (DUO-NEB) nebulizer solution 3 mL, 3 mL, Nebulization, Q4H PRN, Dominik Sepulveda MD  •  lisinopril (PRINIVIL,ZESTRIL) tablet 5 mg, 5 mg, Oral, Daily, Dominik Sepulveda MD, 5 mg at 07/21/17 0811  •  LORazepam (ATIVAN) injection 0.5 mg, 0.5 mg, Intravenous, Q6H PRN, Dominik Sepulveda MD  •  methylPREDNISolone sodium succinate (SOLU-Medrol) injection 80 mg, 80 mg, Intravenous, Q12H, Dominik Sepulveda MD, 80 mg at 07/21/17 0011  •  Morphine sulfate (PF) injection 1 mg, 1 mg, Intravenous, Q4H PRN **AND** naloxone (NARCAN) injection 0.4 mg, 0.4 mg, Intravenous, Q5 Min PRN, Dominik Sepulveda MD  •  ondansetron (ZOFRAN) injection 4 mg, 4 mg, Intravenous, Q6H PRN, Dominik Sepulveda MD, 4 mg at 07/21/17 0819  •  pantoprazole (PROTONIX) EC tablet 40 mg, 40 mg, Oral, QAM, Dominik Sepulveda MD, 40 mg at 07/21/17 0811  •  Pharmacy Consult - MTM, , Does not apply, Daily, Dimitri Montanez, [facility]  •  sodium chloride 0.9 % flush 1-10 mL, 1-10 mL, Intravenous, PRN, Dominik Sepulveda MD  •  sodium chloride 0.9 % flush 10 mL, 10 mL, Intravenous, PRN, Donovan Marcano MD  •  traZODone (DESYREL) tablet 50 mg, 50 mg, Oral, Nightly, Dominik Sepulveda MD, 50 mg at 07/21/17 0015         Prescriptions Prior to Admission   Medication Sig Dispense Refill Last Dose   • aspirin  MG tablet Take 1 tablet by mouth Daily. 90 tablet 3 Taking   • atorvastatin (LIPITOR) 20 MG tablet Take 1 tablet by mouth Every Night. STOP PRAVASTATIN 90 tablet 2 Taking   • budesonide-formoterol (SYMBICORT) 160-4.5 MCG/ACT inhaler Inhale 2 puffs 2 (Two) Times a Day. 1 inhaler 12 Taking   • carvedilol (COREG) 25 MG  "tablet Take 1 tablet by mouth 2 (Two) Times a Day With Meals. 180 tablet 3 Taking   • cholecalciferol (VITAMIN D3) 400 UNITS tablet Take 400 Units by mouth Daily.      • colchicine 0.6 MG tablet Take 0.6 mg by mouth 2 (Two) Times a Day.      • dabigatran etexilate (PRADAXA) 150 MG capsu Take 1 capsule by mouth 2 (Two) Times a Day. 60 capsule 5 Taking   • diltiaZEM (CARDIZEM) 60 MG tablet Take 1 tablet by mouth Every 6 (Six) Hours. 120 tablet 2 Taking   • furosemide (LASIX) 40 MG tablet Take 40 mg by mouth 2 (Two) Times a Day.      • glimepiride (AMARYL) 2 MG tablet Take 1 tablet by mouth Every Morning Before Breakfast. 90 tablet 3 Taking   • Insulin Glargine (TOUJEO SOLOSTAR) 300 UNIT/ML solution pen-injector Inject 22 Units under the skin Every Night.      • metFORMIN ER (GLUCOPHAGE-XR) 500 MG 24 hr tablet Take 1 tablet by mouth 2 (Two) Times a Day. 180 tablet 3 Taking   • omeprazole (priLOSEC) 40 MG capsule TAKE ONE CAPSULE BY MOUTH DAILY 30 capsule 1    • traZODone (DESYREL) 50 MG tablet Take 1 tablet by mouth Every Night. 30 tablet 5 Taking         Subjective .   History of present illness:    Patient is a 66-year-old  male who we are asked to see for further evaluation of congestive heart failure.  He is unsure at this time who his cardiologist is.  Overall is noted to be a somewhat poor historian as he openly admits that he can't \"keeping straight\".  He has a diagnosis of chronic atrial fibrillation for which he has been maintained on chronic Pradaxa therapy.  This is managed by his primary care physician.  No previous history of coronary disease by his report.  He was in his usual state of health up until the last few weeks.  He notes that he has gained roughly 15 pounds.  He presented to the emergency department yesterday initially with complaints of pain in his feet.  He also notes some shortness of breath but notes that the pain in his feet with his primary reason for presenting.  He was brought " "to the hospital and was noted to be significantly hypoxic.  He continues to be maintained on high flow nasal cannula with oxygen saturation still in the low 90s to high 80s.  Has some occasional chest discomfort which is atypical described as a sharp shooting sensation that lasts for just a second.  Has noted over the last few weeks some increasing shortness of breath, orthopnea, and PND.  He also has bilateral lower extremity edema.  He is not compliant with any type of fluid restriction.  He notes that he drinks \"lots\" of fluid daily.  Also notes that he eats salt on \"everything\".  No reported syncope.  Was given half a milligram of Bumex yesterday and diuresed close to 3 L.    Admits to increased dietary salt intake.    Social History     Social History   • Marital status: Unknown     Spouse name: N/A   • Number of children: 5   • Years of education: H.S.     Occupational History   • Construction Retired     Social History Main Topics   • Smoking status: Former Smoker     Packs/day: 1.00     Years: 47.00     Types: Cigarettes     Quit date: 2014   • Smokeless tobacco: Never Used   • Alcohol use No   • Drug use: No   • Sexual activity: No      Comment:      Other Topics Concern   • Not on file     Social History Narrative    He lives by himself     Family History   Problem Relation Age of Onset   • Diabetes Mother    • Heart disease Mother    • Diabetes Father    • Heart disease Father    • Heart disease Sister          Review of Systems:  Review of Systems   Constitution: Positive for malaise/fatigue. Negative for fever and weakness.   HENT: Negative for headaches and nosebleeds.    Eyes: Negative for redness and visual disturbance.   Cardiovascular: Positive for orthopnea and paroxysmal nocturnal dyspnea. Negative for palpitations.   Respiratory: Positive for snoring. Negative for cough, sputum production and wheezing.    Hematologic/Lymphatic: Negative for bleeding problem.   Skin: Negative for " "flushing, itching and rash.   Musculoskeletal: Positive for joint pain. Negative for falls and muscle cramps.   Gastrointestinal: Negative for abdominal pain, diarrhea, heartburn, nausea and vomiting.   Genitourinary: Negative for hematuria.   Neurological: Negative for excessive daytime sleepiness, dizziness and tremors.   Psychiatric/Behavioral: Negative for substance abuse. The patient is not nervous/anxious.               Objective   Vitals:  Blood pressure 112/79, pulse 90, temperature 97.6 °F (36.4 °C), temperature source Oral, resp. rate 20, height 71\" (180.3 cm), weight (!) 332 lb 9.6 oz (151 kg), SpO2 94 %.     Intake/Output Summary (Last 24 hours) at 07/21/17 1117  Last data filed at 07/21/17 1115   Gross per 24 hour   Intake                0 ml   Output             2700 ml   Net            -2700 ml       Physical Exam   Constitutional: He is oriented to person, place, and time. He appears well-developed and well-nourished. No distress.   Currently on high flow nasal cannula   HENT:   Head: Normocephalic and atraumatic.   Eyes: Right eye exhibits no discharge. Left eye exhibits no discharge.   Neck: No JVD present. No tracheal deviation present.   Cardiovascular: Normal rate and normal heart sounds.  An irregularly irregular rhythm present. Exam reveals no friction rub.    No murmur heard.  Pulmonary/Chest: Effort normal.   Bibasilar crackles     Abdominal: Soft. Bowel sounds are normal. There is no tenderness.   Musculoskeletal: He exhibits edema (1-2+ ble edema radiating almost to the knee). He exhibits no deformity.   Neurological: He is alert and oriented to person, place, and time.   Skin: Skin is warm and dry.   Psychiatric: His behavior is normal.            Results Review:  I reviewed the patient's new clinical results.    Results from last 7 days  Lab Units 07/20/17  1456   WBC 10*3/mm3 9.00   HEMOGLOBIN g/dL 12.6*   HEMATOCRIT % 41.9   PLATELETS 10*3/mm3 170       Results from last 7 days  Lab " Units 07/20/17  1456   SODIUM mmol/L 133   POTASSIUM mmol/L 4.4   CHLORIDE mmol/L 96*   CO2 mmol/L 30.0   BUN mg/dL 24*   CREATININE mg/dL 1.50*   CALCIUM mg/dL 8.8   BILIRUBIN mg/dL 0.4   ALK PHOS U/L 79   ALT (SGPT) U/L 34   AST (SGOT) U/L 27   GLUCOSE mg/dL 294*       Results from last 7 days  Lab Units 07/20/17  1456   SODIUM mmol/L 133   POTASSIUM mmol/L 4.4   CHLORIDE mmol/L 96*   CO2 mmol/L 30.0   BUN mg/dL 24*   CREATININE mg/dL 1.50*   GLUCOSE mg/dL 294*   CALCIUM mg/dL 8.8           0  Lab Value Date/Time   TROPONINI <0.006 07/21/2017 0553   TROPONINI <0.006 07/21/2017 0107   TROPONINI <0.006 04/18/2017 2143   TROPONINI 0.01 04/27/2014 0438                       Tele:  Atrial fibrillation      Assessment/Plan     1. Acute on chronic diastolic heart failure.  Suspect significant portion due to dietary noncompliance.  Echocardiogram pending  2. Acute hypoxic respiratory failure   3. Chronic atrial fibrillation on chronic anticoagulation.  4. Hypertension  5. Dyslipidemia  6. Diabetes  7. Renal insufficiency, prior to this admission had normal creatinine.  BMP for today is pending.  8. Sleep apnea  9. Morbid obesity  10. Chest pain, atypical      Plan:    1. Check BMP today as labs were not ordered.  Continue to diurese, will give Bumex 2 mg IV ×1 dose today after BMP has been resulted in his renal function is noted to be stable.  Instructed patient regarding restricting fluids as well as decreasing sodium intake.  We'll review echocardiogram once it is available.  May consider ischemic evaluation with cardiac PET.  We'll order strict intake and output as well as daily weights. Continue ACE and BB for now. Monitor renal function.    Starting scheduled IV lasix.  Continue rate control with CCB and BB.    OTILIA Keith obtained past medical, family history, social history, review of systems and functioned as a scribe for the remainder of the dictation for Dr. Lainez.      Berta Mccarthy,  APRN  07/21/17  11:17 AM    Dictated utilizing Dragon dictation    I,Jean Pierre Lainez M.D., personally performed the services described in this documentation as scribed by the above named individual in my presence, and it is both accurate and complete.  7/21/2017  2:35 PM

## 2017-07-21 NOTE — PROGRESS NOTES
[facility] Medicine Services  INPATIENT PROGRESS NOTE    Date of Admission: 7/20/2017  Length of Stay: 1  Primary Care Physician: Javan Shankar MD    Subjective   CC: dyspnea, paresthesias  HPI:    Breathing easier but still on high flow oxygen. Has many questions about which foods may be high in carbs and inappropriate for a diabetic to eat. Paresthesias in feet better today      Review Of Systems:   Review of Systems   Constitutional: Negative.    HENT: Negative.    Eyes: Negative.    Respiratory: Positive for shortness of breath.    Cardiovascular: Positive for leg swelling.   Gastrointestinal: Negative.    Endocrine: Negative.    Musculoskeletal: Negative.    Skin: Negative.    Allergic/Immunologic: Negative.    Neurological:        Foot paresthesias   Hematological: Negative.    Psychiatric/Behavioral: Negative.          Objective      Temp:  [97.6 °F (36.4 °C)-98.9 °F (37.2 °C)] 97.9 °F (36.6 °C)  Heart Rate:  [] 92  Resp:  [16-22] 22  BP: ()/(38-85) 106/66  Physical Exam  Gen-no acute distress, non toxic, sitting up in chair, somewhat unkempt appearance, has on high flow oxygen  CV-RRR, but heart sounds distant.  HEENT- NCAT  Resp-Diminished bilatarally, but no wheezes or rhonchi  Abd-soft, Non tender, Non distended, normo active bowel sounds, morbidly obese  Ext-1-2+ LE edema bilaterally   Neuro-speech clear, moves all extremities  Psych-normal affect   Skin- No rash on exposed UE or LE bilaterally      Results Review:    I have reviewed the labs, radiology results and diagnostic studies.      Results from last 7 days  Lab Units 07/20/17  1456   WBC 10*3/mm3 9.00   HEMOGLOBIN g/dL 12.6*   PLATELETS 10*3/mm3 170       Results from last 7 days  Lab Units 07/21/17  1042   SODIUM mmol/L 133   POTASSIUM mmol/L 5.1   CHLORIDE mmol/L 94*   CO2 mmol/L 28.0   BUN mg/dL 15   CREATININE mg/dL 1.10   GLUCOSE mg/dL 445*   CALCIUM mg/dL 8.8       Culture Data: Cultures:          Radiology Data:     I have reviewed the medications.    aspirin 81 mg Oral Daily   atorvastatin 20 mg Oral Nightly   budesonide-formoterol 2 puff Inhalation BID - RT   carvedilol 25 mg Oral BID With Meals   dabigatran etexilate 150 mg Oral BID   diltiaZEM 60 mg Oral Q6H   furosemide 40 mg Intravenous BID   gabapentin 100 mg Oral Q12H   insulin detemir 30 Units Subcutaneous Nightly   insulin lispro 0-9 Units Subcutaneous 4x Daily AC & at Bedtime   insulin lispro 10 Units Subcutaneous TID With Meals   ipratropium-albuterol 3 mL Nebulization Q4H - RT   lisinopril 5 mg Oral Daily   pantoprazole 40 mg Oral QAM   pharmacy consult - MTM  Does not apply Daily   Pharmacy Meds to Bed Consult  Does not apply Daily   traZODone 50 mg Oral Nightly         Assessment/Plan     Problem List  Hospital Problem List     * (Principal)Chronic obstructive pulmonary disease with acute exacerbation    FAUSTO (obstructive sleep apnea)    Hypertension    GERD (gastroesophageal reflux disease)    Acute on chronic diastolic congestive heart failure    Morbid obesity    Acute respiratory failure with hypoxia and hypercapnia    Atrial fibrillation    CAD (coronary artery disease)    Chronic anticoagulation    DM2 (diabetes mellitus, type 2)    HLD (hyperlipidemia)    Oxygen dependent    CKD (chronic kidney disease) stage 3, GFR 30-59 ml/min               Assessment/Plan:    Acute on Chronic Hypoxic and hypercapnic respiratory failure  - suspect multifactorial -- acute Diastolic heart failure, afib, morbid obesity, sleep apnea, and COPD    Acute on Chronic diastolic heart failure  - suspect bnp falsely low due to obesity  - continue IV diuresis, daily wts  - advised low salt diet after discharge (non compliant at home).    CKDIII?  - monitor creatinine while diuresing, bmp am    FAUSTO  - bipap qhs    Possible obesity hypoventillation syndrome    COPD with AE  - wean steroids quickly given significant hyperglycemia    DMII  - poor control, a1c  10.2  - diabetes education  - advised patient to cut soda out of diet and to minimize carbs  - increased basal bolus therapy (reviewed meds from last admit), and added POCT at midnight and 3am to help detect hypo and hyperglycemia.    Peripheral Neuropathy  - added low dose neurontin, titrate as needed    Afib, chronic  - continue Pradaxa    HTN  - continue ACEi in setting of DMII    Morbid Obesity    DVT prophylaxis: pradaxa  Discharge Planning: I expect patient to be discharged to home in 2-3 days.    Pranav Moran MD   07/21/17   5:07 PM

## 2017-07-22 LAB
ANION GAP SERPL CALCULATED.3IONS-SCNC: 8 MMOL/L (ref 3–11)
BUN BLD-MCNC: 21 MG/DL (ref 9–23)
BUN/CREAT SERPL: 17.5 (ref 7–25)
CALCIUM SPEC-SCNC: 8.6 MG/DL (ref 8.7–10.4)
CHLORIDE SERPL-SCNC: 95 MMOL/L (ref 99–109)
CO2 SERPL-SCNC: 30 MMOL/L (ref 20–31)
CREAT BLD-MCNC: 1.2 MG/DL (ref 0.6–1.3)
GFR SERPL CREATININE-BSD FRML MDRD: 61 ML/MIN/1.73
GLUCOSE BLD-MCNC: 460 MG/DL (ref 70–100)
GLUCOSE BLDC GLUCOMTR-MCNC: 333 MG/DL (ref 70–130)
GLUCOSE BLDC GLUCOMTR-MCNC: 335 MG/DL (ref 70–130)
GLUCOSE BLDC GLUCOMTR-MCNC: 357 MG/DL (ref 70–130)
GLUCOSE BLDC GLUCOMTR-MCNC: 367 MG/DL (ref 70–130)
GLUCOSE BLDC GLUCOMTR-MCNC: 390 MG/DL (ref 70–130)
GLUCOSE BLDC GLUCOMTR-MCNC: 476 MG/DL (ref 70–130)
POTASSIUM BLD-SCNC: 4.6 MMOL/L (ref 3.5–5.5)
SODIUM BLD-SCNC: 133 MMOL/L (ref 132–146)

## 2017-07-22 PROCEDURE — 63710000001 INSULIN REGULAR HUMAN PER 5 UNITS: Performed by: NURSE PRACTITIONER

## 2017-07-22 PROCEDURE — 94640 AIRWAY INHALATION TREATMENT: CPT

## 2017-07-22 PROCEDURE — 25010000002 FUROSEMIDE PER 20 MG: Performed by: INTERNAL MEDICINE

## 2017-07-22 PROCEDURE — 63710000001 INSULIN DETEMIR PER 5 UNITS: Performed by: NURSE PRACTITIONER

## 2017-07-22 PROCEDURE — 94760 N-INVAS EAR/PLS OXIMETRY 1: CPT

## 2017-07-22 PROCEDURE — 94799 UNLISTED PULMONARY SVC/PX: CPT

## 2017-07-22 PROCEDURE — 99232 SBSQ HOSP IP/OBS MODERATE 35: CPT | Performed by: FAMILY MEDICINE

## 2017-07-22 PROCEDURE — 82962 GLUCOSE BLOOD TEST: CPT

## 2017-07-22 PROCEDURE — 99232 SBSQ HOSP IP/OBS MODERATE 35: CPT | Performed by: INTERNAL MEDICINE

## 2017-07-22 PROCEDURE — 63710000001 INSULIN LISPRO (HUMAN) PER 5 UNITS: Performed by: NURSE PRACTITIONER

## 2017-07-22 PROCEDURE — 94660 CPAP INITIATION&MGMT: CPT

## 2017-07-22 PROCEDURE — 63710000001 INSULIN DETEMIR PER 5 UNITS: Performed by: FAMILY MEDICINE

## 2017-07-22 PROCEDURE — 80048 BASIC METABOLIC PNL TOTAL CA: CPT | Performed by: INTERNAL MEDICINE

## 2017-07-22 RX ADMIN — ATORVASTATIN CALCIUM 20 MG: 20 TABLET, FILM COATED ORAL at 20:57

## 2017-07-22 RX ADMIN — INSULIN DETEMIR 40 UNITS: 100 INJECTION, SOLUTION SUBCUTANEOUS at 20:57

## 2017-07-22 RX ADMIN — BUDESONIDE AND FORMOTEROL FUMARATE DIHYDRATE 2 PUFF: 160; 4.5 AEROSOL RESPIRATORY (INHALATION) at 20:44

## 2017-07-22 RX ADMIN — DABIGATRAN ETEXILATE MESYLATE 150 MG: 150 CAPSULE ORAL at 17:57

## 2017-07-22 RX ADMIN — INSULIN DETEMIR 15 UNITS: 100 INJECTION, SOLUTION SUBCUTANEOUS at 08:51

## 2017-07-22 RX ADMIN — LISINOPRIL 5 MG: 5 TABLET ORAL at 08:28

## 2017-07-22 RX ADMIN — DABIGATRAN ETEXILATE MESYLATE 150 MG: 150 CAPSULE ORAL at 08:29

## 2017-07-22 RX ADMIN — IPRATROPIUM BROMIDE AND ALBUTEROL SULFATE 3 ML: .5; 3 SOLUTION RESPIRATORY (INHALATION) at 15:47

## 2017-07-22 RX ADMIN — TRAZODONE HYDROCHLORIDE 50 MG: 50 TABLET ORAL at 20:59

## 2017-07-22 RX ADMIN — DILTIAZEM HYDROCHLORIDE 60 MG: 60 TABLET, FILM COATED ORAL at 12:49

## 2017-07-22 RX ADMIN — GABAPENTIN 100 MG: 100 CAPSULE ORAL at 20:59

## 2017-07-22 RX ADMIN — BUDESONIDE AND FORMOTEROL FUMARATE DIHYDRATE 2 PUFF: 160; 4.5 AEROSOL RESPIRATORY (INHALATION) at 07:39

## 2017-07-22 RX ADMIN — PANTOPRAZOLE SODIUM 40 MG: 40 TABLET, DELAYED RELEASE ORAL at 06:48

## 2017-07-22 RX ADMIN — FUROSEMIDE 40 MG: 10 INJECTION, SOLUTION INTRAMUSCULAR; INTRAVENOUS at 08:29

## 2017-07-22 RX ADMIN — INSULIN HUMAN 9 UNITS: 100 INJECTION, SOLUTION PARENTERAL at 00:03

## 2017-07-22 RX ADMIN — INSULIN LISPRO 9 UNITS: 100 INJECTION, SOLUTION INTRAVENOUS; SUBCUTANEOUS at 03:34

## 2017-07-22 RX ADMIN — IPRATROPIUM BROMIDE AND ALBUTEROL SULFATE 3 ML: .5; 3 SOLUTION RESPIRATORY (INHALATION) at 12:04

## 2017-07-22 RX ADMIN — IPRATROPIUM BROMIDE AND ALBUTEROL SULFATE 3 ML: .5; 3 SOLUTION RESPIRATORY (INHALATION) at 20:44

## 2017-07-22 RX ADMIN — GABAPENTIN 100 MG: 100 CAPSULE ORAL at 08:28

## 2017-07-22 RX ADMIN — IPRATROPIUM BROMIDE AND ALBUTEROL SULFATE 3 ML: .5; 3 SOLUTION RESPIRATORY (INHALATION) at 07:39

## 2017-07-22 RX ADMIN — ASPIRIN 81 MG: 81 TABLET, COATED ORAL at 08:29

## 2017-07-22 RX ADMIN — CARVEDILOL 25 MG: 12.5 TABLET, FILM COATED ORAL at 08:29

## 2017-07-22 NOTE — PROGRESS NOTES
Continued Stay Note  [facility]     Patient Name: Chito Rod  MRN: [id number]  Today's Date: 7/22/2017    Admit Date: 7/20/2017          Discharge Plan       07/22/17 1411    Case Management/Social Work Plan    Plan TBD    Patient/Family In Agreement With Plan yes    Additional Comments Referral received for consideration of placement. Discussed with pt at . He states he wants to go home at d/c but if he has to [facility] depending on MD and PT recommendations. PT/OT eval pending. Pt states he has a female friend and her  who are going to stay with him and live in the basement to help him after d/c. CM will f/u after PT/OT eval.               Discharge Codes     None        Expected Discharge Date and Time     Expected Discharge Date Expected Discharge Time    Jul 22, 2017             Earline Borden

## 2017-07-22 NOTE — PLAN OF CARE
Problem: Patient Care Overview (Adult)  Goal: Plan of Care Review  Outcome: Ongoing (interventions implemented as appropriate)    07/22/17 0442   Coping/Psychosocial Response Interventions   Plan Of Care Reviewed With patient   Patient Care Overview   Progress progress toward functional goals as expected   Outcome Evaluation   Outcome Summary/Follow up Plan Pt rested well this shift. Pt's FSBS in 400's all night, despite extra sliding scale insulin coverage; family brought in fast food. Wore BiPAP for a little while, but on high flow nasal cannula a majority of the night. Afib on the monitor. VSS.       Goal: Adult Individualization and Mutuality  Outcome: Ongoing (interventions implemented as appropriate)  Goal: Discharge Needs Assessment  Outcome: Ongoing (interventions implemented as appropriate)    Problem: COPD, Chronic Bronchitis/Emphysema (Adult)  Goal: Signs and Symptoms of Listed Potential Problems Will be Absent or Manageable (COPD, Chronic Bronchitis/Emphysema)  Outcome: Ongoing (interventions implemented as appropriate)    Problem: Cardiac: Heart Failure (Adult)  Goal: Signs and Symptoms of Listed Potential Problems Will be Absent or Manageable (Cardiac: Heart Failure)  Outcome: Ongoing (interventions implemented as appropriate)

## 2017-07-22 NOTE — NURSING NOTE
Patient taken a bariatric bedside commode but even after physicians request still refuses to use it. We will continue to encourage patient to stop removing O2 for his own safety. Patient also adamant about returning home and not going to rehab.  Kate Chau RN

## 2017-07-22 NOTE — PROGRESS NOTES
[facility] Medicine Services  INPATIENT PROGRESS NOTE    Date of Admission: 7/20/2017  Length of Stay: 2  Primary Care Physician: Javan Shankar MD    Subjective   CC: dyspnea. Follow up acute on chronic respiratory failure  HPI:  The patient is short of breath, mostly with movement. He reports feeling slightly better. He is still requiring high flow oxygen. Daughter at bedside reported he had been noncompliant without his  Oxygen and was not taking lasix as prescribed with intermittent confusion, likely due to low oxygen.    Here, patient has been removing his high flow oxygen to use restroom without any oxygen, but agreeable to try bedside commode, honoring his privacy.       He is interested in improving his diabetic diet, but cannot remember which foods to eat or not eat.      Review Of Systems:   Review of Systems   Constitutional: Negative.    HENT: Negative.    Eyes: Negative.    Respiratory: Positive for shortness of breath.    Cardiovascular: Positive for leg swelling.   Gastrointestinal: Negative.    Endocrine: Negative.    Musculoskeletal: Negative.    Skin: Negative.    Allergic/Immunologic: Negative.    Neurological:        Foot paresthesias   Hematological: Negative.    Psychiatric/Behavioral: Negative.          Objective      Temp:  [97.6 °F (36.4 °C)-98.3 °F (36.8 °C)] 98.2 °F (36.8 °C)  Heart Rate:  [] 98  Resp:  [15-22] 20  BP: (105-131)/(60-86) 105/65  Physical Exam  General Appearance:    Alert and cooperative, not in any acute distress. Obese, sitting up on side of bed talking to daughter. Intermittently he is forgetful and daughter states this is baseline.   Head:    Atraumatic and normocephalic, without obvious abnormality.   Eyes:            PERRLA, conjunctivae and sclerae normal, no Icterus. No pallor. Extra-occular movements are within normal limits.   Ears:    Ears appear intact with no abnormalities noted.   Throat:   No oral lesions, no thrush, oral  mucosa moist.   Neck:   Supple, trachea midline, no thyromegaly, no carotid bruit.   Back:     No kyphoscoliosis present. No tenderness to palpation,   range of motion normal.   Lungs:     Chest shape is normal. Breath sounds heard bilaterally equally. Bibasilar  crackles without wheezing. No Pleural rub or bronchial breathing.      Heart:    Normal S1 and S2, no murmur, no gallop, no rub. No JVD   Abdomen:     Normal bowel sounds, no masses, no organomegaly. Soft        non-tender, non-distended, no guarding, no rebound                Tenderness. Morbidly obese   Extremities:   Moves all extremities well, 2+=bilateral edema, no cyanosis, hanging legs over side of bed instead of elevated   Pulses:   Pulses palpable and equal bilaterally   Skin:   No bleeding, bruising or rash   Lymph nodes:   No palpable adenopathy   Neurologic:   Gross sensation intact, Motor power is normal and equal bilaterally.           Results Review:    I have reviewed the labs, radiology results and diagnostic studies.      Results from last 7 days  Lab Units 07/20/17  1456   WBC 10*3/mm3 9.00   HEMOGLOBIN g/dL 12.6*   PLATELETS 10*3/mm3 170       Results from last 7 days  Lab Units 07/22/17  0454   SODIUM mmol/L 133   POTASSIUM mmol/L 4.6   CHLORIDE mmol/L 95*   CO2 mmol/L 30.0   BUN mg/dL 21   CREATININE mg/dL 1.20   GLUCOSE mg/dL 460*   CALCIUM mg/dL 8.6*       Culture Data: Cultures:         Radiology Data:     I have reviewed the medications.    aspirin 81 mg Oral Daily   atorvastatin 20 mg Oral Nightly   budesonide-formoterol 2 puff Inhalation BID - RT   carvedilol 25 mg Oral BID With Meals   dabigatran etexilate 150 mg Oral BID   diltiaZEM 60 mg Oral Q6H   furosemide 40 mg Intravenous BID   gabapentin 100 mg Oral Q12H   insulin detemir 15 Units Subcutaneous QAM   insulin detemir 40 Units Subcutaneous Nightly   insulin lispro 0-9 Units Subcutaneous 4x Daily With Meals & Nightly   insulin lispro 15 Units Subcutaneous TID With Meals    ipratropium-albuterol 3 mL Nebulization 4x Daily - RT   lisinopril 5 mg Oral Daily   pantoprazole 40 mg Oral QAM   pharmacy consult - MTM  Does not apply Daily   Pharmacy Meds to Bed Consult  Does not apply Daily   traZODone 50 mg Oral Nightly         Assessment/Plan     Problem List  Hospital Problem List     * (Principal)Chronic obstructive pulmonary disease with acute exacerbation    FAUSTO (obstructive sleep apnea)    Hypertension    GERD (gastroesophageal reflux disease)    Acute on chronic diastolic congestive heart failure    Morbid obesity    Acute respiratory failure with hypoxia and hypercapnia    Atrial fibrillation    CAD (coronary artery disease)    Chronic anticoagulation    DM2 (diabetes mellitus, type 2)    HLD (hyperlipidemia)    Oxygen dependent    CKD (chronic kidney disease) stage 3, GFR 30-59 ml/min               Assessment:    1. Acute on Chronic Hypoxic and hypercapnic respiratory failure  2. Acute on chronic Diastolic heart failure, EF 70% 4/20/17  3. FAUSTO on bipap with oxygen nightly at home  4. Morbid obesity  5. COPD with acute exacerbation  6. CKD3  7. Suspect obesity hypoventilation syndrome  8. Chronic afib on Pradaxa  9. Uncontrolled diabetes mellitus  With A1c 10.2   10. Medication and oxygen noncompliance at home   11. Suspect mild chronic underlying dementia without behavior disorder  12. Peripheral neuropathy, diabetic  13. Chronic essential hypertension  14. FAUSTO        Plan:  Continue but increase levemir. Continue sliding scale insulin. Diabetes teaching and nutrition consulted. Patient will continue to require friendly reminders on how to follow diabetic diet.    Steroid has been stopped orally. Continue oxygen high flow, bipap, inhaled steroid.    Continue Pradaxa.    Daughter at bedside feels he really needs acute rehab placement. Continue to wean high flow oxygen and bipap nightly as tolerated.     Bedside commode.     PT/OT eval and consultation for CM for placement acute  rehab.    DVT prophylaxis: pradaxa. BENI. SCD  Discharge Planning: I expect patient to be discharged to home in 2-3 days.    Ophelia Tejada DO   07/22/17   4:01 PM

## 2017-07-22 NOTE — PLAN OF CARE
Problem: Patient Care Overview (Adult)  Goal: Plan of Care Review    07/22/17 1042   Coping/Psychosocial Response Interventions   Plan Of Care Reviewed With patient   Patient Care Overview   Progress improving

## 2017-07-23 LAB
ANION GAP SERPL CALCULATED.3IONS-SCNC: 6 MMOL/L (ref 3–11)
BUN BLD-MCNC: 23 MG/DL (ref 9–23)
BUN/CREAT SERPL: 23 (ref 7–25)
CALCIUM SPEC-SCNC: 8.6 MG/DL (ref 8.7–10.4)
CHLORIDE SERPL-SCNC: 94 MMOL/L (ref 99–109)
CO2 SERPL-SCNC: 35 MMOL/L (ref 20–31)
CREAT BLD-MCNC: 1 MG/DL (ref 0.6–1.3)
DEPRECATED RDW RBC AUTO: 51.6 FL (ref 37–54)
ERYTHROCYTE [DISTWIDTH] IN BLOOD BY AUTOMATED COUNT: 14.2 % (ref 11.3–14.5)
GFR SERPL CREATININE-BSD FRML MDRD: 75 ML/MIN/1.73
GLUCOSE BLD-MCNC: 235 MG/DL (ref 70–100)
GLUCOSE BLDC GLUCOMTR-MCNC: 181 MG/DL (ref 70–130)
GLUCOSE BLDC GLUCOMTR-MCNC: 193 MG/DL (ref 70–130)
GLUCOSE BLDC GLUCOMTR-MCNC: 212 MG/DL (ref 70–130)
GLUCOSE BLDC GLUCOMTR-MCNC: 218 MG/DL (ref 70–130)
GLUCOSE BLDC GLUCOMTR-MCNC: 333 MG/DL (ref 70–130)
HCT VFR BLD AUTO: 41.6 % (ref 38.9–50.9)
HGB BLD-MCNC: 12.3 G/DL (ref 13.1–17.5)
MCH RBC QN AUTO: 29.7 PG (ref 27–31)
MCHC RBC AUTO-ENTMCNC: 29.6 G/DL (ref 32–36)
MCV RBC AUTO: 100.5 FL (ref 80–99)
PLATELET # BLD AUTO: 159 10*3/MM3 (ref 150–450)
PMV BLD AUTO: 11.8 FL (ref 6–12)
POTASSIUM BLD-SCNC: 4.2 MMOL/L (ref 3.5–5.5)
RBC # BLD AUTO: 4.14 10*6/MM3 (ref 4.2–5.76)
SODIUM BLD-SCNC: 135 MMOL/L (ref 132–146)
WBC NRBC COR # BLD: 9.92 10*3/MM3 (ref 3.5–10.8)

## 2017-07-23 PROCEDURE — 99232 SBSQ HOSP IP/OBS MODERATE 35: CPT | Performed by: FAMILY MEDICINE

## 2017-07-23 PROCEDURE — 94799 UNLISTED PULMONARY SVC/PX: CPT

## 2017-07-23 PROCEDURE — 99232 SBSQ HOSP IP/OBS MODERATE 35: CPT | Performed by: INTERNAL MEDICINE

## 2017-07-23 PROCEDURE — 97162 PT EVAL MOD COMPLEX 30 MIN: CPT

## 2017-07-23 PROCEDURE — 63710000001 INSULIN DETEMIR PER 5 UNITS: Performed by: FAMILY MEDICINE

## 2017-07-23 PROCEDURE — 85027 COMPLETE CBC AUTOMATED: CPT | Performed by: FAMILY MEDICINE

## 2017-07-23 PROCEDURE — 94640 AIRWAY INHALATION TREATMENT: CPT

## 2017-07-23 PROCEDURE — 97165 OT EVAL LOW COMPLEX 30 MIN: CPT

## 2017-07-23 PROCEDURE — 94760 N-INVAS EAR/PLS OXIMETRY 1: CPT

## 2017-07-23 PROCEDURE — 80048 BASIC METABOLIC PNL TOTAL CA: CPT | Performed by: FAMILY MEDICINE

## 2017-07-23 PROCEDURE — 94660 CPAP INITIATION&MGMT: CPT

## 2017-07-23 PROCEDURE — 82962 GLUCOSE BLOOD TEST: CPT

## 2017-07-23 RX ORDER — CARVEDILOL 12.5 MG/1
25 TABLET ORAL 2 TIMES DAILY WITH MEALS
Status: DISCONTINUED | OUTPATIENT
Start: 2017-07-23 | End: 2017-07-26 | Stop reason: HOSPADM

## 2017-07-23 RX ORDER — FUROSEMIDE 10 MG/ML
20 INJECTION INTRAMUSCULAR; INTRAVENOUS DAILY
Status: DISCONTINUED | OUTPATIENT
Start: 2017-07-24 | End: 2017-07-25

## 2017-07-23 RX ORDER — CARVEDILOL 12.5 MG/1
12.5 TABLET ORAL 2 TIMES DAILY WITH MEALS
Status: DISCONTINUED | OUTPATIENT
Start: 2017-07-23 | End: 2017-07-23

## 2017-07-23 RX ORDER — METOLAZONE 5 MG/1
5 TABLET ORAL DAILY
Status: DISCONTINUED | OUTPATIENT
Start: 2017-07-23 | End: 2017-07-26

## 2017-07-23 RX ADMIN — DILTIAZEM HYDROCHLORIDE 60 MG: 60 TABLET, FILM COATED ORAL at 17:04

## 2017-07-23 RX ADMIN — ATORVASTATIN CALCIUM 20 MG: 20 TABLET, FILM COATED ORAL at 20:57

## 2017-07-23 RX ADMIN — IPRATROPIUM BROMIDE AND ALBUTEROL SULFATE 3 ML: .5; 3 SOLUTION RESPIRATORY (INHALATION) at 08:10

## 2017-07-23 RX ADMIN — DILTIAZEM HYDROCHLORIDE 60 MG: 60 TABLET, FILM COATED ORAL at 12:35

## 2017-07-23 RX ADMIN — IPRATROPIUM BROMIDE AND ALBUTEROL SULFATE 3 ML: .5; 3 SOLUTION RESPIRATORY (INHALATION) at 20:06

## 2017-07-23 RX ADMIN — BUDESONIDE AND FORMOTEROL FUMARATE DIHYDRATE 2 PUFF: 160; 4.5 AEROSOL RESPIRATORY (INHALATION) at 20:06

## 2017-07-23 RX ADMIN — DABIGATRAN ETEXILATE MESYLATE 150 MG: 150 CAPSULE ORAL at 08:33

## 2017-07-23 RX ADMIN — DABIGATRAN ETEXILATE MESYLATE 150 MG: 150 CAPSULE ORAL at 17:08

## 2017-07-23 RX ADMIN — TRAZODONE HYDROCHLORIDE 50 MG: 50 TABLET ORAL at 20:57

## 2017-07-23 RX ADMIN — METOLAZONE 5 MG: 5 TABLET ORAL at 08:42

## 2017-07-23 RX ADMIN — IPRATROPIUM BROMIDE AND ALBUTEROL SULFATE 3 ML: .5; 3 SOLUTION RESPIRATORY (INHALATION) at 11:55

## 2017-07-23 RX ADMIN — INSULIN DETEMIR 40 UNITS: 100 INJECTION, SOLUTION SUBCUTANEOUS at 23:19

## 2017-07-23 RX ADMIN — GABAPENTIN 100 MG: 100 CAPSULE ORAL at 20:57

## 2017-07-23 RX ADMIN — GABAPENTIN 100 MG: 100 CAPSULE ORAL at 08:35

## 2017-07-23 RX ADMIN — DILTIAZEM HYDROCHLORIDE 60 MG: 60 TABLET, FILM COATED ORAL at 00:48

## 2017-07-23 RX ADMIN — BUDESONIDE AND FORMOTEROL FUMARATE DIHYDRATE 2 PUFF: 160; 4.5 AEROSOL RESPIRATORY (INHALATION) at 08:16

## 2017-07-23 RX ADMIN — CARVEDILOL 25 MG: 12.5 TABLET, FILM COATED ORAL at 17:04

## 2017-07-23 RX ADMIN — ASPIRIN 81 MG: 81 TABLET, COATED ORAL at 08:35

## 2017-07-23 RX ADMIN — PANTOPRAZOLE SODIUM 40 MG: 40 TABLET, DELAYED RELEASE ORAL at 08:34

## 2017-07-23 NOTE — PLAN OF CARE
Problem: Patient Care Overview (Adult)  Goal: Plan of Care Review  Outcome: Ongoing (interventions implemented as appropriate)    07/23/17 1019   Coping/Psychosocial Response Interventions   Plan Of Care Reviewed With patient   Outcome Evaluation   Outcome Summary/Follow up Plan OT marthaal complete. Pt presents with limitations in balance, activity tolerance, and ADL performance. Pt will benefit from OT to advance pt toward increased independnece with ADLs. Recommend pulmonary rehab upon d/c.          Problem: Inpatient Occupational Therapy  Goal: Transfer Training Goal 1 LTG- OT  Outcome: Ongoing (interventions implemented as appropriate)    07/23/17 1019   Transfer Training OT LTG   Transfer Training OT LTG, Date Established 07/23/17   Transfer Training OT LTG, Time to Achieve by discharge   Transfer Training OT LTG, Activity Type bed to chair /chair to bed;toilet   Transfer Training OT LTG, [place] Level supervision required       Goal: Patient Education Goal LTG- OT  Outcome: Ongoing (interventions implemented as appropriate)    07/23/17 1019   Patient Education OT LTG   Patient Education OT LTG, Date Established 07/23/17   Patient Education OT LTG, Time to Achieve by discharge   Patient Education OT LTG, Education Type written program;HEP;energy conservation;work simplification;adaptive breathing   Patient Education OT LTG, Education Understanding demonstrates adequately;verbalizes understanding       Goal: Activity Tolerance Goal LTG- OT  Outcome: Ongoing (interventions implemented as appropriate)    07/23/17 1019   Activity Tolerance OT LTG   Activity Tolerance Goal OT LTG, Date Established 07/23/17   Activity Tolerance Goal OT LTG, Time to Achieve by discharge   Activity Tolerance Goal OT LTG, Activity Level 15 min activity   Activity Tolerance Goal OT LTG, Additional Goal Pt will complete 15 min activity with O2 sat 90% or greater

## 2017-07-23 NOTE — THERAPY EVALUATION
Acute Care - Occupational Therapy Initial Evaluation  [facility]     Patient Name: Chito Rod  : 1950  MRN: [id number]  Today's Date: 2017  Onset of Illness/Injury or Date of Surgery Date: 17  Date of Referral to OT: 17  Referring Physician: MD Mallory    Admit Date: 2017       ICD-10-CM ICD-9-CM   1. Acute respiratory failure with hypoxia and hypercapnia J96.01 518.81    J96.02    2. Medically noncompliant Z91.19 V15.81   3. Morbid obesity, unspecified obesity type E66.01 278.01   4. Type 2 diabetes mellitus with complication, with long-term current use of insulin E11.8 250.90    Z79.4 V58.67   5. Essential hypertension I10 401.9   6. Shortness of breath R06.02 786.05   7. Atrial fibrillation with normal ventricular rate I48.91 427.31   8. Acute on chronic diastolic congestive heart failure I50.33 428.33     428.0   9. Impaired mobility and ADLs Z74.09 799.89     Patient Active Problem List   Diagnosis   • FAUSTO (obstructive sleep apnea)   • Chronic obstructive pulmonary disease with acute exacerbation   • Hypertension   • GERD (gastroesophageal reflux disease)   • Acute on chronic diastolic congestive heart failure   • Morbid obesity   • Acute respiratory failure with hypoxia and hypercapnia   • Atrial fibrillation   • CAD (coronary artery disease)   • Chronic anticoagulation   • DM2 (diabetes mellitus, type 2)   • HLD (hyperlipidemia)   • Oxygen dependent   • CKD (chronic kidney disease) stage 3, GFR 30-59 ml/min     Past Medical History:   Diagnosis Date   • Atrial fibrillation    • CAD (coronary artery disease)    • CHF (congestive heart failure)    • Chronic anticoagulation    • CKD (chronic kidney disease) stage 3, GFR 30-59 ml/min    • COPD (chronic obstructive pulmonary disease)    • DM2 (diabetes mellitus, type 2)    • GERD (gastroesophageal reflux disease)    • History of MI (myocardial infarction)    • HLD (hyperlipidemia)    • HTN (hypertension)    • Morbid obesity    •  Noncompliance    • FAUSTO (obstructive sleep apnea)    • Oxygen dependent      Past Surgical History:   Procedure Laterality Date   • APPENDECTOMY  1961   • CORONARY STENT PLACEMENT  2008   • HAND SURGERY Left 2002   • KNEE ARTHROSCOPY Left 1965          OT ASSESSMENT FLOWSHEET (last 72 hours)      OT Evaluation       07/23/17 0805 07/21/17 1350 07/21/17 1348 07/20/17 2134       Rehab Evaluation    Document Type evaluation  -AC        Subjective Information agree to therapy;complains of;dyspnea  -AC        Patient Effort, Rehab Treatment good  -AC        Symptoms Noted During/After Treatment fatigue;shortness of breath  -AC        General Information    Patient Profile Review yes  -AC        Onset of Illness/Injury or Date of Surgery Date 07/20/17  -        Referring Physician MD Mallory  -        General Observations pt received sidelying in bed on hi-wendy O2.   -AC        Pertinent History Of Current Problem Pt admit with low O2 sats and difficulty breathing.  Pt with acute respiratory failure with hypoxia  -AC        Precautions/Limitations fall precautions;oxygen therapy device and L/min   pt on high-wendy; venti mask on 15LO2 with therapy today  -AC        Prior Level of Function independent:;all household mobility;community mobility;transfer;ADL's;home management;driving  -AC        Equipment Currently Used at Home bipap/ cpap;oxygen  -AC oxygen;bipap/ cpap   Pt has cpap and oxygen at night with [facility]. He states he has portables, but per Mati at [facility], if pt needs a portable brought to the hospital, he will require a new order for continous oxygen.   -AB  oxygen  -TS     Plans/Goals Discussed With patient;agreed upon  -AC        Risks Reviewed patient:;LOB;change in vital signs  -AC        Benefits Reviewed patient:;improve function;increase independence;increase balance;increase knowledge;increase strength  -AC        Living Environment    Lives With alone  -AC alone  -AB  alone  -TS     Living  Arrangements house  -AC house   Lives in [facility]  -AB  house  -TS     Home Accessibility stairs within home;tub/shower is not walk in  -AC no concerns  -AB  no concerns  -TS     Number of Stairs Within Home 12   basement  -AC        Stair Railings at Home  none  -AB  outside, present on left side  -TS     Type of Financial/Environmental Concern  none  -AB  none  -TS     Transportation Available  car;family or friend will provide  -AB  car;family or friend will provide  -TS     Clinical Impression    Date of Referral to OT 07/22/17  -AC        OT Diagnosis decline in ADLs  -AC        Impairments Found (describe specific impairments) aerobic capacity/endurance  -AC        Patient/Family Goals Statement pt would like to return home  -AC        Criteria for Skilled Therapeutic Interventions Met yes;treatment indicated  -AC        Rehab Potential good, to achieve stated therapy goals  -AC        Therapy Frequency daily  -AC        Anticipated Discharge Disposition other (see comments)   pulmonary rehab  -AC        Functional Level Prior    Ambulation   0-->independent  -AB 0-->independent  -TS     Transferring   0-->independent  -AB 0-->independent  -TS     Toileting   0-->independent  -AB 0-->independent  -TS     Bathing   0-->independent  -AB 0-->independent  -TS     Dressing   0-->independent  -AB 0-->independent  -TS     Eating   0-->independent  -AB 0-->independent  -TS     Communication   0-->understands/communicates without difficulty  -AB 0-->understands/communicates without difficulty  -TS     Swallowing   0-->swallows foods/liquids without difficulty  -AB 0-->swallows foods/liquids without difficulty  -TS     Prior Functional Level Comment    independent  -TS     Vital Signs    Pre Systolic BP Rehab 90  -AC        Pre Treatment Diastolic BP 52  -AC        Post Systolic BP Rehab 105  -AC        Post Treatment Diastolic BP 47  -AC        Pretreatment Heart Rate (beats/min) 95  -AC        Posttreatment  Heart Rate (beats/min) 88  -AC        Pre SpO2 (%) 92   hi flow O2  -AC        O2 Delivery Pre Treatment supplemental O2  -AC        Intra SpO2 (%) 88  -AC        O2 Delivery Intra Treatment supplemental O2   venti mask on 15LO2  -AC        Post SpO2 (%) 88  -AC        O2 Delivery Post Treatment supplemental O2   hi flow O2  -AC        Pre Patient Position Supine  -AC        Intra Patient Position Standing  -AC        Post Patient Position Sitting  -AC        Vision Assessment/Intervention    Visual Impairment WNL  -        Cognitive Assessment/Intervention    Current Cognitive/Communication Assessment functional  -AC        Orientation Status oriented x 4  -AC        Follows Commands/Answers Questions 100% of the time  -        Personal Safety decreased awareness, need for assist  -        Personal Safety Interventions fall prevention program maintained;gait belt;nonskid shoes/slippers when out of bed  -        ROM (Range of Motion)    General ROM no range of motion deficits identified  -        MMT (Manual Muscle Testing)    General MMT Assessment upper extremity strength deficits identified  -        General MMT Assessment Detail BUE grossly 4/5  -AC        Bed Mobility, Assessment/Treatment    Bed Mobility, Assistive Device bed rails  -        Bed Mob, Sidelying to Sit, [place] supervision required  -        Transfer Assessment/Treatment    Transfers, Sit-Stand [place] verbal cues required;contact guard assist  -AC        Transfers, Stand-Sit [place] verbal cues required;contact guard assist  -AC        Transfers, Sit-Stand-Sit, Assist Device --   declined use of walker  -        Functional Mobility    Functional Mobility- Ind. Level minimum assist (75% patient effort)  -        Functional Mobility- Device --   declined walker; held onto handrail in hallway at times  -        Functional Mobility-Distance (Feet) 200  -        Functional Mobility- Comment 2 standing  restbreaks  -        Lower Body Dressing Assessment/Training    LB Dressing Assess/Train, Clothing Type donning:;slipper socks  -        LB Dressing Assess/Train, Position sitting   propped each leg on bed   -        LB Dressing Assess/Train, [place] supervision required  -        General Therapy Interventions    Planned Therapy Interventions ADL retraining;balance training;bed mobility training;energy conservation;transfer training  -        Positioning and Restraints    Pre-Treatment Position in bed  -AC        Post Treatment Position chair  -AC        In Chair reclined;call light within reach;encouraged to call for assist  -AC          User Key  (r) = Recorded By, (t) = Taken By, (c) = Cosigned By    Initials Name Effective Dates     Sheridan Smith OT 06/23/15 -     AB Earline Borden 05/02/16 -     TS Margareth Degroot RN 06/16/16 -            Occupational Therapy Education     Title: PT OT SLP Therapies (Active)     Topic: Occupational Therapy (Active)     Point: ADL training (Done)    Description: Instruct learner(s) on proper safety adaptation and remediation techniques during self care or transfers.   Instruct in proper use of assistive devices.    Learning Progress Summary    Learner Readiness Method Response Comment Documented by Status   Patient Acceptance E VU benefits of activity  07/23/17 1018 Done                      User Key     Initials Effective Dates Name Provider Type Discipline     06/23/15 -  Sheridan Smith OT Occupational Therapist OT                  OT Recommendation and Plan  Anticipated Discharge Disposition: other (see comments) (pulmonary rehab)  Planned Therapy Interventions: ADL retraining, balance training, bed mobility training, energy conservation, transfer training  Therapy Frequency: daily  Plan of Care Review  Plan Of Care Reviewed With: patient  Outcome Summary/Follow up Plan: OT mery complete.  Pt presents with limitations in balance, activity tolerance, and  ADL performance.  Pt will benefit from OT to advance pt  toward increased independnece with ADLs.  Recommend pulmonary rehab upon d/c.           OT Goals       07/23/17 1019          Transfer Training OT LTG    Transfer Training OT LTG, Date Established 07/23/17  -AC      Transfer Training OT LTG, Time to Achieve by discharge  -AC      Transfer Training OT LTG, Activity Type bed to chair /chair to bed;toilet  -AC      Transfer Training OT LTG, [place] Level supervision required  -AC      Patient Education OT LTG    Patient Education OT LTG, Date Established 07/23/17  -AC      Patient Education OT LTG, Time to Achieve by discharge  -AC      Patient Education OT LTG, Education Type written program;HEP;energy conservation;work simplification;adaptive breathing  -AC      Patient Education OT LTG, Education Understanding demonstrates adequately;verbalizes understanding  -AC      Activity Tolerance OT LTG    Activity Tolerance Goal OT LTG, Date Established 07/23/17  -AC      Activity Tolerance Goal OT LTG, Time to Achieve by discharge  -AC      Activity Tolerance Goal OT LTG, Activity Level 15 min activity  -AC      Activity Tolerance Goal OT LTG, Additional Goal Pt will complete 15 min activity with O2 sat 90% or greater  -AC        User Key  (r) = Recorded By, (t) = Taken By, (c) = Cosigned By    Initials Name Provider Type    PADMINI Smith, OT Occupational Therapist                Outcome Measures       07/23/17 0805          How much help from another is currently needed...    Putting on and taking off regular lower body clothing? 3  -AC      Bathing (including washing, rinsing, and drying) 3  -AC      Toileting (which includes using toilet bed pan or urinal) 3  -AC      Putting on and taking off regular upper body clothing 4  -AC      Taking care of personal grooming (such as brushing teeth) 4  -AC      Eating meals 4  -AC      Score 21  -AC      Functional Assessment    Outcome Measure Options AM-PAC 6  Clicks Daily Activity (OT)  -AC        User Key  (r) = Recorded By, (t) = Taken By, (c) = Cosigned By    Initials Name Provider Type    AC Sheridan Smith OT Occupational Therapist          Time Calculation:   OT Start Time: 0805    Therapy Charges for Today     Code Description Service Date Service Provider Modifiers Qty    [id number]  OT EVAL LOW COMPLEXITY 4 7/23/2017 Sheridan Smith OT GO 1               Sheridan Smith OT  7/23/2017

## 2017-07-23 NOTE — PLAN OF CARE
Problem: Patient Care Overview (Adult)  Goal: Plan of Care Review    07/23/17 0954   Coping/Psychosocial Response Interventions   Plan Of Care Reviewed With patient   Patient Care Overview   Progress improving

## 2017-07-23 NOTE — PLAN OF CARE
Problem: Patient Care Overview (Adult)  Goal: Plan of Care Review  Outcome: Ongoing (interventions implemented as appropriate)    07/23/17 1500   Coping/Psychosocial Response Interventions   Plan Of Care Reviewed With patient   Patient Care Overview   Progress progress toward functional goals as expected   Outcome Evaluation   Outcome Summary/Follow up Plan Pt presents with unsteadiness while ambulating, due to balance deficits and weakness. Pt would benefit from PT for gait trainign and stair training, would also benefit from use of straight cane. PT recommends d/c home.         Problem: Inpatient Physical Therapy  Goal: Gait Training Goal LTG- PT  Outcome: Ongoing (interventions implemented as appropriate)    07/23/17 1500   Gait Training PT LTG   Gait Training Goal PT LTG, Date Established 07/23/17   Gait Training Goal PT LTG, Time to Achieve 2 wks   Gait Training Goal PT LTG, [place] Level conditional independence   Gait Training Goal PT LTG, Assist Device cane, straight   Gait Training Goal PT LTG, Distance to Achieve 500   Gait Training Goal PT LTG, Outcome goal ongoing       Goal: Stair Training Goal LTG- PT  Outcome: Ongoing (interventions implemented as appropriate)    07/23/17 1500   Stair Training PT LTG   Stair Training Goal PT LTG, Date Established 07/23/17   Stair Training Goal PT LTG, Time to Achieve 2 wks   Stair Training Goal PT LTG, Number of Steps 6   Stair Training Goal PT LTG, [place] Level supervision required   Stair Training Goal PT LTG, Assist Device 1 handrail   Stair Training Goal PT LTG, Outcome goal ongoing

## 2017-07-23 NOTE — PROGRESS NOTES
[facility] Medicine Services  INPATIENT PROGRESS NOTE    Date of Admission: 7/20/2017  Length of Stay: 3  Primary Care Physician: Javan Shankar MD    Subjective   CC: dyspnea. Follow up acute on chronic respiratory failure  HPI:  He is sleeping in bedside chair, noncompliant with bipap.   He is agreeing to wear the high flow oxygen.   Blood pressure lower today. He sleeps often and has uncontrolled sleep apnea, for which he was formerly noncompliant with home oxygen.     No family at bedside today, but daughter was here yesterday and she confirmed patient is not well enough to go home and will need rehab.      Review Of Systems:   Review of Systems   Constitutional: Negative.  Negative for chills, fatigue and fever.   HENT: Negative.  Negative for congestion, sneezing and sore throat.    Eyes: Negative.    Respiratory: Positive for shortness of breath (stable ).    Cardiovascular: Positive for leg swelling (improving).   Gastrointestinal: Negative.  Negative for constipation, diarrhea and nausea.   Endocrine: Negative.    Genitourinary: Negative for dysuria, flank pain and hematuria.   Musculoskeletal: Negative.  Negative for back pain, gait problem and myalgias.   Skin: Negative.    Allergic/Immunologic: Negative.    Neurological: Negative for dizziness, tremors, syncope, weakness and headaches.        Foot paresthesias   Hematological: Negative.    Psychiatric/Behavioral: Negative.  Negative for confusion and self-injury. The patient is not nervous/anxious and is not hyperactive.          Objective      Temp:  [97.5 °F (36.4 °C)-98.1 °F (36.7 °C)] 97.8 °F (36.6 °C)  Heart Rate:  [] 93  Resp:  [18-27] 22  BP: ()/(52-76) 104/76  Physical Exam  General Appearance:    Asleep but easily awakened. Cooperative with reassurance and he is very forgetful improved with reminders. No acute distress. Obese, sitting up in bedside chair.    Head:    Atraumatic and normocephalic,  without obvious abnormality.   Eyes:            PERRLA, conjunctivae and sclerae normal, no Icterus. No pallor. Extra-occular movements are within normal limits.   Ears:    Ears appear intact with no abnormalities noted.   Throat:   No oral lesions, no thrush, oral mucosa moist.   Neck:   Supple, trachea midline, no thyromegaly, no carotid bruit.   Back:     No kyphoscoliosis present. No tenderness to palpation,   range of motion normal.   Lungs:     Chest shape is normal. Breath sounds heard bilaterally equally with trace improved bibasilar crackles without wheezing. No Pleural rub or bronchial breathing.      Heart:    Normal S1 and S2, no murmur, no gallop, no rub. No JVD   Abdomen:     Normal bowel sounds, no masses, no organomegaly. Soft        non-tender, non-distended, no guarding, no rebound                Tenderness. Morbidly obese   Extremities:   Moves all extremities well, 1+=bilateral edema, no cyanosis, hanging legs over side of bed instead of elevated   Pulses:   Pulses palpable and equal bilaterally   Skin:   No bleeding, bruising or rash   Lymph nodes:   No palpable adenopathy   Neurologic:   Gross sensation intact, Motor power is normal and equal bilaterally.           Results Review:    I have reviewed the labs, radiology results and diagnostic studies.      Results from last 7 days  Lab Units 07/23/17  0651   WBC 10*3/mm3 9.92   HEMOGLOBIN g/dL 12.3*   PLATELETS 10*3/mm3 159       Results from last 7 days  Lab Units 07/23/17  0651   SODIUM mmol/L 135   POTASSIUM mmol/L 4.2   CHLORIDE mmol/L 94*   CO2 mmol/L 35.0*   BUN mg/dL 23   CREATININE mg/dL 1.00   GLUCOSE mg/dL 235*   CALCIUM mg/dL 8.6*       Culture Data: Cultures:         Radiology Data:     I have reviewed the medications.    aspirin 81 mg Oral Daily   atorvastatin 20 mg Oral Nightly   budesonide-formoterol 2 puff Inhalation BID - RT   carvedilol 25 mg Oral BID With Meals   dabigatran etexilate 150 mg Oral BID   diltiaZEM 60 mg Oral Q6H    [START ON 7/24/2017] furosemide 20 mg Intravenous Daily   gabapentin 100 mg Oral Q12H   [START ON 7/24/2017] insulin detemir 20 Units Subcutaneous Daily   insulin detemir 40 Units Subcutaneous Nightly   insulin lispro 0-9 Units Subcutaneous 4x Daily With Meals & Nightly   insulin lispro 15 Units Subcutaneous TID With Meals   ipratropium-albuterol 3 mL Nebulization 4x Daily - RT   metOLazone 5 mg Oral Daily   pantoprazole 40 mg Oral QAM   pharmacy consult - MTM  Does not apply Daily   Pharmacy Meds to Bed Consult  Does not apply Daily   traZODone 50 mg Oral Nightly         Assessment/Plan     Problem List  Hospital Problem List     * (Principal)Chronic obstructive pulmonary disease with acute exacerbation    FAUSTO (obstructive sleep apnea)    Hypertension    GERD (gastroesophageal reflux disease)    Acute on chronic diastolic congestive heart failure    Morbid obesity    Acute respiratory failure with hypoxia and hypercapnia    Atrial fibrillation    CAD (coronary artery disease)    Chronic anticoagulation    DM2 (diabetes mellitus, type 2)    HLD (hyperlipidemia)    Oxygen dependent    CKD (chronic kidney disease) stage 3, GFR 30-59 ml/min               Assessment:    1. Acute on Chronic Hypoxic and hypercapnic respiratory failure, stable on high flow oxygen  2. Acute on chronic Diastolic heart failure, EF 70% 4/20/17  3. FAUSTO on bipap with oxygen nightly at home  4. Morbid obesity  5. COPD with acute exacerbation, stable improved  6. CKD3  7. Suspect obesity hypoventilation syndrome  8. Chronic afib on Pradaxa  9. Uncontrolled diabetes mellitus  With A1c 10.2   10. Medication and oxygen noncompliance at home   11. Suspect mild chronic underlying dementia without behavior disorder, stable, suspect chronic hypoxia related encephalopathy from chronic noncompliance  12. Peripheral neuropathy, diabetic  13. Chronic essential hypertension  14. FAUSTO        Plan:  Increase day levemir today and still continue sliding scale  insulin.    Continue and wean high flow oxygen as tolerated. He has declined bipap at this time, would try again.     Continue symbicort, avoiding oral steroid due to hyperglycemia still uncontrolled diabetes.   Continue Pradaxa.    Since blood pressure low today, stop his lisinopril. Meds held this morning. Will resume coreg and diltiazem later. Cardiology following has added zaroxolyn and he has received lasix. With fatigue and hypersomnolence, suspect intake not his usual. Continue to monitor daily labs and titrate medications accordingly.     PT/OT eval and consultation for CM for placement acute rehab.    DVT prophylaxis: pradaxa. BENI. SCD  Discharge Planning: I expect patient to be discharged to acute rehab in the next week.    Ophelia Tejada DO   07/23/17   11:54 AM

## 2017-07-23 NOTE — THERAPY EVALUATION
Acute Care - Physical Therapy Initial Evaluation  [facility]     Patient Name: Chito Rod  : 1950  MRN: [id number]  Today's Date: 2017   Onset of Illness/Injury or Date of Surgery Date: 17  Date of Referral to PT: 17  Referring Physician: MD Mallory      Admit Date: 2017     Visit Dx:    ICD-10-CM ICD-9-CM   1. Acute respiratory failure with hypoxia and hypercapnia J96.01 518.81    J96.02    2. Medically noncompliant Z91.19 V15.81   3. Morbid obesity, unspecified obesity type E66.01 278.01   4. Type 2 diabetes mellitus with complication, with long-term current use of insulin E11.8 250.90    Z79.4 V58.67   5. Essential hypertension I10 401.9   6. Shortness of breath R06.02 786.05   7. Atrial fibrillation with normal ventricular rate I48.91 427.31   8. Acute on chronic diastolic congestive heart failure I50.33 428.33     428.0   9. Impaired mobility and ADLs Z74.09 799.89   10. Impaired functional mobility, balance, gait, and endurance Z74.09 V49.89     Patient Active Problem List   Diagnosis   • FAUSTO (obstructive sleep apnea)   • Chronic obstructive pulmonary disease with acute exacerbation   • Hypertension   • GERD (gastroesophageal reflux disease)   • Acute on chronic diastolic congestive heart failure   • Morbid obesity   • Acute respiratory failure with hypoxia and hypercapnia   • Atrial fibrillation   • CAD (coronary artery disease)   • Chronic anticoagulation   • DM2 (diabetes mellitus, type 2)   • HLD (hyperlipidemia)   • Oxygen dependent   • CKD (chronic kidney disease) stage 3, GFR 30-59 ml/min     Past Medical History:   Diagnosis Date   • Atrial fibrillation    • CAD (coronary artery disease)    • CHF (congestive heart failure)    • Chronic anticoagulation    • CKD (chronic kidney disease) stage 3, GFR 30-59 ml/min    • COPD (chronic obstructive pulmonary disease)    • DM2 (diabetes mellitus, type 2)    • GERD (gastroesophageal reflux disease)    • History of MI (myocardial  infarction)    • HLD (hyperlipidemia)    • HTN (hypertension)    • Morbid obesity    • Noncompliance    • FAUSTO (obstructive sleep apnea)    • Oxygen dependent      Past Surgical History:   Procedure Laterality Date   • APPENDECTOMY  1961   • CORONARY STENT PLACEMENT  2008   • HAND SURGERY Left 2002   • KNEE ARTHROSCOPY Left 1965          PT ASSESSMENT (last 72 hours)      PT Evaluation       07/23/17 1412 07/23/17 0805    Rehab Evaluation    Document Type evaluation  - evaluation  -AC    Subjective Information no complaints;agree to therapy  -SJ agree to therapy;complains of;dyspnea  -AC    Patient Effort, Rehab Treatment good  -SJ good  -AC    Symptoms Noted During/After Treatment none  - fatigue;shortness of breath  -AC    General Information    Patient Profile Review yes  -SJ yes  -AC    Onset of Illness/Injury or Date of Surgery Date 07/20/17  -SJ 07/20/17  -    Referring Physician MD Mallory  - MD Mallory  -    General Observations Pt sidelying in bed, asleep, arouses easily. has high flow O2, tele  - pt received sidelying in bed on hi-wendy O2.   -    Pertinent History Of Current Problem Pt brought to ED with difficulty breathing  - Pt admit with low O2 sats and difficulty breathing.  Pt with acute respiratory failure with hypoxia  -AC    Precautions/Limitations fall precautions;oxygen therapy device and L/min  - fall precautions;oxygen therapy device and L/min   pt on high-wendy; venti mask on 15LO2 with therapy today  -AC    Prior Level of Function independent:;all household mobility;community mobility;gait;transfer;bed mobility;ADL's;driving;using stairs  - independent:;all household mobility;community mobility;transfer;ADL's;home management;driving  -AC    Equipment Currently Used at Home bipap/ cpap;oxygen  - bipap/ cpap;oxygen  -AC    Plans/Goals Discussed With patient;agreed upon  - patient;agreed upon  -AC    Risks Reviewed patient:;LOB;increased discomfort;change in vital signs  -  patient:;LOB;change in vital signs  -AC    Benefits Reviewed patient:;improve function;increase independence;increase strength;increase balance;increase knowledge  - patient:;improve function;increase independence;increase balance;increase knowledge;increase strength  -AC    Barriers to Rehab none identified  -SJ     Living Environment    Lives With alone  -SJ alone  -AC    Living Arrangements house  -SJ house  -AC    Home Accessibility stairs to enter home;stairs within home;bed and bath on same level  -SJ stairs within home;tub/shower is not walk in  -AC    Number of Stairs to Enter Home 6  -SJ     Number of Stairs Within Home 12   basement  -SJ 12   basement  -AC    Stair Railings at Home inside, present on left side;outside, present on left side  -SJ     Living Environment Comment pt states he has 3 steps, then landing, then 3 more step  -SJ     Clinical Impression    Date of Referral to PT 07/22/17  -     PT Diagnosis impaired mobility  -SJ     Patient/Family Goals Statement return to home  -     Criteria for Skilled Therapeutic Interventions Met yes;treatment indicated  -     Pathology/Pathophysiology Noted (Describe Specifically for Each System) musculoskeletal;pulmonary  -     Impairments Found (describe specific impairments) aerobic capacity/endurance;gait, locomotion, and balance  -     Functional Limitations in Following Categories (Describe Specific Limitations) self-care;home management;community/leisure  -     Rehab Potential good, to achieve stated therapy goals  -     Predicted Duration of Therapy Intervention (days/wks) 2wks  -SJ     Vital Signs    Pre Systolic BP Rehab 104  -SJ 90  -AC    Pre Treatment Diastolic BP 76  -SJ 52  -AC    Post Systolic BP Rehab  105  -AC    Post Treatment Diastolic BP  47  -AC    Pretreatment Heart Rate (beats/min) 99  -SJ 95  -AC    Posttreatment Heart Rate (beats/min)  88  -AC    Pre SpO2 (%) 98  -SJ 92   hi flow O2  -AC    O2 Delivery Pre Treatment  supplemental O2   high flow  -SJ supplemental O2  -AC    Intra SpO2 (%) 95   O2 mask at 15L  -SJ 88  -AC    O2 Delivery Intra Treatment supplemental O2  -SJ supplemental O2   venti mask on 15LO2  -AC    Post SpO2 (%) 93  -SJ 88  -AC    O2 Delivery Post Treatment supplemental O2   high flow  -SJ supplemental O2   hi flow O2  -AC    Pre Patient Position Side Lying  -SJ Supine  -AC    Intra Patient Position Standing  -SJ Standing  -AC    Post Patient Position Sitting  -SJ Sitting  -AC    Pain Assessment    Pain Assessment No/denies pain  -SJ     Vision Assessment/Intervention    Visual Impairment  WNL  -AC    Cognitive Assessment/Intervention    Current Cognitive/Communication Assessment functional  -SJ functional  -AC    Orientation Status oriented x 4  -SJ oriented x 4  -AC    Follows Commands/Answers Questions 100% of the time;able to follow multi-step instructions  - 100% of the time  -    Personal Safety decreased awareness, need for safety  - decreased awareness, need for assist  -AC    Personal Safety Interventions gait belt;muscle strengthening facilitated;nonskid shoes/slippers when out of bed  - fall prevention program maintained;gait belt;nonskid shoes/slippers when out of bed  -AC    ROM (Range of Motion)    General ROM no range of motion deficits identified  - no range of motion deficits identified  -    MMT (Manual Muscle Testing)    General MMT Assessment lower extremity strength deficits identified  - upper extremity strength deficits identified  -    General MMT Assessment Detail BLE's grossly 4+/5  -SJ BUE grossly 4/5  -AC    Bed Mobility, Assessment/Treatment    Bed Mobility, Assistive Device bed rails  -SJ bed rails  -AC    Bed Mob, Sidelying to Sit, [place] set up required  - supervision required  -    Bed Mobility, Comment setup for lines only  -     Transfer Assessment/Treatment    Transfers, Sit-Stand [place] stand by assist  - verbal cues required;contact guard  assist  -AC    Transfers, Stand-Sit [place] supervision required  -SJ verbal cues required;contact guard assist  -AC    Transfers, Sit-Stand-Sit, Assist Device  --   declined use of walker  -AC    Toilet Transfer, [place] set up required  -SJ     Transfer, Safety Issues balance decreased during turns  -SJ     Transfer, Impairments impaired balance;strength decreased  -SJ     Gait Assessment/Treatment    Gait, [place] Level contact guard assist;verbal cues required  -SJ     Gait, Distance (Feet) 375  -SJ     Gait, Gait Pattern Analysis swing-through gait  -SJ     Gait, Gait Deviations decreased heel strike   wide SHAKILA  -SJ     Gait, Safety Issues balance decreased during turns;supplemental O2  -SJ     Gait, Impairments strength decreased;impaired balance  -SJ     Gait, Comment occasional LOB req CGA, mildly unsteady initially, would benefit from straight cane  -SJ     Motor Skills/Interventions    Additional Documentation Balance Skills Training (Group)  -SJ     Balance Skills Training    Sitting-Level of Assistance Independent  -SJ     Sitting-Balance Support Feet supported  -SJ     Standing-Level of Assistance Independent  -SJ     Gait Balance-Level of Assistance Contact guard  -SJ     Gait Balance Support No upper extremity supported  -SJ     Positioning and Restraints    Pre-Treatment Position in bed  -SJ in bed  -AC    Post Treatment Position bed  -SJ chair  -AC    In Bed sitting EOB;call light within reach;encouraged to call for assist  -SJ     In Chair  reclined;call light within reach;encouraged to call for assist  -AC      07/21/17 1350 07/20/17 4761    General Information    Equipment Currently Used at Home oxygen;bipap/ cpap   Pt has cpap and oxygen at night with [employer]. He states he has portables, but per Mati at [facility], if pt needs a portable brought to the hospital, he will require a new order for continous oxygen.   -AB oxygen  -TS    Living Environment    Lives With alone  -AB alone   -TS    Living Arrangements house   Lives in [facility]  -AB house  -TS    Home Accessibility no concerns  -AB no concerns  -TS    Stair Railings at Home none  -AB outside, present on left side  -TS    Type of Financial/Environmental Concern none  -AB none  -TS    Transportation Available car;family or friend will provide  -AB car;family or friend will provide  -TS      User Key  (r) = Recorded By, (t) = Taken By, (c) = Cosigned By    Initials Name Provider Type    AC Sheridan Smith, OT Occupational Therapist    HARLAN Dior, PT Physical Therapist    AB Earline Borden      Margareth Degroot RN Registered Nurse          Physical Therapy Education     Title: PT OT SLP Therapies (Active)     Topic: Physical Therapy (Active)     Point: Mobility training (Done)    Learning Progress Summary    Learner Readiness Method Response Comment Documented by Status   Patient Acceptance E RL,NR   07/23/17 1502 Done               Point: Body mechanics (Done)    Learning Progress Summary    Learner Readiness Method Response Comment Documented by Status   Patient Acceptance E VU,NR   07/23/17 1502 Done                      User Key     Initials Effective Dates Name Provider Type Discipline     06/19/15 -  Pattie Dior, PT Physical Therapist PT                PT Recommendation and Plan  Anticipated Equipment Needs At Discharge: standard cane  Anticipated Discharge Disposition: home with outpatient services (pulmonary rehab)  Planned Therapy Interventions: balance training, gait training, home exercise program, patient/family education, strengthening, stair training, transfer training  PT Frequency: daily, per priority policy  Plan of Care Review  Plan Of Care Reviewed With: patient  Progress: progress toward functional goals as expected  Outcome Summary/Follow up Plan: Pt presents with unsteadiness while ambulating, due to balance deficits and weakness. Pt would benefit from PT for gait trainign and stair  training, would also benefit from use of straight cane. PT recommends d/c home.          IP PT Goals       07/23/17 1500          Gait Training PT LTG    Gait Training Goal PT LTG, Date Established 07/23/17  -SJ      Gait Training Goal PT LTG, Time to Achieve 2 wks  -SJ      Gait Training Goal PT LTG, [place] Level conditional independence  -SJ      Gait Training Goal PT LTG, Assist Device cane, straight  -SJ      Gait Training Goal PT LTG, Distance to Achieve 500  -SJ      Gait Training Goal PT LTG, Outcome goal ongoing  -SJ      Stair Training PT LTG    Stair Training Goal PT LTG, Date Established 07/23/17  -SJ      Stair Training Goal PT LTG, Time to Achieve 2 wks  -SJ      Stair Training Goal PT LTG, Number of Steps 6  -SJ      Stair Training Goal PT LTG, [place] Level supervision required  -SJ      Stair Training Goal PT LTG, Assist Device 1 handrail  -SJ      Stair Training Goal PT LTG, Outcome goal ongoing  -SJ        User Key  (r) = Recorded By, (t) = Taken By, (c) = Cosigned By    Initials Name Provider Type    HARLAN Dior, PT Physical Therapist                Outcome Measures       07/23/17 1412 07/23/17 0805       How much help from another person do you currently need...    Turning from your back to your side while in flat bed without using bedrails? 4  -SJ      Moving from lying on back to sitting on the side of a flat bed without bedrails? 4  -SJ      Moving to and from a bed to a chair (including a wheelchair)? 4  -SJ      Standing up from a chair using your arms (e.g., wheelchair, bedside chair)? 4  -SJ      Climbing 3-5 steps with a railing? 3  -SJ      To walk in hospital room? 3  -SJ      AM-PAC 6 Clicks Score 22  -SJ      How much help from another is currently needed...    Putting on and taking off regular lower body clothing?  3  -AC     Bathing (including washing, rinsing, and drying)  3  -AC     Toileting (which includes using toilet bed pan or urinal)  3  -AC     Putting on  and taking off regular upper body clothing  4  -AC     Taking care of personal grooming (such as brushing teeth)  4  -AC     Eating meals  4  -AC     Score  21  -AC     Functional Assessment    Outcome Measure Options AM-PAC 6 Clicks Basic Mobility (PT)  - AM-PAC 6 Clicks Daily Activity (OT)  -       User Key  (r) = Recorded By, (t) = Taken By, (c) = Cosigned By    Initials Name Provider Type     Sheridan Smith, OT Occupational Therapist     Pattie Dior, PT Physical Therapist           Time Calculation:         PT Charges       07/23/17 1503          Time Calculation    Start Time 1412  -      PT Received On 07/23/17  -      PT Goal Re-Cert Due Date 08/02/17  -        User Key  (r) = Recorded By, (t) = Taken By, (c) = Cosigned By    Initials Name Provider Type     Pattie Dior, PT Physical Therapist          Therapy Charges for Today     Code Description Service Date Service Provider Modifiers Qty    [id number] HC PT EVAL MOD COMPLEXITY 3 7/23/2017 Pattie Dior, PT GP 1          PT G-Codes  Outcome Measure Options: AM-PAC 6 Clicks Basic Mobility (PT)      Pattie Dior PT  7/23/2017

## 2017-07-23 NOTE — PROGRESS NOTES
"[facility] Daily Note       LOS: 3 days   Patient Care Team:  Javan Shankar MD as PCP - General (Internal Medicine)    Chief Complaint:  CHF and atrial fibrillation     Subjective: Resting comfortably; on bi-pap.  Still with peripheral edema and volume overload.    Review of Systems:   As above.    Medications:    aspirin 81 mg Oral Daily   atorvastatin 20 mg Oral Nightly   budesonide-formoterol 2 puff Inhalation BID - RT   carvedilol 25 mg Oral BID With Meals   dabigatran etexilate 150 mg Oral BID   diltiaZEM 60 mg Oral Q6H   furosemide 40 mg Intravenous BID   gabapentin 100 mg Oral Q12H   insulin detemir 15 Units Subcutaneous QAM   insulin detemir 40 Units Subcutaneous Nightly   insulin lispro 0-9 Units Subcutaneous 4x Daily With Meals & Nightly   insulin lispro 15 Units Subcutaneous TID With Meals   ipratropium-albuterol 3 mL Nebulization 4x Daily - RT   lisinopril 5 mg Oral Daily   pantoprazole 40 mg Oral QAM   pharmacy consult - MTM  Does not apply Daily   Pharmacy Meds to Bed Consult  Does not apply Daily   traZODone 50 mg Oral Nightly       Vital Sign Min/Max for last 24 hours  Temp  Min: 97.5 °F (36.4 °C)  Max: 98.2 °F (36.8 °C)   BP  Min: 89/56  Max: 110/70   Pulse  Min: 74  Max: 103   Resp  Min: 18  Max: 20   SpO2  Min: 91 %  Max: 96 %   Flow (L/min)  Min: 40  Max: 40   Weight  Min: 325 lb 9.6 oz (148 kg)  Max: 325 lb 9.6 oz (148 kg)      Intake/Output Summary (Last 24 hours) at 07/23/17 0759  Last data filed at 07/23/17 0455   Gross per 24 hour   Intake              480 ml   Output             4400 ml   Net            -3920 ml        Flowsheet Rows         First Filed Value    Admission Height  71\" (180.3 cm) Documented at 07/20/2017 1444    Admission Weight  (!)  333 lb (151 kg) Documented at 07/20/2017 1444          Physical Exam:    Neck: Jugular venous pressure is within normal limits. Carotids have normal upstrokes without bruits.   Cardiovascular: Heart has a nondisplaced focal PMI. " Irregularly irregular rate and rhythm without murmur, gallop or rub.  Lungs: Clear without rales or wheezes. Equal expansion is noted.   Abdomen: Soft, nontender.  Extremities: 2+ edema.   Skin: warm and dry.     Results Review:    I reviewed the patient's new clinical results.    Tele:  [facility] @ 22    Labs:      Results from last 7 days  Lab Units 07/22/17  0454 07/21/17  1042 07/20/17  1456   SODIUM mmol/L 133 133 133   POTASSIUM mmol/L 4.6 5.1 4.4   CHLORIDE mmol/L 95* 94* 96*   CO2 mmol/L 30.0 28.0 30.0   BUN mg/dL 21 15 24*   CREATININE mg/dL 1.20 1.10 1.50*   CALCIUM mg/dL 8.6* 8.8 8.8   BILIRUBIN mg/dL  --   --  0.4   ALK PHOS U/L  --   --  79   ALT (SGPT) U/L  --   --  34   AST (SGOT) U/L  --   --  27   GLUCOSE mg/dL 460* 445* 294*       Results from last 7 days  Lab Units 07/20/17  1456   WBC 10*3/mm3 9.00   HEMOGLOBIN g/dL 12.6*   HEMATOCRIT % 41.9   PLATELETS 10*3/mm3 170     Lab Results   Component Value Date    TROPONINI <0.006 07/21/2017    TROPONINI <0.006 07/21/2017    TROPONINI <0.006 07/21/2017     Lab Results   Component Value Date    CHOL 135 04/19/2017     Lab Results   Component Value Date    TRIG 86 04/19/2017    TRIG 127 02/07/2017    TRIG 135 04/27/2014     Lab Results   Component Value Date    HDL 39 (L) 04/19/2017    HDL 47 02/07/2017    HDL 38 (L) 04/27/2014     No results found for: LDLCALC  Lab Results   Component Value Date    INR 1.03 04/26/2014    PROTIME 11.0 04/26/2014       Ejection Fraction:  70% per echo 4/2017      Assessment:  1.  Acute on chronic diastolic heart failure.                          -Suspect significant portion due to dietary noncompliance.                          -Echocardiogram pending  2.  Acute hypoxic respiratory failure   3.  Chronic atrial fibrillation on chronic anticoagulation                        -CHADS VASC= 5; Pradaxa.  4.  Hypertension                        - well controlled  5.  Dyslipidemia                        -atorvastatin 20mg; LDL 92  4/2017  6.  Diabetes  7.  Renal insufficiency, prior to this admission had normal creatinine                        -creat today 1.2 (1.1)  8.  Sleep apnea  9.  Morbid obesity  10.  Chest pain, atypical                        -consider ischemic eval in future      Plan:  Add Metolazone; continue to monitor    OTILIA Tijerina  07/23/17  7:56 AM      I have reviewed the notes, assessments, and/or procedures performed by OTILIA, I CONCUR with her documentation of Chito Rod.

## 2017-07-23 NOTE — CONSULTS
Adult Nutrition  Assessment/PES    Patient Name:  Chito Rod  YOB: 1950  MRN: [id number]  Admit Date:  7/20/2017    Assessment Date:  7/23/2017        Reason for Assessment       07/23/17 1755    Reason for Assessment    Identified At Risk By Screening Criteria need for education    Time Spent (min) 20    Diagnosis Diagnosis    Cardiac CHF    Endocrine DM Type 2    Pulmonary/Critical Care COPD;Acute respiratory failure    Renal CKD      07/23/17 1305    Reason for Assessment    Reason For Assessment/Visit education;physician consult              Nutrition/Diet History       07/23/17 1757    Nutrition/Diet History    Typical Food/Fluid Intake potato chips, soups, sausage, barker    Meal/Snack Patterns 4 meals per day    Typical Activity Patterns sedentary    Functional Status/Food Prep Mobility able to prepare meals;able to purchase food    Factors Affecting Nutritional Intake Factors    Food Habit/Preferences Other   higher sodium foods            Anthropometrics       07/23/17 1759    Body Mass Index (BMI)    BMI Grade greater than 40 - obesity grade III            Labs/Tests/Procedures/Meds       07/23/17 1305    Labs/Tests/Procedures/Meds    Labs/Tests Review Reviewed            Physical Findings       07/23/17 1759    Physical Findings/Assessment    Additional Documentation Physical Appearance (Group)    Physical Appearance    Overall Physical Appearance obese              Nutrition Prescription Ordered       07/23/17 1800    Nutrition Prescription PO    Current PO Diet Regular    Common Modifiers Cardiac;Consistent Carbohydrate            Evaluation of Received Nutrient/Fluid Intake       07/23/17 1800    PO Evaluation    Number of Meals 5    % PO Intake 100              Problem/Interventions:        Problem 1       07/23/17 1800    Nutrition Diagnoses Problem 1    Problem 1 Knowledge Deficit    Etiology (related to) MNT for Treatment/Condition    Signs/Symptoms (evidenced by) Demonstrated  Information Deficit            Problem 2       07/23/17 1800    Nutrition Diagnoses Problem 2    Problem 2 Excessive Nutrient Intake    Micronutrient Sodium    Etiology (related to) Factors Affecting Nutrition;Medical Diagnosis    Cardiac CHF    Reported/Observed By RD/Tech    Best Intake of Salty Snacks;Highly Seasoned Food    Signs/Symptoms (evidenced by) Report/Observation    Best Intake Of --                  Intervention Goal       07/23/17 1803    Intervention Goal    General Nutrition support treatment;Provide information regarding MNT for treatment/condition    PO Maintain intake;Continue positive trend    Weight Appropriate weight loss            Nutrition Intervention       07/23/17 1803    Nutrition Intervention    RD/Tech Action Menu adjusted;Follow Tx progress;Care plan reviewd   referral to cardiac rehab,               Education/Evaluation       07/23/17 1804    Education    Education Advised regarding habits/behavior;Will Instruct as appropriate;Provided education regarding;Education not appropriate at this time    Please explain Other (comment)   patient with respiratory failure, poor memory and thought process at this time    Provided education regarding Key food habit change   decrease portion,    Advised Regarding Habits/Behavior Food choices;Snacks;Appropriate portions   encouraged doubling vegetables, decreasing by half barker, sausage and salty snacks    Monitor/Evaluation    Education Follow-up Reinforce PRN   cardiac rehab        Comments:  Much room for improvement in food choices (high sodium) and portions.  Patient does eat 3-4 meals per day.  Standard hospital diet less than caloric needs, although slow weight lot appropriate.  Patient verbalizes he is willing to make gradual change.    Electronically signed by:  Amber Desir RD  07/23/17 6:07 PM

## 2017-07-23 NOTE — DISCHARGE PLACEMENT REQUEST
"Khoi Rod (66 y.o. Male)     Date of Birth Social Security Number Address Home Phone MRN    [id number]  [id number]  [facility] [address] [phone] [id number]    Mandaeism Marital Status          Unknown Unknown       Admission Date Admission Type Admitting Provider Attending Provider Department, Room/Bed    7/20/17 Emergency Ophelia Tejada DO Gandee, Julie G,  [facility] 6A, N602/1    Discharge Date Discharge Disposition Discharge Destination                      Attending Provider: Ophelia Tejada DO     Allergies:  No Known Allergies    Isolation:  None   Infection:  None   Code Status:  FULL    Ht:  71\" (180.3 cm)   Wt:  325 lb 9.6 oz (148 kg)    Admission Cmt:  None   Principal Problem:  Chronic obstructive pulmonary disease with acute exacerbation [J44.1]                 Active Insurance as of 7/20/2017     Primary Coverage     Payor Plan Insurance Group Employer/Plan Group    HUMANA MEDICARE REPLACEMENT HUMANA MEDICARE REPL [id number]     Payor Plan Address Payor Plan Phone Number Effective From Effective To    [address] [address] [phone] 1/1/2014     [place] [address]       Subscriber Name Subscriber Birth Date Member ID       KHOI ROD 1950 [id number]                 Emergency Contacts      (Rel.) Home Phone Work Phone Mobile Phone    Kristyn Luque (Sister) [phone] -- --            Insurance Information                HUMANA MEDICARE REPLACEMENT/HUMANA MEDICARE REPL Phone: [phone]    Subscriber: DouglasKhoi cuevas Subscriber#: [id number]    Group#: [id number] Precert#:              History & Physical      Dominik Sepulveda MD at 7/20/2017  8:54 PM              [facility] Medicine Services  HISTORY AND PHYSICAL    Primary Care Physician: Javan Shankar MD    Subjective     Chief Complaint:  Trouble breathing    History of Present Illness:  This is a 66 year old  male that presents to [facility] ED secondary to " difficulty breathing that started this afternoon.  He describes a chronic history of COPD, FAUSTO and Oxygen dependence.  He was visiting his sister and fell asleep without his oxygen.  He reports being aroused by EMS secondary to his skin coloration.  He was brought to [facility] ED secondary to his decreased oxygen saturations of < 88%.  Even with 6 L of O2 in the ED his saturations were only 92%.  His ABG identified a pH 7.322 pCO2 60.4 and pO2 61.  He describes shortness of air to his chest with no associated retrosternal chest pain.  The dyspnea is constant and was made worse today with not using his oxygen.  The oxygen and nebulizer in the ED modified his symptoms.  He describes his dyspnea as severe but improving with ED care.  He reports some confusion.  There is no associated fever, chills, n/v, rashes, syncope or falls.  His MAURO index = 6 (57%).  His [id number] score = 5.    Review of Systems   Constitutional: Positive for activity change, diaphoresis and fatigue. Negative for appetite change, chills, fever and unexpected weight change.   HENT: Positive for congestion. Negative for ear pain, facial swelling, nosebleeds, postnasal drip, sore throat and trouble swallowing.    Eyes: Negative.  Negative for discharge and visual disturbance.   Respiratory: Positive for cough, chest tightness, shortness of breath and wheezing. Negative for choking.    Cardiovascular: Positive for palpitations and leg swelling. Negative for chest pain.   Gastrointestinal: Negative for abdominal pain, blood in stool, diarrhea, nausea and vomiting.   Endocrine: Negative for polydipsia, polyphagia and polyuria.   Genitourinary: Negative for difficulty urinating and hematuria.   Musculoskeletal: Positive for back pain and myalgias.   Skin: Positive for color change. Negative for rash.   Neurological: Positive for light-headedness. Negative for dizziness, syncope and speech difficulty.   Hematological: Does not bruise/bleed easily.    "  Psychiatric/Behavioral: Positive for confusion and sleep disturbance. The patient is nervous/anxious.    All other systems reviewed and are negative.     Past Medical History:   Diagnosis Date   • Atrial fibrillation    • CAD (coronary artery disease)    • CHF (congestive heart failure)    • Chronic anticoagulation    • CKD (chronic kidney disease) stage 3, GFR 30-59 ml/min    • COPD (chronic obstructive pulmonary disease)    • DM2 (diabetes mellitus, type 2)    • GERD (gastroesophageal reflux disease)    • History of MI (myocardial infarction)    • HLD (hyperlipidemia)    • HTN (hypertension)    • Morbid obesity    • Noncompliance    • FAUSTO (obstructive sleep apnea)    • Oxygen dependent      Past Surgical History:   Procedure Laterality Date   • APPENDECTOMY  1961   • CORONARY STENT PLACEMENT  2008   • HAND SURGERY Left 2002   • KNEE ARTHROSCOPY Left 1965     Family History   Problem Relation Age of Onset   • Diabetes Mother    • Heart disease Mother    • Diabetes Father    • Heart disease Father    • Heart disease Sister      Social History     Social History   • Marital status: Unknown     Spouse name: N/A   • Number of children: 5   • Years of education: H.S.     Occupational History   • Construction Retired     Social History Main Topics   • Smoking status: Former Smoker     Packs/day: 1.00     Years: 47.00     Types: Cigarettes     Quit date: 2014   • Smokeless tobacco: Never Used   • Alcohol use No   • Drug use: No   • Sexual activity: No      Comment:      Social History Narrative    He lives by himself     Medications:  Reviewed and reconciled    Allergies:  No Known Allergies    Objective     Vital Signs: /83  Pulse 93  Temp 98.7 °F (37.1 °C) (Oral)   Resp 28  Ht 71\" (180.3 cm)  Wt (!) 333 lb (151 kg)  SpO2 92%  BMI 46.44 kg/m2  Body mass index is 46.44 kg/(m^2).    Physical Exam   Constitutional: He is oriented to person, place, and time. He appears well-developed and " "well-nourished. He is cooperative.   Disheveled Pickwickian appearance     HENT:   Head: Normocephalic.   Right Ear: Hearing and external ear normal.   Left Ear: Hearing and external ear normal.   Nose: Mucosal edema present.   Mouth/Throat: Mucous membranes are dry.   Eyes: Conjunctivae and EOM are normal. Pupils are equal, round, and reactive to light. No scleral icterus.   Neck: Trachea normal and normal range of motion. Neck supple. No JVD present. Carotid bruit is not present. No thyromegaly present.   Neck diameter > 20\"   Cardiovascular: Normal rate, normal heart sounds and intact distal pulses.  An irregular rhythm present.   Pulmonary/Chest: Accessory muscle usage present. Tachypnea noted. He has decreased breath sounds. He has wheezes.   Abdominal: Soft. Bowel sounds are normal. There is no hepatosplenomegaly. There is no tenderness.   Musculoskeletal: Normal range of motion.   Lymphadenopathy:     He has no cervical adenopathy.   Neurological: He is alert and oriented to person, place, and time. He has normal strength and normal reflexes. No sensory deficit.   Skin: Skin is warm and dry.   Psychiatric: His behavior is normal. Judgment and thought content normal. His mood appears anxious. His speech is rapid and/or pressured. Cognition and memory are normal.   Nursing note and vitals reviewed.    Results Reviewed:     Results from last 7 days  Lab Units 07/20/17  1456   WBC 10*3/mm3 9.00   HEMOGLOBIN g/dL 12.6*   PLATELETS 10*3/mm3 170       Results from last 7 days  Lab Units 07/20/17  1456   SODIUM mmol/L 133   POTASSIUM mmol/L 4.4   CO2 mmol/L 30.0   CREATININE mg/dL 1.50*   GLUCOSE mg/dL 294*   CALCIUM mg/dL 8.8   BNP pg/mL 162.0*     I have personally reviewed and interpreted available lab data dated 07/20/17.  I have personally reviewed cxr reports available and dated 07/20/17 and 04/22/2017.  I have personally reviewed ECG report dated 07/20/17.   I have personally reviewed and summarized old " records.    Assessment / Plan     Assessment/Problem List:   Principal Problem:    Chronic obstructive pulmonary disease with acute exacerbation  Active Problems:    Acute on chronic diastolic congestive heart failure    Acute respiratory failure with hypoxia and hypercapnia    Oxygen dependent    FAUSTO (obstructive sleep apnea)    Hypertension    GERD (gastroesophageal reflux disease)    Morbid obesity    Atrial fibrillation    CAD (coronary artery disease)    Chronic anticoagulation    DM2 (diabetes mellitus, type 2)    HLD (hyperlipidemia)    CKD (chronic kidney disease) stage 3, GFR 30-59 ml/min    Plan:  We will admit as an inpatient to telemetry.  We will continue with oxygen therapy and add scheduled nebulizer care.  We will add IV steroids and continue with inhaled corticosteroids.  With his chest x-ray findings, We will consult Cardiology.  An echo has been ordered.  We will continue with diuretics and cardioselective beta blocker and start a low dose ace inhibitor.  We will continue to monitor his creatinine and electrolytes.  We will continue to trend troponins and acquire an ECG with any reported chest pain.  We will continue to anticoagulate his atrial fibrillation with his [id number] score = 5.  We will follow his blood pressure response.  We will continue to monitor his blood sugars and cover with basal and sliding scale insulin.  We will continue with anti-reflux measures and PPI therapy.  We will continue to provide pain control with morphine prn, anti-emetics and anxiolytics.  The plan has been discussed with the patient and his daughter who is at the bedside.    DVT prophylaxis:  Pradaxa, teds, scuds  Code Status: Full  Admission Status: Patient will be admitted to [facility] secondary to the abrupt decompensation in his respiratory status and findings of acute congestive heart failure.  I believe this patient meets INPATIENT status due to the need for care which can only be reasonably provided in an  hospital setting such as aggressive/expedited ancillary services and/or consultation services and the necessity for IV medications, close physician monitoring and/or the possible need for procedures.  In such, I feel patient’s risk for adverse outcomes and need for care warrant INPATIENT evaluation and predict the patient’s care encounter to likely last beyond 2 midnights.     Dominik Sepulveda MD 07/20/17 8:55 PM           Electronically signed by Dominik Sepulveda MD at 7/20/2017  9:30 PM        Hospital Medications (active)       Dose Frequency Start End    albuterol (PROVENTIL) nebulizer solution 0.5% 2.5 mg/0.5mL 2.5 mg Every 3 Hours PRN 7/21/2017     Sig - Route: Take 0.5 mL by nebulization Every 3 (Three) Hours As Needed for Shortness of Air. - Nebulization    aspirin EC tablet 81 mg 81 mg Daily 7/21/2017     Sig - Route: Take 1 tablet by mouth Daily. - Oral    atorvastatin (LIPITOR) tablet 20 mg 20 mg Nightly 7/21/2017     Sig - Route: Take 1 tablet by mouth Every Night. - Oral    budesonide-formoterol (SYMBICORT) 160-4.5 MCG/ACT inhaler 2 puff 2 puff 2 Times Daily - RT 7/21/2017     Sig - Route: Inhale 2 puffs 2 (Two) Times a Day. - Inhalation    carvedilol (COREG) tablet 25 mg 25 mg 2 Times Daily With Meals 7/23/2017     Sig - Route: Take 2 tablets by mouth 2 (Two) Times a Day With Meals. - Oral    dabigatran etexilate (PRADAXA) capsule 150 mg 150 mg 2 Times Daily 7/21/2017     Sig - Route: Take 1 capsule by mouth 2 (Two) Times a Day. - Oral    dextrose (D50W) solution 25 g 25 g Every 15 Minutes PRN 7/20/2017     Sig - Route: Infuse 50 mL into a venous catheter Every 15 (Fifteen) Minutes As Needed for Low Blood Sugar (Blood Sugar Less Than 70, Patient Has IV Access - Unresponsive, NPO or Unable To Safely Swallow). - Intravenous    dextrose (GLUTOSE) oral gel 15 g 15 g Every 15 Minutes PRN 7/20/2017     Sig - Route: Take 15 g by mouth Every 15 (Fifteen) Minutes As Needed for Low Blood Sugar (Blood Sugar Less Than  70, Patient Alert, Is Not NPO & Can Safely Swallow). - Oral    diltiaZEM (CARDIZEM) tablet 60 mg 60 mg Every 6 Hours Scheduled 7/21/2017     Sig - Route: Take 1 tablet by mouth Every 6 (Six) Hours. - Oral    furosemide (LASIX) injection 20 mg 20 mg Daily 7/24/2017     Sig - Route: Infuse 2 mL into a venous catheter Daily. - Intravenous    gabapentin (NEURONTIN) capsule 100 mg 100 mg Every 12 Hours Scheduled 7/21/2017     Sig - Route: Take 1 capsule by mouth Every 12 (Twelve) Hours. - Oral    glucagon (GLUCAGEN) injection 1 mg 1 mg Every 15 Minutes PRN 7/20/2017     Sig - Route: Inject 1 mg under the skin Every 15 (Fifteen) Minutes As Needed (Blood Glucose Less Than 70 - Patient Without IV Access - Unresponsive, NPO or Unable To Safely Swallow). - Subcutaneous    HYDROcodone-acetaminophen (NORCO) 7.5-325 MG per tablet 1 tablet 1 tablet Every 4 Hours PRN 7/20/2017 7/30/2017    Sig - Route: Take 1 tablet by mouth Every 4 (Four) Hours As Needed for Moderate Pain (4-6). - Oral    insulin detemir (LEVEMIR) injection 20 Units 20 Units Daily 7/24/2017     Sig - Route: Inject 20 Units under the skin Daily. - Subcutaneous    insulin detemir (LEVEMIR) injection 40 Units 40 Units Nightly 7/22/2017     Sig - Route: Inject 40 Units under the skin Every Night. - Subcutaneous    insulin lispro (humaLOG) injection 0-9 Units 0-9 Units 4 Times Daily With Meals & Nightly 7/22/2017     Sig - Route: Inject 0-9 Units under the skin 4 (Four) Times a Day With Meals & at Bedtime. - Subcutaneous    Notes to Pharmacy: Please change to AC/HS in the am 7/22    insulin lispro (humaLOG) injection 15 Units 15 Units 3 Times Daily With Meals 7/22/2017     Sig - Route: Inject 15 Units under the skin 3 (Three) Times a Day With Meals. - Subcutaneous    ipratropium-albuterol (DUO-NEB) nebulizer solution 3 mL 3 mL Every 4 Hours PRN 7/20/2017     Sig - Route: Take 3 mL by nebulization Every 4 (Four) Hours As Needed for Shortness of Air. - Nebulization     "ipratropium-albuterol (DUO-NEB) nebulizer solution 3 mL 3 mL 4 Times Daily - RT 7/21/2017     Sig - Route: Take 3 mL by nebulization 4 (Four) Times a Day. - Nebulization    LORazepam (ATIVAN) injection 0.5 mg 0.5 mg Every 6 Hours PRN 7/20/2017 7/30/2017    Sig - Route: Infuse 0.25 mL into a venous catheter Every 6 (Six) Hours As Needed for Anxiety. - Intravenous    metOLazone (ZAROXOLYN) tablet 5 mg 5 mg Daily 7/23/2017     Sig - Route: Take 1 tablet by mouth Daily. - Oral    Morphine sulfate (PF) injection 1 mg 1 mg Every 4 Hours PRN 7/20/2017 7/30/2017    Sig - Route: Infuse 0.5 mL into a venous catheter Every 4 (Four) Hours As Needed for Moderate Pain (4-6). - Intravenous    Linked Group 1:  \"And\" Linked Group Details        naloxone (NARCAN) injection 0.4 mg 0.4 mg Every 5 Minutes PRN 7/20/2017     Sig - Route: Infuse 1 mL into a venous catheter Every 5 (Five) Minutes As Needed for Respiratory Depression. - Intravenous    Linked Group 1:  \"And\" Linked Group Details        ondansetron (ZOFRAN) injection 4 mg 4 mg Every 6 Hours PRN 7/20/2017     Sig - Route: Infuse 2 mL into a venous catheter Every 6 (Six) Hours As Needed for Nausea or Vomiting. - Intravenous    pantoprazole (PROTONIX) EC tablet 40 mg 40 mg Every Morning 7/21/2017     Sig - Route: Take 1 tablet by mouth Every Morning. - Oral    Pharmacy Consult - MTM  Daily 7/21/2017     Sig - Route: Daily. - Does not apply    Pharmacy Meds to Bed Consult  Daily (Monday-Friday) 7/21/2017     Sig - Route: Daily (Monday-Friday). - Does not apply    sodium chloride 0.9 % flush 1-10 mL 1-10 mL As Needed 7/20/2017     Sig - Route: Infuse 1-10 mL into a venous catheter As Needed for Line Care. - Intravenous    sodium chloride 0.9 % flush 10 mL 10 mL As Needed 7/20/2017     Sig - Route: Infuse 10 mL into a venous catheter As Needed for Line Care. - Intravenous    Cosign for Ordering: Accepted by Donovan Marcano MD on 7/20/2017  6:49 PM    traZODone (DESYREL) tablet " 50 mg 50 mg Nightly 7/21/2017     Sig - Route: Take 1 tablet by mouth Every Night. - Oral    carvedilol (COREG) tablet 12.5 mg (Discontinued) 12.5 mg 2 Times Daily With Meals 7/23/2017 7/23/2017    Sig - Route: Take 1 tablet by mouth 2 (Two) Times a Day With Meals. - Oral    carvedilol (COREG) tablet 25 mg (Discontinued) 25 mg 2 Times Daily With Meals 7/21/2017 7/23/2017    Sig - Route: Take 2 tablets by mouth 2 (Two) Times a Day With Meals. - Oral    furosemide (LASIX) injection 40 mg (Discontinued) 40 mg 2 Times Daily 7/21/2017 7/23/2017    Sig - Route: Infuse 4 mL into a venous catheter 2 (Two) Times a Day. - Intravenous    insulin detemir (LEVEMIR) injection 15 Units (Discontinued) 15 Units Every Morning 7/22/2017 7/23/2017    Sig - Route: Inject 15 Units under the skin Every Morning. - Subcutaneous    insulin detemir (LEVEMIR) injection 30 Units (Discontinued) 30 Units Nightly 7/21/2017 7/22/2017    Sig - Route: Inject 30 Units under the skin Every Night. - Subcutaneous    lisinopril (PRINIVIL,ZESTRIL) tablet 5 mg (Discontinued) 5 mg Daily 7/21/2017 7/23/2017    Sig - Route: Take 1 tablet by mouth Daily. - Oral             Physician Progress Notes (most recent note)      Ophelia Tejada DO at 7/23/2017 11:54 AM  Version 1 of 1             [facility] Medicine Services  INPATIENT PROGRESS NOTE    Date of Admission: 7/20/2017  Length of Stay: 3  Primary Care Physician: Javan Shankar MD    Subjective   CC: dyspnea. Follow up acute on chronic respiratory failure  HPI:  He is sleeping in bedside chair, noncompliant with bipap.   He is agreeing to wear the high flow oxygen.   Blood pressure lower today. He sleeps often and has uncontrolled sleep apnea, for which he was formerly noncompliant with home oxygen.     No family at bedside today, but daughter was here yesterday and she confirmed patient is not well enough to go home and will need rehab.      Review Of Systems:   Review of Systems    Constitutional: Negative.  Negative for chills, fatigue and fever.   HENT: Negative.  Negative for congestion, sneezing and sore throat.    Eyes: Negative.    Respiratory: Positive for shortness of breath (stable ).    Cardiovascular: Positive for leg swelling (improving).   Gastrointestinal: Negative.  Negative for constipation, diarrhea and nausea.   Endocrine: Negative.    Genitourinary: Negative for dysuria, flank pain and hematuria.   Musculoskeletal: Negative.  Negative for back pain, gait problem and myalgias.   Skin: Negative.    Allergic/Immunologic: Negative.    Neurological: Negative for dizziness, tremors, syncope, weakness and headaches.        Foot paresthesias   Hematological: Negative.    Psychiatric/Behavioral: Negative.  Negative for confusion and self-injury. The patient is not nervous/anxious and is not hyperactive.          Objective      Temp:  [97.5 °F (36.4 °C)-98.1 °F (36.7 °C)] 97.8 °F (36.6 °C)  Heart Rate:  [] 93  Resp:  [18-27] 22  BP: ()/(52-76) 104/76  Physical Exam  General Appearance:    Asleep but easily awakened. Cooperative with reassurance and he is very forgetful improved with reminders. No acute distress. Obese, sitting up in bedside chair.    Head:    Atraumatic and normocephalic, without obvious abnormality.   Eyes:            PERRLA, conjunctivae and sclerae normal, no Icterus. No pallor. Extra-occular movements are within normal limits.   Ears:    Ears appear intact with no abnormalities noted.   Throat:   No oral lesions, no thrush, oral mucosa moist.   Neck:   Supple, trachea midline, no thyromegaly, no carotid bruit.   Back:     No kyphoscoliosis present. No tenderness to palpation,   range of motion normal.   Lungs:     Chest shape is normal. Breath sounds heard bilaterally equally with trace improved bibasilar crackles without wheezing. No Pleural rub or bronchial breathing.      Heart:    Normal S1 and S2, no murmur, no gallop, no rub. No JVD   Abdomen:      Normal bowel sounds, no masses, no organomegaly. Soft        non-tender, non-distended, no guarding, no rebound                Tenderness. Morbidly obese   Extremities:   Moves all extremities well, 1+=bilateral edema, no cyanosis, hanging legs over side of bed instead of elevated   Pulses:   Pulses palpable and equal bilaterally   Skin:   No bleeding, bruising or rash   Lymph nodes:   No palpable adenopathy   Neurologic:   Gross sensation intact, Motor power is normal and equal bilaterally.           Results Review:    I have reviewed the labs, radiology results and diagnostic studies.      Results from last 7 days  Lab Units 07/23/17  0651   WBC 10*3/mm3 9.92   HEMOGLOBIN g/dL 12.3*   PLATELETS 10*3/mm3 159       Results from last 7 days  Lab Units 07/23/17  0651   SODIUM mmol/L 135   POTASSIUM mmol/L 4.2   CHLORIDE mmol/L 94*   CO2 mmol/L 35.0*   BUN mg/dL 23   CREATININE mg/dL 1.00   GLUCOSE mg/dL 235*   CALCIUM mg/dL 8.6*       Culture Data: Cultures:         Radiology Data:     I have reviewed the medications.    aspirin 81 mg Oral Daily   atorvastatin 20 mg Oral Nightly   budesonide-formoterol 2 puff Inhalation BID - RT   carvedilol 25 mg Oral BID With Meals   dabigatran etexilate 150 mg Oral BID   diltiaZEM 60 mg Oral Q6H   [START ON 7/24/2017] furosemide 20 mg Intravenous Daily   gabapentin 100 mg Oral Q12H   [START ON 7/24/2017] insulin detemir 20 Units Subcutaneous Daily   insulin detemir 40 Units Subcutaneous Nightly   insulin lispro 0-9 Units Subcutaneous 4x Daily With Meals & Nightly   insulin lispro 15 Units Subcutaneous TID With Meals   ipratropium-albuterol 3 mL Nebulization 4x Daily - RT   metOLazone 5 mg Oral Daily   pantoprazole 40 mg Oral QAM   pharmacy consult - MTM  Does not apply Daily   Pharmacy Meds to Bed Consult  Does not apply Daily   traZODone 50 mg Oral Nightly         Assessment/Plan     Problem List  Hospital Problem List     * (Principal)Chronic obstructive pulmonary disease with  acute exacerbation    FAUSTO (obstructive sleep apnea)    Hypertension    GERD (gastroesophageal reflux disease)    Acute on chronic diastolic congestive heart failure    Morbid obesity    Acute respiratory failure with hypoxia and hypercapnia    Atrial fibrillation    CAD (coronary artery disease)    Chronic anticoagulation    DM2 (diabetes mellitus, type 2)    HLD (hyperlipidemia)    Oxygen dependent    CKD (chronic kidney disease) stage 3, GFR 30-59 ml/min               Assessment:    1. Acute on Chronic Hypoxic and hypercapnic respiratory failure, stable on high flow oxygen  2. Acute on chronic Diastolic heart failure, EF 70% 4/20/17  3. FAUSTO on bipap with oxygen nightly at home  4. Morbid obesity  5. COPD with acute exacerbation, stable improved  6. CKD3  7. Suspect obesity hypoventilation syndrome  8. Chronic afib on Pradaxa  9. Uncontrolled diabetes mellitus  With A1c 10.2   10. Medication and oxygen noncompliance at home   11. Suspect mild chronic underlying dementia without behavior disorder, stable, suspect chronic hypoxia related encephalopathy from chronic noncompliance  12. Peripheral neuropathy, diabetic  13. Chronic essential hypertension  14. FAUSTO        Plan:  Increase day levemir today and still continue sliding scale insulin.    Continue and wean high flow oxygen as tolerated. He has declined bipap at this time, would try again.     Continue symbicort, avoiding oral steroid due to hyperglycemia still uncontrolled diabetes.   Continue Pradaxa.    Since blood pressure low today, stop his lisinopril. Meds held this morning. Will resume coreg and diltiazem later. Cardiology following has added zaroxolyn and he has received lasix. With fatigue and hypersomnolence, suspect intake not his usual. Continue to monitor daily labs and titrate medications accordingly.     PT/OT eval and consultation for CM for placement acute rehab.    DVT prophylaxis: pradaxa. BENI. SCD  Discharge Planning: I expect patient to be  discharged to acute rehab in the next week.    Ophelia Tejada DO   07/23/17   11:54 AM         Electronically signed by Ophelia Tejada DO at 7/23/2017  1:40 PM           Consult Notes (most recent note)      Jean Pierre Lainez III, MD at 7/21/2017 11:17 AM  Version 2 of 2     Consult Orders:    1. Inpatient Consult to Cardiology [[id number]] ordered by Dominik Sepulveda MD at 07/20/17 2054                [facility]   Consult Note    Referring Provider: Dr. Dominik Sepulveda    Reason for Consultation: CHF and atrial fibrillation    Patient Care Team:  Javan Shankar MD as PCP - General (Internal Medicine)     Problem List:  1. Congestive heart failure, diastolic, acute on chronic  1. April 2017 echocardiogram with an EF of 70%.  Left atrial cavity size mildly dilated.  No significant valve disease.  2. Atrial fibrillation, chronic  1. CHADSVASC= 5, on chronic Pradaxa therapy  3. Hypertension  4. Dyslipidemia  5. Diabetes mellitus II  6. COPD  7. GERD  8. Obstructive sleep apnea, states wears CPAP device at home.  9. Morbid obesity  10. Surgeries:  1. Appendectomy  2. Left hand surgery  3. Left knee surgery      No Known Allergies        Current Facility-Administered Medications:   •  aspirin EC tablet 81 mg, 81 mg, Oral, Daily, Dominik Sepulveda MD, 81 mg at 07/21/17 0811  •  atorvastatin (LIPITOR) tablet 20 mg, 20 mg, Oral, Nightly, Dominik Sepulveda MD  •  budesonide-formoterol (SYMBICORT) 160-4.5 MCG/ACT inhaler 2 puff, 2 puff, Inhalation, BID - RT, Dominik Sepulveda MD, 2 puff at 07/21/17 0730  •  carvedilol (COREG) tablet 25 mg, 25 mg, Oral, BID With Meals, Dominik Sepulveda MD, 25 mg at 07/21/17 0811  •  dabigatran etexilate (PRADAXA) capsule 150 mg, 150 mg, Oral, BID, Dominik Sepulveda MD, 150 mg at 07/21/17 0811  •  dextrose (D50W) solution 25 g, 25 g, Intravenous, Q15 Min PRN, Dominik Sepulveda MD  •  dextrose (GLUTOSE) oral gel 15 g, 15 g, Oral, Q15 Min PRN, Dominik Sepulveda MD  •  diltiaZEM (CARDIZEM) tablet 60  mg, 60 mg, Oral, Q6H, Dominik Sepulveda MD, Stopped at 07/21/17 0600  •  furosemide (LASIX) tablet 40 mg, 40 mg, Oral, BID, Dominik Sepulveda MD, 40 mg at 07/21/17 0811  •  glucagon (GLUCAGEN) injection 1 mg, 1 mg, Subcutaneous, Q15 Min PRN, Dominik Sepulveda MD  •  HYDROcodone-acetaminophen (NORCO) 7.5-325 MG per tablet 1 tablet, 1 tablet, Oral, Q4H PRN, Dominik Sepulveda MD  •  insulin detemir (LEVEMIR) injection 10 Units, 10 Units, Subcutaneous, Nightly, Dominik Sepulveda MD, 10 Units at 07/21/17 0011  •  insulin lispro (humaLOG) injection 0-9 Units, 0-9 Units, Subcutaneous, 4x Daily AC & at Bedtime, Dominik Sepulveda MD, 7 Units at 07/21/17 0811  •  ipratropium-albuterol (DUO-NEB) nebulizer solution 3 mL, 3 mL, Nebulization, Q4H - RT, Dominik Sepulveda MD, 3 mL at 07/21/17 0729  •  ipratropium-albuterol (DUO-NEB) nebulizer solution 3 mL, 3 mL, Nebulization, Q4H PRN, Dominik Sepulveda MD  •  lisinopril (PRINIVIL,ZESTRIL) tablet 5 mg, 5 mg, Oral, Daily, Dominik Sepulveda MD, 5 mg at 07/21/17 0811  •  LORazepam (ATIVAN) injection 0.5 mg, 0.5 mg, Intravenous, Q6H PRN, Dominik Sepulveda MD  •  methylPREDNISolone sodium succinate (SOLU-Medrol) injection 80 mg, 80 mg, Intravenous, Q12H, Dominik Sepulveda MD, 80 mg at 07/21/17 0011  •  Morphine sulfate (PF) injection 1 mg, 1 mg, Intravenous, Q4H PRN **AND** naloxone (NARCAN) injection 0.4 mg, 0.4 mg, Intravenous, Q5 Min PRN, Dominik Sepulveda MD  •  ondansetron (ZOFRAN) injection 4 mg, 4 mg, Intravenous, Q6H PRN, Dominik Sepulveda MD, 4 mg at 07/21/17 0819  •  pantoprazole (PROTONIX) EC tablet 40 mg, 40 mg, Oral, QAM, Dominik Sepulveda MD, 40 mg at 07/21/17 0811  •  Pharmacy Consult - [facility], , Does not apply, Daily, Dimitri Montanez [facility]  •  sodium chloride 0.9 % flush 1-10 mL, 1-10 mL, Intravenous, PRN, Dominik Sepulveda MD  •  sodium chloride 0.9 % flush 10 mL, 10 mL, Intravenous, PRN, Donovan Marcano MD  •  traZODone (DESYREL) tablet 50 mg, 50 mg, Oral, Nightly, Dominik Sepulveda MD, 50 mg at 07/21/17 0015          "    Prescriptions Prior to Admission   Medication Sig Dispense Refill Last Dose   • aspirin  MG tablet Take 1 tablet by mouth Daily. 90 tablet 3 Taking   • atorvastatin (LIPITOR) 20 MG tablet Take 1 tablet by mouth Every Night. STOP PRAVASTATIN 90 tablet 2 Taking   • budesonide-formoterol (SYMBICORT) 160-4.5 MCG/ACT inhaler Inhale 2 puffs 2 (Two) Times a Day. 1 inhaler 12 Taking   • carvedilol (COREG) 25 MG tablet Take 1 tablet by mouth 2 (Two) Times a Day With Meals. 180 tablet 3 Taking   • cholecalciferol (VITAMIN D3) 400 UNITS tablet Take 400 Units by mouth Daily.      • colchicine 0.6 MG tablet Take 0.6 mg by mouth 2 (Two) Times a Day.      • dabigatran etexilate (PRADAXA) 150 MG capsu Take 1 capsule by mouth 2 (Two) Times a Day. 60 capsule 5 Taking   • diltiaZEM (CARDIZEM) 60 MG tablet Take 1 tablet by mouth Every 6 (Six) Hours. 120 tablet 2 Taking   • furosemide (LASIX) 40 MG tablet Take 40 mg by mouth 2 (Two) Times a Day.      • glimepiride (AMARYL) 2 MG tablet Take 1 tablet by mouth Every Morning Before Breakfast. 90 tablet 3 Taking   • Insulin Glargine (TOUJEO SOLOSTAR) 300 UNIT/ML solution pen-injector Inject 22 Units under the skin Every Night.      • metFORMIN ER (GLUCOPHAGE-XR) 500 MG 24 hr tablet Take 1 tablet by mouth 2 (Two) Times a Day. 180 tablet 3 Taking   • omeprazole (priLOSEC) 40 MG capsule TAKE ONE CAPSULE BY MOUTH DAILY 30 capsule 1    • traZODone (DESYREL) 50 MG tablet Take 1 tablet by mouth Every Night. 30 tablet 5 Taking         Subjective .   History of present illness:    Patient is a 66-year-old  male who we are asked to see for further evaluation of congestive heart failure.  He is unsure at this time who his cardiologist is.  Overall is noted to be a somewhat poor historian as he openly admits that he can't \"keeping straight\".  He has a diagnosis of chronic atrial fibrillation for which he has been maintained on chronic Pradaxa therapy.  This is managed by his primary " "care physician.  No previous history of coronary disease by his report.  He was in his usual state of health up until the last few weeks.  He notes that he has gained roughly 15 pounds.  He presented to the emergency department yesterday initially with complaints of pain in his feet.  He also notes some shortness of breath but notes that the pain in his feet with his primary reason for presenting.  He was brought to the hospital and was noted to be significantly hypoxic.  He continues to be maintained on high flow nasal cannula with oxygen saturation still in the low 90s to high 80s.  Has some occasional chest discomfort which is atypical described as a sharp shooting sensation that lasts for just a second.  Has noted over the last few weeks some increasing shortness of breath, orthopnea, and PND.  He also has bilateral lower extremity edema.  He is not compliant with any type of fluid restriction.  He notes that he drinks \"lots\" of fluid daily.  Also notes that he eats salt on \"everything\".  No reported syncope.  Was given half a milligram of Bumex yesterday and diuresed close to 3 L.    Admits to increased dietary salt intake.    Social History     Social History   • Marital status: Unknown     Spouse name: N/A   • Number of children: 5   • Years of education: H.S.     Occupational History   • Construction Retired     Social History Main Topics   • Smoking status: Former Smoker     Packs/day: 1.00     Years: 47.00     Types: Cigarettes     Quit date: 2014   • Smokeless tobacco: Never Used   • Alcohol use No   • Drug use: No   • Sexual activity: No      Comment:      Other Topics Concern   • Not on file     Social History Narrative    He lives by himself     Family History   Problem Relation Age of Onset   • Diabetes Mother    • Heart disease Mother    • Diabetes Father    • Heart disease Father    • Heart disease Sister          Review of Systems:  Review of Systems   Constitution: Positive for " "malaise/fatigue. Negative for fever and weakness.   HENT: Negative for headaches and nosebleeds.    Eyes: Negative for redness and visual disturbance.   Cardiovascular: Positive for orthopnea and paroxysmal nocturnal dyspnea. Negative for palpitations.   Respiratory: Positive for snoring. Negative for cough, sputum production and wheezing.    Hematologic/Lymphatic: Negative for bleeding problem.   Skin: Negative for flushing, itching and rash.   Musculoskeletal: Positive for joint pain. Negative for falls and muscle cramps.   Gastrointestinal: Negative for abdominal pain, diarrhea, heartburn, nausea and vomiting.   Genitourinary: Negative for hematuria.   Neurological: Negative for excessive daytime sleepiness, dizziness and tremors.   Psychiatric/Behavioral: Negative for substance abuse. The patient is not nervous/anxious.               Objective   Vitals:  Blood pressure 112/79, pulse 90, temperature 97.6 °F (36.4 °C), temperature source Oral, resp. rate 20, height 71\" (180.3 cm), weight (!) 332 lb 9.6 oz (151 kg), SpO2 94 %.     Intake/Output Summary (Last 24 hours) at 07/21/17 1117  Last data filed at 07/21/17 1115   Gross per 24 hour   Intake                0 ml   Output             2700 ml   Net            -2700 ml       Physical Exam   Constitutional: He is oriented to person, place, and time. He appears well-developed and well-nourished. No distress.   Currently on high flow nasal cannula   HENT:   Head: Normocephalic and atraumatic.   Eyes: Right eye exhibits no discharge. Left eye exhibits no discharge.   Neck: No JVD present. No tracheal deviation present.   Cardiovascular: Normal rate and normal heart sounds.  An irregularly irregular rhythm present. Exam reveals no friction rub.    No murmur heard.  Pulmonary/Chest: Effort normal.   Bibasilar crackles     Abdominal: Soft. Bowel sounds are normal. There is no tenderness.   Musculoskeletal: He exhibits edema (1-2+ ble edema radiating almost to the knee). " He exhibits no deformity.   Neurological: He is alert and oriented to person, place, and time.   Skin: Skin is warm and dry.   Psychiatric: His behavior is normal.            Results Review:  I reviewed the patient's new clinical results.    Results from last 7 days  Lab Units 07/20/17  1456   WBC 10*3/mm3 9.00   HEMOGLOBIN g/dL 12.6*   HEMATOCRIT % 41.9   PLATELETS 10*3/mm3 170       Results from last 7 days  Lab Units 07/20/17  1456   SODIUM mmol/L 133   POTASSIUM mmol/L 4.4   CHLORIDE mmol/L 96*   CO2 mmol/L 30.0   BUN mg/dL 24*   CREATININE mg/dL 1.50*   CALCIUM mg/dL 8.8   BILIRUBIN mg/dL 0.4   ALK PHOS U/L 79   ALT (SGPT) U/L 34   AST (SGOT) U/L 27   GLUCOSE mg/dL 294*       Results from last 7 days  Lab Units 07/20/17  1456   SODIUM mmol/L 133   POTASSIUM mmol/L 4.4   CHLORIDE mmol/L 96*   CO2 mmol/L 30.0   BUN mg/dL 24*   CREATININE mg/dL 1.50*   GLUCOSE mg/dL 294*   CALCIUM mg/dL 8.8           0  Lab Value Date/Time   TROPONINI <0.006 07/21/2017 0553   TROPONINI <0.006 07/21/2017 0107   TROPONINI <0.006 04/18/2017 2143   TROPONINI 0.01 04/27/2014 0438                       Tele:  Atrial fibrillation      Assessment/Plan     1. Acute on chronic diastolic heart failure.  Suspect significant portion due to dietary noncompliance.  Echocardiogram pending  2. Acute hypoxic respiratory failure   3. Chronic atrial fibrillation on chronic anticoagulation.  4. Hypertension  5. Dyslipidemia  6. Diabetes  7. Renal insufficiency, prior to this admission had normal creatinine.  BMP for today is pending.  8. Sleep apnea  9. Morbid obesity  10. Chest pain, atypical      Plan:    1. Check BMP today as labs were not ordered.  Continue to diurese, will give Bumex 2 mg IV ×1 dose today after BMP has been resulted in his renal function is noted to be stable.  Instructed patient regarding restricting fluids as well as decreasing sodium intake.  We'll review echocardiogram once it is available.  May consider ischemic evaluation  with cardiac PET.  We'll order strict intake and output as well as daily weights. Continue ACE and BB for now. Monitor renal function.    Starting scheduled IV lasix.  Continue rate control with CCB and BB.    OTILIA Keith obtained past medical, family history, social history, review of systems and functioned as a scribe for the remainder of the dictation for Dr. Lainez.      OTILIA Keith  07/21/17  11:17 AM    Dictated utilizing Dragon dictation    I,Jean Pierre Lainez M.D., personally performed the services described in this documentation as scribed by the above named individual in my presence, and it is both accurate and complete.  7/21/2017  2:35 PM       Electronically signed by Jean Pierre Lainez III, MD at 7/21/2017  2:37 PM      OTILIA Keith at 7/21/2017 11:17 AM  Version 1 of 2         [facility]   Consult Note    Referring Provider: Dr. Dominik Sepulveda    Reason for Consultation: CHF and atrial fibrillation    Patient Care Team:  Javan Shankar MD as PCP - General (Internal Medicine)     Problem List:  11. Congestive heart failure, diastolic  1. April 2017 echocardiogram with an EF of 70%.  Left atrial cavity size mildly dilated.  No significant valve disease.  12. Atrial fibrillation, chronic  1. CHADSVASC= 5, on chronic Pradaxa therapy  13. Hypertension  14. Dyslipidemia  15. Diabetes mellitus II  16. COPD  17. GERD  18. Obstructive sleep apnea, states wears CPAP device at home.  19. Morbid obesity  20. Surgeries:  1. Appendectomy  2. Left hand surgery  3. Left knee surgery      No Known Allergies        Current Facility-Administered Medications:   •  aspirin EC tablet 81 mg, 81 mg, Oral, Daily, Dominik Sepulveda MD, 81 mg at 07/21/17 0811  •  atorvastatin (LIPITOR) tablet 20 mg, 20 mg, Oral, Nightly, Dominik Sepulveda MD  •  budesonide-formoterol (SYMBICORT) 160-4.5 MCG/ACT inhaler 2 puff, 2 puff, Inhalation, BID - RT, Dominik Sepulveda MD, 2 puff at 07/21/17 0730  •   carvedilol (COREG) tablet 25 mg, 25 mg, Oral, BID With Meals, Dominik Sepulveda MD, 25 mg at 07/21/17 0811  •  dabigatran etexilate (PRADAXA) capsule 150 mg, 150 mg, Oral, BID, Dominik Sepulveda MD, 150 mg at 07/21/17 0811  •  dextrose (D50W) solution 25 g, 25 g, Intravenous, Q15 Min PRN, Dominik Sepulveda MD  •  dextrose (GLUTOSE) oral gel 15 g, 15 g, Oral, Q15 Min PRN, Dominik Sepulveda MD  •  diltiaZEM (CARDIZEM) tablet 60 mg, 60 mg, Oral, Q6H, Dominik Sepulveda MD, Stopped at 07/21/17 0600  •  furosemide (LASIX) tablet 40 mg, 40 mg, Oral, BID, Dominik Sepulveda MD, 40 mg at 07/21/17 0811  •  glucagon (GLUCAGEN) injection 1 mg, 1 mg, Subcutaneous, Q15 Min PRN, Dominik Sepulveda MD  •  HYDROcodone-acetaminophen (NORCO) 7.5-325 MG per tablet 1 tablet, 1 tablet, Oral, Q4H PRN, Dominik Sepulveda MD  •  insulin detemir (LEVEMIR) injection 10 Units, 10 Units, Subcutaneous, Nightly, Dominik Sepulveda MD, 10 Units at 07/21/17 0011  •  insulin lispro (humaLOG) injection 0-9 Units, 0-9 Units, Subcutaneous, 4x Daily AC & at Bedtime, Dominik Sepulveda MD, 7 Units at 07/21/17 0811  •  ipratropium-albuterol (DUO-NEB) nebulizer solution 3 mL, 3 mL, Nebulization, Q4H - RT, Dominik Sepulveda MD, 3 mL at 07/21/17 0729  •  ipratropium-albuterol (DUO-NEB) nebulizer solution 3 mL, 3 mL, Nebulization, Q4H PRN, Dominik Sepulveda MD  •  lisinopril (PRINIVIL,ZESTRIL) tablet 5 mg, 5 mg, Oral, Daily, Dominik Sepulveda MD, 5 mg at 07/21/17 0811  •  LORazepam (ATIVAN) injection 0.5 mg, 0.5 mg, Intravenous, Q6H PRN, Dominik Sepulveda MD  •  methylPREDNISolone sodium succinate (SOLU-Medrol) injection 80 mg, 80 mg, Intravenous, Q12H, Dominik Sepulveda MD, 80 mg at 07/21/17 0011  •  Morphine sulfate (PF) injection 1 mg, 1 mg, Intravenous, Q4H PRN **AND** naloxone (NARCAN) injection 0.4 mg, 0.4 mg, Intravenous, Q5 Min PRN, Dominik Sepulveda MD  •  ondansetron (ZOFRAN) injection 4 mg, 4 mg, Intravenous, Q6H PRN, Dominik Sepulveda MD, 4 mg at 07/21/17 0819  •  pantoprazole (PROTONIX) EC tablet 40 mg, 40 mg, Oral,  QAM, Dominik Sepulveda MD, 40 mg at 07/21/17 0811  •  Pharmacy Consult - [facility], , Does not apply, Daily, Dimitri Montanez [facility]  •  sodium chloride 0.9 % flush 1-10 mL, 1-10 mL, Intravenous, PRN, Dominik Sepulveda MD  •  sodium chloride 0.9 % flush 10 mL, 10 mL, Intravenous, PRN, Donovan Marcano MD  •  traZODone (DESYREL) tablet 50 mg, 50 mg, Oral, Nightly, Dominik Sepulveda MD, 50 mg at 07/21/17 0015         Prescriptions Prior to Admission   Medication Sig Dispense Refill Last Dose   • aspirin  MG tablet Take 1 tablet by mouth Daily. 90 tablet 3 Taking   • atorvastatin (LIPITOR) 20 MG tablet Take 1 tablet by mouth Every Night. STOP PRAVASTATIN 90 tablet 2 Taking   • budesonide-formoterol (SYMBICORT) 160-4.5 MCG/ACT inhaler Inhale 2 puffs 2 (Two) Times a Day. 1 inhaler 12 Taking   • carvedilol (COREG) 25 MG tablet Take 1 tablet by mouth 2 (Two) Times a Day With Meals. 180 tablet 3 Taking   • cholecalciferol (VITAMIN D3) 400 UNITS tablet Take 400 Units by mouth Daily.      • colchicine 0.6 MG tablet Take 0.6 mg by mouth 2 (Two) Times a Day.      • dabigatran etexilate (PRADAXA) 150 MG capsu Take 1 capsule by mouth 2 (Two) Times a Day. 60 capsule 5 Taking   • diltiaZEM (CARDIZEM) 60 MG tablet Take 1 tablet by mouth Every 6 (Six) Hours. 120 tablet 2 Taking   • furosemide (LASIX) 40 MG tablet Take 40 mg by mouth 2 (Two) Times a Day.      • glimepiride (AMARYL) 2 MG tablet Take 1 tablet by mouth Every Morning Before Breakfast. 90 tablet 3 Taking   • Insulin Glargine (TOUJEO SOLOSTAR) 300 UNIT/ML solution pen-injector Inject 22 Units under the skin Every Night.      • metFORMIN ER (GLUCOPHAGE-XR) 500 MG 24 hr tablet Take 1 tablet by mouth 2 (Two) Times a Day. 180 tablet 3 Taking   • omeprazole (priLOSEC) 40 MG capsule TAKE ONE CAPSULE BY MOUTH DAILY 30 capsule 1    • traZODone (DESYREL) 50 MG tablet Take 1 tablet by mouth Every Night. 30 tablet 5 Taking         Subjective .   History of present illness:    Patient is a  "66-year-old  male who we are asked to see for further evaluation of congestive heart failure.  He is unsure at this time who his cardiologist is.  Overall is noted to be a somewhat poor historian as he openly admits that he can't \"keeping straight\".  He has a diagnosis of chronic atrial fibrillation for which he has been maintained on chronic Pradaxa therapy.  This is managed by his primary care physician.  No previous history of coronary disease by his report.  He was in his usual state of health up until the last few weeks.  He notes that he has gained roughly 15 pounds.  He presented to the emergency department yesterday initially with complaints of pain in his feet.  He also notes some shortness of breath but notes that the pain in his feet with his primary reason for presenting.  He was brought to the hospital and was noted to be significantly hypoxic.  He continues to be maintained on high flow nasal cannula with oxygen saturation still in the low 90s to high 80s.  Has some occasional chest discomfort which is atypical described as a sharp shooting sensation that lasts for just a second.  Has noted over the last few weeks some increasing shortness of breath, orthopnea, and PND.  He also has bilateral lower extremity edema.  He is not compliant with any type of fluid restriction.  He notes that he drinks \"lots\" of fluid daily.  Also notes that he eats salt on \"everything\".  No reported syncope.  Was given half a milligram of Bumex yesterday and diuresed close to 3 L.      Social History     Social History   • Marital status: Unknown     Spouse name: N/A   • Number of children: 5   • Years of education: H.S.     Occupational History   • Construction Retired     Social History Main Topics   • Smoking status: Former Smoker     Packs/day: 1.00     Years: 47.00     Types: Cigarettes     Quit date: 2014   • Smokeless tobacco: Never Used   • Alcohol use No   • Drug use: No   • Sexual activity: No      " "Comment:      Other Topics Concern   • Not on file     Social History Narrative    He lives by himself     Family History   Problem Relation Age of Onset   • Diabetes Mother    • Heart disease Mother    • Diabetes Father    • Heart disease Father    • Heart disease Sister          Review of Systems:  Review of Systems   Constitution: Positive for malaise/fatigue. Negative for fever and weakness.   HENT: Negative for headaches and nosebleeds.    Eyes: Negative for redness and visual disturbance.   Cardiovascular: Positive for orthopnea and paroxysmal nocturnal dyspnea. Negative for palpitations.   Respiratory: Positive for snoring. Negative for cough, sputum production and wheezing.    Hematologic/Lymphatic: Negative for bleeding problem.   Skin: Negative for flushing, itching and rash.   Musculoskeletal: Positive for joint pain. Negative for falls and muscle cramps.   Gastrointestinal: Negative for abdominal pain, diarrhea, heartburn, nausea and vomiting.   Genitourinary: Negative for hematuria.   Neurological: Negative for excessive daytime sleepiness, dizziness and tremors.   Psychiatric/Behavioral: Negative for substance abuse. The patient is not nervous/anxious.               Objective   Vitals:  Blood pressure 112/79, pulse 90, temperature 97.6 °F (36.4 °C), temperature source Oral, resp. rate 20, height 71\" (180.3 cm), weight (!) 332 lb 9.6 oz (151 kg), SpO2 94 %.     Intake/Output Summary (Last 24 hours) at 07/21/17 1117  Last data filed at 07/21/17 1115   Gross per 24 hour   Intake                0 ml   Output             2700 ml   Net            -2700 ml       Physical Exam   Constitutional: He is oriented to person, place, and time. He appears well-developed and well-nourished. No distress.   Currently on high flow nasal cannula   HENT:   Head: Normocephalic and atraumatic.   Eyes: Right eye exhibits no discharge. Left eye exhibits no discharge.   Neck: No JVD present. No tracheal deviation " present.   Cardiovascular: Normal rate and normal heart sounds.  An irregularly irregular rhythm present. Exam reveals no friction rub.    No murmur heard.  Pulmonary/Chest: Effort normal.   Bibasilar crackles     Abdominal: Soft. Bowel sounds are normal. There is no tenderness.   Musculoskeletal: He exhibits edema (1-2+ ble edema radiating almost to the knee). He exhibits no deformity.   Neurological: He is alert and oriented to person, place, and time.   Skin: Skin is warm and dry.   Psychiatric: His behavior is normal.            Results Review:  I reviewed the patient's new clinical results.    Results from last 7 days  Lab Units 07/20/17  1456   WBC 10*3/mm3 9.00   HEMOGLOBIN g/dL 12.6*   HEMATOCRIT % 41.9   PLATELETS 10*3/mm3 170       Results from last 7 days  Lab Units 07/20/17  1456   SODIUM mmol/L 133   POTASSIUM mmol/L 4.4   CHLORIDE mmol/L 96*   CO2 mmol/L 30.0   BUN mg/dL 24*   CREATININE mg/dL 1.50*   CALCIUM mg/dL 8.8   BILIRUBIN mg/dL 0.4   ALK PHOS U/L 79   ALT (SGPT) U/L 34   AST (SGOT) U/L 27   GLUCOSE mg/dL 294*       Results from last 7 days  Lab Units 07/20/17  1456   SODIUM mmol/L 133   POTASSIUM mmol/L 4.4   CHLORIDE mmol/L 96*   CO2 mmol/L 30.0   BUN mg/dL 24*   CREATININE mg/dL 1.50*   GLUCOSE mg/dL 294*   CALCIUM mg/dL 8.8           0  Lab Value Date/Time   TROPONINI <0.006 07/21/2017 0553   TROPONINI <0.006 07/21/2017 0107   TROPONINI <0.006 04/18/2017 2143   TROPONINI 0.01 04/27/2014 0438                       Tele:  Atrial fibrillation      Assessment/Plan     11. Acute on chronic diastolic heart failure.  Suspect significant portion due to dietary noncompliance.  Echocardiogram pending  12. Acute hypoxic respiratory failure   13. Chronic atrial fibrillation on chronic anticoagulation.  14. Hypertension  15. Dyslipidemia  16. Diabetes  17. Renal insufficiency, prior to this admission had normal creatinine.  BMP for today is pending.  18. Sleep apnea  19. Morbid obesity  20. Chest pain,  atypical      Plan:    2. Check BMP today as labs were not ordered.  Continue to diurese, will give Bumex 2 mg IV ×1 dose today after BMP has been resulted in his renal function is noted to be stable.  Instructed patient regarding restricting fluids as well as decreasing sodium intake.  We'll review echocardiogram once it is available.  May consider ischemic evaluation with cardiac PET.  We'll order strict intake and output as well as daily weights. Continue ACE and BB for now. Monitor renal function.      OTILIA Keith obtained past medical, family history, social history, review of systems and functioned as a scribe for the remainder of the dictation for Dr. Lainez.      OTILIA Keith  17  11:17 AM    Dictated utilizing Dragon dictation       Electronically signed by OTILIA Keith at 2017 12:16 PM           Physical Therapy Notes (most recent note)      Pattie Dior, PT at 2017  3:03 PM  Version 1 of 1         Acute Care - Physical Therapy Initial Evaluation  [facility]     Patient Name: Chito Rod  : 1950  MRN: [id number]  Today's Date: 2017   Onset of Illness/Injury or Date of Surgery Date: 17  Date of Referral to PT: 17  Referring Physician: MD Mallory      Admit Date: 2017     Visit Dx:    ICD-10-CM ICD-9-CM   1. Acute respiratory failure with hypoxia and hypercapnia J96.01 518.81    J96.02    2. Medically noncompliant Z91.19 V15.81   3. Morbid obesity, unspecified obesity type E66.01 278.01   4. Type 2 diabetes mellitus with complication, with long-term current use of insulin E11.8 250.90    Z79.4 V58.67   5. Essential hypertension I10 401.9   6. Shortness of breath R06.02 786.05   7. Atrial fibrillation with normal ventricular rate I48.91 427.31   8. Acute on chronic diastolic congestive heart failure I50.33 428.33     428.0   9. Impaired mobility and ADLs Z74.09 799.89   10. Impaired functional mobility, balance, gait, and  endurance Z74.09 V49.89     Patient Active Problem List   Diagnosis   • FAUSTO (obstructive sleep apnea)   • Chronic obstructive pulmonary disease with acute exacerbation   • Hypertension   • GERD (gastroesophageal reflux disease)   • Acute on chronic diastolic congestive heart failure   • Morbid obesity   • Acute respiratory failure with hypoxia and hypercapnia   • Atrial fibrillation   • CAD (coronary artery disease)   • Chronic anticoagulation   • DM2 (diabetes mellitus, type 2)   • HLD (hyperlipidemia)   • Oxygen dependent   • CKD (chronic kidney disease) stage 3, GFR 30-59 ml/min     Past Medical History:   Diagnosis Date   • Atrial fibrillation    • CAD (coronary artery disease)    • CHF (congestive heart failure)    • Chronic anticoagulation    • CKD (chronic kidney disease) stage 3, GFR 30-59 ml/min    • COPD (chronic obstructive pulmonary disease)    • DM2 (diabetes mellitus, type 2)    • GERD (gastroesophageal reflux disease)    • History of MI (myocardial infarction)    • HLD (hyperlipidemia)    • HTN (hypertension)    • Morbid obesity    • Noncompliance    • FAUSTO (obstructive sleep apnea)    • Oxygen dependent      Past Surgical History:   Procedure Laterality Date   • APPENDECTOMY  1961   • CORONARY STENT PLACEMENT  2008   • HAND SURGERY Left 2002   • KNEE ARTHROSCOPY Left 1965          PT ASSESSMENT (last 72 hours)      PT Evaluation       07/23/17 1412 07/23/17 0805    Rehab Evaluation    Document Type evaluation  -SJ evaluation  -AC    Subjective Information no complaints;agree to therapy  -SJ agree to therapy;complains of;dyspnea  -AC    Patient Effort, Rehab Treatment good  -SJ good  -AC    Symptoms Noted During/After Treatment none  -SJ fatigue;shortness of breath  -AC    General Information    Patient Profile Review yes  -SJ yes  -AC    Onset of Illness/Injury or Date of Surgery Date 07/20/17  -SJ 07/20/17  -AC    Referring Physician MD Mallory  -SJ MD Mallory  -PADMINI    General Observations Pt  sidelying in bed, asleep, arouses easily. has high flow O2, tele  - pt received sidelying in bed on hi-wendy O2.   -AC    Pertinent History Of Current Problem Pt brought to ED with difficulty breathing  - Pt admit with low O2 sats and difficulty breathing.  Pt with acute respiratory failure with hypoxia  -AC    Precautions/Limitations fall precautions;oxygen therapy device and L/min  - fall precautions;oxygen therapy device and L/min   pt on high-wendy; venti mask on 15LO2 with therapy today  -AC    Prior Level of Function independent:;all household mobility;community mobility;gait;transfer;bed mobility;ADL's;driving;using stairs  - independent:;all household mobility;community mobility;transfer;ADL's;home management;driving  -AC    Equipment Currently Used at Home bipap/ cpap;oxygen  - bipap/ cpap;oxygen  -AC    Plans/Goals Discussed With patient;agreed upon  -SJ patient;agreed upon  -AC    Risks Reviewed patient:;LOB;increased discomfort;change in vital signs  - patient:;LOB;change in vital signs  -AC    Benefits Reviewed patient:;improve function;increase independence;increase strength;increase balance;increase knowledge  - patient:;improve function;increase independence;increase balance;increase knowledge;increase strength  -AC    Barriers to Rehab none identified  -     Living Environment    Lives With alone  - alone  -AC    Living Arrangements house  - house  -    Home Accessibility stairs to enter home;stairs within home;bed and bath on same level  -SJ stairs within home;tub/shower is not walk in  -AC    Number of Stairs to Enter Home 6  -SJ     Number of Stairs Within Home 12   basement  -SJ 12   basement  -AC    Stair Railings at Home inside, present on left side;outside, present on left side  -SJ     Living Environment Comment pt states he has 3 steps, then landing, then 3 more step  -     Clinical Impression    Date of Referral to PT 07/22/17  -     PT Diagnosis impaired mobility   -     Patient/Family Goals Statement return to home  -     Criteria for Skilled Therapeutic Interventions Met yes;treatment indicated  -     Pathology/Pathophysiology Noted (Describe Specifically for Each System) musculoskeletal;pulmonary  -     Impairments Found (describe specific impairments) aerobic capacity/endurance;gait, locomotion, and balance  -     Functional Limitations in Following Categories (Describe Specific Limitations) self-care;home management;community/leisure  -     Rehab Potential good, to achieve stated therapy goals  -     Predicted Duration of Therapy Intervention (days/wks) 2wks  -SJ     Vital Signs    Pre Systolic BP Rehab 104  -SJ 90  -AC    Pre Treatment Diastolic BP 76  -SJ 52  -AC    Post Systolic BP Rehab  105  -AC    Post Treatment Diastolic BP  47  -AC    Pretreatment Heart Rate (beats/min) 99  -SJ 95  -AC    Posttreatment Heart Rate (beats/min)  88  -AC    Pre SpO2 (%) 98  -SJ 92   hi flow O2  -AC    O2 Delivery Pre Treatment supplemental O2   high flow  -SJ supplemental O2  -AC    Intra SpO2 (%) 95   O2 mask at 15L  -SJ 88  -AC    O2 Delivery Intra Treatment supplemental O2  -SJ supplemental O2   venti mask on 15LO2  -AC    Post SpO2 (%) 93  -SJ 88  -AC    O2 Delivery Post Treatment supplemental O2   high flow  -SJ supplemental O2   hi flow O2  -AC    Pre Patient Position Side Lying  -SJ Supine  -AC    Intra Patient Position Standing  -SJ Standing  -AC    Post Patient Position Sitting  -SJ Sitting  -AC    Pain Assessment    Pain Assessment No/denies pain  -     Vision Assessment/Intervention    Visual Impairment  WNL  -AC    Cognitive Assessment/Intervention    Current Cognitive/Communication Assessment functional  -SJ functional  -AC    Orientation Status oriented x 4  -SJ oriented x 4  -AC    Follows Commands/Answers Questions 100% of the time;able to follow multi-step instructions  - 100% of the time  -    Personal Safety decreased awareness, need for safety   -SJ decreased awareness, need for assist  -    Personal Safety Interventions gait belt;muscle strengthening facilitated;nonskid shoes/slippers when out of bed  - fall prevention program maintained;gait belt;nonskid shoes/slippers when out of bed  -AC    ROM (Range of Motion)    General ROM no range of motion deficits identified  -SJ no range of motion deficits identified  -    MMT (Manual Muscle Testing)    General MMT Assessment lower extremity strength deficits identified  -SJ upper extremity strength deficits identified  -AC    General MMT Assessment Detail BLE's grossly 4+/5  -SJ BUE grossly 4/5  -AC    Bed Mobility, Assessment/Treatment    Bed Mobility, Assistive Device bed rails  -SJ bed rails  -AC    Bed Mob, Sidelying to Sit, [place] set up required  -SJ supervision required  -    Bed Mobility, Comment setup for lines only  -     Transfer Assessment/Treatment    Transfers, Sit-Stand [place] stand by assist  - verbal cues required;contact guard assist  -AC    Transfers, Stand-Sit [place] supervision required  - verbal cues required;contact guard assist  -AC    Transfers, Sit-Stand-Sit, Assist Device  --   declined use of walker  -    Toilet Transfer, [place] set up required  -SJ     Transfer, Safety Issues balance decreased during turns  -SJ     Transfer, Impairments impaired balance;strength decreased  -SJ     Gait Assessment/Treatment    Gait, [place] Level contact guard assist;verbal cues required  -SJ     Gait, Distance (Feet) 375  -SJ     Gait, Gait Pattern Analysis swing-through gait  -SJ     Gait, Gait Deviations decreased heel strike   wide SHAKILA  -SJ     Gait, Safety Issues balance decreased during turns;supplemental O2  -SJ     Gait, Impairments strength decreased;impaired balance  -SJ     Gait, Comment occasional LOB req CGA, mildly unsteady initially, would benefit from straight cane  -SJ     Motor Skills/Interventions    Additional Documentation Balance  Skills Training (Group)  -SJ     Balance Skills Training    Sitting-Level of Assistance Independent  -SJ     Sitting-Balance Support Feet supported  -SJ     Standing-Level of Assistance Independent  -SJ     Gait Balance-Level of Assistance Contact guard  -SJ     Gait Balance Support No upper extremity supported  -SJ     Positioning and Restraints    Pre-Treatment Position in bed  -SJ in bed  -AC    Post Treatment Position bed  -SJ chair  -AC    In Bed sitting EOB;call light within reach;encouraged to call for assist  -SJ     In Chair  reclined;call light within reach;encouraged to call for assist  -AC      07/21/17 1350 07/20/17 9243    General Information    Equipment Currently Used at Home oxygen;bipap/ cpap   Pt has cpap and oxygen at night with [employer]. He states he has portables, but per Mati at [facility], if pt needs a portable brought to the hospital, he will require a new order for continous oxygen.   -AB oxygen  -TS    Living Environment    Lives With alone  -AB alone  -TS    Living Arrangements house   Lives in [facility]  -AB house  -TS    Home Accessibility no concerns  -AB no concerns  -TS    Stair Railings at Home none  -AB outside, present on left side  -TS    Type of Financial/Environmental Concern none  -AB none  -TS    Transportation Available car;family or friend will provide  -AB car;family or friend will provide  -TS      User Key  (r) = Recorded By, (t) = Taken By, (c) = Cosigned By    Initials Name Provider Type    PADMINI Smith, OT Occupational Therapist    HARLAN Dior, PT Physical Therapist    AB Earline Borden      Margareth Degroot, RN Registered Nurse          Physical Therapy Education     Title: PT OT SLP Therapies (Active)     Topic: Physical Therapy (Active)     Point: Mobility training (Done)    Learning Progress Summary    Learner Readiness Method Response Comment Documented by Status   Patient Acceptance JODIE WILSON   07/23/17 1509 Done               Point:  Body mechanics (Done)    Learning Progress Summary    Learner Readiness Method Response Comment Documented by Status   Patient Acceptance E RL,NR   07/23/17 1502 Done                      User Key     Initials Effective Dates Name Provider Type Discipline     06/19/15 -  Pattie Dior, PT Physical Therapist PT                PT Recommendation and Plan  Anticipated Equipment Needs At Discharge: standard cane  Anticipated Discharge Disposition: home with outpatient services (pulmonary rehab)  Planned Therapy Interventions: balance training, gait training, home exercise program, patient/family education, strengthening, stair training, transfer training  PT Frequency: daily, per priority policy  Plan of Care Review  Plan Of Care Reviewed With: patient  Progress: progress toward functional goals as expected  Outcome Summary/Follow up Plan: Pt presents with unsteadiness while ambulating, due to balance deficits and weakness. Pt would benefit from PT for gait trainign and stair training, would also benefit from use of straight cane. PT recommends d/c home.          IP PT Goals       07/23/17 1500          Gait Training PT LTG    Gait Training Goal PT LTG, Date Established 07/23/17  -SJ      Gait Training Goal PT LTG, Time to Achieve 2 wks  -SJ      Gait Training Goal PT LTG, [place] Level conditional independence  -SJ      Gait Training Goal PT LTG, Assist Device cane, straight  -SJ      Gait Training Goal PT LTG, Distance to Achieve 500  -SJ      Gait Training Goal PT LTG, Outcome goal ongoing  -SJ      Stair Training PT LTG    Stair Training Goal PT LTG, Date Established 07/23/17  -SJ      Stair Training Goal PT LTG, Time to Achieve 2 wks  -SJ      Stair Training Goal PT LTG, Number of Steps 6  -SJ      Stair Training Goal PT LTG, [place] Level supervision required  -SJ      Stair Training Goal PT LTG, Assist Device 1 handrail  -SJ      Stair Training Goal PT LTG, Outcome goal ongoing  -SJ        User Key   (r) = Recorded By, (t) = Taken By, (c) = Cosigned By    Initials Name Provider Type    HARLAN Dior PT Physical Therapist                Outcome Measures       07/23/17 1412 07/23/17 0805       How much help from another person do you currently need...    Turning from your back to your side while in flat bed without using bedrails? 4  -SJ      Moving from lying on back to sitting on the side of a flat bed without bedrails? 4  -SJ      Moving to and from a bed to a chair (including a wheelchair)? 4  -SJ      Standing up from a chair using your arms (e.g., wheelchair, bedside chair)? 4  -SJ      Climbing 3-5 steps with a railing? 3  -SJ      To walk in hospital room? 3  -      AM-PAC 6 Clicks Score 22  -      How much help from another is currently needed...    Putting on and taking off regular lower body clothing?  3  -AC     Bathing (including washing, rinsing, and drying)  3  -AC     Toileting (which includes using toilet bed pan or urinal)  3  -AC     Putting on and taking off regular upper body clothing  4  -AC     Taking care of personal grooming (such as brushing teeth)  4  -AC     Eating meals  4  -AC     Score  21  -AC     Functional Assessment    Outcome Measure Options AM-PAC 6 Clicks Basic Mobility (PT)  -[facility]-PAC 6 Clicks Daily Activity (OT)  -       User Key  (r) = Recorded By, (t) = Taken By, (c) = Cosigned By    Initials Name Provider Type    PADMINI Smith, OT Occupational Therapist    HARLAN Dior PT Physical Therapist           Time Calculation:         PT Charges       07/23/17 1503          Time Calculation    Start Time 1412  -      PT Received On 07/23/17  -      PT Goal Re-Cert Due Date 08/02/17  -        User Key  (r) = Recorded By, (t) = Taken By, (c) = Cosigned By    Initials Name Provider Type    HARLAN Dior PT Physical Therapist          Therapy Charges for Today     Code Description Service Date Service Provider Modifiers Qty    [id number]  PT MANJINDERAL  MOD COMPLEXITY 3 2017 Pattie Dior, PT GP 1          PT G-Codes  Outcome Measure Options: AM-PAC 6 Clicks Basic Mobility (PT)      Pattie Dior PT  2017            Electronically signed by Pattie Dior PT at 2017  3:04 PM           Occupational Therapy Notes (most recent note)      Sheridan Smith, OT at 2017 10:24 AM  Version 1 of 1         Acute Care - Occupational Therapy Initial Evaluation  [facility]     Patient Name: Chito Rod  : 1950  MRN: [id number]  Today's Date: 2017  Onset of Illness/Injury or Date of Surgery Date: 17  Date of Referral to OT: 17  Referring Physician: MD Mallory    Admit Date: 2017       ICD-10-CM ICD-9-CM   1. Acute respiratory failure with hypoxia and hypercapnia J96.01 518.81    J96.02    2. Medically noncompliant Z91.19 V15.81   3. Morbid obesity, unspecified obesity type E66.01 278.01   4. Type 2 diabetes mellitus with complication, with long-term current use of insulin E11.8 250.90    Z79.4 V58.67   5. Essential hypertension I10 401.9   6. Shortness of breath R06.02 786.05   7. Atrial fibrillation with normal ventricular rate I48.91 427.31   8. Acute on chronic diastolic congestive heart failure I50.33 428.33     428.0   9. Impaired mobility and ADLs Z74.09 799.89     Patient Active Problem List   Diagnosis   • FAUSTO (obstructive sleep apnea)   • Chronic obstructive pulmonary disease with acute exacerbation   • Hypertension   • GERD (gastroesophageal reflux disease)   • Acute on chronic diastolic congestive heart failure   • Morbid obesity   • Acute respiratory failure with hypoxia and hypercapnia   • Atrial fibrillation   • CAD (coronary artery disease)   • Chronic anticoagulation   • DM2 (diabetes mellitus, type 2)   • HLD (hyperlipidemia)   • Oxygen dependent   • CKD (chronic kidney disease) stage 3, GFR 30-59 ml/min     Past Medical History:   Diagnosis Date   • Atrial fibrillation    • CAD (coronary artery disease)     • CHF (congestive heart failure)    • Chronic anticoagulation    • CKD (chronic kidney disease) stage 3, GFR 30-59 ml/min    • COPD (chronic obstructive pulmonary disease)    • DM2 (diabetes mellitus, type 2)    • GERD (gastroesophageal reflux disease)    • History of MI (myocardial infarction)    • HLD (hyperlipidemia)    • HTN (hypertension)    • Morbid obesity    • Noncompliance    • FAUSTO (obstructive sleep apnea)    • Oxygen dependent      Past Surgical History:   Procedure Laterality Date   • APPENDECTOMY  1961   • CORONARY STENT PLACEMENT  2008   • HAND SURGERY Left 2002   • KNEE ARTHROSCOPY Left 1965          OT ASSESSMENT FLOWSHEET (last 72 hours)      OT Evaluation       07/23/17 0805 07/21/17 1350 07/21/17 1348 07/20/17 2134       Rehab Evaluation    Document Type evaluation  -AC        Subjective Information agree to therapy;complains of;dyspnea  -AC        Patient Effort, Rehab Treatment good  -AC        Symptoms Noted During/After Treatment fatigue;shortness of breath  -AC        General Information    Patient Profile Review yes  -AC        Onset of Illness/Injury or Date of Surgery Date 07/20/17  -AC        Referring Physician MD Mallory  -AC        General Observations pt received sidelying in bed on hi-wendy O2.   -AC        Pertinent History Of Current Problem Pt admit with low O2 sats and difficulty breathing.  Pt with acute respiratory failure with hypoxia  -AC        Precautions/Limitations fall precautions;oxygen therapy device and L/min   pt on high-wendy; venti mask on 15LO2 with therapy today  -AC        Prior Level of Function independent:;all household mobility;community mobility;transfer;ADL's;home management;driving  -AC        Equipment Currently Used at Home bipap/ cpap;oxygen  -AC oxygen;bipap/ cpap   Pt has cpap and oxygen at night with [employer]. He states he has portables, but [facility] if pt needs a portable brought to the hospital, he will require a new order for  continous oxygen.   -AB  oxygen  -TS     Plans/Goals Discussed With patient;agreed upon  -AC        Risks Reviewed patient:;LOB;change in vital signs  -AC        Benefits Reviewed patient:;improve function;increase independence;increase balance;increase knowledge;increase strength  -AC        Living Environment    Lives With alone  -AC alone  -AB  alone  -TS     Living Arrangements house  -AC house   Lives in [facility]  -AB  house  -TS     Home Accessibility stairs within home;tub/shower is not walk in  -AC no concerns  -AB  no concerns  -TS     Number of Stairs Within Home 12   basement  -AC        Stair Railings at Home  none  -AB  outside, present on left side  -TS     Type of Financial/Environmental Concern  none  -AB  none  -TS     Transportation Available  car;family or friend will provide  -AB  car;family or friend will provide  -TS     Clinical Impression    Date of Referral to OT 07/22/17  -AC        OT Diagnosis decline in ADLs  -AC        Impairments Found (describe specific impairments) aerobic capacity/endurance  -AC        Patient/Family Goals Statement pt would like to return home  -AC        Criteria for Skilled Therapeutic Interventions Met yes;treatment indicated  -AC        Rehab Potential good, to achieve stated therapy goals  -AC        Therapy Frequency daily  -AC        Anticipated Discharge Disposition other (see comments)   pulmonary rehab  -AC        Functional Level Prior    Ambulation   0-->independent  -AB 0-->independent  -TS     Transferring   0-->independent  -AB 0-->independent  -TS     Toileting   0-->independent  -AB 0-->independent  -TS     Bathing   0-->independent  -AB 0-->independent  -TS     Dressing   0-->independent  -AB 0-->independent  -TS     Eating   0-->independent  -AB 0-->independent  -TS     Communication   0-->understands/communicates without difficulty  -AB 0-->understands/communicates without difficulty  -TS     Swallowing   0-->swallows foods/liquids without  difficulty  -AB 0-->swallows foods/liquids without difficulty  -TS     Prior Functional Level Comment    independent  -TS     Vital Signs    Pre Systolic BP Rehab 90  -AC        Pre Treatment Diastolic BP 52  -AC        Post Systolic BP Rehab 105  -AC        Post Treatment Diastolic BP 47  -AC        Pretreatment Heart Rate (beats/min) 95  -AC        Posttreatment Heart Rate (beats/min) 88  -AC        Pre SpO2 (%) 92   hi flow O2  -AC        O2 Delivery Pre Treatment supplemental O2  -AC        Intra SpO2 (%) 88  -AC        O2 Delivery Intra Treatment supplemental O2   venti mask on 15LO2  -AC        Post SpO2 (%) 88  -AC        O2 Delivery Post Treatment supplemental O2   hi flow O2  -AC        Pre Patient Position Supine  -AC        Intra Patient Position Standing  -AC        Post Patient Position Sitting  -AC        Vision Assessment/Intervention    Visual Impairment WNL  -AC        Cognitive Assessment/Intervention    Current Cognitive/Communication Assessment functional  -AC        Orientation Status oriented x 4  -AC        Follows Commands/Answers Questions 100% of the time  -AC        Personal Safety decreased awareness, need for assist  -AC        Personal Safety Interventions fall prevention program maintained;gait belt;nonskid shoes/slippers when out of bed  -AC        ROM (Range of Motion)    General ROM no range of motion deficits identified  -AC        MMT (Manual Muscle Testing)    General MMT Assessment upper extremity strength deficits identified  -AC        General MMT Assessment Detail BUE grossly 4/5  -AC        Bed Mobility, Assessment/Treatment    Bed Mobility, Assistive Device bed rails  -AC        Bed Mob, Sidelying to Sit, [place] supervision required  -AC        Transfer Assessment/Treatment    Transfers, Sit-Stand [place] verbal cues required;contact guard assist  -AC        Transfers, Stand-Sit [place] verbal cues required;contact guard assist  -AC        Transfers,  Sit-Stand-Sit, Assist Device --   declined use of walker  -        Functional Mobility    Functional Mobility- Ind. Level minimum assist (75% patient effort)  -        Functional Mobility- Device --   declined walker; held onto handrail in hallway at times  -        Functional Mobility-Distance (Feet) 200  -        Functional Mobility- Comment 2 standing restbreaks  -        Lower Body Dressing Assessment/Training    LB Dressing Assess/Train, Clothing Type donning:;slipper socks  -        LB Dressing Assess/Train, Position sitting   propped each leg on bed   -        LB Dressing Assess/Train, [place] supervision required  -        General Therapy Interventions    Planned Therapy Interventions ADL retraining;balance training;bed mobility training;energy conservation;transfer training  -        Positioning and Restraints    Pre-Treatment Position in bed  -        Post Treatment Position chair  -        In Chair reclined;call light within reach;encouraged to call for assist  -          User Key  (r) = Recorded By, (t) = Taken By, (c) = Cosigned By    Initials Name Effective Dates     Sheridan Smith OT 06/23/15 -     AB Earline Borden 05/02/16 -     MARISOL Degroot RN 06/16/16 -            Occupational Therapy Education     Title: PT OT SLP Therapies (Active)     Topic: Occupational Therapy (Active)     Point: ADL training (Done)    Description: Instruct learner(s) on proper safety adaptation and remediation techniques during self care or transfers.   Instruct in proper use of assistive devices.    Learning Progress Summary    Learner Readiness Method Response Comment Documented by Status   Patient Acceptance E VU benefits of activity  07/23/17 1018 Done                      User Key     Initials Effective Dates Name Provider Type Discipline     06/23/15 -  Sheridan Smith OT Occupational Therapist OT                  OT Recommendation and Plan  Anticipated Discharge Disposition:  other (see comments) (pulmonary rehab)  Planned Therapy Interventions: ADL retraining, balance training, bed mobility training, energy conservation, transfer training  Therapy Frequency: daily  Plan of Care Review  Plan Of Care Reviewed With: patient  Outcome Summary/Follow up Plan: OT mery complete.  Pt presents with limitations in balance, activity tolerance, and ADL performance.  Pt will benefit from OT to advance pt  toward increased independnece with ADLs.  Recommend pulmonary rehab upon d/c.           OT Goals       07/23/17 1019          Transfer Training OT LTG    Transfer Training OT LTG, Date Established 07/23/17  -AC      Transfer Training OT LTG, Time to Achieve by discharge  -AC      Transfer Training OT LTG, Activity Type bed to chair /chair to bed;toilet  -AC      Transfer Training OT LTG, [place] Level supervision required  -AC      Patient Education OT LTG    Patient Education OT LTG, Date Established 07/23/17  -AC      Patient Education OT LTG, Time to Achieve by discharge  -AC      Patient Education OT LTG, Education Type written program;HEP;energy conservation;work simplification;adaptive breathing  -AC      Patient Education OT LTG, Education Understanding demonstrates adequately;verbalizes understanding  -AC      Activity Tolerance OT LTG    Activity Tolerance Goal OT LTG, Date Established 07/23/17  -AC      Activity Tolerance Goal OT LTG, Time to Achieve by discharge  -AC      Activity Tolerance Goal OT LTG, Activity Level 15 min activity  -AC      Activity Tolerance Goal OT LTG, Additional Goal Pt will complete 15 min activity with O2 sat 90% or greater  -AC        User Key  (r) = Recorded By, (t) = Taken By, (c) = Cosigned By    Initials Name Provider Type    PADMINI Smith OT Occupational Therapist                Outcome Measures       07/23/17 0805          How much help from another is currently needed...    Putting on and taking off regular lower body clothing? 3  -AC       Bathing (including washing, rinsing, and drying) 3  -AC      Toileting (which includes using toilet bed pan or urinal) 3  -AC      Putting on and taking off regular upper body clothing 4  -AC      Taking care of personal grooming (such as brushing teeth) 4  -AC      Eating meals 4  -AC      Score 21  -AC      Functional Assessment    Outcome Measure Options AM-PAC 6 Clicks Daily Activity (OT)  -AC        User Key  (r) = Recorded By, (t) = Taken By, (c) = Cosigned By    Initials Name Provider Type    AC Sheridan Smith OT Occupational Therapist          Time Calculation:   OT Start Time: 0805    Therapy Charges for Today     Code Description Service Date Service Provider Modifiers Qty    [id number]  OT EVAL LOW COMPLEXITY 4 7/23/2017 Sheridan Smith OT GO 1               Sheridan Smith OT  7/23/2017     Electronically signed by Sheridan Smith OT at 7/23/2017 10:24 AM

## 2017-07-23 NOTE — PLAN OF CARE
Problem: Patient Care Overview (Adult)  Goal: Plan of Care Review  Outcome: Ongoing (interventions implemented as appropriate)    07/23/17 0416   Coping/Psychosocial Response Interventions   Plan Of Care Reviewed With patient   Patient Care Overview   Progress progress toward functional goals as expected   Outcome Evaluation   Outcome Summary/Follow up Plan VSS, pt. kept bipap on for majority of this shift without issue.         Problem: Cardiac: Heart Failure (Adult)  Goal: Signs and Symptoms of Listed Potential Problems Will be Absent or Manageable (Cardiac: Heart Failure)  Outcome: Ongoing (interventions implemented as appropriate)

## 2017-07-24 ENCOUNTER — APPOINTMENT (OUTPATIENT)
Dept: GENERAL RADIOLOGY | Facility: HOSPITAL | Age: 67
End: 2017-07-24

## 2017-07-24 LAB
ANION GAP SERPL CALCULATED.3IONS-SCNC: 8 MMOL/L (ref 3–11)
BUN BLD-MCNC: 15 MG/DL (ref 9–23)
BUN/CREAT SERPL: 16.7 (ref 7–25)
CALCIUM SPEC-SCNC: 8.9 MG/DL (ref 8.7–10.4)
CHLORIDE SERPL-SCNC: 94 MMOL/L (ref 99–109)
CO2 SERPL-SCNC: 36 MMOL/L (ref 20–31)
CREAT BLD-MCNC: 0.9 MG/DL (ref 0.6–1.3)
DEPRECATED RDW RBC AUTO: 48.7 FL (ref 37–54)
ERYTHROCYTE [DISTWIDTH] IN BLOOD BY AUTOMATED COUNT: 13.7 % (ref 11.3–14.5)
GFR SERPL CREATININE-BSD FRML MDRD: 85 ML/MIN/1.73
GLUCOSE BLD-MCNC: 176 MG/DL (ref 70–100)
GLUCOSE BLDC GLUCOMTR-MCNC: 202 MG/DL (ref 70–130)
GLUCOSE BLDC GLUCOMTR-MCNC: 233 MG/DL (ref 70–130)
GLUCOSE BLDC GLUCOMTR-MCNC: 234 MG/DL (ref 70–130)
GLUCOSE BLDC GLUCOMTR-MCNC: 276 MG/DL (ref 70–130)
HCT VFR BLD AUTO: 41.9 % (ref 38.9–50.9)
HGB BLD-MCNC: 12.7 G/DL (ref 13.1–17.5)
MAGNESIUM SERPL-MCNC: 2 MG/DL (ref 1.3–2.7)
MCH RBC QN AUTO: 29.4 PG (ref 27–31)
MCHC RBC AUTO-ENTMCNC: 30.3 G/DL (ref 32–36)
MCV RBC AUTO: 97 FL (ref 80–99)
PLATELET # BLD AUTO: 168 10*3/MM3 (ref 150–450)
PMV BLD AUTO: 11.1 FL (ref 6–12)
POTASSIUM BLD-SCNC: 3.9 MMOL/L (ref 3.5–5.5)
RBC # BLD AUTO: 4.32 10*6/MM3 (ref 4.2–5.76)
SODIUM BLD-SCNC: 138 MMOL/L (ref 132–146)
WBC NRBC COR # BLD: 9.95 10*3/MM3 (ref 3.5–10.8)

## 2017-07-24 PROCEDURE — 63710000001 INSULIN DETEMIR PER 5 UNITS: Performed by: FAMILY MEDICINE

## 2017-07-24 PROCEDURE — 82962 GLUCOSE BLOOD TEST: CPT

## 2017-07-24 PROCEDURE — 25010000002 FUROSEMIDE PER 20 MG: Performed by: FAMILY MEDICINE

## 2017-07-24 PROCEDURE — 94799 UNLISTED PULMONARY SVC/PX: CPT

## 2017-07-24 PROCEDURE — 94660 CPAP INITIATION&MGMT: CPT

## 2017-07-24 PROCEDURE — 94640 AIRWAY INHALATION TREATMENT: CPT

## 2017-07-24 PROCEDURE — 80048 BASIC METABOLIC PNL TOTAL CA: CPT | Performed by: FAMILY MEDICINE

## 2017-07-24 PROCEDURE — 25010000002 LORAZEPAM PER 2 MG: Performed by: FAMILY MEDICINE

## 2017-07-24 PROCEDURE — 85027 COMPLETE CBC AUTOMATED: CPT | Performed by: FAMILY MEDICINE

## 2017-07-24 PROCEDURE — G0108 DIAB MANAGE TRN  PER INDIV: HCPCS | Performed by: REGISTERED NURSE

## 2017-07-24 PROCEDURE — 71010 HC CHEST PA OR AP: CPT

## 2017-07-24 PROCEDURE — 83735 ASSAY OF MAGNESIUM: CPT | Performed by: FAMILY MEDICINE

## 2017-07-24 PROCEDURE — 99232 SBSQ HOSP IP/OBS MODERATE 35: CPT | Performed by: FAMILY MEDICINE

## 2017-07-24 PROCEDURE — 99232 SBSQ HOSP IP/OBS MODERATE 35: CPT | Performed by: INTERNAL MEDICINE

## 2017-07-24 RX ADMIN — IPRATROPIUM BROMIDE AND ALBUTEROL SULFATE 3 ML: .5; 3 SOLUTION RESPIRATORY (INHALATION) at 06:58

## 2017-07-24 RX ADMIN — IPRATROPIUM BROMIDE AND ALBUTEROL SULFATE 3 ML: .5; 3 SOLUTION RESPIRATORY (INHALATION) at 16:02

## 2017-07-24 RX ADMIN — ASPIRIN 81 MG: 81 TABLET, COATED ORAL at 08:27

## 2017-07-24 RX ADMIN — GABAPENTIN 100 MG: 100 CAPSULE ORAL at 08:27

## 2017-07-24 RX ADMIN — DABIGATRAN ETEXILATE MESYLATE 150 MG: 150 CAPSULE ORAL at 08:27

## 2017-07-24 RX ADMIN — PANTOPRAZOLE SODIUM 40 MG: 40 TABLET, DELAYED RELEASE ORAL at 06:28

## 2017-07-24 RX ADMIN — TRAZODONE HYDROCHLORIDE 50 MG: 50 TABLET ORAL at 20:55

## 2017-07-24 RX ADMIN — DABIGATRAN ETEXILATE MESYLATE 150 MG: 150 CAPSULE ORAL at 17:56

## 2017-07-24 RX ADMIN — DILTIAZEM HYDROCHLORIDE 60 MG: 60 TABLET, FILM COATED ORAL at 17:56

## 2017-07-24 RX ADMIN — LORAZEPAM 0.5 MG: 2 INJECTION INTRAMUSCULAR; INTRAVENOUS at 20:56

## 2017-07-24 RX ADMIN — INSULIN DETEMIR 40 UNITS: 100 INJECTION, SOLUTION SUBCUTANEOUS at 20:57

## 2017-07-24 RX ADMIN — FUROSEMIDE 20 MG: 10 INJECTION, SOLUTION INTRAMUSCULAR; INTRAVENOUS at 08:27

## 2017-07-24 RX ADMIN — ATORVASTATIN CALCIUM 20 MG: 20 TABLET, FILM COATED ORAL at 20:55

## 2017-07-24 RX ADMIN — IPRATROPIUM BROMIDE AND ALBUTEROL SULFATE 3 ML: .5; 3 SOLUTION RESPIRATORY (INHALATION) at 12:49

## 2017-07-24 RX ADMIN — BUDESONIDE AND FORMOTEROL FUMARATE DIHYDRATE 2 PUFF: 160; 4.5 AEROSOL RESPIRATORY (INHALATION) at 07:00

## 2017-07-24 RX ADMIN — INSULIN DETEMIR 20 UNITS: 100 INJECTION, SOLUTION SUBCUTANEOUS at 08:30

## 2017-07-24 RX ADMIN — IPRATROPIUM BROMIDE AND ALBUTEROL SULFATE 3 ML: .5; 3 SOLUTION RESPIRATORY (INHALATION) at 20:33

## 2017-07-24 RX ADMIN — CARVEDILOL 25 MG: 12.5 TABLET, FILM COATED ORAL at 17:56

## 2017-07-24 RX ADMIN — CARVEDILOL 25 MG: 12.5 TABLET, FILM COATED ORAL at 08:27

## 2017-07-24 RX ADMIN — BUDESONIDE AND FORMOTEROL FUMARATE DIHYDRATE 2 PUFF: 160; 4.5 AEROSOL RESPIRATORY (INHALATION) at 20:33

## 2017-07-24 RX ADMIN — METOLAZONE 5 MG: 5 TABLET ORAL at 08:27

## 2017-07-24 RX ADMIN — DILTIAZEM HYDROCHLORIDE 60 MG: 60 TABLET, FILM COATED ORAL at 12:45

## 2017-07-24 NOTE — PROGRESS NOTES
[facility] Medicine Services  INPATIENT PROGRESS NOTE    Date of Admission: 7/20/2017  Length of Stay: 4  Primary Care Physician: Javan Shankar MD    Subjective   CC: tired.  Follow up acute COPD and CHF exacerbation  HPI:  Patient seen twice today, first after breakfast and again later today.   He admits he will not follow strict diet at home and requesting potato chips.  He is agreeable to wear oxygen at home as ordered. He declined rehab.   He told me again today he wants to be a do not resuscitate.   He was noncompliant with bipap.     Denies chest pain, shortness of breath, abdominal pain. Edema in legs better.He was not taking his home medication as directed.    Review Of Systems:   Review of Systems   Constitutional: Negative.  Negative for chills, fatigue and fever.   HENT: Negative.  Negative for congestion, sneezing and sore throat.    Eyes: Negative.    Respiratory: Positive for shortness of breath (stable ).    Cardiovascular: Positive for leg swelling (improving).   Gastrointestinal: Negative.  Negative for constipation, diarrhea and nausea.   Endocrine: Negative.    Genitourinary: Negative for dysuria, flank pain and hematuria.   Musculoskeletal: Negative.  Negative for back pain, gait problem and myalgias.   Skin: Negative.    Allergic/Immunologic: Negative.    Neurological: Negative for dizziness, tremors, syncope, weakness and headaches.        Foot paresthesias   Hematological: Negative.    Psychiatric/Behavioral: Negative.  Negative for confusion and self-injury. The patient is not nervous/anxious and is not hyperactive.          Objective      Temp:  [97.8 °F (36.6 °C)-98.6 °F (37 °C)] 98.6 °F (37 °C)  Heart Rate:  [] 112  Resp:  [16-20] 16  BP: (108-113)/(52-87) 110/52  Physical Exam  General: Asleep, awakened easily. No acute distress.    Head:    Atraumatic and normocephalic, without obvious abnormality.   Eyes:            PERRLA, conjunctivae and sclerae  normal, no Icterus. No pallor. Extra-occular movements are within normal limits.   Ears:    Ears appear intact with no abnormalities noted.   Throat:   No oral lesions, no thrush, oral mucosa moist.   Neck:   Supple, trachea midline, no thyromegaly, no carotid bruit.       Lungs:     Chest shape is normal. Breath sounds heard bilaterally equally with trace improved left sided crackles without wheezing.     Heart:    Normal S1 and S2, no murmur, no gallop, no rub. No JVD   Abdomen:     Normal bowel sounds, no masses, no organomegaly. Soft        non-tender, non-distended, no guarding, no rebound                Tenderness. obese   Extremities:   Moves all extremities well, trace improved bilateral edema, no cyanosis, hanging legs over side of bed instead of elevated   Pulses:   Pulses palpable and equal bilaterally   Skin:   No bleeding, bruising or rash   Lymph nodes:   No palpable adenopathy   Neurologic:   Gross sensation intact, Motor power is normal and equal bilaterally, but exam affected by willingness to cooperative due to sleeping           Results Review:    I have reviewed the labs, radiology results and diagnostic studies.      Results from last 7 days  Lab Units 07/24/17  0442   WBC 10*3/mm3 9.95   HEMOGLOBIN g/dL 12.7*   PLATELETS 10*3/mm3 168       Results from last 7 days  Lab Units 07/24/17  0442   SODIUM mmol/L 138   POTASSIUM mmol/L 3.9   CHLORIDE mmol/L 94*   CO2 mmol/L 36.0*   BUN mg/dL 15   CREATININE mg/dL 0.90   GLUCOSE mg/dL 176*   CALCIUM mg/dL 8.9       Culture Data: Cultures:         Radiology Data:     I have reviewed the medications.    aspirin 81 mg Oral Daily   atorvastatin 20 mg Oral Nightly   budesonide-formoterol 2 puff Inhalation BID - RT   carvedilol 25 mg Oral BID With Meals   dabigatran etexilate 150 mg Oral BID   diltiaZEM 60 mg Oral Q6H   furosemide 20 mg Intravenous Daily   gabapentin 100 mg Oral Q12H   insulin detemir 20 Units Subcutaneous Daily   insulin detemir 40 Units  Subcutaneous Nightly   insulin lispro 0-9 Units Subcutaneous 4x Daily With Meals & Nightly   insulin lispro 15 Units Subcutaneous TID With Meals   ipratropium-albuterol 3 mL Nebulization 4x Daily - RT   metOLazone 5 mg Oral Daily   pantoprazole 40 mg Oral QAM   pharmacy consult - MTM  Does not apply Daily   Pharmacy Meds to Bed Consult  Does not apply Daily   traZODone 50 mg Oral Nightly         Assessment/Plan     Problem List  Hospital Problem List     * (Principal)Chronic obstructive pulmonary disease with acute exacerbation    FAUSTO (obstructive sleep apnea)    Hypertension    GERD (gastroesophageal reflux disease)    Acute on chronic diastolic congestive heart failure    Morbid obesity    Acute respiratory failure with hypoxia and hypercapnia    Atrial fibrillation    CAD (coronary artery disease)    Chronic anticoagulation    DM2 (diabetes mellitus, type 2)    HLD (hyperlipidemia)    Oxygen dependent    CKD (chronic kidney disease) stage 3, GFR 30-59 ml/min               Assessment:    1. Acute on Chronic Hypoxic and hypercapnic respiratory failure, stable on high flow oxygen  2. Acute on chronic Diastolic heart failure, EF 70% 4/20/17  3. FAUSTO on bipap with oxygen nightly at home  4. Morbid obesity  5. COPD with acute exacerbation, stable improved  6. CKD3  7. Suspect obesity hypoventilation syndrome  8. Chronic afib on Pradaxa  9. Uncontrolled diabetes mellitus  With A1c 10.2   10. Medication and oxygen noncompliance at home   11. Suspect mild chronic underlying dementia without behavior disorder, stable, suspect chronic hypoxia related encephalopathy from chronic noncompliance  12. Peripheral neuropathy, diabetic  13. Chronic essential hypertension  14. FAUSTO        Plan:  Continue diuresis, strict diet, Pradoxa. Continue nebs. Continue glucose control.     PT/OT ordered. Patient refuses rehab. He is feeling stronger now.     He has weaned down off high flow oxygen this afternoon.     If he remains stable  "overnight, would consider discharge home with friend, family, and home health assistance.     Case management please ensure patient has functioning oxygen and portable oxygen tank for home use. Retest can be performed at discharge if needed.     Spoke to him in detail this afternoon about code status after he mentioned multiple times \" I dont want all this\". He was alert and oriented x 3. He answered all my questions appropriately. He requested to have no intubation, no CPR, and no bipap/cpap, and requested finally to be DO NOT RESUSCITATE. He also mentioned this two days ago with daughter present, so she knows.     DVT prophylaxis: pradaxa. BENI. SCD  Discharge Planning: I expect patient to be discharged to acute rehab in the next week.    Ophelia Tejada, DO   07/24/17   3:39 PM      "

## 2017-07-24 NOTE — PROGRESS NOTES
Continued Stay Note  [facility]     Patient Name: Chito Rod  MRN: [id number]  Today's Date: 7/24/2017    Admit Date: 7/20/2017          Discharge Plan       07/24/17 1349    Case Management/Social Work Plan    Plan Home    Patient/Family In Agreement With Plan yes    Additional Comments Discussed with pt that MD recommending inpatient pulmonary rehab. Pt states he was agreeable if recommended but wants to go home. Per PT pt is walking 375 feet and may not qualify for inpatient rehab. He is currently on high flow oxygen and would need to be weaned down to nasal cannula.  He may be agreeable to outpt pulmonary rehab at d/c. CM will follow.               Discharge Codes     None        Expected Discharge Date and Time     Expected Discharge Date Expected Discharge Time    Jul 25, 2017             Earline Borden

## 2017-07-24 NOTE — PLAN OF CARE
Problem: Patient Care Overview (Adult)  Goal: Plan of Care Review  Outcome: Ongoing (interventions implemented as appropriate)    07/24/17 0535   Coping/Psychosocial Response Interventions   Plan Of Care Reviewed With patient   Patient Care Overview   Progress no change   Outcome Evaluation   Outcome Summary/Follow up Plan pt has remained on his hi flow nasal cannula while awake and on the BiPAP while asleep. pt stated he does not want to stay on the high flow cannula and he will likely take it off. pt seems to be noncompliant and annoyed with care.          Problem: COPD, Chronic Bronchitis/Emphysema (Adult)  Goal: Signs and Symptoms of Listed Potential Problems Will be Absent or Manageable (COPD, Chronic Bronchitis/Emphysema)  Outcome: Ongoing (interventions implemented as appropriate)    07/24/17 0535   COPD, Chronic Bronchitis/Emphysema   Problems Assessed (COPD, Chronic Bronchitis/Emphysema) all   Problems Present (COPD, Chronic Bronchitis/Emphysema) dyspnea;hypoxia/hypoxemia

## 2017-07-24 NOTE — CONSULTS
"Diabetes Education  Assessment/Teaching    Patient Name:  Chito Rod  YOB: 1950  MRN: [id number]  Admit Date:  7/20/2017      Assessment Date:  7/24/2017       Most Recent Value    General Information      Referral From:  A1c    Height  5' 11\" (1.803 m)    Height Method  Stated    Weight  (!)  322 lb 9 oz (146 kg)    Weight Method  Standing scale    Pregnancy Assessment     Diabetes History     What type of diabetes do you have?  Type 2    Length of Diabetes Diagnosis  1 - 5 years    Current DM knowledge  fair    Have you had diabetes education/teaching in the past?  no    Do you test your blood sugar at home?  no    How would you rate your diabetes control?  fair    How often do you check your feet?  never    Do you perform your own nail care?  yes    Education Preferences     What areas of diabetes would you like to learn about?  avoiding high blood sugar, avoiding low blood sugar, testing my blood sugar at home, diabetes complications    Nutrition Information     When was the last time you saw a dietician?  Order was  placed for RD consult.     Are you currently following a special meal plan?  never    Do you eat mostly at home or out of the house?  at home    Assessment Topics     DM Goals                Most Recent Value    DM Education Needs     Meter  Meter provided    Meter Type  One Touch    Frequency of Testing  -- [Prior to hospitalization, patient was not checking glucose at home]    Medication  Oral, Insulin    Problem Solving  Hypoglycemia, Hyperglycemia, Sick days, Signs, Symptoms, Treatment    Reducing Risks  A1C testing, Blood pressure, Eye exam, Cardiovascular    Physical Activity  None    Physical Activity Frequency  Never    Healthy Coping  Appropriate    Discharge Plan  Home    Motivation  Moderate    Teaching Method  Explanation, Discussion, Demonstration, Handouts, Teach back    Patient Response  Verbalized understanding        Pt education on DM completed. Pt given the " "[facility] \"what is diabetes\" education handouts, and a goal sheet. All hand outs reviewed with pt. Pt also given a free one touch verio meter and stated that he knows how to use meter with a brief return demonstration. Pt advised to contact PCP for prescription for strips and lancets. He admits to noncompliance with adhering to diabetes management, but is \"now too scared, not to\". He admits to drinking 8- 12 ounce cans of regular pepsi a day. Explained to patient, that this is approximately 320 carbs in liquid alone with no nutritional value. Discussed the importance of healthy eating and meal planning to control glucose. He had requested a RD consult and this was placed by floor nurse. Discussed the importance of replacing reg soda with water, flavored water or diet soda in moderation. He does voice understanding and states he will cut his consumption in half to 4 cans daily, for the next month, then will attempt to wean himself completely according to patient. Offered patient outpatient follow up class for DM, he declined at this time. Praised him that decreasing soda and glucose monitoring are two very important ways to start managing his glucose. His questions were answered. Contact info was given and Pt was advised to call us with any other questions or concerns. Thank you for this referral. Maggie Verdin RN Diabetes Education          Electronically signed by:  Maggie Verdin RN  07/24/17 3:18 PM  "

## 2017-07-24 NOTE — PROGRESS NOTES
Chito Rod  [id number]  1950   LOS: 4 days   Patient Care Team:  Javan Shankar MD as PCP - General (Internal Medicine)    Chief Complaint:  CHF and atrial fib    Subjective patient denies any chest discomfort, increased shortness of breath, abdominal pain.  He is resting comfortably this morning on high flow mask    Objective     Vital Sign Min/Max for last 24 hours  Temp  Min: 97.7 °F (36.5 °C)  Max: 97.8 °F (36.6 °C)   BP  Min: 90/52  Max: 113/87   Pulse  Min: 80  Max: 103   Resp  Min: 18  Max: 27   SpO2  Min: 90 %  Max: 95 %   Flow (L/min)  Min: 40  Max: 40   Weight  Min: 322 lb 9 oz (146 kg)  Max: 322 lb 9 oz (146 kg)     Last 2 weights    07/23/17  0455 07/24/17  0440   Weight: (!) 325 lb 9.6 oz (148 kg) (!) 322 lb 9 oz (146 kg)         Intake/Output Summary (Last 24 hours) at 07/24/17 0716  Last data filed at 07/24/17 0440   Gross per 24 hour   Intake             1080 ml   Output              500 ml   Net              580 ml       Physical Exam:     General Appearance:    Alert, cooperative, in no acute distress   Lungs:     Diminished to auscultation,respirations regular, even and                   Unlabored, on high flow mask    Heart:    Irregular and normal rate, normal S1 and S2, no            murmur, no gallop, no rub, no click   Abdomen:  Extremities:   Soft, non-tender        1+ edema             Pulses:   Pulses palpable and equal bilaterally      Results Review:     Results from last 7 days  Lab Units 07/24/17  0442 07/23/17  0651 07/22/17  0454   SODIUM mmol/L 138 135 133   POTASSIUM mmol/L 3.9 4.2 4.6   CHLORIDE mmol/L 94* 94* 95*   CO2 mmol/L 36.0* 35.0* 30.0   BUN mg/dL 15 23 21   CREATININE mg/dL 0.90 1.00 1.20   GLUCOSE mg/dL 176* 235* 460*   CALCIUM mg/dL 8.9 8.6* 8.6*       Results from last 7 days  Lab Units 07/24/17  0442 07/23/17  0651 07/20/17  1456   WBC 10*3/mm3 9.95 9.92 9.00   HEMOGLOBIN g/dL 12.7* 12.3* 12.6*   HEMATOCRIT % 41.9 41.6 41.9   PLATELETS 10*3/mm3 168 159 170            Results from last 7 days  Lab Units 07/21/17  0003   HEMOGLOBIN A1C % 10.40*       Results from last 7 days  Lab Units 07/21/17  1042 07/21/17  0553 07/21/17  0107   TROPONIN I ng/mL <0.006 <0.006 <0.006   No new chest x-ray or EKG to review.    Medication Review: Reviewed    Assessment/Plan   Patient with COPD/CHF exacerbation in the setting of chronic atrial fibrillation, anticoagulated with pradaxa.  Echocardiogram acceptable with LVEF 0.65. Will order a chest x ray. Metolozone added yesterday. Continue current cardiac medications. Strict I/os and daily weights.    Principal Problem:    Chronic obstructive pulmonary disease with acute exacerbation  Active Problems:    FAUSTO (obstructive sleep apnea)    Hypertension    GERD (gastroesophageal reflux disease)    Acute on chronic diastolic congestive heart failure    Morbid obesity    Acute respiratory failure with hypoxia and hypercapnia    Atrial fibrillation    CAD (coronary artery disease)    Chronic anticoagulation    DM2 (diabetes mellitus, type 2)    HLD (hyperlipidemia)    Oxygen dependent    CKD (chronic kidney disease) stage 3, GFR 30-59 ml/min    Scribed for Jean Pierre Lainez MD by OTILIA Yoon. 7/24/2017  7:45 AM      07/24/17  7:45 AM     I have reviewed the notes, assessments, and/or procedures performed by OTILIA, I CONCUR with her documentation of Chito Rod.

## 2017-07-25 LAB
ANION GAP SERPL CALCULATED.3IONS-SCNC: 11 MMOL/L (ref 3–11)
ANION GAP SERPL CALCULATED.3IONS-SCNC: 16 MMOL/L (ref 3–11)
BUN BLD-MCNC: 14 MG/DL (ref 9–23)
BUN BLD-MCNC: 22 MG/DL (ref 9–23)
BUN/CREAT SERPL: 14 (ref 7–25)
BUN/CREAT SERPL: 15.7 (ref 7–25)
CALCIUM SPEC-SCNC: 9.7 MG/DL (ref 8.7–10.4)
CALCIUM SPEC-SCNC: 9.9 MG/DL (ref 8.7–10.4)
CHLORIDE SERPL-SCNC: 87 MMOL/L (ref 99–109)
CHLORIDE SERPL-SCNC: 93 MMOL/L (ref 99–109)
CO2 SERPL-SCNC: 24 MMOL/L (ref 20–31)
CO2 SERPL-SCNC: 34 MMOL/L (ref 20–31)
CREAT BLD-MCNC: 1 MG/DL (ref 0.6–1.3)
CREAT BLD-MCNC: 1.4 MG/DL (ref 0.6–1.3)
DEPRECATED RDW RBC AUTO: 45.7 FL (ref 37–54)
ERYTHROCYTE [DISTWIDTH] IN BLOOD BY AUTOMATED COUNT: 13.6 % (ref 11.3–14.5)
GFR SERPL CREATININE-BSD FRML MDRD: 51 ML/MIN/1.73
GFR SERPL CREATININE-BSD FRML MDRD: 75 ML/MIN/1.73
GLUCOSE BLD-MCNC: 177 MG/DL (ref 70–100)
GLUCOSE BLD-MCNC: 378 MG/DL (ref 70–100)
GLUCOSE BLDC GLUCOMTR-MCNC: 202 MG/DL (ref 70–130)
GLUCOSE BLDC GLUCOMTR-MCNC: 257 MG/DL (ref 70–130)
GLUCOSE BLDC GLUCOMTR-MCNC: 301 MG/DL (ref 70–130)
GLUCOSE BLDC GLUCOMTR-MCNC: 380 MG/DL (ref 70–130)
HCT VFR BLD AUTO: 44.9 % (ref 38.9–50.9)
HGB BLD-MCNC: 14.7 G/DL (ref 13.1–17.5)
MCH RBC QN AUTO: 30.2 PG (ref 27–31)
MCHC RBC AUTO-ENTMCNC: 32.7 G/DL (ref 32–36)
MCV RBC AUTO: 92.4 FL (ref 80–99)
PLATELET # BLD AUTO: 134 10*3/MM3 (ref 150–450)
PMV BLD AUTO: 12.4 FL (ref 6–12)
POTASSIUM BLD-SCNC: 3.5 MMOL/L (ref 3.5–5.5)
POTASSIUM BLD-SCNC: 4.2 MMOL/L (ref 3.5–5.5)
RBC # BLD AUTO: 4.86 10*6/MM3 (ref 4.2–5.76)
SODIUM BLD-SCNC: 132 MMOL/L (ref 132–146)
SODIUM BLD-SCNC: 133 MMOL/L (ref 132–146)
WBC NRBC COR # BLD: 8.82 10*3/MM3 (ref 3.5–10.8)

## 2017-07-25 PROCEDURE — 63710000001 INSULIN DETEMIR PER 5 UNITS: Performed by: FAMILY MEDICINE

## 2017-07-25 PROCEDURE — 99232 SBSQ HOSP IP/OBS MODERATE 35: CPT | Performed by: INTERNAL MEDICINE

## 2017-07-25 PROCEDURE — 85027 COMPLETE CBC AUTOMATED: CPT | Performed by: FAMILY MEDICINE

## 2017-07-25 PROCEDURE — 99232 SBSQ HOSP IP/OBS MODERATE 35: CPT | Performed by: FAMILY MEDICINE

## 2017-07-25 PROCEDURE — 97530 THERAPEUTIC ACTIVITIES: CPT

## 2017-07-25 PROCEDURE — 82962 GLUCOSE BLOOD TEST: CPT

## 2017-07-25 PROCEDURE — 94799 UNLISTED PULMONARY SVC/PX: CPT

## 2017-07-25 PROCEDURE — 94640 AIRWAY INHALATION TREATMENT: CPT

## 2017-07-25 PROCEDURE — 80048 BASIC METABOLIC PNL TOTAL CA: CPT | Performed by: FAMILY MEDICINE

## 2017-07-25 PROCEDURE — 94660 CPAP INITIATION&MGMT: CPT

## 2017-07-25 PROCEDURE — 97116 GAIT TRAINING THERAPY: CPT

## 2017-07-25 PROCEDURE — 94760 N-INVAS EAR/PLS OXIMETRY 1: CPT

## 2017-07-25 RX ADMIN — TRAZODONE HYDROCHLORIDE 50 MG: 50 TABLET ORAL at 20:08

## 2017-07-25 RX ADMIN — PANTOPRAZOLE SODIUM 40 MG: 40 TABLET, DELAYED RELEASE ORAL at 08:46

## 2017-07-25 RX ADMIN — DABIGATRAN ETEXILATE MESYLATE 150 MG: 150 CAPSULE ORAL at 08:37

## 2017-07-25 RX ADMIN — INSULIN DETEMIR 20 UNITS: 100 INJECTION, SOLUTION SUBCUTANEOUS at 08:40

## 2017-07-25 RX ADMIN — DILTIAZEM HYDROCHLORIDE 60 MG: 60 TABLET, FILM COATED ORAL at 17:35

## 2017-07-25 RX ADMIN — BUDESONIDE AND FORMOTEROL FUMARATE DIHYDRATE 2 PUFF: 160; 4.5 AEROSOL RESPIRATORY (INHALATION) at 07:02

## 2017-07-25 RX ADMIN — IPRATROPIUM BROMIDE AND ALBUTEROL SULFATE 3 ML: .5; 3 SOLUTION RESPIRATORY (INHALATION) at 20:34

## 2017-07-25 RX ADMIN — GABAPENTIN 100 MG: 100 CAPSULE ORAL at 20:08

## 2017-07-25 RX ADMIN — CARVEDILOL 25 MG: 12.5 TABLET, FILM COATED ORAL at 08:37

## 2017-07-25 RX ADMIN — IPRATROPIUM BROMIDE AND ALBUTEROL SULFATE 3 ML: .5; 3 SOLUTION RESPIRATORY (INHALATION) at 07:02

## 2017-07-25 RX ADMIN — CARVEDILOL 25 MG: 12.5 TABLET, FILM COATED ORAL at 17:35

## 2017-07-25 RX ADMIN — DILTIAZEM HYDROCHLORIDE 60 MG: 60 TABLET, FILM COATED ORAL at 13:10

## 2017-07-25 RX ADMIN — GABAPENTIN 100 MG: 100 CAPSULE ORAL at 08:38

## 2017-07-25 RX ADMIN — BUDESONIDE AND FORMOTEROL FUMARATE DIHYDRATE 2 PUFF: 160; 4.5 AEROSOL RESPIRATORY (INHALATION) at 20:34

## 2017-07-25 RX ADMIN — DILTIAZEM HYDROCHLORIDE 60 MG: 60 TABLET, FILM COATED ORAL at 05:34

## 2017-07-25 RX ADMIN — DABIGATRAN ETEXILATE MESYLATE 150 MG: 150 CAPSULE ORAL at 17:35

## 2017-07-25 RX ADMIN — ASPIRIN 81 MG: 81 TABLET, COATED ORAL at 08:38

## 2017-07-25 RX ADMIN — ATORVASTATIN CALCIUM 20 MG: 20 TABLET, FILM COATED ORAL at 20:07

## 2017-07-25 RX ADMIN — INSULIN DETEMIR 40 UNITS: 100 INJECTION, SOLUTION SUBCUTANEOUS at 20:09

## 2017-07-25 RX ADMIN — METOLAZONE 5 MG: 5 TABLET ORAL at 08:37

## 2017-07-25 RX ADMIN — IPRATROPIUM BROMIDE AND ALBUTEROL SULFATE 3 ML: .5; 3 SOLUTION RESPIRATORY (INHALATION) at 15:32

## 2017-07-25 NOTE — THERAPY TREATMENT NOTE
Acute Care - Physical Therapy Treatment Note  [facility]     Patient Name: Chito Rod  : 1950  MRN: [id number]  Today's Date: 2017  Onset of Illness/Injury or Date of Surgery Date: 17  Date of Referral to PT: 17  Referring Physician: MD Mallory    Admit Date: 2017    Visit Dx:    ICD-10-CM ICD-9-CM   1. Acute respiratory failure with hypoxia and hypercapnia J96.01 518.81    J96.02    2. Medically noncompliant Z91.19 V15.81   3. Morbid obesity, unspecified obesity type E66.01 278.01   4. Type 2 diabetes mellitus with complication, with long-term current use of insulin E11.8 250.90    Z79.4 V58.67   5. Essential hypertension I10 401.9   6. Shortness of breath R06.02 786.05   7. Atrial fibrillation with normal ventricular rate I48.91 427.31   8. Acute on chronic diastolic congestive heart failure I50.33 428.33     428.0   9. Impaired mobility and ADLs Z74.09 799.89   10. Impaired functional mobility, balance, gait, and endurance Z74.09 V49.89     Patient Active Problem List   Diagnosis   • FAUSTO (obstructive sleep apnea)   • Chronic obstructive pulmonary disease with acute exacerbation   • Hypertension   • GERD (gastroesophageal reflux disease)   • Acute on chronic diastolic congestive heart failure   • Morbid obesity   • Acute respiratory failure with hypoxia and hypercapnia   • Atrial fibrillation   • CAD (coronary artery disease)   • Chronic anticoagulation   • DM2 (diabetes mellitus, type 2)   • HLD (hyperlipidemia)   • Oxygen dependent   • CKD (chronic kidney disease) stage 3, GFR 30-59 ml/min               Adult Rehabilitation Note       17 0808 17 0806       Rehab Assessment/Intervention    Discipline physical therapist  - occupational therapist  -CL     Document Type therapy note (daily note)  - therapy note (daily note)  -CL     Subjective Information agree to therapy;no complaints  - agree to therapy;no complaints  -CL     Patient Effort, Rehab Treatment good   -DR good  -CL     Symptoms Noted During/After Treatment shortness of breath  -DR shortness of breath  -CL     Symptoms Noted Comment O2 sats decrease with ambulation; VC for PLB  -DR O2 sats decreased w/ activity, VCs for PLB throughout.   -CL     Precautions/Limitations fall precautions;oxygen therapy device and L/min  -DR fall precautions;oxygen therapy device and L/min  -CL     Recorded by [DR] Ricky Liao, PT [CL] Armida Hood OT     Vital Signs    Pre Systolic BP Rehab 90  -DR 90  -CL     Pre Treatment Diastolic BP 62  -DR 62  -CL     Post Systolic BP Rehab 114  -  -CL     Post Treatment Diastolic BP 78  -DR 78  -CL     Pre SpO2 (%) 88  -DR 88  -CL     O2 Delivery Pre Treatment supplemental O2   4L  -DR supplemental O2   4L NC  -CL     Intra SpO2 (%) 85  -DR 85  -CL     O2 Delivery Intra Treatment supplemental O2   4L  -DR supplemental O2  -CL     Post SpO2 (%) 90  -DR 90  -CL     O2 Delivery Post Treatment supplemental O2   4L  -DR supplemental O2  -CL     Pre Patient Position Sitting  -DR Sitting  -CL     Intra Patient Position Standing  -DR Standing  -CL     Post Patient Position Sitting  -DR Sitting  -CL     Recorded by [DR] Ricky Liao, PT [CL] Armida Hood OT     Pain Assessment    Pain Assessment 0-10  -DR No/denies pain  -CL     Pain Score 0  -DR      Post Pain Score 0  -DR      Recorded by [DR] Ricky Liao, PT [CL] Armida Hood OT     Cognitive Assessment/Intervention    Current Cognitive/Communication Assessment functional  -DR functional  -CL     Orientation Status oriented x 4  -DR oriented x 4  -CL     Follows Commands/Answers Questions able to follow single-step instructions;100% of the time  -% of the time  -CL     Personal Safety mild impairment;decreased awareness, need for assist;decreased awareness, need for safety;decreased insight to deficits  -DR mild impairment;decreased awareness, need for assist;decreased awareness, need for safety  -CL     Personal Safety  Interventions fall prevention program maintained;gait belt;nonskid shoes/slippers when out of bed  -DR fall prevention program maintained;gait belt;nonskid shoes/slippers when out of bed  -CL     Recorded by [] Ricky Liao, PT [CL] Armida Hood OT     Bed Mobility, Assessment/Treatment    Bed Mobility, Comment [facility]  - [facility].   -CL     Recorded by [] Ricky Liao, PT [CL] Armida Hood OT     Transfer Assessment/Treatment    Transfers, Sit-Stand [place] stand by assist  - stand by assist  -CL     Transfers, Stand-Sit [place] stand by assist  -DR stand by assist  -CL     Recorded by [] Ricky Liao, PT [CL] Armida Hood OT     Gait Assessment/Treatment    Gait, [place] Level contact guard assist;1 person + 1 person to manage equipment;verbal cues required  -DR      Gait, Distance (Feet) 400  -DR      Gait, Gait Pattern Analysis swing-through gait  -DR      Gait, Gait Deviations bilateral:;hilario decreased;step length decreased   wide SHAKILA; decreased arm swing  -DR      Gait, Safety Issues weight-shifting ability decreased;step length decreased;sequencing ability decreased  -DR      Gait, Impairments strength decreased;impaired balance;coordination impaired  -DR      Gait, Comment Pt demonstrated x 1 LOB during ambulation required VC and CGA to maintain upright posture. He also required x 2 standing rest breaks and VC to perform PLB.  -DR      Recorded by [DR] Ricky Liao, PT      Functional Mobility    Functional Mobility- Ind. Level  contact guard assist;1 person + 1 person to manage equipment;verbal cues required  -CL     Functional Mobility-Distance (Feet)  400  -CL     Functional Mobility- Comment  Pt had 1 minor LOB w/ 2 standing rest breaks. VCs for PLB throughout.   -CL     Recorded by  [CL] Armida Hood OT     Upper Body Dressing Assessment/Training    UB Dressing Assess/Train, Clothing Type  doffing:;hospital gown;donning:;t-shirt  -CL     UB Dressing Assess/Train, Position   supported sitting  -CL     UB Dressing Assess/Train, [place]  independent  -CL     Recorded by  [CL] Armida Hood OT     Lower Body Dressing Assessment/Training    LB Dressing Assess/Train, Clothing Type  donning:;pants;shoes;socks  -CL     LB Dressing Assess/Train, Position  supported sitting  -CL     LB Dressing Assess/Train, [place]  independent  -CL     LB Dressing Assess/Train, Comment  Pt finishing dressing upon arrival.   -CL     Recorded by  [CL] Armida Hodo OT     Motor Skills/Interventions    Additional Documentation Balance Skills Training (Group)  - Balance Skills Training (Group)  -CL     Recorded by [DR] Ricky Liao, PT [CL] Armida Hood OT     Balance Skills Training    Sitting-Level of Assistance Independent  - Independent  -CL     Sitting-Balance Support Feet supported  - Feet supported  -CL     Sitting-Balance Activities Forward lean  - Forward lean;Reaching for objects;Trunk control activities  -CL     Standing-Level of Assistance Independent  - Independent  -CL     Static Standing Balance Support No upper extremity supported  -DR No upper extremity supported  -CL     Standing-Balance Activities Weight Shift A-P  -DR Weight Shift A-P;Weight Shift R-L  -CL     Gait Balance-Level of Assistance  Contact guard  -CL     Gait Balance Support  No upper extremity supported  -CL     Gait Balance Activities  scanning environment R/L;side-stepping  -CL     Recorded by [DR] Ricky Liao, PT [CL] Armida Hood OT     Positioning and Restraints    Pre-Treatment Position sitting in chair/recliner  - sitting in chair/recliner  -CL     Post Treatment Position chair  -DR chair  -CL     In Chair notified nsg;sitting;call light within reach;encouraged to call for assist  - notified nsg;sitting;call light within reach;encouraged to call for assist;with nsg;with other staff  -CL     Recorded by [DR] Ricky Liao, PT [CL] Armida Hood OT       User Key  (r) = Recorded By, (t) = Taken By,  (c) = Cosigned By    Initials Name Effective Dates    CL Armida Hood, OT 06/08/16 -     DR Ricky Liao, PT 09/06/16 -                 IP PT Goals       07/25/17 0914 07/23/17 1500       Gait Training PT LTG    Gait Training Goal PT LTG, Date Established  07/23/17  -SJ     Gait Training Goal PT LTG, Time to Achieve  2 wks  -SJ     Gait Training Goal PT LTG, [place] Level  conditional independence  -SJ     Gait Training Goal PT LTG, Assist Device  cane, straight  -SJ     Gait Training Goal PT LTG, Distance to Achieve  500  -SJ     Gait Training Goal PT LTG, Outcome goal ongoing  -DR goal ongoing  -SJ     Stair Training PT LTG    Stair Training Goal PT LTG, Date Established  07/23/17  -SJ     Stair Training Goal PT LTG, Time to Achieve  2 wks  -SJ     Stair Training Goal PT LTG, Number of Steps  6  -SJ     Stair Training Goal PT LTG, [place] Level  supervision required  -SJ     Stair Training Goal PT LTG, Assist Device  1 handrail  -SJ     Stair Training Goal PT LTG, Outcome goal ongoing  -DR goal ongoing  -SJ       User Key  (r) = Recorded By, (t) = Taken By, (c) = Cosigned By    Initials Name Provider Type    SJ Pattie Dior, PT Physical Therapist    DR Ricky Liao, PT Physical Therapist          Physical Therapy Education     Title: PT OT SLP Therapies (Active)     Topic: Physical Therapy (Active)     Point: Mobility training (Active)    Learning Progress Summary    Learner Readiness Method Response Comment Documented by Status   Patient Acceptance E NR   07/25/17 0913 Active    Acceptance E VU,NR   07/23/17 1502 Done               Point: Body mechanics (Active)    Learning Progress Summary    Learner Readiness Method Response Comment Documented by Status   Patient Acceptance E NR   07/25/17 0913 Active    Acceptance E VU,NR   07/23/17 1502 Done               Point: Precautions (Active)    Learning Progress Summary    Learner Readiness Method Response Comment Documented by Status   Patient  Acceptance E NR   07/25/17 0913 Active                      User Key     Initials Effective Dates Name Provider Type Discipline     06/19/15 -  Pattie Dior, PT Physical Therapist PT     09/06/16 -  Ricky Liao, PT Physical Therapist PT                    PT Recommendation and Plan  Anticipated Equipment Needs At Discharge: standard cane  Anticipated Discharge Disposition: home with outpatient services (pulmonary rehab)  Planned Therapy Interventions: balance training, gait training, home exercise program, patient/family education, strengthening, stair training, transfer training  PT Frequency: daily, per priority policy  Plan of Care Review  Plan Of Care Reviewed With: patient  Progress: progress toward functional goals as expected  Outcome Summary/Follow up Plan: Pt increased forward ambulation distance to 400 ft with CGA and without assistive device. He demonstrated x 1 LOB, but able to maintain upright posture with CGA and requried x 2 standing rest breaks due to fatigue and SOA. Required VC for pursed lip breathing.          Outcome Measures       07/25/17 0808 07/25/17 0806 07/23/17 1412    How much help from another person do you currently need...    Turning from your back to your side while in flat bed without using bedrails? 3  -  4  -SJ    Moving from lying on back to sitting on the side of a flat bed without bedrails? 3  -  4  -SJ    Moving to and from a bed to a chair (including a wheelchair)? 3  -  4  -SJ    Standing up from a chair using your arms (e.g., wheelchair, bedside chair)? 3  -  4  -SJ    Climbing 3-5 steps with a railing? 3  -  3  -SJ    To walk in hospital room? 3  -  3  -SJ    AM-PAC 6 Clicks Score 18  -  22  -SJ    How much help from another is currently needed...    Putting on and taking off regular lower body clothing?  4  -CL     Bathing (including washing, rinsing, and drying)  3  -CL     Toileting (which includes using toilet bed pan or urinal)  3  -CL      Putting on and taking off regular upper body clothing  4  -CL     Taking care of personal grooming (such as brushing teeth)  4  -CL     Eating meals  4  -CL     Score  22  -CL     Functional Assessment    Outcome Measure Options AM-PAC 6 Clicks Basic Mobility (PT)  - AM-PAC 6 Clicks Daily Activity (OT)  -CL AM-PAC 6 Clicks Basic Mobility (PT)  -SJ      07/23/17 0805          How much help from another is currently needed...    Putting on and taking off regular lower body clothing? 3  -AC      Bathing (including washing, rinsing, and drying) 3  -AC      Toileting (which includes using toilet bed pan or urinal) 3  -AC      Putting on and taking off regular upper body clothing 4  -AC      Taking care of personal grooming (such as brushing teeth) 4  -AC      Eating meals 4  -AC      Score 21  -AC      Functional Assessment    Outcome Measure Options AM-PAC 6 Clicks Daily Activity (OT)  -AC        User Key  (r) = Recorded By, (t) = Taken By, (c) = Cosigned By    Initials Name Provider Type    AC Sheridan Smith, OT Occupational Therapist    HARLAN Dior, PT Physical Therapist    CL Armida Hood, OT Occupational Therapist    DR Ricky Liao, PT Physical Therapist           Time Calculation:         PT Charges       07/25/17 0915          Time Calculation    Start Time 0808  -      PT Received On 07/25/17  -      PT Goal Re-Cert Due Date 08/02/17  -      Time Calculation- PT    Total Timed Code Minutes- PT 15 minute(s)  -        User Key  (r) = Recorded By, (t) = Taken By, (c) = Cosigned By    Initials Name Provider Type    DR Ricky Liao, PT Physical Therapist          Therapy Charges for Today     Code Description Service Date Service Provider Modifiers Qty    [id number] HC GAIT TRAINING EA 15 MIN 7/25/2017 Ricky Liao, PT GP 1          PT G-Codes  Outcome Measure Options: AM-PAC 6 Clicks Basic Mobility (PT)    Ricky Liao, PT  7/25/2017

## 2017-07-25 NOTE — THERAPY TREATMENT NOTE
Acute Care - Occupational Therapy Treatment Note  [facility]     Patient Name: Chito Rod  : 1950  MRN: [id number]  Today's Date: 2017  Onset of Illness/Injury or Date of Surgery Date: 17  Date of Referral to OT: 17  Referring Physician: MD Mallory      Admit Date: 2017    Visit Dx:     ICD-10-CM ICD-9-CM   1. Acute respiratory failure with hypoxia and hypercapnia J96.01 518.81    J96.02    2. Medically noncompliant Z91.19 V15.81   3. Morbid obesity, unspecified obesity type E66.01 278.01   4. Type 2 diabetes mellitus with complication, with long-term current use of insulin E11.8 250.90    Z79.4 V58.67   5. Essential hypertension I10 401.9   6. Shortness of breath R06.02 786.05   7. Atrial fibrillation with normal ventricular rate I48.91 427.31   8. Acute on chronic diastolic congestive heart failure I50.33 428.33     428.0   9. Impaired mobility and ADLs Z74.09 799.89   10. Impaired functional mobility, balance, gait, and endurance Z74.09 V49.89     Patient Active Problem List   Diagnosis   • FAUSTO (obstructive sleep apnea)   • Chronic obstructive pulmonary disease with acute exacerbation   • Hypertension   • GERD (gastroesophageal reflux disease)   • Acute on chronic diastolic congestive heart failure   • Morbid obesity   • Acute respiratory failure with hypoxia and hypercapnia   • Atrial fibrillation   • CAD (coronary artery disease)   • Chronic anticoagulation   • DM2 (diabetes mellitus, type 2)   • HLD (hyperlipidemia)   • Oxygen dependent   • CKD (chronic kidney disease) stage 3, GFR 30-59 ml/min             Adult Rehabilitation Note       17 0806          Rehab Assessment/Intervention    Discipline occupational therapist  -CL      Document Type therapy note (daily note)  -CL      Subjective Information agree to therapy;no complaints  -CL      Patient Effort, Rehab Treatment good  -CL      Symptoms Noted During/After Treatment shortness of breath  -CL      Symptoms Noted  Comment O2 sats decreased w/ activity, VCs for PLB throughout.   -CL      Precautions/Limitations fall precautions;oxygen therapy device and L/min  -CL      Recorded by [CL] Armida Hood OT      Vital Signs    Pre Systolic BP Rehab 90  -CL      Pre Treatment Diastolic BP 62  -CL      Post Systolic BP Rehab 114  -CL      Post Treatment Diastolic BP 78  -CL      Pre SpO2 (%) 88  -CL      O2 Delivery Pre Treatment supplemental O2   4L NC  -CL      Intra SpO2 (%) 85  -CL      O2 Delivery Intra Treatment supplemental O2  -CL      Post SpO2 (%) 90  -CL      O2 Delivery Post Treatment supplemental O2  -CL      Pre Patient Position Sitting  -CL      Intra Patient Position Standing  -CL      Post Patient Position Sitting  -CL      Recorded by [CL] Armida Hood OT      Pain Assessment    Pain Assessment No/denies pain  -CL      Recorded by [CL] Armida Hood OT      Cognitive Assessment/Intervention    Current Cognitive/Communication Assessment functional  -CL      Orientation Status oriented x 4  -CL      Follows Commands/Answers Questions 100% of the time  -CL      Personal Safety mild impairment;decreased awareness, need for assist;decreased awareness, need for safety  -CL      Personal Safety Interventions fall prevention program maintained;gait belt;nonskid shoes/slippers when out of bed  -CL      Recorded by [CL] Armida Hood OT      Bed Mobility, Assessment/Treatment    Bed Mobility, Comment UIC.   -CL      Recorded by [CL] Armida Hood OT      Transfer Assessment/Treatment    Transfers, Sit-Stand [place] stand by assist  -CL      Transfers, Stand-Sit [place] stand by assist  -CL      Recorded by [CL] Armida Hood OT      Functional Mobility    Functional Mobility- Ind. Level contact guard assist;1 person + 1 person to manage equipment;verbal cues required  -CL      Functional Mobility-Distance (Feet) 400  -CL      Functional Mobility- Comment Pt had 1 minor LOB w/ 2 standing rest breaks. VCs for PLB  throughout.   -CL      Recorded by [CL] Armida Hood OT      Upper Body Dressing Assessment/Training    UB Dressing Assess/Train, Clothing Type doffing:;hospital gown;donning:;t-shirt  -CL      UB Dressing Assess/Train, Position supported sitting  -CL      UB Dressing Assess/Train, [place] independent  -CL      Recorded by [CL] Armida Hood OT      Lower Body Dressing Assessment/Training    LB Dressing Assess/Train, Clothing Type donning:;pants;shoes;socks  -CL      LB Dressing Assess/Train, Position supported sitting  -CL      LB Dressing Assess/Train, [place] independent  -CL      LB Dressing Assess/Train, Comment Pt finishing dressing upon arrival.   -CL      Recorded by [CL] Armida Hood OT      Motor Skills/Interventions    Additional Documentation Balance Skills Training (Group)  -CL      Recorded by [CL] Armida Hood OT      Balance Skills Training    Sitting-Level of Assistance Independent  -CL      Sitting-Balance Support Feet supported  -CL      Sitting-Balance Activities Forward lean;Reaching for objects;Trunk control activities  -CL      Standing-Level of Assistance Independent  -CL      Static Standing Balance Support No upper extremity supported  -CL      Standing-Balance Activities Weight Shift A-P;Weight Shift R-L  -CL      Gait Balance-Level of Assistance Contact guard  -CL      Gait Balance Support No upper extremity supported  -CL      Gait Balance Activities scanning environment R/L;side-stepping  -CL      Recorded by [CL] Armida Hood OT      Positioning and Restraints    Pre-Treatment Position sitting in chair/recliner  -CL      Post Treatment Position chair  -CL      In Chair notified nsg;sitting;call light within reach;encouraged to call for assist;with nsg;with other staff  -CL      Recorded by [CL] Armida Hood OT        User Key  (r) = Recorded By, (t) = Taken By, (c) = Cosigned By    Initials Name Effective Dates    CL Armida Hood OT 06/08/16 -                 OT Goals        07/25/17 0907 07/23/17 1019       Transfer Training OT LTG    Transfer Training OT LTG, Date Established  07/23/17  -AC     Transfer Training OT LTG, Time to Achieve  by discharge  -AC     Transfer Training OT LTG, Activity Type  bed to chair /chair to bed;toilet  -AC     Transfer Training OT LTG, [place] Level  supervision required  -AC     Transfer Training OT LTG, Outcome goal ongoing  -CL      Patient Education OT LTG    Patient Education OT LTG, Date Established  07/23/17  -AC     Patient Education OT LTG, Time to Achieve  by discharge  -AC     Patient Education OT LTG, Education Type  written program;HEP;energy conservation;work simplification;adaptive breathing  -AC     Patient Education OT LTG, Education Understanding  demonstrates adequately;verbalizes understanding  -AC     Patient Education OT LTG Outcome goal ongoing  -CL      Activity Tolerance OT LTG    Activity Tolerance Goal OT LTG, Date Established  07/23/17  -AC     Activity Tolerance Goal OT LTG, Time to Achieve  by discharge  -AC     Activity Tolerance Goal OT LTG, Activity Level  15 min activity  -AC     Activity Tolerance Goal OT LTG, Additional Goal  Pt will complete 15 min activity with O2 sat 90% or greater  -AC     Activity Tolerance Goal OT LTG, Outcome goal ongoing  -CL        User Key  (r) = Recorded By, (t) = Taken By, (c) = Cosigned By    Initials Name Provider Type    PADMINI Smith OT Occupational Therapist    ALONDRA Hood OT Occupational Therapist          Occupational Therapy Education     Title: PT OT SLP Therapies (Active)     Topic: Occupational Therapy (Active)     Point: ADL training (Active)    Description: Instruct learner(s) on proper safety adaptation and remediation techniques during self care or transfers.   Instruct in proper use of assistive devices.    Learning Progress Summary    Learner Readiness Method Response Comment Documented by Status   Patient Acceptance D,E NR Pt educated on appropriate safety  precautions, PLB, and benefits of therapy.  07/25/17 0906 Active    Acceptance E VU benefits of activity  07/23/17 1018 Done               Point: Precautions (Active)    Description: Instruct learner(s) on prescribed precautions during self-care and functional transfers.    Learning Progress Summary    Learner Readiness Method Response Comment Documented by Status   Patient Acceptance D,E NR Pt educated on appropriate safety precautions, PLB, and benefits of therapy.  07/25/17 0906 Active               Point: Body mechanics (Active)    Description: Instruct learner(s) on proper positioning and spine alignment during self-care, functional mobility activities and/or exercises.    Learning Progress Summary    Learner Readiness Method Response Comment Documented by Status   Patient Acceptance D,E NR Pt educated on appropriate safety precautions, PLB, and benefits of therapy.  07/25/17 0906 Active                      User Key     Initials Effective Dates Name Provider Type Discipline     06/23/15 -  Sheridan Smith, OT Occupational Therapist OT     06/08/16 -  Armida Hood, OT Occupational Therapist OT                  OT Recommendation and Plan  Anticipated Discharge Disposition: other (see comments) (pulmonary rehab)  Planned Therapy Interventions: ADL retraining, balance training, bed mobility training, energy conservation, transfer training  Therapy Frequency: daily  Plan of Care Review  Plan Of Care Reviewed With: patient  Progress: improving  Outcome Summary/Follow up Plan: Pt demo improved activity tolerance by ambulating 400 ft w/ CGAx1+1 w/ 2 standing rest breaks, O2 sats decreased to 85% on 4L NC. Pt performed UBD and LBD upon arrival independently.          Outcome Measures       07/25/17 0806 07/23/17 1412 07/23/17 0805    How much help from another person do you currently need...    Turning from your back to your side while in flat bed without using bedrails?  4  -SJ     Moving from lying on back  to sitting on the side of a flat bed without bedrails?  4  -SJ     Moving to and from a bed to a chair (including a wheelchair)?  4  -SJ     Standing up from a chair using your arms (e.g., wheelchair, bedside chair)?  4  -SJ     Climbing 3-5 steps with a railing?  3  -SJ     To walk in hospital room?  3  -SJ     AM-PAC 6 Clicks Score  22  -SJ     How much help from another is currently needed...    Putting on and taking off regular lower body clothing? 4  -CL  3  -AC    Bathing (including washing, rinsing, and drying) 3  -CL  3  -AC    Toileting (which includes using toilet bed pan or urinal) 3  -CL  3  -AC    Putting on and taking off regular upper body clothing 4  -CL  4  -AC    Taking care of personal grooming (such as brushing teeth) 4  -CL  4  -AC    Eating meals 4  -CL  4  -AC    Score 22  -CL  21  -AC    Functional Assessment    Outcome Measure Options AM-PAC 6 Clicks Daily Activity (OT)  -CL AM-PAC 6 Clicks Basic Mobility (PT)  -SJ AM-PAC 6 Clicks Daily Activity (OT)  -AC      User Key  (r) = Recorded By, (t) = Taken By, (c) = Cosigned By    Initials Name Provider Type    AC Sheridan Smith OT Occupational Therapist    HARLAN Dior, PT Physical Therapist    CL Armida Hood OT Occupational Therapist           Time Calculation:         Time Calculation- OT       07/25/17 0909          Time Calculation- OT    OT Start Time 0806  -CL      Total Timed Code Minutes- OT 15 minute(s)  -CL      OT Received On 07/25/17  -CL      OT Goal Re-Cert Due Date 08/02/17  -CL        User Key  (r) = Recorded By, (t) = Taken By, (c) = Cosigned By    Initials Name Provider Type    CL Armida Hood OT Occupational Therapist           Therapy Charges for Today     Code Description Service Date Service Provider Modifiers Qty    [id number]  OT THERAPEUTIC ACT EA 15 MIN 7/25/2017 Armida Hood OT GO 1               Armida Hood OT  7/25/2017

## 2017-07-25 NOTE — PLAN OF CARE
Problem: Patient Care Overview (Adult)  Goal: Plan of Care Review  Outcome: Ongoing (interventions implemented as appropriate)    07/25/17 0914   Coping/Psychosocial Response Interventions   Plan Of Care Reviewed With patient   Patient Care Overview   Progress progress toward functional goals as expected   Outcome Evaluation   Outcome Summary/Follow up Plan Pt increased forward ambulation distance to 400 ft with CGA and without assistive device. He demonstrated x 1 LOB, but able to maintain upright posture with CGA and requried x 2 standing rest breaks due to fatigue and SOA. Required VC for pursed lip breathing.         Problem: Inpatient Physical Therapy  Goal: Gait Training Goal LTG- PT  Outcome: Ongoing (interventions implemented as appropriate)    07/23/17 1500 07/25/17 0914   Gait Training PT LTG   Gait Training Goal PT LTG, Date Established 07/23/17 --    Gait Training Goal PT LTG, Time to Achieve 2 wks --    Gait Training Goal PT LTG, [place] Level conditional independence --    Gait Training Goal PT LTG, Assist Device cane, straight --    Gait Training Goal PT LTG, Distance to Achieve 500 --    Gait Training Goal PT LTG, Outcome --  goal ongoing       Goal: Stair Training Goal LTG- PT  Outcome: Ongoing (interventions implemented as appropriate)    07/23/17 1500 07/25/17 0914   Stair Training PT LTG   Stair Training Goal PT LTG, Date Established 07/23/17 --    Stair Training Goal PT LTG, Time to Achieve 2 wks --    Stair Training Goal PT LTG, Number of Steps 6 --    Stair Training Goal PT LTG, [place] Level supervision required --    Stair Training Goal PT LTG, Assist Device 1 handrail --    Stair Training Goal PT LTG, Outcome --  goal ongoing

## 2017-07-25 NOTE — PROGRESS NOTES
[facility] Medicine Services  INPATIENT PROGRESS NOTE    Date of Admission: 7/20/2017  Length of Stay: 5  Primary Care Physician: Javan Shankar MD    Subjective   CC: Sleepy. Short of breath with ambulation.   Follow up acute COPD and CHF exacerbation  HPI:  Patient was seen multiple times today. Mostly he was asleep in bed. Other time he was walking in [place]. He has not been very well compliant with wearing oxygen, even attempting to disconnect his own tubing at home. He snuck and ate salty chips, which was not on his diet ordered.     He has not been ambulating much prior to today because he has been sleeping all of the time. He states he takes care of himself at home, but I question this. No family at bedside.     Patient seen twice today, first after breakfast and again later today.   He admits he will not follow strict diet at home and requesting potato chips.  He is agreeable to wear oxygen at home as ordered. He declined rehab.   He told me again today he wants to be a do not resuscitate.   He was noncompliant with bipap.     Denies chest pain, shortness of breath, abdominal pain. Edema in legs better.He was not taking his home medication as directed.    Review Of Systems:   Review of Systems   Constitutional: Negative.  Negative for chills, fatigue and fever.   HENT: Negative.  Negative for congestion, sneezing and sore throat.    Eyes: Negative.    Respiratory: Positive for shortness of breath (with ambulation ).    Cardiovascular: Positive for leg swelling (improving).   Gastrointestinal: Negative.  Negative for constipation, diarrhea and nausea.   Endocrine: Negative.    Genitourinary: Negative for dysuria, flank pain and hematuria.   Musculoskeletal: Positive for gait problem (reduced ambulation. Sedentary). Negative for back pain and myalgias.   Skin: Negative.    Allergic/Immunologic: Negative.    Neurological: Negative for dizziness, tremors, syncope, weakness and  headaches.        Foot paresthesias   Hematological: Negative.    Psychiatric/Behavioral: Negative.  Negative for confusion and self-injury. The patient is not nervous/anxious and is not hyperactive.          Objective      Temp:  [97.8 °F (36.6 °C)-98.8 °F (37.1 °C)] 97.8 °F (36.6 °C)  Heart Rate:  [] 124  Resp:  [16-22] 20  BP: ()/(57-88) 120/74  Physical Exam  General: Asleep, awakened easily. No acute distress.    Head:    Atraumatic and normocephalic, without obvious abnormality.   Eyes:            PERRLA, conjunctivae and sclerae normal, no Icterus. No pallor. Extra-occular movements are within normal limits.   Ears:    Ears appear intact with no abnormalities noted.   Throat:   No oral lesions, no thrush, oral mucosa moist.   Neck:   Supple, trachea midline, no thyromegaly, no carotid bruit.       Lungs:     Chest shape is normal. Breath sounds heard bilaterally equally with trace improved left sided crackles without wheezing.     Heart:    Normal S1 and S2, no murmur, no gallop, no rub. No JVD   Abdomen:     Normal bowel sounds, no masses, no organomegaly. Soft        non-tender, non-distended, no guarding, no rebound                Tenderness. obese   Extremities:   Moves all extremities well, trace improved bilateral edema, no cyanosis, hanging legs over side of bed instead of elevated   Pulses:   Pulses palpable and equal bilaterally   Skin:   No bleeding, bruising or rash   Lymph nodes:   No palpable adenopathy   Neurologic:   Gross sensation intact, Motor power is normal and equal bilaterally, but exam affected by willingness to cooperative due to sleeping           Results Review:    I have reviewed the labs, radiology results and diagnostic studies.      Results from last 7 days  Lab Units 07/25/17  0513   WBC 10*3/mm3 8.82   HEMOGLOBIN g/dL 14.7   PLATELETS 10*3/mm3 134*       Results from last 7 days  Lab Units 07/25/17  0513   SODIUM mmol/L 133   POTASSIUM mmol/L 4.2   CHLORIDE mmol/L  93*   CO2 mmol/L 24.0   BUN mg/dL 14   CREATININE mg/dL 1.00   GLUCOSE mg/dL 177*   CALCIUM mg/dL 9.7       Culture Data: Cultures:         Radiology Data:     I have reviewed the medications.    aspirin 81 mg Oral Daily   atorvastatin 20 mg Oral Nightly   budesonide-formoterol 2 puff Inhalation BID - RT   carvedilol 25 mg Oral BID With Meals   dabigatran etexilate 150 mg Oral BID   diltiaZEM 60 mg Oral Q6H   gabapentin 100 mg Oral Q12H   insulin detemir 20 Units Subcutaneous Daily   insulin detemir 40 Units Subcutaneous Nightly   insulin lispro 0-9 Units Subcutaneous 4x Daily With Meals & Nightly   insulin lispro 15 Units Subcutaneous TID With Meals   ipratropium-albuterol 3 mL Nebulization 4x Daily - RT   metOLazone 5 mg Oral Daily   pantoprazole 40 mg Oral QAM   pharmacy consult - MTM  Does not apply Daily   Pharmacy Meds to Bed Consult  Does not apply Daily   traZODone 50 mg Oral Nightly         Assessment/Plan     Assessment:    1. Acute on Chronic Hypoxic and hypercapnic respiratory failure, stable on nasal cannula oxygen 5 Liter  2. Acute on chronic Diastolic heart failure, EF 70% 4/20/17, EF repeat 65% resolving  3. FAUSTO on bipap with oxygen nightly at home  4. Morbid obesity  5. COPD with acute exacerbation, stable improving  6. CKD3  7. Suspect obesity hypoventilation syndrome with persistent hypersomnolence  8. Chronic afib on Pradaxa  9. Uncontrolled diabetes mellitus  With A1c 10.2 with persistant noncompliance  10. Medication and oxygen noncompliance at home  persistant despite counseling  11. Suspect mild chronic underlying dementia without behavior disorder, stable, suspect chronic hypoxia related encephalopathy from chronic noncompliance  12. Peripheral neuropathy, diabetic  13. Chronic essential hypertension  14. FAUSTO declined treatment       Plan:  Today, he is too sleepy. His Anion gap increased. He is noncompliant. I don't feel he is safe for discharge home alone today. Would like to discuss  further with family and friends. He should not drive. Recheck BMP later today.     Consider discharge home tomorrow if improved and family discussion. Will need home portable concentrator pack oxygen as well.     Cardiology saw him today and was satisfied with diuresis. Was okay with discharge, but I have held it for today because of concern for his home safety. He will ambulate more. Recheck labs again tomorrow. Meet with family/friend to review safe discharge plan and consider discharge home with home health tomorrow.     He will need home oxygen, reports he drives but I recommended he shouldn't drive right now while sleepy all of the time, expecially when noncompliant with oxygen.     Glucose uncontrolled due to noncompliance. He has been using insulin and will need to test insulin teaching prior to discharge.     Continue atorvastatin, coreg, pradaxa, diltiazems, and lasix as below    ·   · Atorvastatin 20mg nightly  · Coreg 25mg bid  · Pradaxa 150mg bid  · Diltiazem 60mg q 6 hours or Dilt ER 240mg po daily  · Lasix 40 mg bid   · Follow up with Dr. Lainez in 4 weeks          PT/OT ordered. Patient refuses rehab. He  Feels he can care for himself, but would like to talk to family about this further.      DVT prophylaxis: pradaxa. BENI. SCD  Discharge Planning: I expect patient to be discharged home with family and caretaker tomorrow    Ophelia Tejada, DO   07/25/17   4:10 PM

## 2017-07-25 NOTE — PLAN OF CARE
Problem: Patient Care Overview (Adult)  Goal: Plan of Care Review  Outcome: Ongoing (interventions implemented as appropriate)  Patient non-compliant with oxygen therapy.  Patient insistent on going downstairs to the gift shop, explained to patient that cardiac monitor will not read off the unit.  After stating that he would just sneak after I left, I had patient signing paper stating that he was leaving floor against recommendation from nurse and doctor. Patient has been off the floor several times getting snacks and regular soft drinks. Educated patient on why we have him on a special diet and that these food choices are not in that diet.  Patient states that he is going to just go back to eating these foods when he goes back home.  Patient was agreeable with getting cleaned up today.     Goal: Adult Individualization and Mutuality  Outcome: Ongoing (interventions implemented as appropriate)  Goal: Discharge Needs Assessment  Outcome: Ongoing (interventions implemented as appropriate)    Problem: COPD, Chronic Bronchitis/Emphysema (Adult)  Goal: Signs and Symptoms of Listed Potential Problems Will be Absent or Manageable (COPD, Chronic Bronchitis/Emphysema)  Outcome: Ongoing (interventions implemented as appropriate)    Problem: Diabetes, Type 2 (Adult)  Goal: Signs and Symptoms of Listed Potential Problems Will be Absent or Manageable (Diabetes, Type 2)  Outcome: Ongoing (interventions implemented as appropriate)    Problem: Cardiac: Heart Failure (Adult)  Goal: Signs and Symptoms of Listed Potential Problems Will be Absent or Manageable (Cardiac: Heart Failure)  Outcome: Ongoing (interventions implemented as appropriate)    Problem: Fall Risk (Adult)  Goal: Identify Related Risk Factors and Signs and Symptoms  Outcome: Ongoing (interventions implemented as appropriate)  Goal: Absence of Falls  Outcome: Ongoing (interventions implemented as appropriate)

## 2017-07-25 NOTE — PROGRESS NOTES
Chito Rod  [id number]  1950   LOS: 5 days   Patient Care Team:  Javan Shankar MD as PCP - General (Internal Medicine)    Chief Complaint:  CHF and a fib    Subjective  Patient denies any chest discomfort, increased shortness of breath, sputum production, abdominal pain.  He is resting comfortably this morning in chair on 4-5L O2 with sats 89-90%. Per RN patient has refused pulmonary rehab, is a AND. Patient wants to go home.        Objective     Vital Sign Min/Max for last 24 hours  Temp  Min: 98.2 °F (36.8 °C)  Max: 98.8 °F (37.1 °C)   BP  Min: 85/59  Max: 121/88   Pulse  Min: 84  Max: 112   Resp  Min: 16  Max: 22   SpO2  Min: 90 %  Max: 97 %   Flow (L/min)  Min: 4  Max: 40   Weight  Min: 316 lb 4 oz (143 kg)  Max: 316 lb 4 oz (143 kg)     Last 2 weights    07/24/17  0440 07/25/17  0338   Weight: (!) 322 lb 9 oz (146 kg) (!) 316 lb 4 oz (143 kg)         Intake/Output Summary (Last 24 hours) at 07/25/17 0817  Last data filed at 07/25/17 0700   Gross per 24 hour   Intake             1200 ml   Output             6300 ml   Net            -5100 ml       Physical Exam:     General Appearance:    Alert, cooperative, in no acute distress   Lungs:     Clear to auscultation,respirations regular, even and                   Unlabored, 4-5L O2 NC    Heart:    Irregular and normal rate, normal S1 and S2, no            murmur, no gallop, no rub, no click   Abdomen:  Extremities:   Soft, non-tender        Trace edema             Pulses:   Pulses palpable and equal bilaterally      Results Review:     Results from last 7 days  Lab Units 07/25/17  0513 07/24/17  0442 07/23/17  0651   SODIUM mmol/L 133 138 135   POTASSIUM mmol/L 4.2 3.9 4.2   CHLORIDE mmol/L 93* 94* 94*   CO2 mmol/L 24.0 36.0* 35.0*   BUN mg/dL 14 15 23   CREATININE mg/dL 1.00 0.90 1.00   GLUCOSE mg/dL 177* 176* 235*   CALCIUM mg/dL 9.7 8.9 8.6*       Results from last 7 days  Lab Units 07/25/17  0513 07/24/17  0442 07/23/17  0651   WBC 10*3/mm3 8.82 9.95  9.92   HEMOGLOBIN g/dL 14.7 12.7* 12.3*   HEMATOCRIT % 44.9 41.9 41.6   PLATELETS 10*3/mm3 134* 168 159           Results from last 7 days  Lab Units 07/21/17  0003   HEMOGLOBIN A1C % 10.40*       Results from last 7 days  Lab Units 07/21/17  1042 07/21/17  0553 07/21/17  0107   TROPONIN I ng/mL <0.006 <0.006 <0.006       Medication Review: Reviewed    Assessment/Plan   Patient with COPD/CHF exacerbation in the setting of chronic atrial fibrillation, anticoagulated with pradaxa.  Echocardiogram acceptable with LVEF 0.65. Continue current cardiac medications. Excellent diuresis. Possible discharge today. Patient is on oxygen at home but is noncompliant. Would benefit but has refused pulmonary rehab. Questionable whether he will be compliant at home after discharge. Would continue the following cardiac medications after discharge:  · ASA 81 mg daily  · Atorvastatin 20mg nightly  · Coreg 25mg bid  · Pradaxa 150mg bid  · Diltiazem 60mg q 6 hours or Dilt ER 240mg po daily  · Lasix 40 mg bid   · Follow up with Dr. Lainez in 4 weeks    Principal Problem:    Chronic obstructive pulmonary disease with acute exacerbation  Active Problems:    FAUSTO (obstructive sleep apnea)    Hypertension    GERD (gastroesophageal reflux disease)    Acute on chronic diastolic congestive heart failure    Morbid obesity    Acute respiratory failure with hypoxia and hypercapnia    Atrial fibrillation    CAD (coronary artery disease)    Chronic anticoagulation    DM2 (diabetes mellitus, type 2)    HLD (hyperlipidemia)    Oxygen dependent    CKD (chronic kidney disease) stage 3, GFR 30-59 ml/min    Scribed for Jean Pierre Lainez MD by OTILIA Yoon. 7/25/2017  8:17 AM      07/25/17  8:17 AM     I have reviewed the notes, assessments, and/or procedures performed by OTILIA, I CONCUR with her documentation of Chito Rod.

## 2017-07-25 NOTE — PLAN OF CARE
Problem: Patient Care Overview (Adult)  Goal: Plan of Care Review  Outcome: Ongoing (interventions implemented as appropriate)    07/25/17 0907   Coping/Psychosocial Response Interventions   Plan Of Care Reviewed With patient   Patient Care Overview   Progress improving   Outcome Evaluation   Outcome Summary/Follow up Plan Pt demo improved activity tolerance by ambulating 400 ft w/ CGAx1+1 w/ 2 standing rest breaks, O2 sats decreased to 85% on 4L NC. Pt performed UBD and LBD upon arrival independently.          Problem: Inpatient Occupational Therapy  Goal: Transfer Training Goal 1 LTG- OT  Outcome: Ongoing (interventions implemented as appropriate)    07/23/17 1019 07/25/17 0907   Transfer Training OT LTG   Transfer Training OT LTG, Date Established 07/23/17 --    Transfer Training OT LTG, Time to Achieve by discharge --    Transfer Training OT LTG, Activity Type bed to chair /chair to bed;toilet --    Transfer Training OT LTG, Erin Level supervision required --    Transfer Training OT LTG, Outcome --  goal ongoing       Goal: Patient Education Goal LTG- OT  Outcome: Ongoing (interventions implemented as appropriate)    07/23/17 1019 07/25/17 0907   Patient Education OT LTG   Patient Education OT LTG, Date Established 07/23/17 --    Patient Education OT LTG, Time to Achieve by discharge --    Patient Education OT LTG, Education Type written program;HEP;energy conservation;work simplification;adaptive breathing --    Patient Education OT LTG, Education Understanding demonstrates adequately;verbalizes understanding --    Patient Education OT LTG Outcome --  goal ongoing       Goal: Activity Tolerance Goal LTG- OT  Outcome: Ongoing (interventions implemented as appropriate)    07/23/17 1019 07/25/17 0907   Activity Tolerance OT LTG   Activity Tolerance Goal OT LTG, Date Established 07/23/17 --    Activity Tolerance Goal OT LTG, Time to Achieve by discharge --    Activity Tolerance Goal OT LTG, Activity Level  15 min activity --    Activity Tolerance Goal OT LTG, Additional Goal Pt will complete 15 min activity with O2 sat 90% or greater --    Activity Tolerance Goal OT LTG, Outcome --  goal ongoing

## 2017-07-25 NOTE — PLAN OF CARE
Problem: Patient Care Overview (Adult)  Goal: Plan of Care Review  Outcome: Ongoing (interventions implemented as appropriate)    07/25/17 0506   Coping/Psychosocial Response Interventions   Plan Of Care Reviewed With patient   Patient Care Overview   Progress no change   Outcome Evaluation   Outcome Summary/Follow up Plan Held 0000 dose of cardizem due to BP, otherwise VSS. Pt continues to ask for chips and pepsi, and very aggravated with care from staff.         Problem: COPD, Chronic Bronchitis/Emphysema (Adult)  Goal: Signs and Symptoms of Listed Potential Problems Will be Absent or Manageable (COPD, Chronic Bronchitis/Emphysema)  Outcome: Ongoing (interventions implemented as appropriate)    07/25/17 0506   COPD, Chronic Bronchitis/Emphysema   Problems Assessed (COPD, Chronic Bronchitis/Emphysema) all   Problems Present (COPD, Chronic Bronchitis/Emphysema) hypoxia/hypoxemia

## 2017-07-26 VITALS
RESPIRATION RATE: 18 BRPM | WEIGHT: 310 LBS | OXYGEN SATURATION: 98 % | HEIGHT: 71 IN | DIASTOLIC BLOOD PRESSURE: 86 MMHG | TEMPERATURE: 97.9 F | BODY MASS INDEX: 43.4 KG/M2 | SYSTOLIC BLOOD PRESSURE: 119 MMHG | HEART RATE: 93 BPM

## 2017-07-26 LAB
ANION GAP SERPL CALCULATED.3IONS-SCNC: 11 MMOL/L (ref 3–11)
BUN BLD-MCNC: 18 MG/DL (ref 9–23)
BUN/CREAT SERPL: 16.4 (ref 7–25)
CALCIUM SPEC-SCNC: 9.9 MG/DL (ref 8.7–10.4)
CHLORIDE SERPL-SCNC: 87 MMOL/L (ref 99–109)
CO2 SERPL-SCNC: 34 MMOL/L (ref 20–31)
CREAT BLD-MCNC: 1.1 MG/DL (ref 0.6–1.3)
GFR SERPL CREATININE-BSD FRML MDRD: 67 ML/MIN/1.73
GLUCOSE BLD-MCNC: 340 MG/DL (ref 70–100)
GLUCOSE BLDC GLUCOMTR-MCNC: 283 MG/DL (ref 70–130)
GLUCOSE BLDC GLUCOMTR-MCNC: 316 MG/DL (ref 70–130)
POTASSIUM BLD-SCNC: 3.6 MMOL/L (ref 3.5–5.5)
SODIUM BLD-SCNC: 132 MMOL/L (ref 132–146)

## 2017-07-26 PROCEDURE — 82962 GLUCOSE BLOOD TEST: CPT

## 2017-07-26 PROCEDURE — 80048 BASIC METABOLIC PNL TOTAL CA: CPT | Performed by: FAMILY MEDICINE

## 2017-07-26 PROCEDURE — 94640 AIRWAY INHALATION TREATMENT: CPT

## 2017-07-26 PROCEDURE — 94799 UNLISTED PULMONARY SVC/PX: CPT

## 2017-07-26 PROCEDURE — 99232 SBSQ HOSP IP/OBS MODERATE 35: CPT | Performed by: INTERNAL MEDICINE

## 2017-07-26 PROCEDURE — 94760 N-INVAS EAR/PLS OXIMETRY 1: CPT

## 2017-07-26 PROCEDURE — 63710000001 INSULIN DETEMIR PER 5 UNITS: Performed by: FAMILY MEDICINE

## 2017-07-26 PROCEDURE — 99239 HOSP IP/OBS DSCHRG MGMT >30: CPT | Performed by: NURSE PRACTITIONER

## 2017-07-26 RX ORDER — GABAPENTIN 100 MG/1
100 CAPSULE ORAL EVERY 12 HOURS SCHEDULED
Qty: 60 CAPSULE | Refills: 0 | COMMUNITY
Start: 2017-07-26 | End: 2019-04-18

## 2017-07-26 RX ORDER — DABIGATRAN ETEXILATE 150 MG/1
150 CAPSULE ORAL EVERY 12 HOURS SCHEDULED
Status: DISCONTINUED | OUTPATIENT
Start: 2017-07-26 | End: 2017-07-26 | Stop reason: HOSPADM

## 2017-07-26 RX ORDER — ASPIRIN 81 MG/1
81 TABLET ORAL DAILY
Qty: 30 TABLET | Refills: 0 | Status: SHIPPED | OUTPATIENT
Start: 2017-07-26 | End: 2017-08-14 | Stop reason: DRUGHIGH

## 2017-07-26 RX ADMIN — BUDESONIDE AND FORMOTEROL FUMARATE DIHYDRATE 2 PUFF: 160; 4.5 AEROSOL RESPIRATORY (INHALATION) at 07:56

## 2017-07-26 RX ADMIN — DILTIAZEM HYDROCHLORIDE 60 MG: 60 TABLET, FILM COATED ORAL at 12:12

## 2017-07-26 RX ADMIN — PANTOPRAZOLE SODIUM 40 MG: 40 TABLET, DELAYED RELEASE ORAL at 08:40

## 2017-07-26 RX ADMIN — GABAPENTIN 100 MG: 100 CAPSULE ORAL at 08:41

## 2017-07-26 RX ADMIN — IPRATROPIUM BROMIDE AND ALBUTEROL SULFATE 3 ML: .5; 3 SOLUTION RESPIRATORY (INHALATION) at 12:16

## 2017-07-26 RX ADMIN — CARVEDILOL 25 MG: 12.5 TABLET, FILM COATED ORAL at 08:44

## 2017-07-26 RX ADMIN — INSULIN DETEMIR 20 UNITS: 100 INJECTION, SOLUTION SUBCUTANEOUS at 08:40

## 2017-07-26 RX ADMIN — DABIGATRAN ETEXILATE MESYLATE 150 MG: 150 CAPSULE ORAL at 08:39

## 2017-07-26 RX ADMIN — IPRATROPIUM BROMIDE AND ALBUTEROL SULFATE 3 ML: .5; 3 SOLUTION RESPIRATORY (INHALATION) at 07:50

## 2017-07-26 RX ADMIN — ASPIRIN 81 MG: 81 TABLET, COATED ORAL at 08:40

## 2017-07-26 NOTE — PLAN OF CARE
Problem: Diabetes, Type 2 (Adult)  Goal: Signs and Symptoms of Listed Potential Problems Will be Absent or Manageable (Diabetes, Type 2)  Outcome: Ongoing (interventions implemented as appropriate)    07/26/17 0557   Diabetes, Type 2   Problems Assessed (Type 2 Diabetes) all   Problems Present (Type 2 Diabetes) impaired glycemic control

## 2017-07-26 NOTE — PROGRESS NOTES
"  [facility] at [facility]  PROGRESS NOTE    Date of Admission: 7/20/2017  Length of Stay: 6  Primary Care Physician: Javan Shankar MD    Chief Complaint: f/u CHF and PAF    Subjective      He is asleep but will awaken. No cardiac complaints, no chest discomfort, tachypalpitations or shortness of breath. O2 while asleep with 2L NC 85%, and will increased to 88-89% when awake.  Cr uptrending.      Objective   Vitals: BP 96/61 (BP Location: Left arm, Patient Position: Lying)  Pulse 99  Temp 98 °F (36.7 °C) (Oral)   Resp 18  Ht 71\" (180.3 cm)  Wt (!) 310 lb (141 kg)  SpO2 95%  BMI 43.24 kg/m2    Physical Exam:  GENERAL: Alert, cooperative, in no acute distress.   HEENT: Fundoscopic deferred, otherwise unremarkable.  NECK: No Jugular venous distention, adenopathy, or thyromegaly noted.  HEART: No discrete PMI is noted. Distant heart tones, irregular rhythm, fast rate   LUNGS: Rales bilaterally, no wheezing or ronchi. 88% on 2L NC  NEUROLOGIC: No focal abnormalities involving strength or sensation are noted.   EXTREMITIES: No clubbing, cyanosis, or edema noted.    Results:    Results from last 7 days  Lab Units 07/25/17  0513 07/24/17  0442 07/23/17  0651   WBC 10*3/mm3 8.82 9.95 9.92   HEMOGLOBIN g/dL 14.7 12.7* 12.3*   HEMATOCRIT % 44.9 41.9 41.6   PLATELETS 10*3/mm3 134* 168 159       Results from last 7 days  Lab Units 07/26/17  0817 07/25/17  1645 07/25/17  0513   SODIUM mmol/L 132 132 133   POTASSIUM mmol/L 3.6 3.5 4.2   CHLORIDE mmol/L 87* 87* 93*   CO2 mmol/L 34.0* 34.0* 24.0   BUN mg/dL 18 22 14   CREATININE mg/dL 1.10 1.40* 1.00   GLUCOSE mg/dL 340* 378* 177*      Lab Results   Component Value Date    CHOL 135 04/19/2017    TRIG 86 04/19/2017    HDL 39 (L) 04/19/2017    LDLDIRECT 92 04/19/2017    AST 27 07/20/2017    ALT 34 07/20/2017       Results from last 7 days  Lab Units 07/21/17  0003   HEMOGLOBIN A1C % 10.40*       Results from last 7 days  Lab Units 07/20/17  1456 "   BNP pg/mL 162.0*       Results from last 7 days  Lab Units 07/21/17  1042 07/21/17  0553 07/21/17  0107   TROPONIN I ng/mL <0.006 <0.006 <0.006       Intake/Output Summary (Last 24 hours) at 07/26/17 0950  Last data filed at 07/25/17 1700   Gross per 24 hour   Intake              480 ml   Output             1000 ml   Net             -520 ml     I personally reviewed the patient's EKG/Telemetry data    Radiology Data:   Echo 7/21/17:  Interpretation Summary      · Calculated EF = 64.6%. Estimated EF appears to be in the range of 56 - 60%.  · Left ventricular wall thickness is consistent with mild concentric hypertrophy.  · Left atrial cavity size is mildly dilated.     Current Medications:    aspirin 81 mg Oral Daily   atorvastatin 20 mg Oral Nightly   budesonide-formoterol 2 puff Inhalation BID - RT   carvedilol 25 mg Oral BID With Meals   dabigatran etexilate 150 mg Oral Q12H   diltiaZEM 60 mg Oral Q6H   gabapentin 100 mg Oral Q12H   insulin detemir 20 Units Subcutaneous Daily   insulin detemir 40 Units Subcutaneous Nightly   insulin lispro 0-9 Units Subcutaneous 4x Daily With Meals & Nightly   insulin lispro 15 Units Subcutaneous TID With Meals   ipratropium-albuterol 3 mL Nebulization 4x Daily - RT   pantoprazole 40 mg Oral QAM   pharmacy consult - MTM  Does not apply Daily   Pharmacy Meds to Bed Consult  Does not apply Daily   traZODone 50 mg Oral Nightly          Assessment and Plan:   1. Acute COPD/ DHF exacerbation  - diuresed as much as possible, however limited by low BP  - oxygenation still an issue   - medically noncompliance complicating all aspects of care, as he has refused some treatment, and has been noncompliant with his diet restriction  -Continued bibasilar inspiratory rales despite being intravascularly hypovolemic.  Suspect significant underlying pulmonary fibrosis and COPD is current predominant etiology of chronic hypoxemia    2. PAF  - on Pradaxa, Coreg 25mg BID and Diltiazem 60mg QID    3.  Medical noncompliance       Tran Barnhart PA-C.  9:50 AM  07/26/17    I have reviewed the notes, assessments, and/or procedures performed by OTILIA, I CONCUR with her documentation of Chito Rod.

## 2017-07-26 NOTE — PROGRESS NOTES
Continued Stay Note  [facility]     Patient Name: Chito Rod  MRN: [id number]  Today's Date: 7/26/2017    Admit Date: 7/20/2017          Discharge Plan       07/26/17 1052    Case Management/Social Work Plan    Plan Home    Patient/Family In Agreement With Plan yes    Additional Comments PT recommending outpt pulmonary rehab. Pt walking 400 ft. He declines any rehab services at this time.  Consult for oxygen portable received. Called and spoke to Marika at [facility] at [phone] and they will bring portable to bedside. CM will follow.               Discharge Codes     None        Expected Discharge Date and Time     Expected Discharge Date Expected Discharge Time    Jul 27, 2017             Earline Borden

## 2017-07-27 ENCOUNTER — TRANSITIONAL CARE MANAGEMENT TELEPHONE ENCOUNTER (OUTPATIENT)
Dept: INTERNAL MEDICINE | Facility: CLINIC | Age: 67
End: 2017-07-27

## 2017-07-27 NOTE — THERAPY DISCHARGE NOTE
Acute Care - Occupational Therapy Discharge Summary  [facility]     Patient Name: Chito Rod  : 1950  MRN: [id number]    Today's Date: 2017  Onset of Illness/Injury or Date of Surgery Date: 17    Date of Referral to OT: 17  Referring Physician: MD Mallory      Admit Date: 2017        OT Recommendation and Plan    Visit Dx:    ICD-10-CM ICD-9-CM   1. Acute respiratory failure with hypoxia and hypercapnia J96.01 518.81    J96.02    2. Medically noncompliant Z91.19 V15.81   3. Morbid obesity, unspecified obesity type E66.01 278.01   4. Type 2 diabetes mellitus with complication, with long-term current use of insulin E11.8 250.90    Z79.4 V58.67   5. Essential hypertension I10 401.9   6. Shortness of breath R06.02 786.05   7. Atrial fibrillation with normal ventricular rate I48.91 427.31   8. Acute on chronic diastolic congestive heart failure I50.33 428.33     428.0   9. Impaired mobility and ADLs Z74.09 799.89   10. Impaired functional mobility, balance, gait, and endurance Z74.09 V49.89                     OT Goals       17 0907 17 1019       Transfer Training OT LTG    Transfer Training OT LTG, Date Established  17  -AC     Transfer Training OT LTG, Time to Achieve  by discharge  -AC     Transfer Training OT LTG, Activity Type  bed to chair /chair to bed;toilet  -AC     Transfer Training OT LTG, [place] Level  supervision required  -AC     Transfer Training OT LTG, Outcome goal ongoing  -CL      Patient Education OT LTG    Patient Education OT LTG, Date Established  17  -AC     Patient Education OT LTG, Time to Achieve  by discharge  -AC     Patient Education OT LTG, Education Type  written program;HEP;energy conservation;work simplification;adaptive breathing  -AC     Patient Education OT LTG, Education Understanding  demonstrates adequately;verbalizes understanding  -AC     Patient Education OT LTG Outcome goal ongoing  -CL      Activity Tolerance OT  LTG    Activity Tolerance Goal OT LTG, Date Established  07/23/17  -AC     Activity Tolerance Goal OT LTG, Time to Achieve  by discharge  -AC     Activity Tolerance Goal OT LTG, Activity Level  15 min activity  -AC     Activity Tolerance Goal OT LTG, Additional Goal  Pt will complete 15 min activity with O2 sat 90% or greater  -AC     Activity Tolerance Goal OT LTG, Outcome goal ongoing  -CL        User Key  (r) = Recorded By, (t) = Taken By, (c) = Cosigned By    Initials Name Provider Type    PADMINI Smith, OT Occupational Therapist    ALONDRA Hood OT Occupational Therapist                Outcome Measures       07/25/17 0808 07/25/17 0806       How much help from another person do you currently need...    Turning from your back to your side while in flat bed without using bedrails? 3  -DR      Moving from lying on back to sitting on the side of a flat bed without bedrails? 3  -DR      Moving to and from a bed to a chair (including a wheelchair)? 3  -DR      Standing up from a chair using your arms (e.g., wheelchair, bedside chair)? 3  -DR      Climbing 3-5 steps with a railing? 3  -DR      To walk in hospital room? 3  -DR      AM-PAC 6 Clicks Score 18  -DR      How much help from another is currently needed...    Putting on and taking off regular lower body clothing?  4  -CL     Bathing (including washing, rinsing, and drying)  3  -CL     Toileting (which includes using toilet bed pan or urinal)  3  -CL     Putting on and taking off regular upper body clothing  4  -CL     Taking care of personal grooming (such as brushing teeth)  4  -CL     Eating meals  4  -CL     Score  22  -CL     Functional Assessment    Outcome Measure Options AM-PAC 6 Clicks Basic Mobility (PT)  -DR AM-PAC 6 Clicks Daily Activity (OT)  -CL       User Key  (r) = Recorded By, (t) = Taken By, (c) = Cosigned By    Initials Name Provider Type    ALONDRA Hood OT Occupational Therapist    DR Ricky Liao, PT Physical Therapist               OT Discharge Summary  Reason for Discharge: Discharge from facility  Outcomes Achieved: Refer to plan of care for updates on goals achieved  Discharge Destination: Home      Alla Cross, OT  7/27/2017

## 2017-07-27 NOTE — DISCHARGE SUMMARY
HOSPITAL MEDICINE DISCHARGE SUMMARY    Date of Admission: 7/20/2017  Date of Discharge:  7/26/2017    Discharge Diagnoses:  Principal Problem:    Chronic obstructive pulmonary disease with acute exacerbation  Active Problems:    FAUSTO (obstructive sleep apnea)    Hypertension    GERD (gastroesophageal reflux disease)    Acute on chronic diastolic congestive heart failure    Morbid obesity    Acute respiratory failure with hypoxia and hypercapnia    Atrial fibrillation    CAD (coronary artery disease)    Chronic anticoagulation    DM2 (diabetes mellitus, type 2)    HLD (hyperlipidemia)    Oxygen dependent    CKD (chronic kidney disease) stage 3, GFR 30-59 ml/min      Presenting Problem/History of Present Illness  Acute respiratory failure with hypoxia and hypercapnia [J96.01, J96.02]  Patient is a 66 y.o. male presented with secondary to difficulty breathing. He described a chronic history of COPD, FAUSTO and Oxygen dependence.  He was visiting his sister and fell asleep without his oxygen.  He reportd being aroused by EMS secondary to his skin coloration.  He was brought to [facility] ED secondary to his decreased oxygen saturations of < 88%.  Even with 6 L of O2 in the ED his saturations were only 92%.  His ABG identified a pH 7.322 pCO2 60.4 and pO2 61.  He described shortness of air to his chest with no associated retrosternal chest pain.  The dyspnea is constant and was made worse today with not using his oxygen.  The oxygen and nebulizer in the ED modified his symptoms.  He described his dyspnea as severe but improving with ED care.  He reported some confusion.  There is no associated fever, chills, n/v, rashes, syncope or falls. Asked to be admitted to hospital medicine for further evaluation.       Discharge Day HPI:  Sitting up on side of bed. Very eager to go home. NAD. No complaints. Denies f/c, n/v/d, SOA or CP.     Hospital Course    1. Acute on Chronic Hypoxic and hypercapnic respiratory failure, stable on  nasal cannula oxygen 5 Liter  2. Acute on chronic Diastolic heart failure, EF 70% 4/20/17, EF repeat 65% resolving  3. FAUSTO on bipap with oxygen nightly at home  4. Morbid obesity  5. COPD with acute exacerbation, stable improving  6. CKD3  7. Suspect obesity hypoventilation syndrome with persistent hypersomnolence  8. Chronic afib on Pradaxa  9. Uncontrolled diabetes mellitus  With A1c 10.2 with persistant noncompliance  10. Medication and oxygen noncompliance at home  persistant despite counseling  11. Suspect mild chronic underlying dementia without behavior disorder, stable, suspect chronic hypoxia related encephalopathy from chronic noncompliance  12. Peripheral neuropathy, diabetic  13. Chronic essential hypertension  14. FAUSTO declined treatment       Patient treated for COPD/ CHF exacerbation. Asked cardiology to assist in care, and was able to get patient diuresed well. Seen by diabetes educator for lifestyle modification and medication education. Patient non compliant with diet and BiPap while inpatient. Was able to wean down to baseline oxygen level.   He will need home oxygen, reports he drives but I recommended he shouldn't drive right now while sleepy all of the time, expecially when noncompliant with oxygen.      Glucose uncontrolled due to noncompliance. He has been using insulin, due to noncompliance and concern for safety at home, the decision was made to restart patient home diabetic medications and not pursue insulin at this time.      Continue atorvastatin, coreg, pradaxa, diltiazems, and lasix as below     ·    · Atorvastatin 20mg nightly  · Coreg 25mg bid  · Pradaxa 150mg bid  · Diltiazem 60mg q 6 hours or Dilt ER 240mg po daily  · Lasix 40 mg bid   · Follow up with Dr. Lainez in 4 weeks             Patient refused rehab though recommended by PT. He  Feels he can care for himself,but was agreeable to home health. Patient has also refused pulmonary rehab as well.  Will be discharged home today with  a portable oxygen tank. Needs to follow up with PCP in 1 week, Cardiology in 1 month, and Heart/Valve clinic in 1 week.      Procedures Performed         Consults:   Consults     Date and Time Order Name Status Description    7/20/2017 2316 Inpatient Consult to Cardiology Completed           Pertinent Test Results:   Lab Results (last 7 days)     Procedure Component Value Units Date/Time    POC Troponin, Rapid [[id number]]  (Normal) Collected:  07/20/17 1503    Specimen:  Blood Updated:  07/20/17 1520     Troponin I 0.03 ng/mL       Serial Number: [id number]:  [id number]       CBC & Differential [[id number]] Collected:  07/20/17 1456    Specimen:  Blood Updated:  07/20/17 1533    Narrative:       The following orders were created for panel order CBC & Differential.  Procedure                               Abnormality         Status                     ---------                               -----------         ------                     CBC Auto Differential[[id number]]        Abnormal            Final result                 Please view results for these tests on the individual orders.    CBC Auto Differential [[id number]]  (Abnormal) Collected:  07/20/17 1456    Specimen:  Blood Updated:  07/20/17 1533     WBC 9.00 10*3/mm3      RBC 4.23 10*6/mm3      Hemoglobin 12.6 (L) g/dL      Hematocrit 41.9 %      MCV 99.1 (H) fL      MCH 29.8 pg      MCHC 30.1 (L) g/dL      RDW 14.3 %      RDW-SD 51.4 fl      MPV 11.3 fL      Platelets 170 10*3/mm3      Neutrophil % 58.5 %      Lymphocyte % 30.8 %      Monocyte % 7.8 %      Eosinophil % 2.2 %      Basophil % 0.3 %      Immature Grans % 0.4 %      Neutrophils, Absolute 5.26 10*3/mm3      Lymphocytes, Absolute 2.77 10*3/mm3      Monocytes, Absolute 0.70 10*3/mm3      Eosinophils, Absolute 0.20 10*3/mm3      Basophils, Absolute 0.03 10*3/mm3      Immature Grans, Absolute 0.04 (H) 10*3/mm3     Comprehensive Metabolic Panel [[id number]]  (Abnormal) Collected:  07/20/17 1456     Specimen:  Blood Updated:  07/20/17 1549     Glucose 294 (H) mg/dL      BUN 24 (H) mg/dL      Creatinine 1.50 (H) mg/dL      Sodium 133 mmol/L      Potassium 4.4 mmol/L      Chloride 96 (L) mmol/L      CO2 30.0 mmol/L      Calcium 8.8 mg/dL      Total Protein 7.2 g/dL      Albumin 4.10 g/dL      ALT (SGPT) 34 U/L      AST (SGOT) 27 U/L      Alkaline Phosphatase 79 U/L      Total Bilirubin 0.4 mg/dL      eGFR Non African Amer 47 (L) mL/min/1.73      Globulin 3.1 gm/dL      A/G Ratio 1.3 (L) g/dL      BUN/Creatinine Ratio 16.0     Anion Gap 7.0 mmol/L     Narrative:       National Kidney Foundation Guidelines    Stage     Description        GFR  1         Normal or High     90+  2         Mild decrease      60-89  3         Moderate decrease  30-59  4         Severe decrease    15-29  5         Kidney failure     <15    BNP [[id number]]  (Abnormal) Collected:  07/20/17 1456    Specimen:  Blood Updated:  07/20/17 1557     .0 (H) pg/mL     Light Blue Top [[id number]] Collected:  07/20/17 1456    Specimen:  Blood Updated:  07/20/17 1601     Extra Tube hold for add-on      Auto resulted       Green Top (Gel) [[id number]] Collected:  07/20/17 1456    Specimen:  Blood Updated:  07/20/17 1601     Extra Tube Hold for add-ons.      Auto resulted.       Lavender Top [[id number]] Collected:  07/20/17 1456    Specimen:  Blood Updated:  07/20/17 1601     Extra Tube hold for add-on      Auto resulted       Gold Top - SST [[id number]] Collected:  07/20/17 1456    Specimen:  Blood Updated:  07/20/17 1601     Extra Tube Hold for add-ons.      Auto resulted.       [place] Draw [[id number]] Collected:  07/20/17 1456    Specimen:  Blood Updated:  07/20/17 1628    Narrative:       The following orders were created for panel order [place] Draw.  Procedure                               Abnormality         Status                     ---------                               -----------         ------                     Light Blue  Top[[id number]]                                   Final result               Green Top (Gel)[[id number]]                                  Final result               Lavender Top[[id number]]                                     Final result               Gold Top - SST[[id number]]                                   Final result               Green Top (No Gel)[[id number]]                                                            Please view results for these tests on the individual orders.    POC Glucose Fingerstick [[id number]]  (Abnormal) Collected:  07/20/17 1638    Specimen:  Blood Updated:  07/20/17 1642     Glucose 197 (H) mg/dL     Narrative:       Meter: [id number] : [id number] Elías Jerry    POC Troponin, Rapid [[id number]]  (Normal) Collected:  07/20/17 1916    Specimen:  Blood Updated:  07/20/17 1931     Troponin I 0.03 ng/mL       Serial Number: [id number]:  [id number]       Blood Gas, Arterial [[id number]]  (Abnormal) Collected:  07/20/17 1919    Specimen:  Arterial Blood Updated:  07/20/17 1942     Site Arterial: left radial     Ej's Test N/A     pH, Arterial 7.322 (L) pH units      pCO2, Arterial 60.4 (H) mm Hg      pO2, Arterial 61.4 (L) mm Hg      HCO3, Arterial 31.2 (H) mmol/L      Base Excess, Arterial 4.3 (H) mmol/L      Hemoglobin, Blood Gas 13.1 (L) g/dL      Hematocrit, Blood Gas 40.3 %      Oxyhemoglobin 86.3 (L) %      Methemoglobin 0.9 %      Carboxyhemoglobin 0.7 %      CO2 Content 33.1 (H)     Barometric Pressure for Blood Gas -- mmHg       N/A        Modality Cannula - Nasal     FIO2 40 %     POC Glucose Fingerstick [[id number]]  (Abnormal) Collected:  07/20/17 2347    Specimen:  Blood Updated:  07/20/17 2349     Glucose 139 (H) mg/dL     Narrative:       Meter: [id number] : [id number] Jeremy Cooper    Troponin [[id number]]  (Normal) Collected:  07/21/17 0107    Specimen:  Blood Updated:  07/21/17 0147     Troponin I <0.006 ng/mL     Narrative:       Ultra Troponin I  Reference Range:    <=0.039 ng/mL: Negative  0.04-0.779 ng/mL: Indeterminate Range. Clinical correlation required.  >=0.78  ng/mL: Consistent with myocardial injury. Clinical correlation required.    Troponin [[id number]]  (Normal) Collected:  07/21/17 0553    Specimen:  Blood Updated:  07/21/17 0648     Troponin I <0.006 ng/mL     Narrative:       Ultra Troponin I Reference Range:    <=0.039 ng/mL: Negative  0.04-0.779 ng/mL: Indeterminate Range. Clinical correlation required.  >=0.78  ng/mL: Consistent with myocardial injury. Clinical correlation required.    POC Glucose Fingerstick [[id number]]  (Abnormal) Collected:  07/21/17 0718    Specimen:  Blood Updated:  07/21/17 0722     Glucose 347 (H) mg/dL     Narrative:       Meter: [id number] : [id number] Gertrude Knight    POC Glucose Fingerstick [[id number]]  (Abnormal) Collected:  07/21/17 1112    Specimen:  Blood Updated:  07/21/17 1114     Glucose 406 (H) mg/dL     Narrative:       Confirmed by Repeat Meter: [id number] : [id number] Gertrude Knight    POC Glucose Fingerstick [[id number]]  (Abnormal) Collected:  07/21/17 1113    Specimen:  Blood Updated:  07/21/17 1114     Glucose 416 (H) mg/dL     Narrative:       Confirmed by Repeat Meter: [id number] : [id number] Gertrude Knight    Troponin [[id number]]  (Normal) Collected:  07/21/17 1042    Specimen:  Blood Updated:  07/21/17 1228     Troponin I <0.006 ng/mL     Narrative:       Ultra Troponin I Reference Range:    <=0.039 ng/mL: Negative  0.04-0.779 ng/mL: Indeterminate Range. Clinical correlation required.  >=0.78  ng/mL: Consistent with myocardial injury. Clinical correlation required.    Basic Metabolic Panel [[id number]]  (Abnormal) Collected:  07/21/17 1042    Specimen:  Blood Updated:  07/21/17 1308     Glucose 445 (C) mg/dL      BUN 15 mg/dL      Creatinine 1.10 mg/dL      Sodium 133 mmol/L      Potassium 5.1 mmol/L      Chloride 94 (L) mmol/L      CO2 28.0 mmol/L      Calcium 8.8  mg/dL      eGFR Non African Amer 67 mL/min/1.73      BUN/Creatinine Ratio 13.6     Anion Gap 11.0 mmol/L     Narrative:       National Kidney Foundation Guidelines    Stage     Description        GFR  1         Normal or High     90+  2         Mild decrease      60-89  3         Moderate decrease  30-59  4         Severe decrease    15-29  5         Kidney failure     <15    POC Glucose Fingerstick [[id number]]  (Abnormal) Collected:  07/21/17 1618    Specimen:  Blood Updated:  07/21/17 1626     Glucose 387 (H) mg/dL     Narrative:       Meter: [id number] : [id number] Gertrude Knight    Hemoglobin A1c [[id number]]  (Abnormal) Collected:  07/21/17 0003    Specimen:  Blood Updated:  07/21/17 1658     Hemoglobin A1C 10.40 (H) %     Narrative:       The American Diabetes Association recommends maintenance of Hemoglobin A1C at 7.0% or lower. Goals for Hemoglobin A1C reduction may need to be modified if hypoglycemia is a problem.    POC Glucose Fingerstick [[id number]]  (Abnormal) Collected:  07/21/17 2010    Specimen:  Blood Updated:  07/21/17 2015     Glucose 456 (C) mg/dL     Narrative:       Confirmed by Repeat Meter: [id number] : [id number] Prater Manasa    POC Glucose Fingerstick [[id number]]  (Abnormal) Collected:  07/21/17 2011    Specimen:  Blood Updated:  07/21/17 2015     Glucose 499 (C) mg/dL     Narrative:       Meter: [id number] : [id number] Prater Manasa    POC Glucose Fingerstick [[id number]]  (Abnormal) Collected:  07/21/17 2343    Specimen:  Blood Updated:  07/21/17 2348     Glucose 459 (C) mg/dL     Narrative:       Confirmed by Repeat Meter: [id number] : [id number] Prater Manasa    POC Glucose Fingerstick [[id number]]  (Abnormal) Collected:  07/22/17 0315    Specimen:  Blood Updated:  07/22/17 0324     Glucose 476 (C) mg/dL     Narrative:       Meter: [id number] : [id number] Prater Manasa    POC Glucose Fingerstick [[id number]]  (Abnormal) Collected:  07/22/17 0614    Specimen:  Blood  Updated:  07/22/17 0615     Glucose 390 (H) mg/dL     Narrative:       Meter: [id number] : [id number] Moi Goel    Basic Metabolic Panel [[id number]]  (Abnormal) Collected:  07/22/17 0454    Specimen:  Blood Updated:  07/22/17 0656     Glucose 460 (C) mg/dL      BUN 21 mg/dL      Creatinine 1.20 mg/dL      Sodium 133 mmol/L      Potassium 4.6 mmol/L      Chloride 95 (L) mmol/L      CO2 30.0 mmol/L      Calcium 8.6 (L) mg/dL      eGFR Non African Amer 61 mL/min/1.73      BUN/Creatinine Ratio 17.5     Anion Gap 8.0 mmol/L     Narrative:       National Kidney Foundation Guidelines    Stage     Description        GFR  1         Normal or High     90+  2         Mild decrease      60-89  3         Moderate decrease  30-59  4         Severe decrease    15-29  5         Kidney failure     <15    POC Glucose Fingerstick [[id number]]  (Abnormal) Collected:  07/22/17 0706    Specimen:  Blood Updated:  07/22/17 0709     Glucose 357 (H) mg/dL     Narrative:       Meter: [id number] : [id number] Lowe Karime    POC Glucose Fingerstick [[id number]]  (Abnormal) Collected:  07/22/17 1209    Specimen:  Blood Updated:  07/22/17 1215     Glucose 335 (H) mg/dL     Narrative:       Meter: [id number] : [id number] Lowe Karime    POC Glucose Fingerstick [[id number]]  (Abnormal) Collected:  07/22/17 1636    Specimen:  Blood Updated:  07/22/17 1649     Glucose 333 (H) mg/dL     Narrative:       Meter: [id number] : [id number] Gallito Case    POC Glucose Fingerstick [[id number]]  (Abnormal) Collected:  07/22/17 2028    Specimen:  Blood Updated:  07/22/17 2031     Glucose 367 (H) mg/dL     Narrative:       Meter: [id number] : [id number] Moi Goel    POC Glucose Fingerstick [[id number]]  (Abnormal) Collected:  07/23/17 0816    Specimen:  Blood Updated:  07/23/17 0821     Glucose 212 (H) mg/dL     Narrative:       Meter: [id number] : [id number] Gertrude Knight    CBC (No Diff) [[id number]]  (Abnormal)  Collected:  07/23/17 0651    Specimen:  Blood Updated:  07/23/17 0821     WBC 9.92 10*3/mm3      RBC 4.14 (L) 10*6/mm3      Hemoglobin 12.3 (L) g/dL      Hematocrit 41.6 %      .5 (H) fL      MCH 29.7 pg      MCHC 29.6 (L) g/dL      RDW 14.2 %      RDW-SD 51.6 fl      MPV 11.8 fL      Platelets 159 10*3/mm3     Basic Metabolic Panel [[id number]]  (Abnormal) Collected:  07/23/17 0651    Specimen:  Blood Updated:  07/23/17 0834     Glucose 235 (H) mg/dL      BUN 23 mg/dL      Creatinine 1.00 mg/dL      Sodium 135 mmol/L      Potassium 4.2 mmol/L      Chloride 94 (L) mmol/L      CO2 35.0 (H) mmol/L      Calcium 8.6 (L) mg/dL      eGFR Non African Amer 75 mL/min/1.73      BUN/Creatinine Ratio 23.0     Anion Gap 6.0 mmol/L     Narrative:       National Kidney Foundation Guidelines    Stage     Description        GFR  1         Normal or High     90+  2         Mild decrease      60-89  3         Moderate decrease  30-59  4         Severe decrease    15-29  5         Kidney failure     <15    POC Glucose Fingerstick [[id number]]  (Abnormal) Collected:  07/23/17 1131    Specimen:  Blood Updated:  07/23/17 1138     Glucose 333 (H) mg/dL     Narrative:       Meter: [id number] : [id number] Gertrude Maddox Payen    POC Glucose Fingerstick [[id number]]  (Abnormal) Collected:  07/23/17 1614    Specimen:  Blood Updated:  07/23/17 1616     Glucose 218 (H) mg/dL     Narrative:       Meter: [id number] : [id number] Gertrude Maddox Payen    POC Glucose Fingerstick [[id number]]  (Abnormal) Collected:  07/23/17 2102    Specimen:  Blood Updated:  07/23/17 2104     Glucose 181 (H) mg/dL     Narrative:       Meter: [id number] : [id number] Carli Garduno    POC Glucose Fingerstick [[id number]]  (Abnormal) Collected:  07/23/17 2318    Specimen:  Blood Updated:  07/23/17 2323     Glucose 193 (H) mg/dL     Narrative:       Meter: [id number] : [id number] Carli Garduno    CBC (No Diff) [[id number]]  (Abnormal) Collected:  07/24/17  0442    Specimen:  Blood Updated:  07/24/17 0532     WBC 9.95 10*3/mm3      RBC 4.32 10*6/mm3      Hemoglobin 12.7 (L) g/dL      Hematocrit 41.9 %      MCV 97.0 fL      MCH 29.4 pg      MCHC 30.3 (L) g/dL      RDW 13.7 %      RDW-SD 48.7 fl      MPV 11.1 fL      Platelets 168 10*3/mm3     Basic Metabolic Panel [[id number]]  (Abnormal) Collected:  07/24/17 0442    Specimen:  Blood Updated:  07/24/17 0625     Glucose 176 (H) mg/dL      BUN 15 mg/dL      Creatinine 0.90 mg/dL      Sodium 138 mmol/L      Potassium 3.9 mmol/L      Chloride 94 (L) mmol/L      CO2 36.0 (H) mmol/L      Calcium 8.9 mg/dL      eGFR Non African Amer 85 mL/min/1.73      BUN/Creatinine Ratio 16.7     Anion Gap 8.0 mmol/L     Narrative:       National Kidney Foundation Guidelines    Stage     Description        GFR  1         Normal or High     90+  2         Mild decrease      60-89  3         Moderate decrease  30-59  4         Severe decrease    15-29  5         Kidney failure     <15    Magnesium [[id number]]  (Normal) Collected:  07/24/17 0442    Specimen:  Blood Updated:  07/24/17 0625     Magnesium 2.0 mg/dL     POC Glucose Fingerstick [[id number]]  (Abnormal) Collected:  07/24/17 0725    Specimen:  Blood Updated:  07/24/17 0742     Glucose 202 (H) mg/dL     Narrative:       Meter: [id number] : [id number] Gertrude Ashen    POC Glucose Fingerstick [[id number]]  (Abnormal) Collected:  07/24/17 1116    Specimen:  Blood Updated:  07/24/17 1117     Glucose 276 (H) mg/dL     Narrative:       Meter: [id number] : [id number] Gertrude Maddox Payen    POC Glucose Fingerstick [[id number]]  (Abnormal) Collected:  07/24/17 1642    Specimen:  Blood Updated:  07/24/17 1643     Glucose 234 (H) mg/dL     Narrative:       Meter: [id number] : [id number] Gertrude Maddox Payen    POC Glucose Fingerstick [[id number]]  (Abnormal) Collected:  07/24/17 2005    Specimen:  Blood Updated:  07/24/17 2006     Glucose 233 (H) mg/dL     Narrative:       Meter:  [id number] : [id number] Kumar Lay    POC Glucose Fingerstick [[id number]]  (Abnormal) Collected:  07/25/17 0721    Specimen:  Blood Updated:  07/25/17 0723     Glucose 202 (H) mg/dL     Narrative:       Meter: [id number] : [id number] Mynor Schaffer    Basic Metabolic Panel [[id number]]  (Abnormal) Collected:  07/25/17 0513    Specimen:  Blood Updated:  07/25/17 0743     Glucose 177 (H) mg/dL      BUN 14 mg/dL      Creatinine 1.00 mg/dL      Sodium 133 mmol/L      Potassium 4.2 mmol/L      Chloride 93 (L) mmol/L      CO2 24.0 mmol/L      Calcium 9.7 mg/dL      eGFR Non African Amer 75 mL/min/1.73      BUN/Creatinine Ratio 14.0     Anion Gap 16.0 (H) mmol/L     Narrative:       National Kidney Foundation Guidelines    Stage     Description        GFR  1         Normal or High     90+  2         Mild decrease      60-89  3         Moderate decrease  30-59  4         Severe decrease    15-29  5         Kidney failure     <15    CBC (No Diff) [[id number]]  (Abnormal) Collected:  07/25/17 0513    Specimen:  Blood Updated:  07/25/17 0815     WBC 8.82 10*3/mm3      RBC 4.86 10*6/mm3      Hemoglobin 14.7 g/dL      Hematocrit 44.9 %      MCV 92.4 fL      MCH 30.2 pg      MCHC 32.7 g/dL      RDW 13.6 %      RDW-SD 45.7 fl      MPV 12.4 (H) fL      Platelets 134 (L) 10*3/mm3     POC Glucose Fingerstick [[id number]]  (Abnormal) Collected:  07/25/17 1143    Specimen:  Blood Updated:  07/25/17 1145     Glucose 257 (H) mg/dL     Narrative:       Meter: [id number] : [id number] Mynor Schaffer    POC Glucose Fingerstick [[id number]]  (Abnormal) Collected:  07/25/17 1656    Specimen:  Blood Updated:  07/25/17 1716     Glucose 380 (H) mg/dL     Narrative:       Meter: [id number] : [id number] Mynor Schaffer    Basic Metabolic Panel [[id number]]  (Abnormal) Collected:  07/25/17 1645    Specimen:  Blood Updated:  07/25/17 1835     Glucose 378 (H) mg/dL      BUN 22 mg/dL      Creatinine 1.40 (H) mg/dL      Sodium 132 mmol/L       Potassium 3.5 mmol/L      Chloride 87 (L) mmol/L      CO2 34.0 (H) mmol/L      Calcium 9.9 mg/dL      eGFR Non African Amer 51 (L) mL/min/1.73      BUN/Creatinine Ratio 15.7     Anion Gap 11.0 mmol/L     Narrative:       National Kidney Foundation Guidelines    Stage     Description        GFR  1         Normal or High     90+  2         Mild decrease      60-89  3         Moderate decrease  30-59  4         Severe decrease    15-29  5         Kidney failure     <15    POC Glucose Fingerstick [[id number]]  (Abnormal) Collected:  07/25/17 2004    Specimen:  Blood Updated:  07/25/17 2006     Glucose 301 (H) mg/dL     Narrative:       Meter: [id number] : [id number] Christine Swartz    POC Glucose Fingerstick [[id number]]  (Abnormal) Collected:  07/26/17 0802    Specimen:  Blood Updated:  07/26/17 0804     Glucose 283 (H) mg/dL     Narrative:       Meter: [id number] : [id number] Gertrude Knight    Basic Metabolic Panel [[id number]]  (Abnormal) Collected:  07/26/17 0817    Specimen:  Blood Updated:  07/26/17 0858     Glucose 340 (H) mg/dL      BUN 18 mg/dL      Creatinine 1.10 mg/dL      Sodium 132 mmol/L      Potassium 3.6 mmol/L      Chloride 87 (L) mmol/L      CO2 34.0 (H) mmol/L      Calcium 9.9 mg/dL      eGFR Non African Amer 67 mL/min/1.73      BUN/Creatinine Ratio 16.4     Anion Gap 11.0 mmol/L     Narrative:       National Kidney Foundation Guidelines    Stage     Description        GFR  1         Normal or High     90+  2         Mild decrease      60-89  3         Moderate decrease  30-59  4         Severe decrease    15-29  5         Kidney failure     <15    POC Glucose Fingerstick [[id number]]  (Abnormal) Collected:  07/26/17 1207    Specimen:  Blood Updated:  07/26/17 1208     Glucose 316 (H) mg/dL     Narrative:       Meter: [id number] : [id number] Gertrude Knight        Imaging Results (all)     Procedure Component Value Units Date/Time    XR Chest 1 View [[id number]] Collected:  07/20/17 1601  "    Updated:  07/20/17 1627    Narrative:       EXAMINATION: XR CHEST 1 VIEW-07/20/2017:      INDICATION: SOA, triage protocol.      COMPARISON: 04/22/2017.     FINDINGS: The heart is large. There is mild central vascular prominence,  however, the overall appearance is unchanged when compared with  04/22/2017. There is no consolidation, mass or effusion.           Impression:       Cardiomegaly with mild central vascular congestion,  unchanged when compared with 04/22/2017.     D:  07/20/2017  E:  07/20/2017     This report was finalized on 7/20/2017 4:25 PM by Dr. Obdulio Lopes MD.       XR Chest 1 View [[id number]] Collected:  07/24/17 1054     Updated:  07/25/17 1311    Narrative:       EXAMINATION: XR CHEST 1 VW- 07/24/2017     INDICATION: J96.01-Acute respiratory failure with hypoxia; J96.02-Acute  respiratory failure with hypercapnia; Z91.19-Patient's noncompliance  with other medical treatment and regimen; E66.01-Morbid (severe) obesity  due to excess calories; E11.8-Type 2 diabetes mellitus with unspecified  complications; Z79.4-Long term (current) use of insulin; I10-Essential  (primary) hypertension; R06.02-Shortness of breat      COMPARISON: 07/20/2017     FINDINGS: Portable chest reveals heart to be enlarged. Underlying  chronic and emphysematous changes are seen within the lung fields  bilaterally. Degenerative changes seen within the spine. Vascular  calcifications are identified. Small left pleural effusion. No focal  parenchymal opacification present.           Impression:       Heart is enlarged with small left pleural effusion. Vascular  calcifications seen within the thoracic aorta.     D:  07/24/2017  E:  07/24/2017     This report was finalized on 7/25/2017 1:09 PM by Dr. Tali Perez MD.               Physical Exam on Discharge:    /86  Pulse 93  Temp 97.9 °F (36.6 °C)  Resp 18  Ht 71\" (180.3 cm)  Wt (!) 310 lb (141 kg)  SpO2 98%  BMI 43.24 kg/m2  Gen-no acute distress, " chronically ill appearing   CV-RRR, S1 S2 normal, no m/r/g  Resp-CTAB, no wheezes  Abd-soft, NT, ND, +BS  Ext-trace non pitting edema; chronic venous stasis changes to BLE   Neuro-A&Ox3, no focal deficits; speech clear and understandable   Psych-appropriate mood      Discharge Disposition  Home or Self Care    Discharge Medications   Chito Rod   [facility] Medication Instructions SUKI:[id number]    Printed on:07/26/17 2047   Medication Information                      aspirin 81 MG EC tablet  Take 1 tablet by mouth Daily.             atorvastatin (LIPITOR) 20 MG tablet  Take 1 tablet by mouth Every Night. STOP PRAVASTATIN             budesonide-formoterol (SYMBICORT) 160-4.5 MCG/ACT inhaler  Inhale 2 puffs 2 (Two) Times a Day.             carvedilol (COREG) 25 MG tablet  Take 1 tablet by mouth 2 (Two) Times a Day With Meals.             cholecalciferol (VITAMIN D3) 400 UNITS tablet  Take 400 Units by mouth Daily.             dabigatran etexilate (PRADAXA) 150 MG capsu  Take 1 capsule by mouth 2 (Two) Times a Day.             diltiaZEM (CARDIZEM) 60 MG tablet  Take 1 tablet by mouth Every 6 (Six) Hours.             furosemide (LASIX) 40 MG tablet  Take 40 mg by mouth 2 (Two) Times a Day.             gabapentin (NEURONTIN) 100 MG capsule  Take 1 capsule by mouth Every 12 (Twelve) Hours.             glimepiride (AMARYL) 2 MG tablet  Take 1 tablet by mouth Every Morning Before Breakfast.             Insulin Glargine (TOUJEO SOLOSTAR) 300 UNIT/ML solution pen-injector  Inject 22 Units under the skin Every Night.             metFORMIN ER (GLUCOPHAGE-XR) 500 MG 24 hr tablet  Take 1 tablet by mouth 2 (Two) Times a Day.             omeprazole (priLOSEC) 40 MG capsule  TAKE ONE CAPSULE BY MOUTH DAILY             traZODone (DESYREL) 50 MG tablet  Take 1 tablet by mouth Every Night.                 Discharge Diet   Diet Instructions     Diet: Regular, Consistent Carbohydrate, Cardiac; Thin Liquids, No Restrictions        Discharge Diet:   Regular  Consistent Carbohydrate  Cardiac      Fluid Consistency:  Thin Liquids, No Restrictions                 Activity at Discharge      Follow-up Appointments  Future Appointments  Date Time Provider Department [place]   8/1/2017 2:30 PM Javan Shankar MD [facility] None   8/2/2017 1:00 PM OTILIA Farris [facility]   8/30/2017 11:30 AM Jean Pierre Lainez III, MD [facility] None     Additional Instructions for the Follow-ups that You Need to Schedule     Discharge Follow-Up With Specified Provider    As directed    To:  Dr Lainez   Follow Up:  1 Month   Has the patient’s follow-up appointment been scheduled and documented in the discharge navigator?:  Yes, documented in the appointment section       Discharge Follow-up with PCP    As directed    Follow Up Details:  1 week   Has the patient’s follow-up appointment been scheduled and documented in the discharge navigator?:  Yes, documented in the appointment section                 Test Results Pending at Discharge      CC to: Javan Shankar MD     Time: 40 min

## 2017-08-08 ENCOUNTER — TELEPHONE (OUTPATIENT)
Dept: INTERNAL MEDICINE | Facility: CLINIC | Age: 67
End: 2017-08-08

## 2017-08-14 ENCOUNTER — OFFICE VISIT (OUTPATIENT)
Dept: CARDIOLOGY | Facility: HOSPITAL | Age: 67
End: 2017-08-14

## 2017-08-14 VITALS
HEIGHT: 71 IN | RESPIRATION RATE: 20 BRPM | DIASTOLIC BLOOD PRESSURE: 69 MMHG | WEIGHT: 315 LBS | TEMPERATURE: 96.9 F | OXYGEN SATURATION: 88 % | HEART RATE: 113 BPM | BODY MASS INDEX: 44.1 KG/M2 | SYSTOLIC BLOOD PRESSURE: 122 MMHG

## 2017-08-14 DIAGNOSIS — N18.30 CKD (CHRONIC KIDNEY DISEASE) STAGE 3, GFR 30-59 ML/MIN (HCC): ICD-10-CM

## 2017-08-14 DIAGNOSIS — I10 ESSENTIAL HYPERTENSION: ICD-10-CM

## 2017-08-14 DIAGNOSIS — I50.33 ACUTE ON CHRONIC DIASTOLIC CONGESTIVE HEART FAILURE (HCC): Primary | ICD-10-CM

## 2017-08-14 DIAGNOSIS — G47.33 OSA (OBSTRUCTIVE SLEEP APNEA): ICD-10-CM

## 2017-08-14 PROCEDURE — 99214 OFFICE O/P EST MOD 30 MIN: CPT | Performed by: NURSE PRACTITIONER

## 2017-08-14 RX ORDER — ASPIRIN 325 MG
325 TABLET, DELAYED RELEASE (ENTERIC COATED) ORAL DAILY
COMMUNITY
Start: 2017-08-08 | End: 2019-04-18

## 2017-08-14 RX ORDER — POTASSIUM CHLORIDE 20 MEQ/1
TABLET, EXTENDED RELEASE ORAL
Qty: 60 TABLET | Refills: 11 | Status: SHIPPED | OUTPATIENT
Start: 2017-08-14 | End: 2019-04-29 | Stop reason: SDUPTHER

## 2017-08-14 RX ORDER — FUROSEMIDE 40 MG/1
40 TABLET ORAL 2 TIMES DAILY
Qty: 60 TABLET | Refills: 11 | Status: SHIPPED | OUTPATIENT
Start: 2017-08-14 | End: 2022-03-11 | Stop reason: HOSPADM

## 2017-08-14 NOTE — PROGRESS NOTES
Encounter Date:08/14/2017      Patient ID: Chito Rod is a 67 y.o. male.        Subjective:     Chief Complaint: Establish Care (CHF)     History of Present Illness patient presents to the heart and valve center today for ongoing evaluation of his diastolic heart failure. Patient was recently hospitalized at [facility] and presents today with complaints of dyspnea, fatigue and abdominal fullness. Patient is a poor historian and is not sure what medications he is taking. He also reports pedal edema that has been present for the last week.     Patient Active Problem List   Diagnosis   • FAUSTO (obstructive sleep apnea)   • Chronic obstructive pulmonary disease with acute exacerbation   • Hypertension   • GERD (gastroesophageal reflux disease)   • Acute on chronic diastolic congestive heart failure   • Morbid obesity   • Acute respiratory failure with hypoxia and hypercapnia   • Atrial fibrillation   • CAD (coronary artery disease)   • Chronic anticoagulation   • DM2 (diabetes mellitus, type 2)   • HLD (hyperlipidemia)   • Oxygen dependent   • CKD (chronic kidney disease) stage 3, GFR 30-59 ml/min       Past Surgical History:   Procedure Laterality Date   • APPENDECTOMY  1961   • CORONARY STENT PLACEMENT  2008   • HAND SURGERY Left 2002   • KNEE ARTHROSCOPY Left 1965       No Known Allergies      Current Outpatient Prescriptions:   •  aspirin  MG tablet, Take 325 mg by mouth Daily., Disp: , Rfl:   •  atorvastatin (LIPITOR) 20 MG tablet, Take 1 tablet by mouth Every Night. STOP PRAVASTATIN, Disp: 90 tablet, Rfl: 2  •  budesonide-formoterol (SYMBICORT) 160-4.5 MCG/ACT inhaler, Inhale 2 puffs 2 (Two) Times a Day., Disp: 1 inhaler, Rfl: 12  •  carvedilol (COREG) 25 MG tablet, Take 1 tablet by mouth 2 (Two) Times a Day With Meals., Disp: 180 tablet, Rfl: 3  •  cholecalciferol (VITAMIN D3) 400 UNITS tablet, Take 400 Units by mouth Daily., Disp: , Rfl:   •  dabigatran etexilate (PRADAXA) 150 MG capsu, Take 1 capsule by mouth 2  (Two) Times a Day., Disp: 60 capsule, Rfl: 5  •  diltiaZEM (CARDIZEM) 60 MG tablet, Take 1 tablet by mouth Every 6 (Six) Hours., Disp: 120 tablet, Rfl: 2  •  glimepiride (AMARYL) 2 MG tablet, Take 1 tablet by mouth Every Morning Before Breakfast., Disp: 90 tablet, Rfl: 3  •  Insulin Glargine (TOUJEO SOLOSTAR) 300 UNIT/ML solution pen-injector, Inject 22 Units under the skin Every Night., Disp: , Rfl:   •  metFORMIN ER (GLUCOPHAGE-XR) 500 MG 24 hr tablet, Take 1 tablet by mouth 2 (Two) Times a Day., Disp: 180 tablet, Rfl: 3  •  omeprazole (priLOSEC) 40 MG capsule, TAKE ONE CAPSULE BY MOUTH DAILY, Disp: 30 capsule, Rfl: 1  •  traZODone (DESYREL) 50 MG tablet, Take 1 tablet by mouth Every Night., Disp: 30 tablet, Rfl: 5  •  furosemide (LASIX) 40 MG tablet, Take 1 tablet by mouth 2 (Two) Times a Day., Disp: 60 tablet, Rfl: 11  •  gabapentin (NEURONTIN) 100 MG capsule, Take 1 capsule by mouth Every 12 (Twelve) Hours., Disp: 60 capsule, Rfl: 0  •  potassium chloride (K-DUR,KLOR-CON) 20 MEQ CR tablet, Please take once in the morning and repeat at lunchtime, Disp: 60 tablet, Rfl: 11    The following portions of the chart were reviewed and updated as appropriate: Allergies, current medications, past family history, social history, past medical history.     Review of Systems   Constitution: Positive for malaise/fatigue. Negative for chills, decreased appetite, diaphoresis, fever, weakness, night sweats, weight gain and weight loss.   HENT: Positive for congestion. Negative for headaches, hearing loss, hoarse voice and nosebleeds.    Eyes: Negative for blurred vision, visual disturbance and visual halos.   Cardiovascular: Positive for dyspnea on exertion and leg swelling. Negative for chest pain, claudication, irregular heartbeat, near-syncope, orthopnea, palpitations, paroxysmal nocturnal dyspnea and syncope.   Respiratory: Positive for shortness of breath. Negative for cough, hemoptysis, sleep disturbances due to breathing,  "snoring, sputum production and wheezing.    Endocrine: Positive for polydipsia and polyuria.   Hematologic/Lymphatic: Negative for bleeding problem. Does not bruise/bleed easily.   Skin: Negative for dry skin, itching and rash.   Musculoskeletal: Positive for gout and muscle weakness. Negative for arthritis, falls, joint pain, joint swelling and myalgias.   Gastrointestinal: Positive for bloating and constipation. Negative for abdominal pain, diarrhea, flatus, heartburn, hematemesis, hematochezia, melena, nausea and vomiting.   Genitourinary: Positive for nocturia. Negative for dysuria, frequency, hematuria and urgency.   Neurological: Positive for excessive daytime sleepiness. Negative for dizziness, light-headedness and loss of balance.   Psychiatric/Behavioral: Positive for altered mental status, depression, memory loss, suicidal ideas and thoughts of violence. The patient has insomnia and is nervous/anxious.            Objective:     Vitals:    08/14/17 1513 08/14/17 1515 08/14/17 1517   BP: 147/91 134/66 122/69   BP Location: Right arm Left arm Left arm   Patient Position: Sitting Sitting Standing   Pulse: 102  113   Resp: 20     Temp: 96.9 °F (36.1 °C)     TempSrc: Temporal Artery      SpO2: (!) 88%     Weight: (!) 324 lb 9.6 oz (147 kg)     Height: 71\" (180.3 cm)           Physical Exam   Constitutional: He is oriented to person, place, and time. He appears well-developed and well-nourished. He is active and cooperative. No distress.   HENT:   Head: Normocephalic and atraumatic.   Mouth/Throat: Oropharynx is clear and moist.   Eyes: Conjunctivae and EOM are normal. Pupils are equal, round, and reactive to light.   Neck: Normal range of motion. Neck supple. No JVD present. No tracheal deviation present. No thyromegaly present.   Cardiovascular: Normal rate, regular rhythm, normal heart sounds and intact distal pulses.    Pulmonary/Chest: Effort normal. He has rales.   Abdominal: Soft. Bowel sounds are normal. " He exhibits no distension. There is no tenderness.   Musculoskeletal: Normal range of motion. He exhibits edema (1+ edema noted bilaterally).   Neurological: He is alert and oriented to person, place, and time.   Skin: Skin is warm, dry and intact.   Psychiatric: He has a normal mood and affect. His behavior is normal.   Nursing note and vitals reviewed.      Lab and Diagnostic Review:    reviewed labs from recent hospital visit    Results for orders placed or performed during the hospital encounter of 07/20/17   Comprehensive Metabolic Panel   Result Value Ref Range    Glucose 294 (H) 70 - 100 mg/dL    BUN 24 (H) 9 - 23 mg/dL    Creatinine 1.50 (H) 0.60 - 1.30 mg/dL    Sodium 133 132 - 146 mmol/L    Potassium 4.4 3.5 - 5.5 mmol/L    Chloride 96 (L) 99 - 109 mmol/L    CO2 30.0 20.0 - 31.0 mmol/L    Calcium 8.8 8.7 - 10.4 mg/dL    Total Protein 7.2 5.7 - 8.2 g/dL    Albumin 4.10 3.20 - 4.80 g/dL    ALT (SGPT) 34 7 - 40 U/L    AST (SGOT) 27 0 - 33 U/L    Alkaline Phosphatase 79 25 - 100 U/L    Total Bilirubin 0.4 0.3 - 1.2 mg/dL    eGFR Non African Amer 47 (L) >60 mL/min/1.73    Globulin 3.1 gm/dL    A/G Ratio 1.3 (L) 1.5 - 2.5 g/dL    BUN/Creatinine Ratio 16.0 7.0 - 25.0    Anion Gap 7.0 3.0 - 11.0 mmol/L   BNP   Result Value Ref Range    .0 (H) 0.0 - 100.0 pg/mL   CBC Auto Differential   Result Value Ref Range    WBC 9.00 3.50 - 10.80 10*3/mm3    RBC 4.23 4.20 - 5.76 10*6/mm3    Hemoglobin 12.6 (L) 13.1 - 17.5 g/dL    Hematocrit 41.9 38.9 - 50.9 %    MCV 99.1 (H) 80.0 - 99.0 fL    MCH 29.8 27.0 - 31.0 pg    MCHC 30.1 (L) 32.0 - 36.0 g/dL    RDW 14.3 11.3 - 14.5 %    RDW-SD 51.4 37.0 - 54.0 fl    MPV 11.3 6.0 - 12.0 fL    Platelets 170 150 - 450 10*3/mm3    Neutrophil % 58.5 41.0 - 71.0 %    Lymphocyte % 30.8 24.0 - 44.0 %    Monocyte % 7.8 0.0 - 12.0 %    Eosinophil % 2.2 0.0 - 3.0 %    Basophil % 0.3 0.0 - 1.0 %    Immature Grans % 0.4 0.0 - 0.6 %    Neutrophils, Absolute 5.26 1.50 - 8.30 10*3/mm3     Lymphocytes, Absolute 2.77 0.60 - 4.80 10*3/mm3    Monocytes, Absolute 0.70 0.00 - 1.00 10*3/mm3    Eosinophils, Absolute 0.20 0.00 - 0.30 10*3/mm3    Basophils, Absolute 0.03 0.00 - 0.20 10*3/mm3    Immature Grans, Absolute 0.04 (H) 0.00 - 0.03 10*3/mm3   Blood Gas, Arterial   Result Value Ref Range    Site Arterial: left radial     Ej's Test N/A     pH, Arterial 7.322 (L) 7.350 - 7.450 pH units    pCO2, Arterial 60.4 (H) 35.0 - 48.0 mm Hg    pO2, Arterial 61.4 (L) 83.0 - 108.0 mm Hg    HCO3, Arterial 31.2 (H) 20.0 - 26.0 mmol/L    Base Excess, Arterial 4.3 (H) 0.0 - 2.0 mmol/L    Hemoglobin, Blood Gas 13.1 (L) 13.5 - 17.5 g/dL    Hematocrit, Blood Gas 40.3 %    Oxyhemoglobin 86.3 (L) 94 - 99 %    Methemoglobin 0.9 0 - 1.5 %    Carboxyhemoglobin 0.7 0 - 2 %    CO2 Content 33.1 (H) 22 - 33    Barometric Pressure for Blood Gas  mmHg    Modality Cannula - Nasal     FIO2 40 %   Troponin   Result Value Ref Range    Troponin I <0.006 <=0.039 ng/mL   Troponin   Result Value Ref Range    Troponin I <0.006 <=0.039 ng/mL    Glucose 276 (H) 70 - 130 mg/dL     Assessment and Plan:         1. Acute on chronic diastolic congestive heart failure  Patient to begin lasix 40 mg bid with potassium 20 meq bid  Heart failure education today including signs and symptoms, the role of the heart failure center, daily weights, low sodium diet (less than 1500 mg per day), and daily physical activity. Reviewed HF Zones with patient and family.  Patient to continue current medications as previously ordered.     2. Essential hypertension  Well controlled  HTN Education provided today including signs and symptoms, medication management, daily blood pressure monitoring. Patient encouraged to call the [facility] and Valve center with any abnormal readings.     3. FAUSTO (obstructive sleep apnea)  Compliant with cpap    4. CKD (chronic kidney disease) stage 3, GFR 30-59 ml/min  Creatinine most recently was 1. 5, will continue to monitor    It has been a  pleasure to participate in the care of this patient.  Patient was instructed to call the [facility] with any questions, concerns, or worsening symptoms.      * Please note that portions of this note were completed with a voice recognition program. Efforts were made to edit the dictation but occasionally words are transcribed.

## 2017-08-15 ENCOUNTER — OFFICE VISIT (OUTPATIENT)
Dept: INTERNAL MEDICINE | Facility: CLINIC | Age: 67
End: 2017-08-15

## 2017-08-15 VITALS
BODY MASS INDEX: 44.1 KG/M2 | HEIGHT: 71 IN | SYSTOLIC BLOOD PRESSURE: 120 MMHG | DIASTOLIC BLOOD PRESSURE: 70 MMHG | WEIGHT: 315 LBS | HEART RATE: 100 BPM | OXYGEN SATURATION: 88 %

## 2017-08-15 DIAGNOSIS — E11.8 TYPE 2 DIABETES MELLITUS WITH COMPLICATION, WITH LONG-TERM CURRENT USE OF INSULIN (HCC): ICD-10-CM

## 2017-08-15 DIAGNOSIS — K21.9 GASTROESOPHAGEAL REFLUX DISEASE WITHOUT ESOPHAGITIS: ICD-10-CM

## 2017-08-15 DIAGNOSIS — J44.1 CHRONIC OBSTRUCTIVE PULMONARY DISEASE WITH ACUTE EXACERBATION (HCC): ICD-10-CM

## 2017-08-15 DIAGNOSIS — Z79.01 CHRONIC ANTICOAGULATION: ICD-10-CM

## 2017-08-15 DIAGNOSIS — E78.49 OTHER HYPERLIPIDEMIA: ICD-10-CM

## 2017-08-15 DIAGNOSIS — I10 ESSENTIAL HYPERTENSION: ICD-10-CM

## 2017-08-15 DIAGNOSIS — Z79.4 TYPE 2 DIABETES MELLITUS WITH COMPLICATION, WITH LONG-TERM CURRENT USE OF INSULIN (HCC): ICD-10-CM

## 2017-08-15 DIAGNOSIS — I25.10 CORONARY ARTERY DISEASE INVOLVING NATIVE CORONARY ARTERY OF NATIVE HEART WITHOUT ANGINA PECTORIS: ICD-10-CM

## 2017-08-15 DIAGNOSIS — I50.33 ACUTE ON CHRONIC DIASTOLIC CONGESTIVE HEART FAILURE (HCC): Primary | ICD-10-CM

## 2017-08-15 DIAGNOSIS — N18.30 CKD (CHRONIC KIDNEY DISEASE) STAGE 3, GFR 30-59 ML/MIN (HCC): ICD-10-CM

## 2017-08-15 DIAGNOSIS — G47.33 OSA (OBSTRUCTIVE SLEEP APNEA): ICD-10-CM

## 2017-08-15 DIAGNOSIS — I48.19 PERSISTENT ATRIAL FIBRILLATION (HCC): ICD-10-CM

## 2017-08-15 PROCEDURE — 99214 OFFICE O/P EST MOD 30 MIN: CPT | Performed by: INTERNAL MEDICINE

## 2017-08-15 NOTE — PROGRESS NOTES
"Patient is a 67 y.o. male who is here for a follow up of chronic conditions.  Chief Complaint   Patient presents with   • Hypertension   • Hyperlipidemia   • Diabetes         HPI:  Here for f/u.  Patient recently in hospital.  Reports he is compliant with meds but still unsure what meds he is on.  Breathing is ok.  Does not check his FSBS on regular basis.  His \"thinking\" is not what is should be.  Still with sob/black.      History:    Patient Active Problem List   Diagnosis   • FAUSTO (obstructive sleep apnea)   • Chronic obstructive pulmonary disease with acute exacerbation   • Hypertension   • GERD (gastroesophageal reflux disease)   • Acute on chronic diastolic congestive heart failure   • Morbid obesity   • Acute respiratory failure with hypoxia and hypercapnia   • Atrial fibrillation   • CAD (coronary artery disease)   • Chronic anticoagulation   • DM2 (diabetes mellitus, type 2)   • HLD (hyperlipidemia)   • Oxygen dependent   • CKD (chronic kidney disease) stage 3, GFR 30-59 ml/min       Past Medical History:   Diagnosis Date   • Atrial fibrillation    • CAD (coronary artery disease)    • CHF (congestive heart failure)    • Chronic anticoagulation    • CKD (chronic kidney disease) stage 3, GFR 30-59 ml/min    • COPD (chronic obstructive pulmonary disease)    • DM2 (diabetes mellitus, type 2)    • GERD (gastroesophageal reflux disease)    • History of MI (myocardial infarction)    • HLD (hyperlipidemia)    • HTN (hypertension)    • Morbid obesity    • Noncompliance    • FAUSTO (obstructive sleep apnea)    • Oxygen dependent        Past Surgical History:   Procedure Laterality Date   • APPENDECTOMY  1961   • CORONARY STENT PLACEMENT  2008   • HAND SURGERY Left 2002   • KNEE ARTHROSCOPY Left 1965       Current Outpatient Prescriptions on File Prior to Visit   Medication Sig   • aspirin  MG tablet Take 325 mg by mouth Daily.   • atorvastatin (LIPITOR) 20 MG tablet Take 1 tablet by mouth Every Night. STOP " PRAVASTATIN   • budesonide-formoterol (SYMBICORT) 160-4.5 MCG/ACT inhaler Inhale 2 puffs 2 (Two) Times a Day.   • carvedilol (COREG) 25 MG tablet Take 1 tablet by mouth 2 (Two) Times a Day With Meals.   • cholecalciferol (VITAMIN D3) 400 UNITS tablet Take 400 Units by mouth Daily.   • dabigatran etexilate (PRADAXA) 150 MG capsu Take 1 capsule by mouth 2 (Two) Times a Day.   • diltiaZEM (CARDIZEM) 60 MG tablet Take 1 tablet by mouth Every 6 (Six) Hours.   • furosemide (LASIX) 40 MG tablet Take 1 tablet by mouth 2 (Two) Times a Day.   • gabapentin (NEURONTIN) 100 MG capsule Take 1 capsule by mouth Every 12 (Twelve) Hours.   • glimepiride (AMARYL) 2 MG tablet Take 1 tablet by mouth Every Morning Before Breakfast.   • Insulin Glargine (TOUJEO SOLOSTAR) 300 UNIT/ML solution pen-injector Inject 22 Units under the skin Every Night.   • metFORMIN ER (GLUCOPHAGE-XR) 500 MG 24 hr tablet Take 1 tablet by mouth 2 (Two) Times a Day.   • omeprazole (priLOSEC) 40 MG capsule TAKE ONE CAPSULE BY MOUTH DAILY   • potassium chloride (K-DUR,KLOR-CON) 20 MEQ CR tablet Please take once in the morning and repeat at lunchtime   • traZODone (DESYREL) 50 MG tablet Take 1 tablet by mouth Every Night.     No current facility-administered medications on file prior to visit.        Family History   Problem Relation Age of Onset   • Diabetes Mother    • Heart disease Mother    • Diabetes Father    • Heart disease Father    • Heart disease Sister        Social History     Social History   • Marital status: Unknown     Spouse name: N/A   • Number of children: 5   • Years of education: H.S.     Occupational History   • Construction Retired     Social History Main Topics   • Smoking status: Former Smoker     Packs/day: 1.00     Years: 47.00     Types: Cigarettes     Quit date: 2014   • Smokeless tobacco: Never Used   • Alcohol use No   • Drug use: No   • Sexual activity: No      Comment:      Other Topics Concern   • Not on file     Social  "History Narrative    Patient consumes 4 sodas  daily.     Patient lives at home alone.              ROS:    Review of Systems   Constitutional: Positive for fatigue. Negative for chills, fever and unexpected weight change.   HENT: Negative for congestion, ear pain, hearing loss, rhinorrhea, sinus pressure, sore throat and trouble swallowing.    Eyes: Negative for discharge and itching.   Respiratory: Positive for cough and shortness of breath. Negative for chest tightness.    Cardiovascular: Positive for leg swelling. Negative for chest pain and palpitations.   Gastrointestinal: Negative for abdominal pain, blood in stool, constipation, diarrhea and vomiting.   Endocrine: Negative for polydipsia and polyuria.   Genitourinary: Negative for difficulty urinating, dysuria, enuresis, frequency, hematuria and urgency.   Musculoskeletal: Positive for arthralgias and back pain. Negative for gait problem and joint swelling.   Skin: Negative for rash and wound.   Allergic/Immunologic: Negative for immunocompromised state.   Neurological: Positive for light-headedness and headaches. Negative for dizziness, syncope, weakness and numbness.   Hematological: Does not bruise/bleed easily.   Psychiatric/Behavioral: Positive for decreased concentration and sleep disturbance. Negative for behavioral problems and dysphoric mood. The patient is not nervous/anxious.        /70  Pulse 100  Ht 71\" (180.3 cm)  Wt (!) 321 lb (146 kg)  SpO2 (!) 88%  BMI 44.77 kg/m2    Physical Exam:    Physical Exam   Constitutional: He is oriented to person, place, and time. He appears well-developed and well-nourished.   HENT:   Head: Normocephalic and atraumatic.   Right Ear: External ear normal.   Left Ear: External ear normal.   Mouth/Throat: Oropharynx is clear and moist.   Eyes: Conjunctivae and EOM are normal.   Neck: Normal range of motion. Neck supple.   Cardiovascular: Normal rate.    Distant heart sounds  IRIR   Pulmonary/Chest: Effort " normal. He has rales (bibasilar).   Distant lung sounds   Abdominal: Soft. Bowel sounds are normal.   Musculoskeletal: Normal range of motion.   Lymphadenopathy:     He has no cervical adenopathy.   Neurological: He is alert and oriented to person, place, and time.   Diminished BS   Skin: Skin is warm and dry.   Psychiatric: He has a normal mood and affect. His behavior is normal. Thought content normal.       Procedure:      Discussion/Summary:    htn-improved  afib-advised compliance with pradaxa  Cad-cont rf mod  Hyperlipidemia-labs noted  Dm-cont amaryl and metformin and toujeo  Insomnia-cont trazodone  Copd-stable   chana-f/u sleep clinic  GERD-stable off meds      Labs noted and dw patient    Patient is confused about his meds and what he is take,  His biggest risk of readmit is due to his noncompliance with meds.  Will provide HH nurse with a list of meds and hopefully we can come up with a plan to improve compliance.      Current Outpatient Prescriptions:   •  aspirin  MG tablet, Take 325 mg by mouth Daily., Disp: , Rfl:   •  atorvastatin (LIPITOR) 20 MG tablet, Take 1 tablet by mouth Every Night. STOP PRAVASTATIN, Disp: 90 tablet, Rfl: 2  •  budesonide-formoterol (SYMBICORT) 160-4.5 MCG/ACT inhaler, Inhale 2 puffs 2 (Two) Times a Day., Disp: 1 inhaler, Rfl: 12  •  carvedilol (COREG) 25 MG tablet, Take 1 tablet by mouth 2 (Two) Times a Day With Meals., Disp: 180 tablet, Rfl: 3  •  cholecalciferol (VITAMIN D3) 400 UNITS tablet, Take 400 Units by mouth Daily., Disp: , Rfl:   •  dabigatran etexilate (PRADAXA) 150 MG capsu, Take 1 capsule by mouth 2 (Two) Times a Day., Disp: 60 capsule, Rfl: 5  •  diltiaZEM (CARDIZEM) 60 MG tablet, Take 1 tablet by mouth Every 6 (Six) Hours., Disp: 120 tablet, Rfl: 2  •  furosemide (LASIX) 40 MG tablet, Take 1 tablet by mouth 2 (Two) Times a Day., Disp: 60 tablet, Rfl: 11  •  gabapentin (NEURONTIN) 100 MG capsule, Take 1 capsule by mouth Every 12 (Twelve) Hours., Disp: 60  capsule, Rfl: 0  •  glimepiride (AMARYL) 2 MG tablet, Take 1 tablet by mouth Every Morning Before Breakfast., Disp: 90 tablet, Rfl: 3  •  Insulin Glargine (TOUJEO SOLOSTAR) 300 UNIT/ML solution pen-injector, Inject 22 Units under the skin Every Night., Disp: , Rfl:   •  metFORMIN ER (GLUCOPHAGE-XR) 500 MG 24 hr tablet, Take 1 tablet by mouth 2 (Two) Times a Day., Disp: 180 tablet, Rfl: 3  •  omeprazole (priLOSEC) 40 MG capsule, TAKE ONE CAPSULE BY MOUTH DAILY, Disp: 30 capsule, Rfl: 1  •  potassium chloride (K-DUR,KLOR-CON) 20 MEQ CR tablet, Please take once in the morning and repeat at lunchtime, Disp: 60 tablet, Rfl: 11  •  traZODone (DESYREL) 50 MG tablet, Take 1 tablet by mouth Every Night., Disp: 30 tablet, Rfl: 5        Chito was seen today for hypertension, hyperlipidemia and diabetes.    Diagnoses and all orders for this visit:    Acute on chronic diastolic congestive heart failure    Persistent atrial fibrillation    Coronary artery disease involving native coronary artery of native heart without angina pectoris    Other hyperlipidemia    Essential hypertension    Chronic obstructive pulmonary disease with acute exacerbation    FAUSTO (obstructive sleep apnea)    Gastroesophageal reflux disease without esophagitis    Type 2 diabetes mellitus with complication, with long-term current use of insulin    CKD (chronic kidney disease) stage 3, GFR 30-59 ml/min    Chronic anticoagulation

## 2017-08-23 ENCOUNTER — TELEPHONE (OUTPATIENT)
Dept: CARDIOLOGY | Facility: HOSPITAL | Age: 67
End: 2017-08-23

## 2017-08-23 NOTE — TELEPHONE ENCOUNTER
----- Message from Romelia Gil sent at 8/23/2017  9:44 AM EDT -----  Unable to reach patient:  NO answer, left message 8/22/17  NO answer, left message 8/22/17  NO answer, left message 8/23/17  Romelia Benjamin  ----- Message -----     From: OTILIA Kim     Sent: 8/21/2017  10:39 PM       To: Romelia Mckinney, do u mind to call him and see how his swelling is doing? thanks

## 2017-09-05 RX ORDER — DILTIAZEM HYDROCHLORIDE 60 MG/1
TABLET, FILM COATED ORAL
Qty: 120 TABLET | Refills: 1 | Status: SHIPPED | OUTPATIENT
Start: 2017-09-05 | End: 2019-04-18

## 2017-10-04 DIAGNOSIS — K21.9 GASTROESOPHAGEAL REFLUX DISEASE WITHOUT ESOPHAGITIS: ICD-10-CM

## 2017-10-04 RX ORDER — TRAZODONE HYDROCHLORIDE 50 MG/1
TABLET ORAL
Qty: 30 TABLET | Refills: 4 | Status: SHIPPED | OUTPATIENT
Start: 2017-10-04 | End: 2019-04-18

## 2017-10-04 RX ORDER — OMEPRAZOLE 40 MG/1
CAPSULE, DELAYED RELEASE ORAL
Qty: 30 CAPSULE | Refills: 0 | Status: SHIPPED | OUTPATIENT
Start: 2017-10-04 | End: 2017-11-24 | Stop reason: SDUPTHER

## 2017-11-24 DIAGNOSIS — K21.9 GASTROESOPHAGEAL REFLUX DISEASE WITHOUT ESOPHAGITIS: ICD-10-CM

## 2017-11-27 RX ORDER — OMEPRAZOLE 40 MG/1
CAPSULE, DELAYED RELEASE ORAL
Qty: 30 CAPSULE | Refills: 3 | Status: SHIPPED | OUTPATIENT
Start: 2017-11-27 | End: 2019-04-18

## 2018-08-02 ENCOUNTER — TRANSITIONAL CARE MANAGEMENT TELEPHONE ENCOUNTER (OUTPATIENT)
Dept: INTERNAL MEDICINE | Facility: CLINIC | Age: 68
End: 2018-08-02

## 2018-08-02 ENCOUNTER — APPOINTMENT (OUTPATIENT)
Dept: GENERAL RADIOLOGY | Facility: HOSPITAL | Age: 68
End: 2018-08-02

## 2018-08-02 ENCOUNTER — APPOINTMENT (OUTPATIENT)
Dept: CT IMAGING | Facility: HOSPITAL | Age: 68
End: 2018-08-02

## 2018-08-02 ENCOUNTER — HOSPITAL ENCOUNTER (EMERGENCY)
Facility: HOSPITAL | Age: 68
Discharge: HOME | End: 2018-08-02
Attending: EMERGENCY MEDICINE | Admitting: EMERGENCY MEDICINE

## 2018-08-02 VITALS
WEIGHT: 240 LBS | HEART RATE: 111 BPM | RESPIRATION RATE: 20 BRPM | HEIGHT: 71 IN | SYSTOLIC BLOOD PRESSURE: 103 MMHG | OXYGEN SATURATION: 93 % | DIASTOLIC BLOOD PRESSURE: 88 MMHG | BODY MASS INDEX: 33.6 KG/M2 | TEMPERATURE: 98.8 F

## 2018-08-02 DIAGNOSIS — R60.9 PERIPHERAL EDEMA: ICD-10-CM

## 2018-08-02 DIAGNOSIS — R06.00 DYSPNEA, UNSPECIFIED TYPE: Primary | ICD-10-CM

## 2018-08-02 DIAGNOSIS — J44.1 COPD EXACERBATION (HCC): ICD-10-CM

## 2018-08-02 DIAGNOSIS — R73.09 ELEVATED GLUCOSE: ICD-10-CM

## 2018-08-02 LAB
ALBUMIN SERPL-MCNC: 4.13 G/DL (ref 3.2–4.8)
ALBUMIN/GLOB SERPL: 1.4 G/DL (ref 1.5–2.5)
ALP SERPL-CCNC: 105 U/L (ref 25–100)
ALT SERPL W P-5'-P-CCNC: 34 U/L (ref 7–40)
ANION GAP SERPL CALCULATED.3IONS-SCNC: 11 MMOL/L (ref 3–11)
AST SERPL-CCNC: 27 U/L (ref 0–33)
BASOPHILS # BLD AUTO: 0.02 10*3/MM3 (ref 0–0.2)
BASOPHILS NFR BLD AUTO: 0.2 % (ref 0–1)
BILIRUB SERPL-MCNC: 0.6 MG/DL (ref 0.3–1.2)
BILIRUB UR QL STRIP: NEGATIVE
BNP SERPL-MCNC: 89 PG/ML (ref 0–100)
BUN BLD-MCNC: 12 MG/DL (ref 9–23)
BUN/CREAT SERPL: 12.8 (ref 7–25)
CALCIUM SPEC-SCNC: 9.1 MG/DL (ref 8.7–10.4)
CHLORIDE SERPL-SCNC: 101 MMOL/L (ref 99–109)
CLARITY UR: CLEAR
CO2 SERPL-SCNC: 27 MMOL/L (ref 20–31)
COLOR UR: YELLOW
CREAT BLD-MCNC: 0.94 MG/DL (ref 0.6–1.3)
DEPRECATED RDW RBC AUTO: 49 FL (ref 37–54)
EOSINOPHIL # BLD AUTO: 0.09 10*3/MM3 (ref 0–0.3)
EOSINOPHIL NFR BLD AUTO: 0.9 % (ref 0–3)
ERYTHROCYTE [DISTWIDTH] IN BLOOD BY AUTOMATED COUNT: 13.7 % (ref 11.3–14.5)
GFR SERPL CREATININE-BSD FRML MDRD: 80 ML/MIN/1.73
GLOBULIN UR ELPH-MCNC: 2.9 GM/DL
GLUCOSE BLD-MCNC: 269 MG/DL (ref 70–100)
GLUCOSE BLDC GLUCOMTR-MCNC: 267 MG/DL (ref 70–130)
GLUCOSE UR STRIP-MCNC: ABNORMAL MG/DL
HCT VFR BLD AUTO: 49.1 % (ref 38.9–50.9)
HGB BLD-MCNC: 15.3 G/DL (ref 13.1–17.5)
HGB UR QL STRIP.AUTO: NEGATIVE
HOLD SPECIMEN: NORMAL
IMM GRANULOCYTES # BLD: 0.03 10*3/MM3 (ref 0–0.03)
IMM GRANULOCYTES NFR BLD: 0.3 % (ref 0–0.6)
KETONES UR QL STRIP: NEGATIVE
LEUKOCYTE ESTERASE UR QL STRIP.AUTO: NEGATIVE
LYMPHOCYTES # BLD AUTO: 3.12 10*3/MM3 (ref 0.6–4.8)
LYMPHOCYTES NFR BLD AUTO: 32 % (ref 24–44)
MCH RBC QN AUTO: 30 PG (ref 27–31)
MCHC RBC AUTO-ENTMCNC: 31.2 G/DL (ref 32–36)
MCV RBC AUTO: 96.3 FL (ref 80–99)
MONOCYTES # BLD AUTO: 0.86 10*3/MM3 (ref 0–1)
MONOCYTES NFR BLD AUTO: 8.8 % (ref 0–12)
NEUTROPHILS # BLD AUTO: 5.62 10*3/MM3 (ref 1.5–8.3)
NEUTROPHILS NFR BLD AUTO: 57.8 % (ref 41–71)
NITRITE UR QL STRIP: NEGATIVE
PH UR STRIP.AUTO: <=5 [PH] (ref 5–8)
PLATELET # BLD AUTO: 161 10*3/MM3 (ref 150–450)
PMV BLD AUTO: 11.6 FL (ref 6–12)
POTASSIUM BLD-SCNC: 4 MMOL/L (ref 3.5–5.5)
PROCALCITONIN SERPL-MCNC: 0.11 NG/ML
PROT SERPL-MCNC: 7 G/DL (ref 5.7–8.2)
PROT UR QL STRIP: NEGATIVE
RBC # BLD AUTO: 5.1 10*6/MM3 (ref 4.2–5.76)
SODIUM BLD-SCNC: 139 MMOL/L (ref 132–146)
SP GR UR STRIP: 1.03 (ref 1–1.03)
TROPONIN I SERPL-MCNC: 0.05 NG/ML (ref 0–0.07)
UROBILINOGEN UR QL STRIP: ABNORMAL
WBC NRBC COR # BLD: 9.74 10*3/MM3 (ref 3.5–10.8)
WHOLE BLOOD HOLD SPECIMEN: NORMAL
WHOLE BLOOD HOLD SPECIMEN: NORMAL

## 2018-08-02 PROCEDURE — 71045 X-RAY EXAM CHEST 1 VIEW: CPT

## 2018-08-02 PROCEDURE — 85025 COMPLETE CBC W/AUTO DIFF WBC: CPT | Performed by: EMERGENCY MEDICINE

## 2018-08-02 PROCEDURE — 80053 COMPREHEN METABOLIC PANEL: CPT | Performed by: EMERGENCY MEDICINE

## 2018-08-02 PROCEDURE — 96374 THER/PROPH/DIAG INJ IV PUSH: CPT

## 2018-08-02 PROCEDURE — 84484 ASSAY OF TROPONIN QUANT: CPT

## 2018-08-02 PROCEDURE — 25010000002 FUROSEMIDE PER 20 MG: Performed by: EMERGENCY MEDICINE

## 2018-08-02 PROCEDURE — 99284 EMERGENCY DEPT VISIT MOD MDM: CPT

## 2018-08-02 PROCEDURE — 93005 ELECTROCARDIOGRAM TRACING: CPT

## 2018-08-02 PROCEDURE — 84145 PROCALCITONIN (PCT): CPT | Performed by: EMERGENCY MEDICINE

## 2018-08-02 PROCEDURE — 93005 ELECTROCARDIOGRAM TRACING: CPT | Performed by: EMERGENCY MEDICINE

## 2018-08-02 PROCEDURE — 81003 URINALYSIS AUTO W/O SCOPE: CPT | Performed by: EMERGENCY MEDICINE

## 2018-08-02 PROCEDURE — 94799 UNLISTED PULMONARY SVC/PX: CPT

## 2018-08-02 PROCEDURE — 94640 AIRWAY INHALATION TREATMENT: CPT

## 2018-08-02 PROCEDURE — 82962 GLUCOSE BLOOD TEST: CPT

## 2018-08-02 PROCEDURE — 83880 ASSAY OF NATRIURETIC PEPTIDE: CPT | Performed by: EMERGENCY MEDICINE

## 2018-08-02 RX ORDER — FUROSEMIDE 10 MG/ML
20 INJECTION INTRAMUSCULAR; INTRAVENOUS ONCE
Status: COMPLETED | OUTPATIENT
Start: 2018-08-02 | End: 2018-08-02

## 2018-08-02 RX ORDER — SODIUM CHLORIDE 0.9 % (FLUSH) 0.9 %
10 SYRINGE (ML) INJECTION AS NEEDED
Status: DISCONTINUED | OUTPATIENT
Start: 2018-08-02 | End: 2018-08-02 | Stop reason: HOSPADM

## 2018-08-02 RX ORDER — IPRATROPIUM BROMIDE AND ALBUTEROL SULFATE 2.5; .5 MG/3ML; MG/3ML
3 SOLUTION RESPIRATORY (INHALATION) ONCE
Status: COMPLETED | OUTPATIENT
Start: 2018-08-02 | End: 2018-08-02

## 2018-08-02 RX ADMIN — FUROSEMIDE 20 MG: 10 INJECTION, SOLUTION INTRAMUSCULAR; INTRAVENOUS at 17:16

## 2018-08-02 RX ADMIN — IPRATROPIUM BROMIDE AND ALBUTEROL SULFATE 3 ML: 2.5; .5 SOLUTION RESPIRATORY (INHALATION) at 17:21

## 2018-08-02 NOTE — DISCHARGE INSTRUCTIONS
Need to where your oxygen all the time at home.  He need to wear your CPAP at night.  He need to elevate your legs is much as possible get some compression stockings to help with the swelling in your feet and legs.  you got a watch your diet very close and attempt to lose weight.

## 2018-08-02 NOTE — ED PROVIDER NOTES
Subjective   Chito Rod is a 68 y.o.male with a history of COPD and FAUSTO on CPAP who presents to the emergency department complaining of difficulty breathing. EMS report that when they arrived to the patient's home he had labored breathing but his oxygen saturation was 92% on room air. He has no complaints of shortness of breath on arrival and denies chest pain, nausea, or vomiting. He has an inhaler at home which he uses as needed. The patient reports normal appetite although he has experienced unintentional weight gain secondary to poor diet. He lives at home by himself and is independently ambulatory. There are no other acute complaints at this time.        History provided by:  Patient  Shortness of Breath   Severity:  Moderate  Onset quality:  Unable to specify  Timing:  Constant  Progression:  Resolved  Chronicity:  Recurrent  Relieved by:  None tried  Worsened by:  Nothing  Ineffective treatments:  None tried  Associated symptoms: no chest pain and no vomiting    Risk factors: obesity        Review of Systems   Constitutional: Positive for unexpected weight change. Negative for appetite change.   Respiratory: Positive for shortness of breath.    Cardiovascular: Negative for chest pain.   Gastrointestinal: Negative for vomiting.   All other systems reviewed and are negative.      Past Medical History:   Diagnosis Date   • Atrial fibrillation (CMS/[facility])    • CAD (coronary artery disease)    • CHF (congestive heart failure) (CMS/[facility])    • Chronic anticoagulation    • CKD (chronic kidney disease) stage 3, GFR 30-59 ml/min    • COPD (chronic obstructive pulmonary disease) (CMS/[facility])    • DM2 (diabetes mellitus, type 2) (CMS/[facility])    • GERD (gastroesophageal reflux disease)    • History of MI (myocardial infarction)    • HLD (hyperlipidemia)    • HTN (hypertension)    • Morbid obesity (CMS/[facility])    • Noncompliance    • FAUSTO (obstructive sleep apnea)    • Oxygen dependent        No Known Allergies    Past Surgical  History:   Procedure Laterality Date   • APPENDECTOMY  1961   • CORONARY STENT PLACEMENT  2008   • HAND SURGERY Left 2002   • KNEE ARTHROSCOPY Left 1965       Family History   Problem Relation Age of Onset   • Diabetes Mother    • Heart disease Mother    • Diabetes Father    • Heart disease Father    • Heart disease Sister        Social History     Social History   • Marital status: Single     Spouse name: N/A   • Number of children: 5   • Years of education: H.S.     Occupational History   • Construction Retired     Social History Main Topics   • Smoking status: Former Smoker     Packs/day: 1.00     Years: 47.00     Types: Cigarettes     Quit date: 2014   • Smokeless tobacco: Never Used   • Alcohol use No   • Drug use: No   • Sexual activity: No      Comment:      Other Topics Concern   • Not on file     Social History Narrative    Patient consumes 4 sodas  daily.     Patient lives at home alone.              Objective   Physical Exam   Constitutional: He is oriented to person, place, and time. He appears well-developed and well-nourished. No distress.   He is somewhat slow to answer questions but is in no distress. The patient is alert and oriented.   HENT:   Head: Normocephalic and atraumatic.   Eyes: Conjunctivae are normal. No scleral icterus.   Neck: Normal range of motion. Neck supple.   Cardiovascular: Normal rate, regular rhythm and normal heart sounds.    No murmur heard.  Pulses:       Radial pulses are 3+ on the right side, and 3+ on the left side.        Dorsalis pedis pulses are 3+ on the right side, and 3+ on the left side.   Pulmonary/Chest: Effort normal. No respiratory distress. He has decreased breath sounds. He has wheezes.   Decreased breath sounds with an occasional wheeze bilaterally. Right chest and arm area there is bruising which is probably IV site attempts.   Abdominal: Soft. Bowel sounds are normal. There is no tenderness. There is no rebound and no guarding.   Obese.    Musculoskeletal: He exhibits edema.   Edema in both arms bilaterally with 3/4 equal pulses in the wrists. Mild to moderate edema up to the thighs bilaterally. 3/4 pulses in the feet with no tissue loss.   Neurological: He is alert and oriented to person, place, and time.   Slow to answer questions. Did not lateralize or localize.    Skin: Skin is warm and dry. No erythema.   Psychiatric: He has a normal mood and affect. His behavior is normal.   Nursing note and vitals reviewed.      Procedures         ED Course  ED Course as of Aug 06 2245   Thu Aug 02, 2018   1654 Dr. Cantu reviewed medical records. He was admitted to [facility] on 7/21/18 for rate controlled atrial fibrillation and diastolic dysfunction. Hx of poorly controlled diabetes. He was noncompliant with medications. He was started on Eliquis and Coreg for his A Fib. A1C was 13.1. Previously seen by [facility]. He has obstructive sleep apnea and is on CPAP. Oxygen dependent.   [AS]      ED Course User Index  [AS] Nasima Bone       No results found for this or any previous visit (from the past 24 hour(s)).  Note: In addition to lab results from this visit, the labs listed above may include labs taken at another facility or during a different encounter within the last 24 hours. Please correlate lab times with ED admission and discharge times for further clarification of the services performed during this visit.    XR Chest 1 View   Final Result   Stable chronic pulmonary findings.       DICTATED:   8/2/2018   EDITED/ls :   8/2/2018        This report was finalized on 8/2/2018 4:48 PM by Dr. Obdulio Lopes MD.            Vitals:    08/02/18 1716 08/02/18 1720 08/02/18 1721 08/02/18 1814   BP: 107/65 103/88     BP Location:       Patient Position:       Pulse: 109  111    Resp:   20    Temp:       TempSrc:       SpO2:  (!) 87% (!) 89% 93%   Weight:       Height:         Medications   furosemide (LASIX) injection 20 mg (20 mg  Intravenous Given 8/2/18 1716)   ipratropium-albuterol (DUO-NEB) nebulizer solution 3 mL (3 mL Nebulization Given 8/2/18 1721)     ECG/EMG Results (last 24 hours)     Procedure Component Value Units Date/Time    ECG 12 Lead [[id number]] Collected:  08/02/18 1555     Updated:  08/02/18 1657    Narrative:       Test Reason : soa  Blood Pressure : **/** mmHG  Vent. Rate : 105 BPM     Atrial Rate : 073 BPM     P-R Int : 000 ms          QRS Dur : 078 ms      QT Int : 356 ms       P-R-T Axes : 000 -06 063 degrees     QTc Int : 470 ms    Atrial fibrillation with rapid ventricular response  Low voltage QRS  Septal infarct (cited on or before 21-JUL-2017)  Abnormal ECG  When compared with ECG of 21-JUL-2017 01:23,  Questionable change in initial forces of Anteroseptal leads  Confirmed by NAZANIN MEDEROS MD ([id number]) on 8/2/2018 4:57:04 PM    Referred By:  EDMD           Confirmed By:NAZANIN MEDEROS MD                   MDM  Number of Diagnoses or Management Options  COPD exacerbation (CMS/[facility]):   Dyspnea, unspecified type:   Elevated glucose:   Peripheral edema:   Diagnosis management comments:       I reviewed all available studies the bedside with the patient.  They are actually quite reassuring.    He has no evidence of heart failure.  No evidence of MI or bad infection.  I suspect his obesity combined with poor control of his diabetes and noncompliance with oxygen therapy and CPAP therapy is lead to peripheral edema. He's probably got some degree of pulmonary hypertension.    I talked about the necessity for weight loss.  He is to wear his oxygen all the time and wear his CPAP at home at night.  Needs to elevate his legs is much as possible and wear compression stockings.  Encouraged follow-up early next week with his primary care doctor to discuss this visit.    He refused CT of the head.  He does not have a headache he tells me he does not feel like he hit his head or his mental status is unchanged in anyway therefore we'll  cancel scan.    He'll return to the ER if worse in any way.    All are agreeable with the plan.       Amount and/or Complexity of Data Reviewed  Clinical lab tests: reviewed  Tests in the radiology section of CPT®: reviewed  Tests in the medicine section of CPT®: reviewed        Final diagnoses:   Dyspnea, unspecified type   COPD exacerbation (CMS/HCC)   Peripheral edema   Elevated glucose       Documentation assistance provided by elise Bone.  Information recorded by the scribe was done at my direction and has been verified and validated by me.     Nasima Bone  08/02/18 9703       Nasima Bone  08/02/18 5831       Carlos Cantu MD  08/06/18 7552

## 2018-08-06 ENCOUNTER — TELEPHONE (OUTPATIENT)
Dept: INTERNAL MEDICINE | Facility: CLINIC | Age: 68
End: 2018-08-06

## 2018-08-06 NOTE — TELEPHONE ENCOUNTER
PATIENTS SISTER WILDA SAID THAT SHE COULDN'T BRING MR ANAYA TO HIS APPOINTMENT ON AUG 8, 2018 DUE TO HAVING 2 OTHER DR APPOINTMENTS THAT DAY AND WANTED TO KNOW IF SHE COULD BRING HIM IN ON Thursday AUG 9, 2018 OR AUG 10, 2018? PLEASE GIVE WILDA A CALL

## 2018-08-08 NOTE — OUTREACH NOTE
BRUCE/TCM call not completed.  I have tried to contact patient 3 times per protocol with no success.  BRUCE/TCM applicable until 8/15/18.  Thank you.

## 2018-08-27 ENCOUNTER — TELEPHONE (OUTPATIENT)
Dept: INTERNAL MEDICINE | Facility: CLINIC | Age: 68
End: 2018-08-27

## 2018-08-27 NOTE — TELEPHONE ENCOUNTER
CRYSTAL, THIS IS THE 2ND HOSPITAL F/U THAT KHOI IS GOING TO [place]ISS. THEY SAID THEY HAVE A DOCTOR COMING TO THE HOUSE TO SEE HIM. PLEASE LET DR. MENDIOLA KNOW. THEY SAID HIS FEET WERE SWOLLEN. I TOLD THEM THAT HE SHOULD BE COMING IN HERE.

## 2018-08-28 NOTE — TELEPHONE ENCOUNTER
Tried calling pt and N/a and mailbox was not set up. Dr amin can not advise him over the phone, he needs to make appt. If he seeing a dr at his home he will need to contact them.

## 2019-02-11 ENCOUNTER — APPOINTMENT (OUTPATIENT)
Dept: GENERAL RADIOLOGY | Facility: HOSPITAL | Age: 69
End: 2019-02-11

## 2019-02-11 ENCOUNTER — APPOINTMENT (OUTPATIENT)
Dept: CT IMAGING | Facility: HOSPITAL | Age: 69
End: 2019-02-11

## 2019-02-11 ENCOUNTER — HOSPITAL ENCOUNTER (EMERGENCY)
Facility: HOSPITAL | Age: 69
Discharge: LEFT BEFORE DISCHARGE | End: 2019-02-11
Attending: EMERGENCY MEDICINE | Admitting: EMERGENCY MEDICINE

## 2019-02-11 VITALS
SYSTOLIC BLOOD PRESSURE: 122 MMHG | WEIGHT: 297 LBS | TEMPERATURE: 98.4 F | HEIGHT: 70 IN | RESPIRATION RATE: 24 BRPM | OXYGEN SATURATION: 92 % | BODY MASS INDEX: 42.52 KG/M2 | HEART RATE: 114 BPM | DIASTOLIC BLOOD PRESSURE: 109 MMHG

## 2019-02-11 DIAGNOSIS — R42 DIZZINESS: ICD-10-CM

## 2019-02-11 DIAGNOSIS — Z91.199 NON-COMPLIANCE: ICD-10-CM

## 2019-02-11 DIAGNOSIS — R41.0 EPISODIC CONFUSION: ICD-10-CM

## 2019-02-11 DIAGNOSIS — R73.9 HYPERGLYCEMIA: Primary | ICD-10-CM

## 2019-02-11 DIAGNOSIS — Z53.29 LEFT AGAINST MEDICAL ADVICE: ICD-10-CM

## 2019-02-11 LAB
ALBUMIN SERPL-MCNC: 4.24 G/DL (ref 3.2–4.8)
ALBUMIN/GLOB SERPL: 1.6 G/DL (ref 1.5–2.5)
ALP SERPL-CCNC: 108 U/L (ref 25–100)
ALT SERPL W P-5'-P-CCNC: 29 U/L (ref 7–40)
ANION GAP SERPL CALCULATED.3IONS-SCNC: 7 MMOL/L (ref 3–11)
ARTERIAL PATENCY WRIST A: ABNORMAL
AST SERPL-CCNC: 39 U/L (ref 0–33)
ATMOSPHERIC PRESS: ABNORMAL MMHG
B-OH-BUTYR SERPL-SCNC: 0.31 MMOL/L
BASE EXCESS BLDA CALC-SCNC: 4.3 MMOL/L (ref 0–2)
BASOPHILS # BLD AUTO: 0.02 10*3/MM3 (ref 0–0.2)
BASOPHILS NFR BLD AUTO: 0.3 % (ref 0–1)
BDY SITE: ABNORMAL
BILIRUB SERPL-MCNC: 0.4 MG/DL (ref 0.3–1.2)
BILIRUB UR QL STRIP: NEGATIVE
BNP SERPL-MCNC: 62 PG/ML (ref 0–100)
BODY TEMPERATURE: 37 C
BUN BLD-MCNC: 14 MG/DL (ref 9–23)
BUN/CREAT SERPL: 14.4 (ref 7–25)
CALCIUM SPEC-SCNC: 9.1 MG/DL (ref 8.7–10.4)
CHLORIDE SERPL-SCNC: 96 MMOL/L (ref 99–109)
CLARITY UR: CLEAR
CO2 BLDA-SCNC: 31 MMOL/L (ref 23–27)
CO2 SERPL-SCNC: 32 MMOL/L (ref 20–31)
COHGB MFR BLD: 1.7 % (ref 0–2)
COLOR UR: YELLOW
CREAT BLD-MCNC: 0.97 MG/DL (ref 0.6–1.3)
DEPRECATED RDW RBC AUTO: 47.4 FL (ref 37–54)
EOSINOPHIL # BLD AUTO: 0.08 10*3/MM3 (ref 0–0.3)
EOSINOPHIL NFR BLD AUTO: 1.1 % (ref 0–3)
ERYTHROCYTE [DISTWIDTH] IN BLOOD BY AUTOMATED COUNT: 13.8 % (ref 11.3–14.5)
GFR SERPL CREATININE-BSD FRML MDRD: 77 ML/MIN/1.73
GLOBULIN UR ELPH-MCNC: 2.7 GM/DL
GLUCOSE BLD-MCNC: 375 MG/DL (ref 70–100)
GLUCOSE BLDC GLUCOMTR-MCNC: 272 MG/DL (ref 70–130)
GLUCOSE BLDC GLUCOMTR-MCNC: 299 MG/DL (ref 70–130)
GLUCOSE BLDC GLUCOMTR-MCNC: 364 MG/DL (ref 70–130)
GLUCOSE UR STRIP-MCNC: ABNORMAL MG/DL
HCO3 BLDA-SCNC: 29.6 MMOL/L (ref 20–26)
HCT VFR BLD AUTO: 45.7 % (ref 38.9–50.9)
HCT VFR BLD CALC: 44.9 %
HGB BLD-MCNC: 14.5 G/DL (ref 13.1–17.5)
HGB BLDA-MCNC: 14.7 G/DL (ref 13.5–17.5)
HGB UR QL STRIP.AUTO: NEGATIVE
HOLD SPECIMEN: NORMAL
HOLD SPECIMEN: NORMAL
HOROWITZ INDEX BLD+IHG-RTO: 28 %
IMM GRANULOCYTES # BLD AUTO: 0.02 10*3/MM3 (ref 0–0.05)
IMM GRANULOCYTES NFR BLD AUTO: 0.3 % (ref 0–0.6)
KETONES UR QL STRIP: NEGATIVE
LEUKOCYTE ESTERASE UR QL STRIP.AUTO: NEGATIVE
LYMPHOCYTES # BLD AUTO: 2.5 10*3/MM3 (ref 0.6–4.8)
LYMPHOCYTES NFR BLD AUTO: 32.9 % (ref 24–44)
MCH RBC QN AUTO: 30.2 PG (ref 27–31)
MCHC RBC AUTO-ENTMCNC: 31.7 G/DL (ref 32–36)
MCV RBC AUTO: 95.2 FL (ref 80–99)
METHGB BLD QL: 1 % (ref 0–1.5)
MODALITY: ABNORMAL
MONOCYTES # BLD AUTO: 0.87 10*3/MM3 (ref 0–1)
MONOCYTES NFR BLD AUTO: 11.4 % (ref 0–12)
NEUTROPHILS # BLD AUTO: 4.14 10*3/MM3 (ref 1.5–8.3)
NEUTROPHILS NFR BLD AUTO: 54.3 % (ref 41–71)
NITRITE UR QL STRIP: NEGATIVE
NOTE: ABNORMAL
OXYHGB MFR BLDV: 89.5 % (ref 94–99)
PCO2 BLDA: 45.4 MM HG
PCO2 TEMP ADJ BLD: 45.4 MM HG (ref 35–48)
PH BLDA: 7.42 PH UNITS (ref 7.35–7.45)
PH UR STRIP.AUTO: 6 [PH] (ref 5–8)
PH, TEMP CORRECTED: 7.42 PH UNITS
PLATELET # BLD AUTO: 200 10*3/MM3 (ref 150–450)
PMV BLD AUTO: 11.8 FL (ref 6–12)
PO2 BLDA: 62.7 MM HG (ref 83–108)
PO2 TEMP ADJ BLD: 62.7 MM HG (ref 83–108)
POTASSIUM BLD-SCNC: 5.4 MMOL/L (ref 3.5–5.5)
PROT SERPL-MCNC: 6.9 G/DL (ref 5.7–8.2)
PROT UR QL STRIP: NEGATIVE
RBC # BLD AUTO: 4.8 10*6/MM3 (ref 4.2–5.76)
SODIUM BLD-SCNC: 135 MMOL/L (ref 132–146)
SP GR UR STRIP: 1.02 (ref 1–1.03)
TROPONIN I SERPL-MCNC: 0.05 NG/ML (ref 0–0.07)
TROPONIN I SERPL-MCNC: 0.05 NG/ML (ref 0–0.07)
UROBILINOGEN UR QL STRIP: ABNORMAL
VENTILATOR MODE: ABNORMAL
WBC NRBC COR # BLD: 7.61 10*3/MM3 (ref 3.5–10.8)
WHOLE BLOOD HOLD SPECIMEN: NORMAL
WHOLE BLOOD HOLD SPECIMEN: NORMAL

## 2019-02-11 PROCEDURE — 99285 EMERGENCY DEPT VISIT HI MDM: CPT

## 2019-02-11 PROCEDURE — 94640 AIRWAY INHALATION TREATMENT: CPT

## 2019-02-11 PROCEDURE — 85025 COMPLETE CBC W/AUTO DIFF WBC: CPT | Performed by: EMERGENCY MEDICINE

## 2019-02-11 PROCEDURE — 93005 ELECTROCARDIOGRAM TRACING: CPT | Performed by: EMERGENCY MEDICINE

## 2019-02-11 PROCEDURE — 82805 BLOOD GASES W/O2 SATURATION: CPT

## 2019-02-11 PROCEDURE — 84484 ASSAY OF TROPONIN QUANT: CPT

## 2019-02-11 PROCEDURE — 82962 GLUCOSE BLOOD TEST: CPT

## 2019-02-11 PROCEDURE — 81003 URINALYSIS AUTO W/O SCOPE: CPT | Performed by: EMERGENCY MEDICINE

## 2019-02-11 PROCEDURE — 96360 HYDRATION IV INFUSION INIT: CPT

## 2019-02-11 PROCEDURE — 70450 CT HEAD/BRAIN W/O DYE: CPT

## 2019-02-11 PROCEDURE — 83880 ASSAY OF NATRIURETIC PEPTIDE: CPT | Performed by: EMERGENCY MEDICINE

## 2019-02-11 PROCEDURE — 82010 KETONE BODYS QUAN: CPT | Performed by: EMERGENCY MEDICINE

## 2019-02-11 PROCEDURE — 36600 WITHDRAWAL OF ARTERIAL BLOOD: CPT

## 2019-02-11 PROCEDURE — 71045 X-RAY EXAM CHEST 1 VIEW: CPT

## 2019-02-11 PROCEDURE — 80053 COMPREHEN METABOLIC PANEL: CPT | Performed by: EMERGENCY MEDICINE

## 2019-02-11 RX ORDER — SODIUM CHLORIDE 0.9 % (FLUSH) 0.9 %
10 SYRINGE (ML) INJECTION AS NEEDED
Status: DISCONTINUED | OUTPATIENT
Start: 2019-02-11 | End: 2019-02-11 | Stop reason: HOSPADM

## 2019-02-11 RX ORDER — IPRATROPIUM BROMIDE AND ALBUTEROL SULFATE 2.5; .5 MG/3ML; MG/3ML
3 SOLUTION RESPIRATORY (INHALATION) ONCE
Status: COMPLETED | OUTPATIENT
Start: 2019-02-11 | End: 2019-02-11

## 2019-02-11 RX ADMIN — SODIUM CHLORIDE 1000 ML: 9 INJECTION, SOLUTION INTRAVENOUS at 17:04

## 2019-02-11 RX ADMIN — IPRATROPIUM BROMIDE AND ALBUTEROL SULFATE 3 ML: 2.5; .5 SOLUTION RESPIRATORY (INHALATION) at 17:55

## 2019-02-11 NOTE — ED PROVIDER NOTES
Subjective   Chito Rod is a 68 y.o. male with a history of COPD, a fib, CHF, HLD, CKD, CAD, and DM who presents to the ED with c/o hyperglycemia. Per the nursing HPI, EMS reports that they were called to the scene for a diabetic emergency. Patient's first fingerstick glucose reading at home was 581, and then 451 while en route to the ED. In triage, his blood glucose is 364. In this duration the patient has been complaining of dizziness, fatigue, and urinary frequency. Upon examination, the patient states that he feels much improved. He denies headache, leg swelling, nausea, vomiting, shortness of breath, chest pain, abdominal pain, or any other acute complaints at this time. Patient does no utilize home O2 but admits that he is supposed to be wearing 2 L all the time. He denies using his Cpap. He also admits to not taking his prescriptions meds as prescribed and may take them every few days.  No recent hospitalizations or antibiotic use.         History provided by:  Patient and EMS personnel (nursing HPI)  Hyperglycemia   Onset quality:  Sudden  Timing:  Constant  Progression:  Improving  Chronicity:  New  Diabetes status:  Controlled with diet  Current diabetic therapy:  Metformin  Relieved by:  Oral agents  Associated symptoms: dizziness and fatigue    Associated symptoms: no abdominal pain, no chest pain, no fever, no nausea, no shortness of breath and no vomiting        Review of Systems   Constitutional: Positive for fatigue. Negative for chills and fever.   Respiratory: Negative for shortness of breath.    Cardiovascular: Negative for chest pain and leg swelling.   Gastrointestinal: Negative for abdominal pain, nausea and vomiting.   Genitourinary: Positive for frequency.   Neurological: Positive for dizziness.   All other systems reviewed and are negative.      Past Medical History:   Diagnosis Date   • Atrial fibrillation (CMS/HCC)    • CAD (coronary artery disease)    • CHF (congestive heart failure)  (CMS/[facility])    • Chronic anticoagulation    • CKD (chronic kidney disease) stage 3, GFR 30-59 ml/min (CMS/[facility])    • COPD (chronic obstructive pulmonary disease) (CMS/[facility])    • DM2 (diabetes mellitus, type 2) (CMS/[facility])    • GERD (gastroesophageal reflux disease)    • History of MI (myocardial infarction)    • HLD (hyperlipidemia)    • HTN (hypertension)    • Morbid obesity (CMS/[facility])    • Noncompliance    • FAUSTO (obstructive sleep apnea)    • Oxygen dependent        No Known Allergies    Past Surgical History:   Procedure Laterality Date   • APPENDECTOMY     • CORONARY STENT PLACEMENT     • HAND SURGERY Left    • KNEE ARTHROSCOPY Left        Family History   Problem Relation Age of Onset   • Diabetes Mother    • Heart disease Mother    • Diabetes Father    • Heart disease Father    • Heart disease Sister        Social History     Socioeconomic History   • Marital status: Single     Spouse name: N/A   • Number of children: 5   • Years of education: H.S.   • Highest education level: Not on file   Occupational History   • Occupation: Construction     Employer: RETIRED   Tobacco Use   • Smoking status: Former Smoker     Packs/day: 1.00     Years: 47.00     Pack years: 47.00     Types: Cigarettes     Last attempt to quit: 2014     Years since quittin.1   • Smokeless tobacco: Never Used   Substance and Sexual Activity   • Alcohol use: No   • Drug use: No   • Sexual activity: No     Comment:    Social History Narrative    Patient consumes 4 sodas  daily.     Patient lives at home alone.          Objective   Physical Exam   Constitutional: He appears well-developed and well-nourished. No distress.   HENT:   Head: Normocephalic and atraumatic.   Nose: Nose normal.   Mouth/Throat: Oropharynx is clear and moist.   Eyes: Conjunctivae are normal. Pupils are equal, round, and reactive to light. No scleral icterus.   Neck: Normal range of motion. Neck supple.   Cardiovascular: Regular rhythm, normal heart  sounds and intact distal pulses. Tachycardia present.   No murmur heard.  Pulmonary/Chest: Effort normal and breath sounds normal. No respiratory distress.   Abdominal: Soft. Bowel sounds are normal. There is no tenderness.   Musculoskeletal: Normal range of motion. He exhibits no edema.   Neurological: He is alert. He is disoriented. No cranial nerve deficit or sensory deficit.   Skin: Skin is warm and dry.   Psychiatric: He has a normal mood and affect. His behavior is normal.   Nursing note and vitals reviewed.      Procedures         ED Course      Re-examined patient several times in ED. Pt improved after fluids and being put on oxygen. He refused to stay in ED for further evaluation and to discuss final results and further monitoring. He is a [employer] and with family in ED. He understands risks of leaving and also with his non compliance.     Reviewed old records.       Recent Results (from the past 24 hour(s))   POC Glucose Once    Collection Time: 02/11/19  4:16 PM   Result Value Ref Range    Glucose 364 (H) 70 - 130 mg/dL   Comprehensive Metabolic Panel    Collection Time: 02/11/19  4:18 PM   Result Value Ref Range    Glucose 375 (H) 70 - 100 mg/dL    BUN 14 9 - 23 mg/dL    Creatinine 0.97 0.60 - 1.30 mg/dL    Sodium 135 132 - 146 mmol/L    Potassium 5.4 3.5 - 5.5 mmol/L    Chloride 96 (L) 99 - 109 mmol/L    CO2 32.0 (H) 20.0 - 31.0 mmol/L    Calcium 9.1 8.7 - 10.4 mg/dL    Total Protein 6.9 5.7 - 8.2 g/dL    Albumin 4.24 3.20 - 4.80 g/dL    ALT (SGPT) 29 7 - 40 U/L    AST (SGOT) 39 (H) 0 - 33 U/L    Alkaline Phosphatase 108 (H) 25 - 100 U/L    Total Bilirubin 0.4 0.3 - 1.2 mg/dL    eGFR Non African Amer 77 >60 mL/min/1.73    Globulin 2.7 gm/dL    A/G Ratio 1.6 1.5 - 2.5 g/dL    BUN/Creatinine Ratio 14.4 7.0 - 25.0    Anion Gap 7.0 3.0 - 11.0 mmol/L   Light Blue Top    Collection Time: 02/11/19  4:18 PM   Result Value Ref Range    Extra Tube hold for add-on    Green Top (Gel)    Collection Time: 02/11/19  4:18  PM   Result Value Ref Range    Extra Tube Hold for add-ons.    Lavender Top    Collection Time: 02/11/19  4:18 PM   Result Value Ref Range    Extra Tube hold for add-on    Gold Top - SST    Collection Time: 02/11/19  4:18 PM   Result Value Ref Range    Extra Tube Hold for add-ons.    CBC Auto Differential    Collection Time: 02/11/19  4:18 PM   Result Value Ref Range    WBC 7.61 3.50 - 10.80 10*3/mm3    RBC 4.80 4.20 - 5.76 10*6/mm3    Hemoglobin 14.5 13.1 - 17.5 g/dL    Hematocrit 45.7 38.9 - 50.9 %    MCV 95.2 80.0 - 99.0 fL    MCH 30.2 27.0 - 31.0 pg    MCHC 31.7 (L) 32.0 - 36.0 g/dL    RDW 13.8 11.3 - 14.5 %    RDW-SD 47.4 37.0 - 54.0 fl    MPV 11.8 6.0 - 12.0 fL    Platelets 200 150 - 450 10*3/mm3    Neutrophil % 54.3 41.0 - 71.0 %    Lymphocyte % 32.9 24.0 - 44.0 %    Monocyte % 11.4 0.0 - 12.0 %    Eosinophil % 1.1 0.0 - 3.0 %    Basophil % 0.3 0.0 - 1.0 %    Immature Grans % 0.3 0.0 - 0.6 %    Neutrophils, Absolute 4.14 1.50 - 8.30 10*3/mm3    Lymphocytes, Absolute 2.50 0.60 - 4.80 10*3/mm3    Monocytes, Absolute 0.87 0.00 - 1.00 10*3/mm3    Eosinophils, Absolute 0.08 0.00 - 0.30 10*3/mm3    Basophils, Absolute 0.02 0.00 - 0.20 10*3/mm3    Immature Grans, Absolute 0.02 0.00 - 0.05 10*3/mm3   Beta Hydroxybutyrate Quantitative    Collection Time: 02/11/19  4:18 PM   Result Value Ref Range    Beta-Hydroxybutyrate Quant 0.310 <=0.500 mmol/L   BNP    Collection Time: 02/11/19  4:18 PM   Result Value Ref Range    BNP 62.0 0.0 - 100.0 pg/mL   Blood Gas, Arterial With Co-Ox    Collection Time: 02/11/19  4:56 PM   Result Value Ref Range    Site Left Radial     Ej's Test N/A     pH, Arterial 7.422 7.350 - 7.450 pH units    pCO2, Arterial 45.4 mm Hg    pO2, Arterial 62.7 (L) 83.0 - 108.0 mm Hg    HCO3, Arterial 29.6 (H) 20.0 - 26.0 mmol/L    Base Excess, Arterial 4.3 (H) 0.0 - 2.0 mmol/L    Hemoglobin, Blood Gas 14.7 13.5 - 17.5 g/dL    Hematocrit, Blood Gas 44.9 %    Oxyhemoglobin 89.5 (L) 94 - 99 %     Methemoglobin 1.00 0.00 - 1.50 %    Carboxyhemoglobin 1.7 0 - 2 %    CO2 Content 31.0 (H) 23 - 27 mmol/L    Temperature 37.0 C    Barometric Pressure for Blood Gas  mmHg    Modality Nasal Cannula     FIO2 28 %    Ventilator Mode       Note      pH, Temp Corrected 7.422 pH Units    pCO2, Temperature Corrected 45.4 35 - 48 mm Hg    pO2, Temperature Corrected 62.7 (L) 83 - 108 mm Hg   Urinalysis With Microscopic If Indicated (No Culture) - Urine, Clean Catch    Collection Time: 02/11/19  5:04 PM   Result Value Ref Range    Color, UA Yellow Yellow, Straw    Appearance, UA Clear Clear    pH, UA 6.0 5.0 - 8.0    Specific Gravity, UA 1.024 1.001 - 1.030    Glucose, UA >=1000 mg/dL (3+) (A) Negative    Ketones, UA Negative Negative    Bilirubin, UA Negative Negative    Blood, UA Negative Negative    Protein, UA Negative Negative    Leuk Esterase, UA Negative Negative    Nitrite, UA Negative Negative    Urobilinogen, UA 0.2 E.U./dL 0.2 - 1.0 E.U./dL   POC Troponin, Rapid    Collection Time: 02/11/19  5:14 PM   Result Value Ref Range    Troponin I 0.05 0.00 - 0.07 ng/mL   POC Glucose Once    Collection Time: 02/11/19  5:54 PM   Result Value Ref Range    Glucose 299 (H) 70 - 130 mg/dL   POC Glucose Once    Collection Time: 02/11/19  8:10 PM   Result Value Ref Range    Glucose 272 (H) 70 - 130 mg/dL   POC Troponin, Rapid    Collection Time: 02/11/19  8:11 PM   Result Value Ref Range    Troponin I 0.05 0.00 - 0.07 ng/mL     Note: In addition to lab results from this visit, the labs listed above may include labs taken at another facility or during a different encounter within the last 24 hours. Please correlate lab times with ED admission and discharge times for further clarification of the services performed during this visit.    CT Head Without Contrast   Final Result   No definite acute intracranial hemorrhage, skull fracture, or acute infarct.      CT can underestimate ischemia for up to 24 hours after the event.       "  Additional information as discussed under \"Findings.\"      THIS DOCUMENT HAS BEEN ELECTRONICALLY SIGNED BY ROBERT SUAREZ MD      XR Chest 1 View    (Results Pending)     Vitals:    02/11/19 1830 02/11/19 1930 02/11/19 2000 02/11/19 2030   BP: (!) 143/112 91/77 93/77 (!) 122/109   Pulse: 115 109  114   Resp:       Temp:       TempSrc:       SpO2: 93% 95% 92% 92%   Weight:       Height:         Medications   sodium chloride 0.9 % bolus 1,000 mL (0 mL Intravenous Stopped 2/11/19 1804)   ipratropium-albuterol (DUO-NEB) nebulizer solution 3 mL (3 mL Nebulization Given 2/11/19 1755)          ECG 12 Lead                           MDM    Final diagnoses:   Hyperglycemia   Non-compliance   Episodic confusion   Dizziness   Left against medical advice       Documentation assistance provided by elise Rodriguez.  Information recorded by the scribe was done at my direction and has been verified and validated by me.     Kenji Rodriguez  02/11/19 1634       Kenji Rodriguez  02/11/19 1920       Cary Vega PA  02/11/19 [id number]    "

## 2019-04-18 ENCOUNTER — HOSPITAL ENCOUNTER (INPATIENT)
Facility: HOSPITAL | Age: 69
LOS: 6 days | Discharge: HOME HEALTH | End: 2019-04-24
Attending: EMERGENCY MEDICINE | Admitting: HOSPITALIST

## 2019-04-18 ENCOUNTER — APPOINTMENT (OUTPATIENT)
Dept: GENERAL RADIOLOGY | Facility: HOSPITAL | Age: 69
End: 2019-04-18

## 2019-04-18 DIAGNOSIS — G47.33 OSA (OBSTRUCTIVE SLEEP APNEA): ICD-10-CM

## 2019-04-18 DIAGNOSIS — E66.01 MORBID OBESITY (HCC): ICD-10-CM

## 2019-04-18 DIAGNOSIS — J96.02 ACUTE RESPIRATORY FAILURE WITH HYPOXIA AND HYPERCAPNIA (HCC): ICD-10-CM

## 2019-04-18 DIAGNOSIS — J96.12 CHRONIC HYPERCAPNIC RESPIRATORY FAILURE (HCC): ICD-10-CM

## 2019-04-18 DIAGNOSIS — Z78.9 IMPAIRED MOBILITY AND ADLS: ICD-10-CM

## 2019-04-18 DIAGNOSIS — J96.21 ACUTE ON CHRONIC RESPIRATORY FAILURE WITH HYPOXIA (HCC): Primary | ICD-10-CM

## 2019-04-18 DIAGNOSIS — E11.8 TYPE 2 DIABETES MELLITUS WITH COMPLICATION, WITHOUT LONG-TERM CURRENT USE OF INSULIN (HCC): ICD-10-CM

## 2019-04-18 DIAGNOSIS — I48.91 ATRIAL FIBRILLATION WITH RVR (HCC): ICD-10-CM

## 2019-04-18 DIAGNOSIS — E11.65 UNCONTROLLED TYPE 2 DIABETES MELLITUS WITH HYPERGLYCEMIA (HCC): ICD-10-CM

## 2019-04-18 DIAGNOSIS — J43.8 OTHER EMPHYSEMA (HCC): ICD-10-CM

## 2019-04-18 DIAGNOSIS — Z74.09 IMPAIRED MOBILITY AND ADLS: ICD-10-CM

## 2019-04-18 DIAGNOSIS — J43.0 UNILATERAL EMPHYSEMA (HCC): ICD-10-CM

## 2019-04-18 DIAGNOSIS — J96.01 ACUTE RESPIRATORY FAILURE WITH HYPOXIA AND HYPERCAPNIA (HCC): ICD-10-CM

## 2019-04-18 DIAGNOSIS — J96.01 ACUTE HYPOXEMIC RESPIRATORY FAILURE (HCC): ICD-10-CM

## 2019-04-18 DIAGNOSIS — Z99.81 OXYGEN DEPENDENT: ICD-10-CM

## 2019-04-18 LAB
ALBUMIN SERPL-MCNC: 3.9 G/DL (ref 3.5–5.2)
ALBUMIN/GLOB SERPL: 1.1 G/DL
ALP SERPL-CCNC: 138 U/L (ref 39–117)
ALT SERPL W P-5'-P-CCNC: 22 U/L (ref 1–41)
ANION GAP SERPL CALCULATED.3IONS-SCNC: 13 MMOL/L
ARTERIAL PATENCY WRIST A: ABNORMAL
AST SERPL-CCNC: 24 U/L (ref 1–40)
ATMOSPHERIC PRESS: ABNORMAL MMHG
BASE EXCESS BLDA CALC-SCNC: 5.5 MMOL/L (ref 0–2)
BASOPHILS # BLD AUTO: 0.02 10*3/MM3 (ref 0–0.2)
BASOPHILS NFR BLD AUTO: 0.2 % (ref 0–1.5)
BDY SITE: ABNORMAL
BILIRUB SERPL-MCNC: 0.2 MG/DL (ref 0.2–1.2)
BODY TEMPERATURE: 37 C
BUN BLD-MCNC: 31 MG/DL (ref 8–23)
BUN/CREAT SERPL: 34.4 (ref 7–25)
CALCIUM SPEC-SCNC: 9.8 MG/DL (ref 8.6–10.5)
CHLORIDE SERPL-SCNC: 92 MMOL/L (ref 98–107)
CO2 BLDA-SCNC: 34.1 MMOL/L (ref 23–27)
CO2 SERPL-SCNC: 27 MMOL/L (ref 22–29)
COHGB MFR BLD: 1.7 % (ref 0–2)
CREAT BLD-MCNC: 0.9 MG/DL (ref 0.76–1.27)
DEPRECATED RDW RBC AUTO: 45.2 FL (ref 37–54)
EOSINOPHIL # BLD AUTO: 0.11 10*3/MM3 (ref 0–0.4)
EOSINOPHIL NFR BLD AUTO: 1.2 % (ref 0.3–6.2)
ERYTHROCYTE [DISTWIDTH] IN BLOOD BY AUTOMATED COUNT: 13.2 % (ref 12.3–15.4)
GFR SERPL CREATININE-BSD FRML MDRD: 84 ML/MIN/1.73
GLOBULIN UR ELPH-MCNC: 3.6 GM/DL
GLUCOSE BLD-MCNC: 456 MG/DL (ref 65–99)
HCO3 BLDA-SCNC: 32.4 MMOL/L (ref 20–26)
HCT VFR BLD AUTO: 43.8 % (ref 37.5–51)
HCT VFR BLD CALC: 45.2 %
HGB BLD-MCNC: 14.3 G/DL (ref 13–17.7)
HGB BLDA-MCNC: 14.7 G/DL (ref 13.5–17.5)
HOLD SPECIMEN: NORMAL
HOLD SPECIMEN: NORMAL
HOROWITZ INDEX BLD+IHG-RTO: 40 %
IMM GRANULOCYTES # BLD AUTO: 0.04 10*3/MM3 (ref 0–0.05)
IMM GRANULOCYTES NFR BLD AUTO: 0.4 % (ref 0–0.5)
LYMPHOCYTES # BLD AUTO: 3.24 10*3/MM3 (ref 0.7–3.1)
LYMPHOCYTES NFR BLD AUTO: 35.3 % (ref 19.6–45.3)
MCH RBC QN AUTO: 30.5 PG (ref 26.6–33)
MCHC RBC AUTO-ENTMCNC: 32.6 G/DL (ref 31.5–35.7)
MCV RBC AUTO: 93.4 FL (ref 79–97)
METHGB BLD QL: 0.9 % (ref 0–1.5)
MODALITY: ABNORMAL
MONOCYTES # BLD AUTO: 1.09 10*3/MM3 (ref 0.1–0.9)
MONOCYTES NFR BLD AUTO: 11.9 % (ref 5–12)
NEUTROPHILS # BLD AUTO: 4.69 10*3/MM3 (ref 1.4–7)
NEUTROPHILS NFR BLD AUTO: 51 % (ref 42.7–76)
NOTE: ABNORMAL
NT-PROBNP SERPL-MCNC: 902.4 PG/ML (ref 5–900)
OXYHGB MFR BLDV: 88.5 % (ref 94–99)
PCO2 BLDA: 55.2 MM HG
PCO2 TEMP ADJ BLD: 55.2 MM HG (ref 35–48)
PH BLDA: 7.38 PH UNITS (ref 7.35–7.45)
PH, TEMP CORRECTED: 7.38 PH UNITS
PLATELET # BLD AUTO: 177 10*3/MM3 (ref 140–450)
PMV BLD AUTO: 11.2 FL (ref 6–12)
PO2 BLDA: 63.4 MM HG (ref 83–108)
PO2 TEMP ADJ BLD: 63.4 MM HG (ref 83–108)
POTASSIUM BLD-SCNC: 5.1 MMOL/L (ref 3.5–5.2)
PROT SERPL-MCNC: 7.5 G/DL (ref 6–8.5)
RBC # BLD AUTO: 4.69 10*6/MM3 (ref 4.14–5.8)
SODIUM BLD-SCNC: 132 MMOL/L (ref 136–145)
TROPONIN I SERPL-MCNC: 0.07 NG/ML (ref 0–0.07)
VENTILATOR MODE: ABNORMAL
WBC NRBC COR # BLD: 9.19 10*3/MM3 (ref 3.4–10.8)
WHOLE BLOOD HOLD SPECIMEN: NORMAL
WHOLE BLOOD HOLD SPECIMEN: NORMAL

## 2019-04-18 PROCEDURE — 84484 ASSAY OF TROPONIN QUANT: CPT

## 2019-04-18 PROCEDURE — 82805 BLOOD GASES W/O2 SATURATION: CPT

## 2019-04-18 PROCEDURE — 36600 WITHDRAWAL OF ARTERIAL BLOOD: CPT

## 2019-04-18 PROCEDURE — 83880 ASSAY OF NATRIURETIC PEPTIDE: CPT | Performed by: EMERGENCY MEDICINE

## 2019-04-18 PROCEDURE — 93005 ELECTROCARDIOGRAM TRACING: CPT

## 2019-04-18 PROCEDURE — 80053 COMPREHEN METABOLIC PANEL: CPT | Performed by: EMERGENCY MEDICINE

## 2019-04-18 PROCEDURE — 63710000001 INSULIN REGULAR HUMAN PER 5 UNITS: Performed by: EMERGENCY MEDICINE

## 2019-04-18 PROCEDURE — 93005 ELECTROCARDIOGRAM TRACING: CPT | Performed by: EMERGENCY MEDICINE

## 2019-04-18 PROCEDURE — 99285 EMERGENCY DEPT VISIT HI MDM: CPT

## 2019-04-18 PROCEDURE — 85025 COMPLETE CBC W/AUTO DIFF WBC: CPT | Performed by: EMERGENCY MEDICINE

## 2019-04-18 PROCEDURE — 71045 X-RAY EXAM CHEST 1 VIEW: CPT

## 2019-04-18 PROCEDURE — 94640 AIRWAY INHALATION TREATMENT: CPT

## 2019-04-18 RX ORDER — QUETIAPINE FUMARATE 25 MG/1
25 TABLET, FILM COATED ORAL NIGHTLY
COMMUNITY
End: 2019-04-29

## 2019-04-18 RX ORDER — IPRATROPIUM BROMIDE AND ALBUTEROL SULFATE 2.5; .5 MG/3ML; MG/3ML
3 SOLUTION RESPIRATORY (INHALATION) ONCE
Status: COMPLETED | OUTPATIENT
Start: 2019-04-18 | End: 2019-04-18

## 2019-04-18 RX ORDER — SODIUM CHLORIDE 0.9 % (FLUSH) 0.9 %
10 SYRINGE (ML) INJECTION AS NEEDED
Status: DISCONTINUED | OUTPATIENT
Start: 2019-04-18 | End: 2019-04-24 | Stop reason: HOSPADM

## 2019-04-18 RX ORDER — CARVEDILOL 3.12 MG/1
3.12 TABLET ORAL 2 TIMES DAILY WITH MEALS
COMMUNITY
End: 2019-04-24 | Stop reason: HOSPADM

## 2019-04-18 RX ORDER — QUETIAPINE FUMARATE 25 MG/1
25 TABLET, FILM COATED ORAL ONCE
Status: COMPLETED | OUTPATIENT
Start: 2019-04-18 | End: 2019-04-18

## 2019-04-18 RX ORDER — DONEPEZIL HYDROCHLORIDE 5 MG/1
5 TABLET, FILM COATED ORAL NIGHTLY
Status: ON HOLD | COMMUNITY
End: 2019-04-24 | Stop reason: SDUPTHER

## 2019-04-18 RX ORDER — DILTIAZEM HYDROCHLORIDE 5 MG/ML
20 INJECTION INTRAVENOUS ONCE
Status: DISCONTINUED | OUTPATIENT
Start: 2019-04-18 | End: 2019-04-18

## 2019-04-18 RX ORDER — METOPROLOL TARTRATE 5 MG/5ML
5 INJECTION INTRAVENOUS ONCE
Status: COMPLETED | OUTPATIENT
Start: 2019-04-18 | End: 2019-04-18

## 2019-04-18 RX ADMIN — QUETIAPINE FUMARATE 25 MG: 25 TABLET ORAL at 21:44

## 2019-04-18 RX ADMIN — METOPROLOL TARTRATE 5 MG: 5 INJECTION INTRAVENOUS at 23:34

## 2019-04-18 RX ADMIN — INSULIN HUMAN 10 UNITS: 100 INJECTION, SOLUTION PARENTERAL at 23:30

## 2019-04-18 RX ADMIN — IPRATROPIUM BROMIDE AND ALBUTEROL SULFATE 3 ML: 2.5; .5 SOLUTION RESPIRATORY (INHALATION) at 19:36

## 2019-04-19 ENCOUNTER — APPOINTMENT (OUTPATIENT)
Dept: CT IMAGING | Facility: HOSPITAL | Age: 69
End: 2019-04-19

## 2019-04-19 ENCOUNTER — APPOINTMENT (OUTPATIENT)
Dept: CARDIOLOGY | Facility: HOSPITAL | Age: 69
End: 2019-04-19

## 2019-04-19 PROBLEM — I50.32 CHRONIC DIASTOLIC CHF (CONGESTIVE HEART FAILURE) (HCC): Status: ACTIVE | Noted: 2017-04-18

## 2019-04-19 LAB
ANION GAP SERPL CALCULATED.3IONS-SCNC: 16 MMOL/L
BASOPHILS # BLD AUTO: 0.02 10*3/MM3 (ref 0–0.2)
BASOPHILS NFR BLD AUTO: 0.2 % (ref 0–1.5)
BH CV ECHO MEAS - AO MAX PG (FULL): 3.7 MMHG
BH CV ECHO MEAS - AO MAX PG: 5.5 MMHG
BH CV ECHO MEAS - AO MEAN PG (FULL): 2.3 MMHG
BH CV ECHO MEAS - AO MEAN PG: 3.3 MMHG
BH CV ECHO MEAS - AO V2 MAX: 116.7 CM/SEC
BH CV ECHO MEAS - AO V2 MEAN: 84 CM/SEC
BH CV ECHO MEAS - AO V2 VTI: 20.1 CM
BH CV ECHO MEAS - BSA(HAYCOCK): 2.7 M^2
BH CV ECHO MEAS - BSA: 2.5 M^2
BH CV ECHO MEAS - BZI_BMI: 40.7 KILOGRAMS/M^2
BH CV ECHO MEAS - BZI_METRIC_HEIGHT: 182.9 CM
BH CV ECHO MEAS - BZI_METRIC_WEIGHT: 136.1 KG
BH CV ECHO MEAS - EDV(CUBED): 168.9 ML
BH CV ECHO MEAS - EDV(TEICH): 149.1 ML
BH CV ECHO MEAS - EF(CUBED): 25.8 %
BH CV ECHO MEAS - EF(TEICH): 20.6 %
BH CV ECHO MEAS - ESV(CUBED): 125.3 ML
BH CV ECHO MEAS - ESV(TEICH): 118.5 ML
BH CV ECHO MEAS - FS: 9.5 %
BH CV ECHO MEAS - IVS/LVPW: 0.99
BH CV ECHO MEAS - IVSD: 1.1 CM
BH CV ECHO MEAS - LA DIMENSION: 4.7 CM
BH CV ECHO MEAS - LAD MAJOR: 6.6 CM
BH CV ECHO MEAS - LAT PEAK E' VEL: 8.7 CM/SEC
BH CV ECHO MEAS - LATERAL E/E' RATIO: 8.9
BH CV ECHO MEAS - LV MASS(C)D: 242.8 GRAMS
BH CV ECHO MEAS - LV MASS(C)DI: 95.9 GRAMS/M^2
BH CV ECHO MEAS - LV MAX PG: 1.7 MMHG
BH CV ECHO MEAS - LV MEAN PG: 1 MMHG
BH CV ECHO MEAS - LV V1 MAX: 66.1 CM/SEC
BH CV ECHO MEAS - LV V1 MEAN: 45.7 CM/SEC
BH CV ECHO MEAS - LV V1 VTI: 11.3 CM
BH CV ECHO MEAS - LVIDD: 5.5 CM
BH CV ECHO MEAS - LVIDS: 5 CM
BH CV ECHO MEAS - LVPWD: 1.1 CM
BH CV ECHO MEAS - MED PEAK E' VEL: 7.7 CM/SEC
BH CV ECHO MEAS - MEDIAL E/E' RATIO: 10
BH CV ECHO MEAS - MV DEC TIME: 0.2 SEC
BH CV ECHO MEAS - MV E MAX VEL: 78.5 CM/SEC
BH CV ECHO MEAS - PA ACC SLOPE: 503.9 CM/SEC^2
BH CV ECHO MEAS - PA ACC TIME: 0.13 SEC
BH CV ECHO MEAS - PA MAX PG: 3 MMHG
BH CV ECHO MEAS - PA PR(ACCEL): 20.4 MMHG
BH CV ECHO MEAS - PA V2 MAX: 86.9 CM/SEC
BH CV ECHO MEAS - RAP SYSTOLE: 3 MMHG
BH CV ECHO MEAS - RVSP: 23 MMHG
BH CV ECHO MEAS - SI(CUBED): 17.2 ML/M^2
BH CV ECHO MEAS - SI(TEICH): 12.1 ML/M^2
BH CV ECHO MEAS - SV(CUBED): 43.6 ML
BH CV ECHO MEAS - SV(TEICH): 30.7 ML
BH CV ECHO MEAS - TAPSE (>1.6): 3 CM2
BH CV ECHO MEAS - TR MAX PG: 20 MMHG
BH CV ECHO MEAS - TR MAX VEL: 219.9 CM/SEC
BH CV ECHO MEASUREMENTS AVERAGE E/E' RATIO: 9.57
BH CV VAS BP RIGHT ARM: NORMAL MMHG
BH CV XLRA - RV BASE: 3.4 CM
BH CV XLRA - RV LENGTH: 6.1 CM
BH CV XLRA - RV MID: 2.7 CM
BH CV XLRA - TDI S': 15 CM/SEC
BUN BLD-MCNC: 32 MG/DL (ref 8–23)
BUN/CREAT SERPL: 34.4 (ref 7–25)
CALCIUM SPEC-SCNC: 9.8 MG/DL (ref 8.6–10.5)
CHLORIDE SERPL-SCNC: 91 MMOL/L (ref 98–107)
CO2 SERPL-SCNC: 26 MMOL/L (ref 22–29)
CREAT BLD-MCNC: 0.93 MG/DL (ref 0.76–1.27)
DEPRECATED RDW RBC AUTO: 46.1 FL (ref 37–54)
EOSINOPHIL # BLD AUTO: 0.12 10*3/MM3 (ref 0–0.4)
EOSINOPHIL NFR BLD AUTO: 1.2 % (ref 0.3–6.2)
ERYTHROCYTE [DISTWIDTH] IN BLOOD BY AUTOMATED COUNT: 13.4 % (ref 12.3–15.4)
GFR SERPL CREATININE-BSD FRML MDRD: 81 ML/MIN/1.73
GLUCOSE BLD-MCNC: 435 MG/DL (ref 65–99)
GLUCOSE BLDC GLUCOMTR-MCNC: 312 MG/DL (ref 70–130)
GLUCOSE BLDC GLUCOMTR-MCNC: 355 MG/DL (ref 70–130)
GLUCOSE BLDC GLUCOMTR-MCNC: 376 MG/DL (ref 70–130)
GLUCOSE BLDC GLUCOMTR-MCNC: 396 MG/DL (ref 70–130)
GLUCOSE BLDC GLUCOMTR-MCNC: 456 MG/DL (ref 70–130)
HBA1C MFR BLD: 12.4 % (ref 4.8–5.6)
HCT VFR BLD AUTO: 47.1 % (ref 37.5–51)
HGB BLD-MCNC: 15.2 G/DL (ref 13–17.7)
IMM GRANULOCYTES # BLD AUTO: 0.04 10*3/MM3 (ref 0–0.05)
IMM GRANULOCYTES NFR BLD AUTO: 0.4 % (ref 0–0.5)
LEFT ATRIUM VOLUME INDEX: 30.4 ML/M^2
LEFT ATRIUM VOLUME: 77 ML
LYMPHOCYTES # BLD AUTO: 3.53 10*3/MM3 (ref 0.7–3.1)
LYMPHOCYTES NFR BLD AUTO: 35.4 % (ref 19.6–45.3)
MCH RBC QN AUTO: 30.8 PG (ref 26.6–33)
MCHC RBC AUTO-ENTMCNC: 32.3 G/DL (ref 31.5–35.7)
MCV RBC AUTO: 95.3 FL (ref 79–97)
MONOCYTES # BLD AUTO: 0.89 10*3/MM3 (ref 0.1–0.9)
MONOCYTES NFR BLD AUTO: 8.9 % (ref 5–12)
NEUTROPHILS # BLD AUTO: 5.41 10*3/MM3 (ref 1.4–7)
NEUTROPHILS NFR BLD AUTO: 54.3 % (ref 42.7–76)
PLATELET # BLD AUTO: 184 10*3/MM3 (ref 140–450)
PMV BLD AUTO: 11.8 FL (ref 6–12)
POTASSIUM BLD-SCNC: 4.3 MMOL/L (ref 3.5–5.2)
RBC # BLD AUTO: 4.94 10*6/MM3 (ref 4.14–5.8)
SODIUM BLD-SCNC: 133 MMOL/L (ref 136–145)
WBC NRBC COR # BLD: 9.97 10*3/MM3 (ref 3.4–10.8)

## 2019-04-19 PROCEDURE — 80048 BASIC METABOLIC PNL TOTAL CA: CPT | Performed by: NURSE PRACTITIONER

## 2019-04-19 PROCEDURE — 85025 COMPLETE CBC W/AUTO DIFF WBC: CPT | Performed by: NURSE PRACTITIONER

## 2019-04-19 PROCEDURE — 82962 GLUCOSE BLOOD TEST: CPT

## 2019-04-19 PROCEDURE — 83036 HEMOGLOBIN GLYCOSYLATED A1C: CPT | Performed by: NURSE PRACTITIONER

## 2019-04-19 PROCEDURE — 63710000001 INSULIN LISPRO (HUMAN) PER 5 UNITS: Performed by: NURSE PRACTITIONER

## 2019-04-19 PROCEDURE — 0 IOPAMIDOL PER 1 ML: Performed by: INTERNAL MEDICINE

## 2019-04-19 PROCEDURE — 94799 UNLISTED PULMONARY SVC/PX: CPT

## 2019-04-19 PROCEDURE — 63710000001 INSULIN DETEMIR PER 5 UNITS: Performed by: INTERNAL MEDICINE

## 2019-04-19 PROCEDURE — 93306 TTE W/DOPPLER COMPLETE: CPT

## 2019-04-19 PROCEDURE — 99223 1ST HOSP IP/OBS HIGH 75: CPT | Performed by: INTERNAL MEDICINE

## 2019-04-19 PROCEDURE — 93306 TTE W/DOPPLER COMPLETE: CPT | Performed by: INTERNAL MEDICINE

## 2019-04-19 PROCEDURE — 99222 1ST HOSP IP/OBS MODERATE 55: CPT | Performed by: INTERNAL MEDICINE

## 2019-04-19 PROCEDURE — 25010000002 FUROSEMIDE PER 20 MG: Performed by: NURSE PRACTITIONER

## 2019-04-19 PROCEDURE — 71275 CT ANGIOGRAPHY CHEST: CPT

## 2019-04-19 PROCEDURE — 25010000002 SULFUR HEXAFLUORIDE MICROSPH 60.7-25 MG RECONSTITUTED SUSPENSION: Performed by: INTERNAL MEDICINE

## 2019-04-19 RX ORDER — FUROSEMIDE 10 MG/ML
40 INJECTION INTRAMUSCULAR; INTRAVENOUS ONCE
Status: COMPLETED | OUTPATIENT
Start: 2019-04-19 | End: 2019-04-19

## 2019-04-19 RX ORDER — IPRATROPIUM BROMIDE AND ALBUTEROL SULFATE 2.5; .5 MG/3ML; MG/3ML
3 SOLUTION RESPIRATORY (INHALATION)
Status: DISCONTINUED | OUTPATIENT
Start: 2019-04-19 | End: 2019-04-22

## 2019-04-19 RX ORDER — QUETIAPINE FUMARATE 25 MG/1
25 TABLET, FILM COATED ORAL NIGHTLY
Status: DISCONTINUED | OUTPATIENT
Start: 2019-04-19 | End: 2019-04-24 | Stop reason: HOSPADM

## 2019-04-19 RX ORDER — METOPROLOL SUCCINATE 50 MG/1
50 TABLET, EXTENDED RELEASE ORAL
Status: DISCONTINUED | OUTPATIENT
Start: 2019-04-19 | End: 2019-04-22

## 2019-04-19 RX ORDER — SODIUM CHLORIDE 0.9 % (FLUSH) 0.9 %
3 SYRINGE (ML) INJECTION EVERY 12 HOURS SCHEDULED
Status: DISCONTINUED | OUTPATIENT
Start: 2019-04-19 | End: 2019-04-24 | Stop reason: HOSPADM

## 2019-04-19 RX ORDER — CARVEDILOL 3.12 MG/1
3.12 TABLET ORAL 2 TIMES DAILY WITH MEALS
Status: DISCONTINUED | OUTPATIENT
Start: 2019-04-19 | End: 2019-04-19

## 2019-04-19 RX ORDER — ATORVASTATIN CALCIUM 20 MG/1
20 TABLET, FILM COATED ORAL NIGHTLY
Status: DISCONTINUED | OUTPATIENT
Start: 2019-04-19 | End: 2019-04-24 | Stop reason: HOSPADM

## 2019-04-19 RX ORDER — IPRATROPIUM BROMIDE AND ALBUTEROL SULFATE 2.5; .5 MG/3ML; MG/3ML
3 SOLUTION RESPIRATORY (INHALATION) EVERY 4 HOURS PRN
Status: DISCONTINUED | OUTPATIENT
Start: 2019-04-19 | End: 2019-04-24 | Stop reason: HOSPADM

## 2019-04-19 RX ORDER — DONEPEZIL HYDROCHLORIDE 10 MG/1
5 TABLET, FILM COATED ORAL NIGHTLY
Status: DISCONTINUED | OUTPATIENT
Start: 2019-04-19 | End: 2019-04-24 | Stop reason: HOSPADM

## 2019-04-19 RX ORDER — ACETAMINOPHEN 325 MG/1
650 TABLET ORAL EVERY 6 HOURS PRN
Status: DISCONTINUED | OUTPATIENT
Start: 2019-04-19 | End: 2019-04-24 | Stop reason: HOSPADM

## 2019-04-19 RX ORDER — FUROSEMIDE 40 MG/1
40 TABLET ORAL DAILY
Status: DISCONTINUED | OUTPATIENT
Start: 2019-04-19 | End: 2019-04-21

## 2019-04-19 RX ORDER — NICOTINE POLACRILEX 4 MG
15 LOZENGE BUCCAL
Status: DISCONTINUED | OUTPATIENT
Start: 2019-04-19 | End: 2019-04-24 | Stop reason: HOSPADM

## 2019-04-19 RX ORDER — DEXTROSE MONOHYDRATE 25 G/50ML
25 INJECTION, SOLUTION INTRAVENOUS
Status: DISCONTINUED | OUTPATIENT
Start: 2019-04-19 | End: 2019-04-24 | Stop reason: HOSPADM

## 2019-04-19 RX ORDER — SODIUM CHLORIDE 0.9 % (FLUSH) 0.9 %
3-10 SYRINGE (ML) INJECTION AS NEEDED
Status: DISCONTINUED | OUTPATIENT
Start: 2019-04-19 | End: 2019-04-24 | Stop reason: HOSPADM

## 2019-04-19 RX ADMIN — INSULIN LISPRO 12 UNITS: 100 INJECTION, SOLUTION INTRAVENOUS; SUBCUTANEOUS at 08:47

## 2019-04-19 RX ADMIN — INSULIN LISPRO 12 UNITS: 100 INJECTION, SOLUTION INTRAVENOUS; SUBCUTANEOUS at 21:28

## 2019-04-19 RX ADMIN — IPRATROPIUM BROMIDE AND ALBUTEROL SULFATE 3 ML: 2.5; .5 SOLUTION RESPIRATORY (INHALATION) at 14:34

## 2019-04-19 RX ADMIN — METOPROLOL SUCCINATE 50 MG: 50 TABLET, EXTENDED RELEASE ORAL at 12:48

## 2019-04-19 RX ADMIN — SULFUR HEXAFLUORIDE 2 ML: KIT at 10:45

## 2019-04-19 RX ADMIN — INSULIN DETEMIR 20 UNITS: 100 INJECTION, SOLUTION SUBCUTANEOUS at 11:21

## 2019-04-19 RX ADMIN — DONEPEZIL HYDROCHLORIDE 5 MG: 10 TABLET, FILM COATED ORAL at 21:27

## 2019-04-19 RX ADMIN — QUETIAPINE FUMARATE 25 MG: 25 TABLET ORAL at 21:28

## 2019-04-19 RX ADMIN — INSULIN LISPRO 12 UNITS: 100 INJECTION, SOLUTION INTRAVENOUS; SUBCUTANEOUS at 12:45

## 2019-04-19 RX ADMIN — SODIUM CHLORIDE, PRESERVATIVE FREE 3 ML: 5 INJECTION INTRAVENOUS at 08:46

## 2019-04-19 RX ADMIN — FUROSEMIDE 40 MG: 40 TABLET ORAL at 08:45

## 2019-04-19 RX ADMIN — CARVEDILOL 3.12 MG: 3.12 TABLET, FILM COATED ORAL at 08:46

## 2019-04-19 RX ADMIN — IOPAMIDOL 75 ML: 755 INJECTION, SOLUTION INTRAVENOUS at 01:16

## 2019-04-19 RX ADMIN — APIXABAN 5 MG: 5 TABLET, FILM COATED ORAL at 21:27

## 2019-04-19 RX ADMIN — ATORVASTATIN CALCIUM 20 MG: 20 TABLET, FILM COATED ORAL at 21:27

## 2019-04-19 RX ADMIN — INSULIN LISPRO 10 UNITS: 100 INJECTION, SOLUTION INTRAVENOUS; SUBCUTANEOUS at 16:42

## 2019-04-19 RX ADMIN — APIXABAN 5 MG: 5 TABLET, FILM COATED ORAL at 08:45

## 2019-04-19 RX ADMIN — FUROSEMIDE 40 MG: 10 INJECTION, SOLUTION INTRAMUSCULAR; INTRAVENOUS at 12:48

## 2019-04-19 RX ADMIN — ACETAMINOPHEN 650 MG: 325 TABLET, FILM COATED ORAL at 16:41

## 2019-04-19 RX ADMIN — IPRATROPIUM BROMIDE AND ALBUTEROL SULFATE 3 ML: 2.5; .5 SOLUTION RESPIRATORY (INHALATION) at 19:30

## 2019-04-20 LAB
ANION GAP SERPL CALCULATED.3IONS-SCNC: 13 MMOL/L
BUN BLD-MCNC: 24 MG/DL (ref 8–23)
BUN/CREAT SERPL: 30.4 (ref 7–25)
CALCIUM SPEC-SCNC: 9.5 MG/DL (ref 8.6–10.5)
CHLORIDE SERPL-SCNC: 95 MMOL/L (ref 98–107)
CO2 SERPL-SCNC: 30 MMOL/L (ref 22–29)
CREAT BLD-MCNC: 0.79 MG/DL (ref 0.76–1.27)
GFR SERPL CREATININE-BSD FRML MDRD: 98 ML/MIN/1.73
GLUCOSE BLD-MCNC: 276 MG/DL (ref 65–99)
GLUCOSE BLDC GLUCOMTR-MCNC: 273 MG/DL (ref 70–130)
GLUCOSE BLDC GLUCOMTR-MCNC: 279 MG/DL (ref 70–130)
GLUCOSE BLDC GLUCOMTR-MCNC: 358 MG/DL (ref 70–130)
GLUCOSE BLDC GLUCOMTR-MCNC: 397 MG/DL (ref 70–130)
GLUCOSE BLDC GLUCOMTR-MCNC: 447 MG/DL (ref 70–130)
NT-PROBNP SERPL-MCNC: 284 PG/ML (ref 5–900)
POTASSIUM BLD-SCNC: 4.3 MMOL/L (ref 3.5–5.2)
SODIUM BLD-SCNC: 138 MMOL/L (ref 136–145)

## 2019-04-20 PROCEDURE — 63710000001 INSULIN LISPRO (HUMAN) PER 5 UNITS: Performed by: INTERNAL MEDICINE

## 2019-04-20 PROCEDURE — 99232 SBSQ HOSP IP/OBS MODERATE 35: CPT | Performed by: INTERNAL MEDICINE

## 2019-04-20 PROCEDURE — 82962 GLUCOSE BLOOD TEST: CPT

## 2019-04-20 PROCEDURE — 25010000002 DIGOXIN PER 500 MCG: Performed by: INTERNAL MEDICINE

## 2019-04-20 PROCEDURE — 63710000001 INSULIN DETEMIR PER 5 UNITS: Performed by: INTERNAL MEDICINE

## 2019-04-20 PROCEDURE — 94799 UNLISTED PULMONARY SVC/PX: CPT

## 2019-04-20 PROCEDURE — 25010000002 FUROSEMIDE PER 20 MG: Performed by: INTERNAL MEDICINE

## 2019-04-20 PROCEDURE — 80048 BASIC METABOLIC PNL TOTAL CA: CPT | Performed by: NURSE PRACTITIONER

## 2019-04-20 PROCEDURE — 83880 ASSAY OF NATRIURETIC PEPTIDE: CPT | Performed by: INTERNAL MEDICINE

## 2019-04-20 RX ORDER — DIGOXIN 0.25 MG/ML
250 INJECTION INTRAMUSCULAR; INTRAVENOUS ONCE
Status: COMPLETED | OUTPATIENT
Start: 2019-04-20 | End: 2019-04-20

## 2019-04-20 RX ORDER — FUROSEMIDE 10 MG/ML
40 INJECTION INTRAMUSCULAR; INTRAVENOUS ONCE
Status: COMPLETED | OUTPATIENT
Start: 2019-04-20 | End: 2019-04-20

## 2019-04-20 RX ADMIN — INSULIN LISPRO 8 UNITS: 100 INJECTION, SOLUTION INTRAVENOUS; SUBCUTANEOUS at 10:04

## 2019-04-20 RX ADMIN — APIXABAN 5 MG: 5 TABLET, FILM COATED ORAL at 20:28

## 2019-04-20 RX ADMIN — ATORVASTATIN CALCIUM 20 MG: 20 TABLET, FILM COATED ORAL at 20:28

## 2019-04-20 RX ADMIN — SODIUM CHLORIDE, PRESERVATIVE FREE 3 ML: 5 INJECTION INTRAVENOUS at 10:05

## 2019-04-20 RX ADMIN — INSULIN LISPRO 14 UNITS: 100 INJECTION, SOLUTION INTRAVENOUS; SUBCUTANEOUS at 17:54

## 2019-04-20 RX ADMIN — INSULIN LISPRO 14 UNITS: 100 INJECTION, SOLUTION INTRAVENOUS; SUBCUTANEOUS at 20:28

## 2019-04-20 RX ADMIN — FUROSEMIDE 40 MG: 10 INJECTION, SOLUTION INTRAMUSCULAR; INTRAVENOUS at 10:03

## 2019-04-20 RX ADMIN — QUETIAPINE FUMARATE 25 MG: 25 TABLET ORAL at 20:28

## 2019-04-20 RX ADMIN — DIGOXIN 250 MCG: 0.25 INJECTION INTRAMUSCULAR; INTRAVENOUS at 17:54

## 2019-04-20 RX ADMIN — IPRATROPIUM BROMIDE AND ALBUTEROL SULFATE 3 ML: 2.5; .5 SOLUTION RESPIRATORY (INHALATION) at 13:24

## 2019-04-20 RX ADMIN — IPRATROPIUM BROMIDE AND ALBUTEROL SULFATE 3 ML: 2.5; .5 SOLUTION RESPIRATORY (INHALATION) at 19:13

## 2019-04-20 RX ADMIN — DONEPEZIL HYDROCHLORIDE 5 MG: 10 TABLET, FILM COATED ORAL at 20:28

## 2019-04-20 RX ADMIN — METOPROLOL SUCCINATE 50 MG: 50 TABLET, EXTENDED RELEASE ORAL at 10:02

## 2019-04-20 RX ADMIN — INSULIN LISPRO 10 UNITS: 100 INJECTION, SOLUTION INTRAVENOUS; SUBCUTANEOUS at 13:08

## 2019-04-20 RX ADMIN — INSULIN DETEMIR 20 UNITS: 100 INJECTION, SOLUTION SUBCUTANEOUS at 06:41

## 2019-04-20 RX ADMIN — DIGOXIN 250 MCG: 0.25 INJECTION INTRAMUSCULAR; INTRAVENOUS at 10:04

## 2019-04-20 RX ADMIN — INSULIN LISPRO 10 UNITS: 100 INJECTION, SOLUTION INTRAVENOUS; SUBCUTANEOUS at 17:55

## 2019-04-20 RX ADMIN — INSULIN LISPRO 12 UNITS: 100 INJECTION, SOLUTION INTRAVENOUS; SUBCUTANEOUS at 13:08

## 2019-04-20 RX ADMIN — APIXABAN 5 MG: 5 TABLET, FILM COATED ORAL at 10:02

## 2019-04-21 LAB
GLUCOSE BLDC GLUCOMTR-MCNC: 299 MG/DL (ref 70–130)
GLUCOSE BLDC GLUCOMTR-MCNC: 318 MG/DL (ref 70–130)
GLUCOSE BLDC GLUCOMTR-MCNC: 325 MG/DL (ref 70–130)
GLUCOSE BLDC GLUCOMTR-MCNC: 349 MG/DL (ref 70–130)

## 2019-04-21 PROCEDURE — 99232 SBSQ HOSP IP/OBS MODERATE 35: CPT | Performed by: INTERNAL MEDICINE

## 2019-04-21 PROCEDURE — 94799 UNLISTED PULMONARY SVC/PX: CPT

## 2019-04-21 PROCEDURE — 82962 GLUCOSE BLOOD TEST: CPT

## 2019-04-21 PROCEDURE — 63710000001 INSULIN DETEMIR PER 5 UNITS: Performed by: INTERNAL MEDICINE

## 2019-04-21 RX ORDER — FUROSEMIDE 40 MG/1
40 TABLET ORAL
Status: DISCONTINUED | OUTPATIENT
Start: 2019-04-21 | End: 2019-04-24 | Stop reason: HOSPADM

## 2019-04-21 RX ORDER — DIGOXIN 125 MCG
125 TABLET ORAL
Status: DISCONTINUED | OUTPATIENT
Start: 2019-04-21 | End: 2019-04-22

## 2019-04-21 RX ADMIN — IPRATROPIUM BROMIDE AND ALBUTEROL SULFATE 3 ML: 2.5; .5 SOLUTION RESPIRATORY (INHALATION) at 19:32

## 2019-04-21 RX ADMIN — INSULIN LISPRO 10 UNITS: 100 INJECTION, SOLUTION INTRAVENOUS; SUBCUTANEOUS at 08:56

## 2019-04-21 RX ADMIN — QUETIAPINE FUMARATE 25 MG: 25 TABLET ORAL at 20:01

## 2019-04-21 RX ADMIN — IPRATROPIUM BROMIDE AND ALBUTEROL SULFATE 3 ML: 2.5; .5 SOLUTION RESPIRATORY (INHALATION) at 15:46

## 2019-04-21 RX ADMIN — INSULIN LISPRO 10 UNITS: 100 INJECTION, SOLUTION INTRAVENOUS; SUBCUTANEOUS at 12:48

## 2019-04-21 RX ADMIN — IPRATROPIUM BROMIDE AND ALBUTEROL SULFATE 3 ML: 2.5; .5 SOLUTION RESPIRATORY (INHALATION) at 07:16

## 2019-04-21 RX ADMIN — SODIUM CHLORIDE, PRESERVATIVE FREE 3 ML: 5 INJECTION INTRAVENOUS at 09:00

## 2019-04-21 RX ADMIN — APIXABAN 5 MG: 5 TABLET, FILM COATED ORAL at 20:02

## 2019-04-21 RX ADMIN — METOPROLOL SUCCINATE 50 MG: 50 TABLET, EXTENDED RELEASE ORAL at 08:58

## 2019-04-21 RX ADMIN — FUROSEMIDE 40 MG: 40 TABLET ORAL at 08:58

## 2019-04-21 RX ADMIN — INSULIN LISPRO 10 UNITS: 100 INJECTION, SOLUTION INTRAVENOUS; SUBCUTANEOUS at 16:55

## 2019-04-21 RX ADMIN — INSULIN DETEMIR 25 UNITS: 100 INJECTION, SOLUTION SUBCUTANEOUS at 08:57

## 2019-04-21 RX ADMIN — APIXABAN 5 MG: 5 TABLET, FILM COATED ORAL at 08:57

## 2019-04-21 RX ADMIN — DONEPEZIL HYDROCHLORIDE 5 MG: 10 TABLET, FILM COATED ORAL at 20:01

## 2019-04-21 RX ADMIN — IPRATROPIUM BROMIDE AND ALBUTEROL SULFATE 3 ML: 2.5; .5 SOLUTION RESPIRATORY (INHALATION) at 12:00

## 2019-04-21 RX ADMIN — INSULIN LISPRO 8 UNITS: 100 INJECTION, SOLUTION INTRAVENOUS; SUBCUTANEOUS at 20:02

## 2019-04-21 RX ADMIN — ATORVASTATIN CALCIUM 20 MG: 20 TABLET, FILM COATED ORAL at 20:01

## 2019-04-21 RX ADMIN — INSULIN LISPRO 10 UNITS: 100 INJECTION, SOLUTION INTRAVENOUS; SUBCUTANEOUS at 09:00

## 2019-04-21 RX ADMIN — DIGOXIN 125 MCG: 125 TABLET ORAL at 08:58

## 2019-04-21 RX ADMIN — FUROSEMIDE 40 MG: 40 TABLET ORAL at 16:56

## 2019-04-21 RX ADMIN — SODIUM CHLORIDE, PRESERVATIVE FREE 3 ML: 5 INJECTION INTRAVENOUS at 20:05

## 2019-04-22 PROBLEM — I50.43 ACUTE ON CHRONIC COMBINED SYSTOLIC AND DIASTOLIC CHF (CONGESTIVE HEART FAILURE): Status: ACTIVE | Noted: 2017-04-18

## 2019-04-22 LAB
ANION GAP SERPL CALCULATED.3IONS-SCNC: 13 MMOL/L
BUN BLD-MCNC: 19 MG/DL (ref 8–23)
BUN/CREAT SERPL: 22.9 (ref 7–25)
CALCIUM SPEC-SCNC: 8.6 MG/DL (ref 8.6–10.5)
CHLORIDE SERPL-SCNC: 95 MMOL/L (ref 98–107)
CO2 SERPL-SCNC: 31 MMOL/L (ref 22–29)
CREAT BLD-MCNC: 0.83 MG/DL (ref 0.76–1.27)
GFR SERPL CREATININE-BSD FRML MDRD: 92 ML/MIN/1.73
GLUCOSE BLD-MCNC: 267 MG/DL (ref 65–99)
GLUCOSE BLDC GLUCOMTR-MCNC: 250 MG/DL (ref 70–130)
GLUCOSE BLDC GLUCOMTR-MCNC: 318 MG/DL (ref 70–130)
GLUCOSE BLDC GLUCOMTR-MCNC: 355 MG/DL (ref 70–130)
GLUCOSE BLDC GLUCOMTR-MCNC: 366 MG/DL (ref 70–130)
POTASSIUM BLD-SCNC: 4.2 MMOL/L (ref 3.5–5.2)
SODIUM BLD-SCNC: 139 MMOL/L (ref 136–145)

## 2019-04-22 PROCEDURE — 99232 SBSQ HOSP IP/OBS MODERATE 35: CPT | Performed by: INTERNAL MEDICINE

## 2019-04-22 PROCEDURE — 94799 UNLISTED PULMONARY SVC/PX: CPT

## 2019-04-22 PROCEDURE — 63710000001 INSULIN LISPRO (HUMAN) PER 5 UNITS: Performed by: HOSPITALIST

## 2019-04-22 PROCEDURE — 99233 SBSQ HOSP IP/OBS HIGH 50: CPT | Performed by: HOSPITALIST

## 2019-04-22 PROCEDURE — 63710000001 INSULIN DETEMIR PER 5 UNITS: Performed by: INTERNAL MEDICINE

## 2019-04-22 PROCEDURE — 82962 GLUCOSE BLOOD TEST: CPT

## 2019-04-22 PROCEDURE — 80048 BASIC METABOLIC PNL TOTAL CA: CPT | Performed by: INTERNAL MEDICINE

## 2019-04-22 RX ORDER — DIGOXIN 250 MCG
250 TABLET ORAL
Status: DISCONTINUED | OUTPATIENT
Start: 2019-04-22 | End: 2019-04-24 | Stop reason: HOSPADM

## 2019-04-22 RX ORDER — METOPROLOL SUCCINATE 50 MG/1
100 TABLET, EXTENDED RELEASE ORAL
Status: DISCONTINUED | OUTPATIENT
Start: 2019-04-22 | End: 2019-04-24 | Stop reason: HOSPADM

## 2019-04-22 RX ADMIN — INSULIN DETEMIR 30 UNITS: 100 INJECTION, SOLUTION SUBCUTANEOUS at 08:54

## 2019-04-22 RX ADMIN — APIXABAN 5 MG: 5 TABLET, FILM COATED ORAL at 08:43

## 2019-04-22 RX ADMIN — IPRATROPIUM BROMIDE AND ALBUTEROL SULFATE 3 ML: 2.5; .5 SOLUTION RESPIRATORY (INHALATION) at 07:41

## 2019-04-22 RX ADMIN — QUETIAPINE FUMARATE 25 MG: 25 TABLET ORAL at 22:13

## 2019-04-22 RX ADMIN — INSULIN LISPRO 15 UNITS: 100 INJECTION, SOLUTION INTRAVENOUS; SUBCUTANEOUS at 16:34

## 2019-04-22 RX ADMIN — INSULIN LISPRO 12 UNITS: 100 INJECTION, SOLUTION INTRAVENOUS; SUBCUTANEOUS at 11:49

## 2019-04-22 RX ADMIN — INSULIN LISPRO 15 UNITS: 100 INJECTION, SOLUTION INTRAVENOUS; SUBCUTANEOUS at 11:48

## 2019-04-22 RX ADMIN — APIXABAN 5 MG: 5 TABLET, FILM COATED ORAL at 22:14

## 2019-04-22 RX ADMIN — SODIUM CHLORIDE, PRESERVATIVE FREE 3 ML: 5 INJECTION INTRAVENOUS at 08:43

## 2019-04-22 RX ADMIN — INSULIN LISPRO 12 UNITS: 100 INJECTION, SOLUTION INTRAVENOUS; SUBCUTANEOUS at 08:44

## 2019-04-22 RX ADMIN — INSULIN LISPRO 10 UNITS: 100 INJECTION, SOLUTION INTRAVENOUS; SUBCUTANEOUS at 16:35

## 2019-04-22 RX ADMIN — FUROSEMIDE 40 MG: 40 TABLET ORAL at 16:34

## 2019-04-22 RX ADMIN — DIGOXIN 250 MCG: 250 TABLET ORAL at 13:09

## 2019-04-22 RX ADMIN — INSULIN LISPRO 15 UNITS: 100 INJECTION, SOLUTION INTRAVENOUS; SUBCUTANEOUS at 08:44

## 2019-04-22 RX ADMIN — ATORVASTATIN CALCIUM 20 MG: 20 TABLET, FILM COATED ORAL at 22:13

## 2019-04-22 RX ADMIN — METOPROLOL SUCCINATE 100 MG: 50 TABLET, EXTENDED RELEASE ORAL at 08:52

## 2019-04-22 RX ADMIN — POLYETHYLENE GLYCOL 3350 17 G: 17 POWDER, FOR SOLUTION ORAL at 14:58

## 2019-04-22 RX ADMIN — IPRATROPIUM BROMIDE AND ALBUTEROL SULFATE 3 ML: 2.5; .5 SOLUTION RESPIRATORY (INHALATION) at 11:58

## 2019-04-22 RX ADMIN — FUROSEMIDE 40 MG: 40 TABLET ORAL at 08:42

## 2019-04-22 RX ADMIN — DONEPEZIL HYDROCHLORIDE 5 MG: 10 TABLET, FILM COATED ORAL at 22:13

## 2019-04-22 RX ADMIN — INSULIN LISPRO 8 UNITS: 100 INJECTION, SOLUTION INTRAVENOUS; SUBCUTANEOUS at 22:14

## 2019-04-23 LAB
ANION GAP SERPL CALCULATED.3IONS-SCNC: 15 MMOL/L
BUN BLD-MCNC: 19 MG/DL (ref 8–23)
BUN/CREAT SERPL: 26 (ref 7–25)
CALCIUM SPEC-SCNC: 8.3 MG/DL (ref 8.6–10.5)
CHLORIDE SERPL-SCNC: 95 MMOL/L (ref 98–107)
CO2 SERPL-SCNC: 24 MMOL/L (ref 22–29)
CREAT BLD-MCNC: 0.73 MG/DL (ref 0.76–1.27)
DEPRECATED RDW RBC AUTO: 47.8 FL (ref 37–54)
ERYTHROCYTE [DISTWIDTH] IN BLOOD BY AUTOMATED COUNT: 13.5 % (ref 12.3–15.4)
GFR SERPL CREATININE-BSD FRML MDRD: 107 ML/MIN/1.73
GLUCOSE BLD-MCNC: 384 MG/DL (ref 65–99)
GLUCOSE BLDC GLUCOMTR-MCNC: 272 MG/DL (ref 70–130)
GLUCOSE BLDC GLUCOMTR-MCNC: 309 MG/DL (ref 70–130)
GLUCOSE BLDC GLUCOMTR-MCNC: 369 MG/DL (ref 70–130)
GLUCOSE BLDC GLUCOMTR-MCNC: 401 MG/DL (ref 70–130)
HCT VFR BLD AUTO: 47.7 % (ref 37.5–51)
HGB BLD-MCNC: 15 G/DL (ref 13–17.7)
MCH RBC QN AUTO: 30.4 PG (ref 26.6–33)
MCHC RBC AUTO-ENTMCNC: 31.4 G/DL (ref 31.5–35.7)
MCV RBC AUTO: 96.6 FL (ref 79–97)
PLATELET # BLD AUTO: 172 10*3/MM3 (ref 140–450)
PMV BLD AUTO: 11.5 FL (ref 6–12)
POTASSIUM BLD-SCNC: 3.8 MMOL/L (ref 3.5–5.2)
RBC # BLD AUTO: 4.94 10*6/MM3 (ref 4.14–5.8)
SODIUM BLD-SCNC: 134 MMOL/L (ref 136–145)
WBC NRBC COR # BLD: 7.69 10*3/MM3 (ref 3.4–10.8)

## 2019-04-23 PROCEDURE — 82962 GLUCOSE BLOOD TEST: CPT

## 2019-04-23 PROCEDURE — 97166 OT EVAL MOD COMPLEX 45 MIN: CPT

## 2019-04-23 PROCEDURE — 80048 BASIC METABOLIC PNL TOTAL CA: CPT | Performed by: HOSPITALIST

## 2019-04-23 PROCEDURE — 97162 PT EVAL MOD COMPLEX 30 MIN: CPT

## 2019-04-23 PROCEDURE — 97535 SELF CARE MNGMENT TRAINING: CPT

## 2019-04-23 PROCEDURE — 99233 SBSQ HOSP IP/OBS HIGH 50: CPT | Performed by: HOSPITALIST

## 2019-04-23 PROCEDURE — 99232 SBSQ HOSP IP/OBS MODERATE 35: CPT | Performed by: INTERNAL MEDICINE

## 2019-04-23 PROCEDURE — 85027 COMPLETE CBC AUTOMATED: CPT | Performed by: HOSPITALIST

## 2019-04-23 PROCEDURE — 63710000001 INSULIN DETEMIR PER 5 UNITS: Performed by: INTERNAL MEDICINE

## 2019-04-23 RX ADMIN — DONEPEZIL HYDROCHLORIDE 5 MG: 10 TABLET, FILM COATED ORAL at 20:32

## 2019-04-23 RX ADMIN — APIXABAN 5 MG: 5 TABLET, FILM COATED ORAL at 20:32

## 2019-04-23 RX ADMIN — FUROSEMIDE 40 MG: 40 TABLET ORAL at 09:55

## 2019-04-23 RX ADMIN — QUETIAPINE FUMARATE 25 MG: 25 TABLET ORAL at 20:32

## 2019-04-23 RX ADMIN — INSULIN LISPRO 15 UNITS: 100 INJECTION, SOLUTION INTRAVENOUS; SUBCUTANEOUS at 17:34

## 2019-04-23 RX ADMIN — DIGOXIN 250 MCG: 250 TABLET ORAL at 12:59

## 2019-04-23 RX ADMIN — FUROSEMIDE 40 MG: 40 TABLET ORAL at 17:34

## 2019-04-23 RX ADMIN — INSULIN LISPRO 12 UNITS: 100 INJECTION, SOLUTION INTRAVENOUS; SUBCUTANEOUS at 17:34

## 2019-04-23 RX ADMIN — INSULIN LISPRO 10 UNITS: 100 INJECTION, SOLUTION INTRAVENOUS; SUBCUTANEOUS at 20:33

## 2019-04-23 RX ADMIN — INSULIN LISPRO 15 UNITS: 100 INJECTION, SOLUTION INTRAVENOUS; SUBCUTANEOUS at 09:54

## 2019-04-23 RX ADMIN — INSULIN LISPRO 14 UNITS: 100 INJECTION, SOLUTION INTRAVENOUS; SUBCUTANEOUS at 12:59

## 2019-04-23 RX ADMIN — INSULIN DETEMIR 30 UNITS: 100 INJECTION, SOLUTION SUBCUTANEOUS at 09:56

## 2019-04-23 RX ADMIN — INSULIN LISPRO 8 UNITS: 100 INJECTION, SOLUTION INTRAVENOUS; SUBCUTANEOUS at 09:55

## 2019-04-23 RX ADMIN — APIXABAN 5 MG: 5 TABLET, FILM COATED ORAL at 09:55

## 2019-04-23 RX ADMIN — ATORVASTATIN CALCIUM 20 MG: 20 TABLET, FILM COATED ORAL at 20:32

## 2019-04-23 RX ADMIN — INSULIN LISPRO 15 UNITS: 100 INJECTION, SOLUTION INTRAVENOUS; SUBCUTANEOUS at 13:00

## 2019-04-23 RX ADMIN — METOPROLOL SUCCINATE 100 MG: 50 TABLET, EXTENDED RELEASE ORAL at 09:55

## 2019-04-24 VITALS
BODY MASS INDEX: 26.82 KG/M2 | SYSTOLIC BLOOD PRESSURE: 126 MMHG | WEIGHT: 198 LBS | TEMPERATURE: 98.3 F | DIASTOLIC BLOOD PRESSURE: 81 MMHG | HEART RATE: 90 BPM | OXYGEN SATURATION: 95 % | HEIGHT: 72 IN | RESPIRATION RATE: 18 BRPM

## 2019-04-24 PROBLEM — F03.90 DEMENTIA (HCC): Status: ACTIVE | Noted: 2019-04-24

## 2019-04-24 PROBLEM — I48.19 PERSISTENT ATRIAL FIBRILLATION (HCC): Status: ACTIVE | Noted: 2019-04-24

## 2019-04-24 PROBLEM — I50.42 CHRONIC COMBINED SYSTOLIC AND DIASTOLIC CONGESTIVE HEART FAILURE (HCC): Status: ACTIVE | Noted: 2019-04-24

## 2019-04-24 PROBLEM — I50.43 ACUTE ON CHRONIC COMBINED SYSTOLIC AND DIASTOLIC CHF (CONGESTIVE HEART FAILURE) (HCC): Status: RESOLVED | Noted: 2017-04-18 | Resolved: 2019-04-24

## 2019-04-24 PROBLEM — J96.01 ACUTE HYPOXEMIC RESPIRATORY FAILURE (HCC): Status: RESOLVED | Noted: 2019-04-18 | Resolved: 2019-04-24

## 2019-04-24 LAB
ANION GAP SERPL CALCULATED.3IONS-SCNC: 12 MMOL/L
BUN BLD-MCNC: 17 MG/DL (ref 8–23)
BUN/CREAT SERPL: 24.6 (ref 7–25)
CALCIUM SPEC-SCNC: 8.5 MG/DL (ref 8.6–10.5)
CHLORIDE SERPL-SCNC: 97 MMOL/L (ref 98–107)
CO2 SERPL-SCNC: 27 MMOL/L (ref 22–29)
CREAT BLD-MCNC: 0.69 MG/DL (ref 0.76–1.27)
GFR SERPL CREATININE-BSD FRML MDRD: 114 ML/MIN/1.73
GLUCOSE BLD-MCNC: 278 MG/DL (ref 65–99)
GLUCOSE BLDC GLUCOMTR-MCNC: 317 MG/DL (ref 70–130)
GLUCOSE BLDC GLUCOMTR-MCNC: 332 MG/DL (ref 70–130)
POTASSIUM BLD-SCNC: 3.8 MMOL/L (ref 3.5–5.2)
SODIUM BLD-SCNC: 136 MMOL/L (ref 136–145)

## 2019-04-24 PROCEDURE — 80048 BASIC METABOLIC PNL TOTAL CA: CPT | Performed by: HOSPITALIST

## 2019-04-24 PROCEDURE — 99232 SBSQ HOSP IP/OBS MODERATE 35: CPT | Performed by: PHYSICIAN ASSISTANT

## 2019-04-24 PROCEDURE — 82962 GLUCOSE BLOOD TEST: CPT

## 2019-04-24 PROCEDURE — 99239 HOSP IP/OBS DSCHRG MGMT >30: CPT | Performed by: PHYSICIAN ASSISTANT

## 2019-04-24 PROCEDURE — 63710000001 INSULIN DETEMIR PER 5 UNITS: Performed by: PHYSICIAN ASSISTANT

## 2019-04-24 RX ORDER — BLOOD-GLUCOSE METER
KIT MISCELLANEOUS
Qty: 1 EACH | Refills: 0 | Status: SHIPPED | OUTPATIENT
Start: 2019-04-24

## 2019-04-24 RX ORDER — METOPROLOL SUCCINATE 100 MG/1
100 TABLET, EXTENDED RELEASE ORAL
Qty: 90 TABLET | Refills: 4 | Status: SHIPPED | OUTPATIENT
Start: 2019-04-24 | End: 2022-03-11 | Stop reason: HOSPADM

## 2019-04-24 RX ORDER — DIGOXIN 250 MCG
250 TABLET ORAL
Qty: 90 TABLET | Refills: 4 | Status: SHIPPED | OUTPATIENT
Start: 2019-04-24 | End: 2022-10-19

## 2019-04-24 RX ORDER — GLIPIZIDE 5 MG/1
2.5 TABLET ORAL
Status: DISCONTINUED | OUTPATIENT
Start: 2019-04-24 | End: 2019-04-24 | Stop reason: HOSPADM

## 2019-04-24 RX ORDER — DONEPEZIL HYDROCHLORIDE 10 MG/1
10 TABLET, FILM COATED ORAL NIGHTLY
Qty: 30 TABLET | Refills: 5 | Status: SHIPPED | OUTPATIENT
Start: 2019-04-24 | End: 2022-10-19

## 2019-04-24 RX ORDER — GLIMEPIRIDE 4 MG/1
4 TABLET ORAL
Qty: 30 TABLET | Refills: 5 | Status: SHIPPED | OUTPATIENT
Start: 2019-04-24 | End: 2022-10-19

## 2019-04-24 RX ADMIN — INSULIN LISPRO 10 UNITS: 100 INJECTION, SOLUTION INTRAVENOUS; SUBCUTANEOUS at 08:25

## 2019-04-24 RX ADMIN — INSULIN LISPRO 10 UNITS: 100 INJECTION, SOLUTION INTRAVENOUS; SUBCUTANEOUS at 12:10

## 2019-04-24 RX ADMIN — APIXABAN 5 MG: 5 TABLET, FILM COATED ORAL at 08:25

## 2019-04-24 RX ADMIN — METFORMIN HYDROCHLORIDE 1000 MG: 1000 TABLET, FILM COATED ORAL at 08:25

## 2019-04-24 RX ADMIN — DIGOXIN 250 MCG: 250 TABLET ORAL at 12:13

## 2019-04-24 RX ADMIN — METOPROLOL SUCCINATE 100 MG: 50 TABLET, EXTENDED RELEASE ORAL at 08:25

## 2019-04-24 RX ADMIN — INSULIN LISPRO 15 UNITS: 100 INJECTION, SOLUTION INTRAVENOUS; SUBCUTANEOUS at 12:10

## 2019-04-24 RX ADMIN — FUROSEMIDE 40 MG: 40 TABLET ORAL at 08:25

## 2019-04-24 RX ADMIN — INSULIN LISPRO 15 UNITS: 100 INJECTION, SOLUTION INTRAVENOUS; SUBCUTANEOUS at 08:25

## 2019-04-24 RX ADMIN — GLIPIZIDE 2.5 MG: 5 TABLET ORAL at 08:25

## 2019-04-24 RX ADMIN — INSULIN DETEMIR 35 UNITS: 100 INJECTION, SOLUTION SUBCUTANEOUS at 08:17

## 2019-04-25 ENCOUNTER — READMISSION MANAGEMENT (OUTPATIENT)
Dept: CALL CENTER | Facility: HOSPITAL | Age: 69
End: 2019-04-25

## 2019-04-25 ENCOUNTER — TRANSITIONAL CARE MANAGEMENT TELEPHONE ENCOUNTER (OUTPATIENT)
Dept: INTERNAL MEDICINE | Facility: CLINIC | Age: 69
End: 2019-04-25

## 2019-04-26 NOTE — PROGRESS NOTES
Transitional Care Follow Up Visit  Subjective     Chito Rod is a 68 y.o. male who presents for a transitional care management visit.    Within 48 business hours after discharge our office contacted him via telephone to coordinate his care and needs.      I reviewed and discussed the details of that call along with the discharge summary, hospital problems, inpatient lab results, inpatient diagnostic studies, and consultation reports with Chito.     Current outpatient and discharge medications have been reconciled for the patient.    Date of TCM Phone Call 4/26/2017 4/27/2017 7/27/2017 8/8/2018 4/25/2019   [facility]   Date of Admission 4/18/2017 4/18/2017 7/20/2017 - 4/18/2019   Date of Discharge 4/24/2017 4/24/2017 7/26/2017 8/1/2018 4/24/2019   Risk for Readmission (LACE Score) 12 12 14 - -   Discharge Disposition Home or Self Care Home or Self Care Home-Health Care [facility] Home or Self Care Home or Self Care     Risk for Readmission (LACE) Score: 14 (4/24/2019  6:00 AM)    Chief Complaint   Patient presents with   • [facility]     Hospital follow up-elevated sugar     Here with his sister Kristyn who is his POA (on file).    History of Present Illness   Course During Hospital Stay:      Patient was admitted 4/18-4/24 at [facility] for acute on chronic hypoxemic respiratory failure.  Is apparently a 68-year-old male with a past medical history significant for hypertension, hyperlipidemia, CAD, insulin-dependent type 2 diabetes, atrial fibrillation, FAUSTO, COPD on chronic home O2, CHF.  He also has a mild dementia with some baseline confusion.  Apparently on the date of admission he presented for evaluation of shortness of breath and hyperglycemia.  He was a poor historian, so presenting history is somewhat unclear.  In the ED he would have acute respiratory failure with hypoxia (room  "air sat in the 80s).  He is found to be in atrial fibrillation with RVR.  Chest x-ray was unremarkable.  EKG confirmed A. fib with RVR.  Labs unrevealing aside from a blood sugar 456.  CTA chest did not show evidence of PE, pneumonia or pleural effusion.  There were nonspecific ground glass opacities in the perihilar lower lungs reflecting possible mild volume overload.  While in the hospital he had an echo showing an EF of 31 to 35%.  He was treated with metoprolol XL and digoxin with improved rate control.  He was diuresed with IV Lasix.  Cardiology will follow him as an outpatient and is recommending [facility] to evaluate for ischemia.  They also recommend a 48-hour Holter monitor at discharge.  His A1c this admission was 12.4%.  There was a long discussion with the family about needing better diet and improve control of his diabetes.    He is to have a one-month follow-up with cardiology (Dr. Lainez--scheduled 6/3/19).  He also needs a one-week follow-up with the heart valve center (Scheduled 5/1/19).    He is getting home nursing, PT/OT.  Per patient's sister, therapist is going to file with  (see below).    He is here to establish care.  Previous primary care physician was [facility].    With regards to his chronic medical conditions:    1.  Atrial fibrillation:  On Toprol- mg daily, Eliquis 5 mg twice daily, digoxin 250 mcg p.o. daily.    2.  CAD/systolic CHF:  On Lasix 40 mg twice daily, KCl 20 M EQ's 2 times per day.    3.  Hyperlipidemia:  On Lipitor 20 mg daily.  Lab Results   Component Value Date    CHOL 135 04/19/2017    CHLPL 188 02/07/2017    TRIG 86 04/19/2017    HDL 39 (L) 04/19/2017    LDL 92 04/19/2017      4.  Type 2 diabetes:  On Levemir 35 units subcu nightly, metformin 1000 mg twice daily, glimepiride 4 mg daily.  Checks fingerstick blood sugars at least twice a day.  Have been running in the 300s in the last week since hospital discharge.  Sister eliminated all soda and \"other " "foods that she thought were bad for him.\"  Has never been on insulin prior to this hospitalization.    CMP:  Lab Results   Component Value Date     (H) 02/07/2017    BUN 17 04/24/2019    CREATININE 0.69 (L) 04/24/2019    EGFRIFNONA 114 04/24/2019    EGFRIFAFRI 81 02/07/2017    BCR 24.6 04/24/2019     04/24/2019    K 3.8 04/24/2019    CO2 27.0 04/24/2019    CALCIUM 8.5 (L) 04/24/2019    PROTENTOTREF 7.6 02/07/2017    ALBUMIN 3.90 04/18/2019    LABGLOBREF 3.2 02/07/2017    LABIL2 1.4 (L) 02/07/2017    BILITOT 0.2 04/18/2019    ALKPHOS 138 (H) 04/18/2019    AST 24 04/18/2019    ALT 22 04/18/2019     HbA1c:  Lab Results   Component Value Date    HGBA1C 12.40 (H) 04/19/2019    HGBA1C 10.40 (H) 07/21/2017     Microalbumin:  Lab Results   Component Value Date    MICROALBUR 21.7 02/07/2017     Left foot exam: Unsure, requesting referral to podiatrist  Last eye exam: Unsure  Last microalbumin: Unsure    5.  Dementia:  On donepezil 10 mg nightly, Seroquel 25 mg nightly.  Unsure what he takes Seroquel for although thinks possibly secondary to insomnia.    6.  COPD:  On around-the-clock 2 L nasal cannula.  In our system, last PFTs on 4/24/2017.  They showed severe obstruction, moderate restriction, low DLCO.  This is consistent with stage III COPD with a combined restrictive defect.  Has not followed with a pulmonologist recently.  Did come to the visit today with an empty portable oxygen tank.  Room air sat was 87% prior to using 2 L with the office nasal cannula.    Past Medical History:   Diagnosis Date   • Atrial fibrillation (CMS/[facility])    • CAD (coronary artery disease)    • CHF (congestive heart failure) (CMS/[facility])    • Chronic anticoagulation    • CKD (chronic kidney disease) stage 3, GFR 30-59 ml/min (CMS/[facility])    • COPD (chronic obstructive pulmonary disease) (CMS/[facility])    • DM2 (diabetes mellitus, type 2) (CMS/HCC)    • GERD (gastroesophageal reflux disease)    • History of MI (myocardial infarction)    • HLD " (hyperlipidemia)    • HTN (hypertension)    • Morbid obesity (CMS/HCC)    • Noncompliance    • FAUSTO (obstructive sleep apnea)    • Oxygen dependent      Past Surgical History:   Procedure Laterality Date   • APPENDECTOMY  1961   • CORONARY STENT PLACEMENT  2008   • HAND SURGERY Left 2002   • KNEE ARTHROSCOPY Left 1965       Current Outpatient Medications:   •  apixaban (ELIQUIS) 5 MG tablet tablet, Take 5 mg by mouth 2 (Two) Times a Day., Disp: , Rfl:   •  atorvastatin (LIPITOR) 20 MG tablet, Take 1 tablet by mouth Every Night. STOP PRAVASTATIN, Disp: 90 tablet, Rfl: 2  •  digoxin (LANOXIN) 250 MCG tablet, Take 1 tablet by mouth Daily., Disp: 90 tablet, Rfl: 4  •  donepezil (ARICEPT) 10 MG tablet, Take 1 tablet by mouth Every Night., Disp: 30 tablet, Rfl: 5  •  furosemide (LASIX) 40 MG tablet, Take 1 tablet by mouth 2 (Two) Times a Day. (Patient taking differently: Take 40 mg by mouth Daily.), Disp: 60 tablet, Rfl: 11  •  glimepiride (AMARYL) 4 MG tablet, Take 1 tablet by mouth Every Morning Before Breakfast., Disp: 30 tablet, Rfl: 5  •  glucose blood test strip, Use as instructed, Disp: 100 each, Rfl: 12  •  glucose monitor monitoring kit, Use to check blood glucose two times per day, Disp: 1 each, Rfl: 0  •  insulin detemir (LEVEMIR) 100 UNIT/ML injection, Inject 40 Units under the skin into the appropriate area as directed Every Night., Disp: 12 mL, Rfl: 11  •  Insulin Pen Needle 30G X 8 MM misc, Use to administer insulin subcutaneously every evening, Disp: 100 each, Rfl: 11  •  metFORMIN (GLUCOPHAGE) 1000 MG tablet, Take 1 tablet by mouth 2 (Two) Times a Day With Meals., Disp: , Rfl: 5  •  metoprolol succinate XL (TOPROL-XL) 100 MG 24 hr tablet, Take 1 tablet by mouth Daily., Disp: 90 tablet, Rfl: 4  •  potassium chloride (K-DUR,KLOR-CON) 20 MEQ CR tablet, Please take once in the morning and repeat at lunchtime, Disp: 60 tablet, Rfl: 1  No Known Allergies  Family History   Problem Relation Age of Onset   •  Diabetes Mother    • Heart disease Mother    • Diabetes Father    • Heart disease Father    • Heart disease Sister      Social History     Socioeconomic History   • Marital status: Single     Spouse name: N/A   • Number of children: 5   • Years of education: H.S.   • Highest education level: Not on file   Occupational History   • Occupation: Construction     Employer: RETIRED   Tobacco Use   • Smoking status: Former Smoker     Packs/day: 1.00     Years: 47.00     Pack years: 47.00     Types: Cigarettes     Last attempt to quit:      Years since quittin.3   • Smokeless tobacco: Never Used   Substance and Sexual Activity   • Alcohol use: No   • Drug use: No   • Sexual activity: No     Comment:    Social History Narrative    Patient consumes 4 sodas  daily.     Patient lives at home alone.        Review of Systems   Constitutional: Negative for activity change, appetite change and fever.   HENT: Negative for congestion, sneezing and sore throat.    Eyes: Negative for discharge and visual disturbance.   Respiratory: Negative for cough and shortness of breath.    Cardiovascular: Negative for chest pain and palpitations.   Gastrointestinal: Negative for abdominal distention, abdominal pain, blood in stool, constipation, nausea and vomiting.   Endocrine: Negative for cold intolerance and heat intolerance.   Genitourinary: Positive for urinary incontinence. Negative for dysuria.   Musculoskeletal: Positive for arthralgias. Negative for neck stiffness.   Skin: Negative for rash.   Allergic/Immunologic: Negative for environmental allergies and food allergies.   Neurological: Positive for memory problem. Negative for headache.   Hematological: Negative for adenopathy.   Psychiatric/Behavioral: Positive for agitation and behavioral problems. Negative for depressed mood.       Objective   Vitals:    19 1520   BP: 126/76   Pulse: 51   Resp: 20   SpO2: 98%     Physical Exam   Constitutional: He appears  well-developed and well-nourished. No distress.   Sitting upright in wheelchair.   HENT:   Head: Normocephalic and atraumatic.   Right Ear: External ear normal.   Left Ear: External ear normal.   Nose: Nose normal.   Mouth/Throat: No oropharyngeal exudate.   Eyes: Conjunctivae are normal. Right eye exhibits no discharge. Left eye exhibits no discharge.   Neck: Normal range of motion. Neck supple. No thyromegaly present.   Cardiovascular: Normal rate, regular rhythm and normal heart sounds. Exam reveals no gallop and no friction rub.   No murmur heard.  Pulmonary/Chest: Effort normal and breath sounds normal. No stridor. No respiratory distress. He has no wheezes. He has no rales.   Limited secondary to obesity   Abdominal: Soft. Bowel sounds are normal. He exhibits no distension and no mass. There is no tenderness. There is no rebound and no guarding.   Limited secondary to morbid obese   Musculoskeletal: He exhibits no edema.   Lymphadenopathy:     He has no cervical adenopathy.   Neurological: He is alert.   Difficult to assess orientation.  Seems confused at times, but at baseline per sister   Skin: Skin is warm and dry. Capillary refill takes less than 2 seconds. He is not diaphoretic.   Evidence of onychomycosis bilateral   Vitals reviewed.      Assessment and Plan: 68 y.o. male here to establish care post hospital d/c and for:  Persistent atrial fibrillation (CMS/HCC)  Chads vas score of at least 4.  Continue Toprol- mg daily, Eliquis 5 mill grams twice daily, digoxin 250 mcg p.o. daily.  Keep cardiology follow-up as scheduled.    HLD (hyperlipidemia)  Continue Lipitor 20 mg daily.    Chronic combined systolic and diastolic congestive heart failure (CMS/HCC)  Hold cardiology follow-up.  Discussed that he needs to notify either myself or his cardiology team if he has more than a 3 pound weight gain in 5 days, has increasing lower extremity edema, PND etc.    COPD (chronic obstructive pulmonary disease)  (CMS/[facility])  On chronic home O2 (2 L nasal cannula around-the-clock).  Moderate to severe COPD.  Likely would benefit from some bronchodilator/inhaled corticosteroids, will need to obtain prior records.  Also would benefit from pulmonology eval, referral placed.  Emphasized with patient and his sister that in the future they should double check that his portable oxygen tank is full before planning travel.  Oxygen saturation came up to normal on 2 L nasal cannula patient was mentating at baseline.  Unable to provide oxygen canister for travel, recommended that patient go straight home to resume his home oxygen therapy (sister assured me that they have plenty of oxygen at home).  She will also double check with the oxygen supply company to make sure the portable tank is functioning correctly.      Type 2 diabetes mellitus with hyperglycemia, with long-term current use of insulin (CMS/[facility])  Poorly controlled given A1c of 12.4.  Complications include hyperglycemia, vascular disease, CKD.  Refer to podiatry for foot care.  Encouraged sister to schedule eye appointment for patient due to potential diabetic complications including retinopathy, blindness etc.  Plan for microalbumin at next visit.  Continue metformin 1000 mg twice daily and glimepiride 4 mg daily.  Increase Levemir to 40 units subcu nightly from 35 units subcu nightly given blood sugars remaining in the high 300s at home.  Continue regular fingerstick blood sugar checks.  Reviewed importance of adjusting diet to eliminate processed foods, simple starches, high sugar/high fat content.    Dementia  On donepezil and mill grams nightly.  Also on Seroquel 25 mg nightly for unclear reasons.  Discussed that Seroquel may be contributing to possible confusion and recommend stopping this.  For insomnia he may try melatonin nightly as needed.  Once done sorting through his cardiac/diabetic/pulmonary issues, may benefit from formal neurology eval.  Discussed that given  "his comorbid dementia, patient is likely to need increasing amounts of supervision.  For now his sister has been driving to his home twice daily to supervise meds (which she removed from the home when she leaves).  She endorses difficulties getting patient to be mobile even though she is offered to purchase him a walker.  He prefers to sit in his chair and if he needs to urinate for example will void on himself while sitting.  At times he can be aggressive when he does not wish to comply with requests (such as to participate in therapy, go to medical appointments).  Sister tells me that \"he swung his arms but I got away.\"  She tells me that the home health therapist is placing a  report.  Although certain only sister seems like she is doing the best she can for the effort increasingly complex care needs with her brother, it sounds like they may benefit from more support and patient will eventually need around-the-clock supervision.  Discussed with sister that I would like to see if APS can be involved for additional community supports.  When I tried to place a call this afternoon, was unable to get a hold of anyone to see if this was an appropriate case for referral.  Therefore I have sent a staff message to our clinic  Susana Haro to see if this is something that she thinks would be appropriate for APS and/or if she would have any other suggestions for support.    Healthcare maintenance:  1.  Try to obtain records from prior PCP  2.  Address immunizations, other routine health maintenance when more stable from the above issues    Return in about 2 weeks (around 5/13/2019) for extended follow up .     Gabriela Hammond MD  4/29/2019                "

## 2019-04-26 NOTE — OUTREACH NOTE
Multiple attempts have been made to contact pt to complete BRUCE call and confirm appt.  All attempts have been documented. The patient has a [facility] hospital follow up scheduled with Dr. Hammond 4/29/19.

## 2019-04-26 NOTE — OUTREACH NOTE
Prep Survey      Responses   Facility patient discharged from?  [place]   Is patient eligible?  Yes   Discharge diagnosis  Acute hypoxemic respiratory failure, Atrial fibrillation Dementia CKD, CHF   Does the patient have one of the following disease processes/diagnoses(primary or secondary)?  COPD/Pneumonia   Does the patient have Home health ordered?  Yes   What is the Home health agency?   [facility]    Is there a DME ordered?  Yes   What DME was ordered?   [facility] [employer].  New orders for home 02   rollator.   General alerts for this patient  Lives w/ friends,  sister primary care giver   Prep survey completed?  Yes          Thalia Keller RN

## 2019-04-29 ENCOUNTER — OFFICE VISIT (OUTPATIENT)
Dept: INTERNAL MEDICINE | Facility: CLINIC | Age: 69
End: 2019-04-29

## 2019-04-29 VITALS
DIASTOLIC BLOOD PRESSURE: 76 MMHG | BODY MASS INDEX: 40.23 KG/M2 | SYSTOLIC BLOOD PRESSURE: 126 MMHG | WEIGHT: 297 LBS | HEIGHT: 72 IN | RESPIRATION RATE: 20 BRPM | OXYGEN SATURATION: 98 % | HEART RATE: 51 BPM

## 2019-04-29 DIAGNOSIS — J42 CHRONIC BRONCHITIS, UNSPECIFIED CHRONIC BRONCHITIS TYPE (HCC): ICD-10-CM

## 2019-04-29 DIAGNOSIS — E11.65 TYPE 2 DIABETES MELLITUS WITH HYPERGLYCEMIA, WITH LONG-TERM CURRENT USE OF INSULIN (HCC): Primary | ICD-10-CM

## 2019-04-29 DIAGNOSIS — E78.49 OTHER HYPERLIPIDEMIA: ICD-10-CM

## 2019-04-29 DIAGNOSIS — I48.19 PERSISTENT ATRIAL FIBRILLATION (HCC): ICD-10-CM

## 2019-04-29 DIAGNOSIS — Z99.81 OXYGEN DEPENDENT: ICD-10-CM

## 2019-04-29 DIAGNOSIS — F03.91 DEMENTIA WITH BEHAVIORAL DISTURBANCE, UNSPECIFIED DEMENTIA TYPE: ICD-10-CM

## 2019-04-29 DIAGNOSIS — Z79.4 TYPE 2 DIABETES MELLITUS WITH HYPERGLYCEMIA, WITH LONG-TERM CURRENT USE OF INSULIN (HCC): Primary | ICD-10-CM

## 2019-04-29 DIAGNOSIS — E66.01 MORBID OBESITY (HCC): ICD-10-CM

## 2019-04-29 DIAGNOSIS — I50.42 CHRONIC COMBINED SYSTOLIC AND DIASTOLIC CONGESTIVE HEART FAILURE (HCC): ICD-10-CM

## 2019-04-29 PROBLEM — J96.01 ACUTE RESPIRATORY FAILURE WITH HYPOXIA AND HYPERCAPNIA (HCC): Status: RESOLVED | Noted: 2017-04-19 | Resolved: 2019-04-29

## 2019-04-29 PROBLEM — J96.02 ACUTE RESPIRATORY FAILURE WITH HYPOXIA AND HYPERCAPNIA: Status: RESOLVED | Noted: 2017-04-19 | Resolved: 2019-04-29

## 2019-04-29 PROCEDURE — 99496 TRANSJ CARE MGMT HIGH F2F 7D: CPT | Performed by: INTERNAL MEDICINE

## 2019-04-29 RX ORDER — POTASSIUM CHLORIDE 20 MEQ/1
TABLET, EXTENDED RELEASE ORAL
Qty: 60 TABLET | Refills: 1 | Status: SHIPPED | OUTPATIENT
Start: 2019-04-29 | End: 2019-10-04 | Stop reason: SDUPTHER

## 2019-04-29 NOTE — ASSESSMENT & PLAN NOTE
Poorly controlled given A1c of 12.4.  Complications include hyperglycemia, vascular disease, CKD.  Refer to podiatry for foot care.  Encouraged sister to schedule eye appointment for patient due to potential diabetic complications including retinopathy, blindness etc.  Plan for microalbumin at next visit.  Continue metformin 1000 mg twice daily and glimepiride 4 mg daily.  Increase Levemir to 40 units subcu nightly from 35 units subcu nightly given blood sugars remaining in the high 300s at home.  Continue regular fingerstick blood sugar checks.  Reviewed importance of adjusting diet to eliminate processed foods, simple starches, high sugar/high fat content.

## 2019-04-29 NOTE — ASSESSMENT & PLAN NOTE
On chronic home O2 (2 L nasal cannula around-the-clock).  Moderate to severe COPD.  Likely would benefit from some bronchodilator/inhaled corticosteroids, will need to obtain prior records.  Also would benefit from pulmonology eval, referral placed.  Emphasized with patient and his sister that in the future they should double check that his portable oxygen tank is full before planning travel.  Oxygen saturation came up to normal on 2 L nasal cannula patient was mentating at baseline.  Unable to provide oxygen canister for travel, recommended that patient go straight home to resume his home oxygen therapy (sister assured me that they have plenty of oxygen at home).  She will also double check with the oxygen supply company to make sure the portable tank is functioning correctly.

## 2019-04-29 NOTE — ASSESSMENT & PLAN NOTE
Hold cardiology follow-up.  Discussed that he needs to notify either myself or his cardiology team if he has more than a 3 pound weight gain in 5 days, has increasing lower extremity edema, PND etc.

## 2019-04-29 NOTE — ASSESSMENT & PLAN NOTE
"On donepezil and mill grams nightly.  Also on Seroquel 25 mg nightly for unclear reasons.  Discussed that Seroquel may be contributing to possible confusion and recommend stopping this.  For insomnia he may try melatonin nightly as needed.  Once done sorting through his cardiac/diabetic/pulmonary issues, may benefit from formal neurology eval.  Discussed that given his comorbid dementia, patient is likely to need increasing amounts of supervision.  For now his sister has been driving to his home twice daily to supervise meds (which she removed from the home when she leaves).  She endorses difficulties getting patient to be mobile even though she is offered to purchase him a walker.  He prefers to sit in his chair and if he needs to urinate for example will void on himself while sitting.  At times he can be aggressive when he does not wish to comply with requests (such as to participate in therapy, go to medical appointments).  Sister tells me that \"he swung his arms but I got away.\"  She tells me that the home health therapist is placing a  report.  Although certain only sister seems like she is doing the best she can for the effort increasingly complex care needs with her brother, it sounds like they may benefit from more support and patient will eventually need around-the-clock supervision.  Discussed with sister that I would like to see if APS can be involved for additional community supports.  When I tried to place a call this afternoon, was unable to get a hold of anyone to see if this was an appropriate case for referral.  Therefore I have sent a staff message to our clinic  Susana Haro to see if this is something that she thinks would be appropriate for APS and/or if she would have any other suggestions for support.  "

## 2019-04-29 NOTE — ASSESSMENT & PLAN NOTE
Chads vas score of at least 4.  Continue Toprol- mg daily, Eliquis 5 mill grams twice daily, digoxin 250 mcg p.o. daily.  Keep cardiology follow-up as scheduled.

## 2019-04-30 ENCOUNTER — READMISSION MANAGEMENT (OUTPATIENT)
Dept: CALL CENTER | Facility: HOSPITAL | Age: 69
End: 2019-04-30

## 2019-04-30 DIAGNOSIS — Z79.4 TYPE 2 DIABETES MELLITUS WITH HYPERGLYCEMIA, WITH LONG-TERM CURRENT USE OF INSULIN (HCC): ICD-10-CM

## 2019-04-30 DIAGNOSIS — Z99.81 OXYGEN DEPENDENT: ICD-10-CM

## 2019-04-30 DIAGNOSIS — E11.65 TYPE 2 DIABETES MELLITUS WITH HYPERGLYCEMIA, WITH LONG-TERM CURRENT USE OF INSULIN (HCC): ICD-10-CM

## 2019-04-30 DIAGNOSIS — F03.91 DEMENTIA WITH BEHAVIORAL DISTURBANCE, UNSPECIFIED DEMENTIA TYPE: Primary | ICD-10-CM

## 2019-04-30 NOTE — OUTREACH NOTE
COPD/PN Week 1 Survey      Responses   Facility patient discharged from?  [place]   Does the patient have one of the following disease processes/diagnoses(primary or secondary)?  COPD/Pneumonia   Is there a successful TCM telephone encounter documented?  Yes          Charu Delgadillo RN

## 2019-05-02 ENCOUNTER — READMISSION MANAGEMENT (OUTPATIENT)
Dept: CALL CENTER | Facility: HOSPITAL | Age: 69
End: 2019-05-02

## 2019-05-02 NOTE — OUTREACH NOTE
COPD/PN Week 2 Survey      Responses   Facility patient discharged from?  [place]   Does the patient have one of the following disease processes/diagnoses(primary or secondary)?  COPD/Pneumonia   Was the primary reason for admission:  COPD exacerbation   Week 2 attempt successful?  No   Unsuccessful attempts  Attempt 1          Charu Delgadillo RN

## 2019-05-03 ENCOUNTER — READMISSION MANAGEMENT (OUTPATIENT)
Dept: CALL CENTER | Facility: HOSPITAL | Age: 69
End: 2019-05-03

## 2019-05-03 NOTE — OUTREACH NOTE
COPD/PN Week 2 Survey      Responses   Facility patient discharged from?  [place]   Does the patient have one of the following disease processes/diagnoses(primary or secondary)?  COPD/Pneumonia   Was the primary reason for admission:  COPD exacerbation   Week 2 attempt successful?  No   Unsuccessful attempts  Attempt 2          Charleen Linda, RN

## 2019-05-07 ENCOUNTER — TELEPHONE (OUTPATIENT)
Dept: INTERNAL MEDICINE | Facility: CLINIC | Age: 69
End: 2019-05-07

## 2019-05-07 NOTE — TELEPHONE ENCOUNTER
----- Message from Debby Valerio sent at 5/7/2019 12:07 PM EDT -----  Kristyn Marcel calling for her brother Chito Rod who needs a refill for levamir 40mg, it was increased in the hospital. He uses the prescription pad in [place]. Kristyn can be reached at [phone]

## 2019-05-07 NOTE — TELEPHONE ENCOUNTER
Sent via [employer].    Please call sister back and let her know that Maik (diabetes specialist) has been trying to call to make f/u.    Give her scheduling number--[phone] and have her make appt.

## 2019-05-10 ENCOUNTER — TELEPHONE (OUTPATIENT)
Dept: SOCIAL WORK | Facility: CLINIC | Age: 69
End: 2019-05-10

## 2019-05-10 ENCOUNTER — READMISSION MANAGEMENT (OUTPATIENT)
Dept: CALL CENTER | Facility: HOSPITAL | Age: 69
End: 2019-05-10

## 2019-05-10 NOTE — OUTREACH NOTE
COPD/PN Week 3 Survey      Responses   Facility patient discharged from?  [place]   Does the patient have one of the following disease processes/diagnoses(primary or secondary)?  COPD/Pneumonia   Was the primary reason for admission:  COPD exacerbation   Week 3 attempt successful?  No   Unsuccessful attempts  Attempt 1          Priyanka Mak RN

## 2019-05-10 NOTE — TELEPHONE ENCOUNTER
DUSTY spoke with [facility] DUSTY Winn. Tatum stated pt is participating in HH and pt appears to have good family support at this time.  Tatum will follow up with DUSTY with any additional updates.

## 2019-05-12 ENCOUNTER — READMISSION MANAGEMENT (OUTPATIENT)
Dept: CALL CENTER | Facility: HOSPITAL | Age: 69
End: 2019-05-12

## 2019-05-12 NOTE — OUTREACH NOTE
COPD/PN Week 3 Survey      Responses   Facility patient discharged from?  [place]   Does the patient have one of the following disease processes/diagnoses(primary or secondary)?  COPD/Pneumonia   Was the primary reason for admission:  COPD exacerbation   Week 3 attempt successful?  No   Unsuccessful attempts  Attempt 2          Johanna Novak RN

## 2019-05-15 ENCOUNTER — TELEPHONE (OUTPATIENT)
Dept: SOCIAL WORK | Facility: CLINIC | Age: 69
End: 2019-05-15

## 2019-05-15 NOTE — TELEPHONE ENCOUNTER
"DUSTY contacted pt's sister Kristyn. Kristyn stated pt is doing fair. Kristyn stated pt is \"grumpy and told off  the PT and HH staff and they are unsure if they are coming back\".     ----SW will follow up with HH.    Kristyn stated that the APRN Susana Aguilar ([facility]).  Came out on Monday to check with pt.     Kristyn stated pt's \"sugar is down due to not having food in the home unless its meal time\". Kristyn stated she brings him food when its time to eat and doesn't leave food in the house so he cant \"munch all day\".     Kristyn stated pt is in need of transportation. Pt doesn't qualify for the [employer] Bus, so SW will assist pt in applying for Wheels.    DUSTY reminded Kristyn that pt does need to schedule an appt with Dr. Hammond and make an Endo appt.     SW will let provider know.   "

## 2019-05-16 ENCOUNTER — TELEPHONE (OUTPATIENT)
Dept: SOCIAL WORK | Facility: CLINIC | Age: 69
End: 2019-05-16

## 2019-05-16 NOTE — TELEPHONE ENCOUNTER
"SW spoke with Tatum ([facility] SW)    Tatum stated, \"I will send over the paperwork to the office for them to follow up on pt'swheelchair.  Also, he cannot get WHEELS because he is in [facility].  I already gave the sister the transportation lists for there and she said she will fu on it if needed\".     SW will follow up.     "

## 2019-05-16 NOTE — TELEPHONE ENCOUNTER
DUSTY was contacted by Dr. Hammond today asking if Susana Aguilar ([facility]IL[facility]) was seeing the pt.  DUSTY stated that according to pt's sister Kristyn, that yes Susana ([facility]IL[facility]) is the [facility] provider and came out to the pt's home on Monday. Dr. Hammond stated that the pt cannot have 2 PCP's.  SW will call pt and let him know.

## 2019-05-16 NOTE — TELEPHONE ENCOUNTER
Notified by Susana CONDON that she had spoken with patient's sister and they have decided to keep [facility] provider Susana BINGHAM as PCP as pt is having difficulty in getting patient to comply with in office appointments. Will update chart.

## 2019-05-18 ENCOUNTER — OUTSIDE FACILITY SERVICE (OUTPATIENT)
Dept: HOSPITALIST | Facility: HOSPITAL | Age: 69
End: 2019-05-18

## 2019-05-18 PROCEDURE — G0180 MD CERTIFICATION HHA PATIENT: HCPCS | Performed by: HOSPITALIST

## 2019-10-07 RX ORDER — POTASSIUM CHLORIDE 20 MEQ/1
TABLET, EXTENDED RELEASE ORAL
Qty: 60 TABLET | Refills: 1 | Status: SHIPPED | OUTPATIENT
Start: 2019-10-07 | End: 2022-03-11 | Stop reason: HOSPADM

## 2020-05-20 RX ORDER — INSULIN DETEMIR 100 [IU]/ML
INJECTION, SOLUTION SUBCUTANEOUS
Qty: 12 ML | Refills: 11 | OUTPATIENT
Start: 2020-05-20

## 2022-02-19 ENCOUNTER — APPOINTMENT (OUTPATIENT)
Dept: CT IMAGING | Facility: HOSPITAL | Age: 72
End: 2022-02-19

## 2022-02-19 ENCOUNTER — HOSPITAL ENCOUNTER (INPATIENT)
Facility: HOSPITAL | Age: 72
LOS: 20 days | Discharge: NURSING, REHAB OR LONG-TERM CARE | End: 2022-03-11
Attending: EMERGENCY MEDICINE | Admitting: FAMILY MEDICINE

## 2022-02-19 ENCOUNTER — APPOINTMENT (OUTPATIENT)
Dept: GENERAL RADIOLOGY | Facility: HOSPITAL | Age: 72
End: 2022-02-19

## 2022-02-19 ENCOUNTER — APPOINTMENT (OUTPATIENT)
Dept: CARDIOLOGY | Facility: HOSPITAL | Age: 72
End: 2022-02-19

## 2022-02-19 DIAGNOSIS — R41.82 ALTERED MENTAL STATUS, UNSPECIFIED ALTERED MENTAL STATUS TYPE: Primary | ICD-10-CM

## 2022-02-19 DIAGNOSIS — U07.1 COVID-19 VIRUS INFECTION: ICD-10-CM

## 2022-02-19 DIAGNOSIS — E86.0 DEHYDRATION: ICD-10-CM

## 2022-02-19 PROBLEM — I25.119 CORONARY ARTERY DISEASE INVOLVING NATIVE CORONARY ARTERY OF NATIVE HEART WITH ANGINA PECTORIS (HCC): Chronic | Status: ACTIVE | Noted: 2022-02-19

## 2022-02-19 PROBLEM — J18.9 PNEUMONIA DUE TO INFECTIOUS ORGANISM: Status: ACTIVE | Noted: 2022-02-19

## 2022-02-19 PROBLEM — J18.9 PNEUMONIA DUE TO INFECTIOUS ORGANISM: Status: RESOLVED | Noted: 2022-02-19 | Resolved: 2022-02-19

## 2022-02-19 PROBLEM — E66.01 MORBID OBESITY (HCC): Status: RESOLVED | Noted: 2017-04-18 | Resolved: 2022-02-19

## 2022-02-19 PROBLEM — I25.119 CORONARY ARTERY DISEASE INVOLVING NATIVE CORONARY ARTERY OF NATIVE HEART WITH ANGINA PECTORIS (HCC): Status: ACTIVE | Noted: 2022-02-19

## 2022-02-19 PROBLEM — I50.22 CHRONIC SYSTOLIC CONGESTIVE HEART FAILURE: Status: ACTIVE | Noted: 2019-04-24

## 2022-02-19 PROBLEM — I48.21 PERMANENT ATRIAL FIBRILLATION: Chronic | Status: ACTIVE | Noted: 2022-02-19

## 2022-02-19 PROBLEM — I50.22 CHRONIC SYSTOLIC CONGESTIVE HEART FAILURE: Chronic | Status: ACTIVE | Noted: 2019-04-24

## 2022-02-19 PROBLEM — I48.21 PERMANENT ATRIAL FIBRILLATION (HCC): Status: ACTIVE | Noted: 2022-02-19

## 2022-02-19 PROBLEM — J12.82 PNEUMONIA DUE TO COVID-19 VIRUS: Status: ACTIVE | Noted: 2022-02-19

## 2022-02-19 PROBLEM — I48.19 PERSISTENT ATRIAL FIBRILLATION (HCC): Status: RESOLVED | Noted: 2019-04-24 | Resolved: 2022-02-19

## 2022-02-19 PROBLEM — I48.91 ATRIAL FIBRILLATION WITH RVR (HCC): Status: ACTIVE | Noted: 2022-02-19

## 2022-02-19 PROBLEM — F03.90 DEMENTIA (HCC): Status: RESOLVED | Noted: 2019-04-24 | Resolved: 2022-02-19

## 2022-02-19 LAB
ALBUMIN SERPL-MCNC: 4 G/DL (ref 3.5–5.2)
ALBUMIN/GLOB SERPL: 1.2 G/DL
ALP SERPL-CCNC: 78 U/L (ref 39–117)
ALT SERPL W P-5'-P-CCNC: 17 U/L (ref 1–41)
ANION GAP SERPL CALCULATED.3IONS-SCNC: 18 MMOL/L (ref 5–15)
AST SERPL-CCNC: 29 U/L (ref 1–40)
BACTERIA UR QL AUTO: NORMAL /HPF
BASOPHILS # BLD AUTO: 0.02 10*3/MM3 (ref 0–0.2)
BASOPHILS NFR BLD AUTO: 0.2 % (ref 0–1.5)
BH CV ECHO MEAS - AO MAX PG (FULL): 5.1 MMHG
BH CV ECHO MEAS - AO MAX PG: 9 MMHG
BH CV ECHO MEAS - AO MEAN PG (FULL): 2.5 MMHG
BH CV ECHO MEAS - AO MEAN PG: 4.7 MMHG
BH CV ECHO MEAS - AO V2 MAX: 149.3 CM/SEC
BH CV ECHO MEAS - AO V2 MEAN: 99.4 CM/SEC
BH CV ECHO MEAS - AO V2 VTI: 21.3 CM
BH CV ECHO MEAS - ASC AORTA: 3.6 CM
BH CV ECHO MEAS - BSA(HAYCOCK): 2.4 M^2
BH CV ECHO MEAS - BSA: 2.3 M^2
BH CV ECHO MEAS - BZI_BMI: 33.2 KILOGRAMS/M^2
BH CV ECHO MEAS - BZI_METRIC_HEIGHT: 182.9 CM
BH CV ECHO MEAS - BZI_METRIC_WEIGHT: 111.1 KG
BH CV ECHO MEAS - EDV(MOD-SP2): 94.5 ML
BH CV ECHO MEAS - EDV(MOD-SP4): 130 ML
BH CV ECHO MEAS - EF(MOD-BP): 30.3 %
BH CV ECHO MEAS - EF(MOD-SP2): 32.2 %
BH CV ECHO MEAS - EF(MOD-SP4): 31.7 %
BH CV ECHO MEAS - ESV(MOD-SP2): 64.1 ML
BH CV ECHO MEAS - ESV(MOD-SP4): 88.8 ML
BH CV ECHO MEAS - LAD MAJOR: 5.8 CM
BH CV ECHO MEAS - LV DIASTOLIC VOL/BSA (35-75): 56 ML/M^2
BH CV ECHO MEAS - LV MAX PG: 3.9 MMHG
BH CV ECHO MEAS - LV MEAN PG: 2.1 MMHG
BH CV ECHO MEAS - LV SYSTOLIC VOL/BSA (12-30): 38.2 ML/M^2
BH CV ECHO MEAS - LV V1 MAX: 99.2 CM/SEC
BH CV ECHO MEAS - LV V1 MEAN: 66.9 CM/SEC
BH CV ECHO MEAS - LV V1 VTI: 15.5 CM
BH CV ECHO MEAS - LVLD AP2: 7.3 CM
BH CV ECHO MEAS - LVLD AP4: 7.7 CM
BH CV ECHO MEAS - LVLS AP2: 6.6 CM
BH CV ECHO MEAS - LVLS AP4: 7.4 CM
BH CV ECHO MEAS - MV DEC SLOPE: 886.6 CM/SEC^2
BH CV ECHO MEAS - MV DEC TIME: 0.14 SEC
BH CV ECHO MEAS - MV E MAX VEL: 124 CM/SEC
BH CV ECHO MEAS - RAP SYSTOLE: 48 MMHG
BH CV ECHO MEAS - RVSP: 8 MMHG
BH CV ECHO MEAS - SI(MOD-SP2): 13.1 ML/M^2
BH CV ECHO MEAS - SI(MOD-SP4): 17.7 ML/M^2
BH CV ECHO MEAS - SV(MOD-SP2): 30.4 ML
BH CV ECHO MEAS - SV(MOD-SP4): 41.2 ML
BH CV ECHO MEAS - TAPSE (>1.6): 1.2 CM
BH CV ECHO MEAS - TR MAX PG: 40 MMHG
BH CV ECHO MEAS - TR MAX VEL: 316.3 CM/SEC
BH CV XLRA - RV BASE: 3.7 CM
BH CV XLRA - RV LENGTH: 7.3 CM
BH CV XLRA - RV MID: 3 CM
BILIRUB SERPL-MCNC: 0.5 MG/DL (ref 0–1.2)
BILIRUB UR QL STRIP: ABNORMAL
BUN SERPL-MCNC: 18 MG/DL (ref 8–23)
BUN/CREAT SERPL: 18.2 (ref 7–25)
CALCIUM SPEC-SCNC: 8.8 MG/DL (ref 8.6–10.5)
CHLORIDE SERPL-SCNC: 96 MMOL/L (ref 98–107)
CK SERPL-CCNC: 163 U/L (ref 20–200)
CLARITY UR: ABNORMAL
CO2 SERPL-SCNC: 22 MMOL/L (ref 22–29)
COLOR UR: ABNORMAL
CREAT SERPL-MCNC: 0.99 MG/DL (ref 0.76–1.27)
D-LACTATE SERPL-SCNC: 4.5 MMOL/L (ref 0.5–2)
DEPRECATED RDW RBC AUTO: 46.7 FL (ref 37–54)
DIGOXIN SERPL-MCNC: <0.3 NG/ML (ref 0.6–1.2)
EOSINOPHIL # BLD AUTO: 0.02 10*3/MM3 (ref 0–0.4)
EOSINOPHIL NFR BLD AUTO: 0.2 % (ref 0.3–6.2)
ERYTHROCYTE [DISTWIDTH] IN BLOOD BY AUTOMATED COUNT: 14.1 % (ref 12.3–15.4)
FLUAV RNA RESP QL NAA+PROBE: NOT DETECTED
FLUBV RNA RESP QL NAA+PROBE: NOT DETECTED
GFR SERPL CREATININE-BSD FRML MDRD: 75 ML/MIN/1.73
GLOBULIN UR ELPH-MCNC: 3.4 GM/DL
GLUCOSE BLDC GLUCOMTR-MCNC: 158 MG/DL (ref 70–130)
GLUCOSE BLDC GLUCOMTR-MCNC: 295 MG/DL (ref 70–130)
GLUCOSE SERPL-MCNC: 215 MG/DL (ref 65–99)
GLUCOSE UR STRIP-MCNC: NEGATIVE MG/DL
HBA1C MFR BLD: 7.9 % (ref 4.8–5.6)
HCT VFR BLD AUTO: 41.2 % (ref 37.5–51)
HGB BLD-MCNC: 13.3 G/DL (ref 13–17.7)
HGB UR QL STRIP.AUTO: NEGATIVE
HOLD SPECIMEN: NORMAL
HYALINE CASTS UR QL AUTO: NORMAL /LPF
IMM GRANULOCYTES # BLD AUTO: 0.04 10*3/MM3 (ref 0–0.05)
IMM GRANULOCYTES NFR BLD AUTO: 0.5 % (ref 0–0.5)
KETONES UR QL STRIP: ABNORMAL
LEFT ATRIUM VOLUME INDEX: 30.7 ML/M^2
LEFT ATRIUM VOLUME: 71.4 ML
LEUKOCYTE ESTERASE UR QL STRIP.AUTO: ABNORMAL
LV EF 2D ECHO EST: 30 %
LYMPHOCYTES # BLD AUTO: 2.06 10*3/MM3 (ref 0.7–3.1)
LYMPHOCYTES NFR BLD AUTO: 25.2 % (ref 19.6–45.3)
MAGNESIUM SERPL-MCNC: 1.4 MG/DL (ref 1.6–2.4)
MAXIMAL PREDICTED HEART RATE: 150 BPM
MCH RBC QN AUTO: 29.2 PG (ref 26.6–33)
MCHC RBC AUTO-ENTMCNC: 32.3 G/DL (ref 31.5–35.7)
MCV RBC AUTO: 90.4 FL (ref 79–97)
MONOCYTES # BLD AUTO: 1.02 10*3/MM3 (ref 0.1–0.9)
MONOCYTES NFR BLD AUTO: 12.5 % (ref 5–12)
NEUTROPHILS NFR BLD AUTO: 5.03 10*3/MM3 (ref 1.7–7)
NEUTROPHILS NFR BLD AUTO: 61.4 % (ref 42.7–76)
NITRITE UR QL STRIP: NEGATIVE
NRBC BLD AUTO-RTO: 0 /100 WBC (ref 0–0.2)
NT-PROBNP SERPL-MCNC: 1265 PG/ML (ref 0–900)
PH UR STRIP.AUTO: 5.5 [PH] (ref 5–8)
PLATELET # BLD AUTO: 186 10*3/MM3 (ref 140–450)
PMV BLD AUTO: 11.6 FL (ref 6–12)
POTASSIUM SERPL-SCNC: 4.1 MMOL/L (ref 3.5–5.2)
PROCALCITONIN SERPL-MCNC: 0.08 NG/ML (ref 0–0.25)
PROT SERPL-MCNC: 7.4 G/DL (ref 6–8.5)
PROT UR QL STRIP: ABNORMAL
QT INTERVAL: 366 MS
QTC INTERVAL: 479 MS
RBC # BLD AUTO: 4.56 10*6/MM3 (ref 4.14–5.8)
RBC # UR STRIP: NORMAL /HPF
REF LAB TEST METHOD: NORMAL
SARS-COV-2 RNA RESP QL NAA+PROBE: DETECTED
SODIUM SERPL-SCNC: 136 MMOL/L (ref 136–145)
SP GR UR STRIP: >=1.03 (ref 1–1.03)
SQUAMOUS #/AREA URNS HPF: NORMAL /HPF
STRESS TARGET HR: 128 BPM
TROPONIN T SERPL-MCNC: <0.01 NG/ML (ref 0–0.03)
UROBILINOGEN UR QL STRIP: ABNORMAL
WBC # UR STRIP: NORMAL /HPF
WBC NRBC COR # BLD: 8.19 10*3/MM3 (ref 3.4–10.8)
WHOLE BLOOD HOLD SPECIMEN: NORMAL
WHOLE BLOOD HOLD SPECIMEN: NORMAL

## 2022-02-19 PROCEDURE — 83605 ASSAY OF LACTIC ACID: CPT | Performed by: EMERGENCY MEDICINE

## 2022-02-19 PROCEDURE — 94799 UNLISTED PULMONARY SVC/PX: CPT

## 2022-02-19 PROCEDURE — 71250 CT THORAX DX C-: CPT

## 2022-02-19 PROCEDURE — 25010000002 DIGOXIN PER 500 MCG: Performed by: INTERNAL MEDICINE

## 2022-02-19 PROCEDURE — 93306 TTE W/DOPPLER COMPLETE: CPT

## 2022-02-19 PROCEDURE — P9612 CATHETERIZE FOR URINE SPEC: HCPCS

## 2022-02-19 PROCEDURE — 25010000002 PIPERACILLIN SOD-TAZOBACTAM PER 1 G: Performed by: EMERGENCY MEDICINE

## 2022-02-19 PROCEDURE — 87077 CULTURE AEROBIC IDENTIFY: CPT | Performed by: EMERGENCY MEDICINE

## 2022-02-19 PROCEDURE — 83735 ASSAY OF MAGNESIUM: CPT | Performed by: EMERGENCY MEDICINE

## 2022-02-19 PROCEDURE — 87636 SARSCOV2 & INF A&B AMP PRB: CPT | Performed by: EMERGENCY MEDICINE

## 2022-02-19 PROCEDURE — 94640 AIRWAY INHALATION TREATMENT: CPT

## 2022-02-19 PROCEDURE — 81001 URINALYSIS AUTO W/SCOPE: CPT | Performed by: EMERGENCY MEDICINE

## 2022-02-19 PROCEDURE — 25010000002 CEFTRIAXONE PER 250 MG: Performed by: STUDENT IN AN ORGANIZED HEALTH CARE EDUCATION/TRAINING PROGRAM

## 2022-02-19 PROCEDURE — 83880 ASSAY OF NATRIURETIC PEPTIDE: CPT | Performed by: INTERNAL MEDICINE

## 2022-02-19 PROCEDURE — 87040 BLOOD CULTURE FOR BACTERIA: CPT | Performed by: EMERGENCY MEDICINE

## 2022-02-19 PROCEDURE — 25010000002 DEXAMETHASONE PER 1 MG: Performed by: EMERGENCY MEDICINE

## 2022-02-19 PROCEDURE — 80053 COMPREHEN METABOLIC PANEL: CPT | Performed by: EMERGENCY MEDICINE

## 2022-02-19 PROCEDURE — 63710000001 INSULIN DETEMIR PER 5 UNITS: Performed by: STUDENT IN AN ORGANIZED HEALTH CARE EDUCATION/TRAINING PROGRAM

## 2022-02-19 PROCEDURE — 93005 ELECTROCARDIOGRAM TRACING: CPT

## 2022-02-19 PROCEDURE — 99285 EMERGENCY DEPT VISIT HI MDM: CPT

## 2022-02-19 PROCEDURE — 84145 PROCALCITONIN (PCT): CPT | Performed by: EMERGENCY MEDICINE

## 2022-02-19 PROCEDURE — 99222 1ST HOSP IP/OBS MODERATE 55: CPT | Performed by: INTERNAL MEDICINE

## 2022-02-19 PROCEDURE — 84484 ASSAY OF TROPONIN QUANT: CPT | Performed by: EMERGENCY MEDICINE

## 2022-02-19 PROCEDURE — 93005 ELECTROCARDIOGRAM TRACING: CPT | Performed by: EMERGENCY MEDICINE

## 2022-02-19 PROCEDURE — 82962 GLUCOSE BLOOD TEST: CPT

## 2022-02-19 PROCEDURE — 87150 DNA/RNA AMPLIFIED PROBE: CPT | Performed by: EMERGENCY MEDICINE

## 2022-02-19 PROCEDURE — 93306 TTE W/DOPPLER COMPLETE: CPT | Performed by: INTERNAL MEDICINE

## 2022-02-19 PROCEDURE — 83036 HEMOGLOBIN GLYCOSYLATED A1C: CPT | Performed by: INTERNAL MEDICINE

## 2022-02-19 PROCEDURE — 93005 ELECTROCARDIOGRAM TRACING: CPT | Performed by: STUDENT IN AN ORGANIZED HEALTH CARE EDUCATION/TRAINING PROGRAM

## 2022-02-19 PROCEDURE — 70450 CT HEAD/BRAIN W/O DYE: CPT

## 2022-02-19 PROCEDURE — 99222 1ST HOSP IP/OBS MODERATE 55: CPT | Performed by: STUDENT IN AN ORGANIZED HEALTH CARE EDUCATION/TRAINING PROGRAM

## 2022-02-19 PROCEDURE — 93010 ELECTROCARDIOGRAM REPORT: CPT | Performed by: INTERNAL MEDICINE

## 2022-02-19 PROCEDURE — 74176 CT ABD & PELVIS W/O CONTRAST: CPT

## 2022-02-19 PROCEDURE — 25010000002 VANCOMYCIN 10 G RECONSTITUTED SOLUTION: Performed by: EMERGENCY MEDICINE

## 2022-02-19 PROCEDURE — 82550 ASSAY OF CK (CPK): CPT | Performed by: EMERGENCY MEDICINE

## 2022-02-19 PROCEDURE — 85025 COMPLETE CBC W/AUTO DIFF WBC: CPT | Performed by: EMERGENCY MEDICINE

## 2022-02-19 PROCEDURE — 80162 ASSAY OF DIGOXIN TOTAL: CPT | Performed by: EMERGENCY MEDICINE

## 2022-02-19 PROCEDURE — 94761 N-INVAS EAR/PLS OXIMETRY MLT: CPT

## 2022-02-19 RX ORDER — NICOTINE POLACRILEX 4 MG
15 LOZENGE BUCCAL
Status: DISCONTINUED | OUTPATIENT
Start: 2022-02-19 | End: 2022-03-11 | Stop reason: HOSPADM

## 2022-02-19 RX ORDER — ATORVASTATIN CALCIUM 20 MG/1
20 TABLET, FILM COATED ORAL NIGHTLY
Status: DISCONTINUED | OUTPATIENT
Start: 2022-02-19 | End: 2022-02-19

## 2022-02-19 RX ORDER — ATORVASTATIN CALCIUM 40 MG/1
40 TABLET, FILM COATED ORAL NIGHTLY
Status: DISCONTINUED | OUTPATIENT
Start: 2022-02-19 | End: 2022-03-11 | Stop reason: HOSPADM

## 2022-02-19 RX ORDER — SODIUM CHLORIDE 0.9 % (FLUSH) 0.9 %
10 SYRINGE (ML) INJECTION EVERY 12 HOURS SCHEDULED
Status: DISCONTINUED | OUTPATIENT
Start: 2022-02-19 | End: 2022-03-11 | Stop reason: HOSPADM

## 2022-02-19 RX ORDER — ALBUTEROL SULFATE 90 UG/1
2 AEROSOL, METERED RESPIRATORY (INHALATION)
Status: DISCONTINUED | OUTPATIENT
Start: 2022-02-19 | End: 2022-02-22

## 2022-02-19 RX ORDER — DOXYCYCLINE 100 MG/1
100 CAPSULE ORAL EVERY 12 HOURS SCHEDULED
Status: DISCONTINUED | OUTPATIENT
Start: 2022-02-19 | End: 2022-02-26

## 2022-02-19 RX ORDER — DIGOXIN 0.25 MG/ML
500 INJECTION INTRAMUSCULAR; INTRAVENOUS ONCE
Status: COMPLETED | OUTPATIENT
Start: 2022-02-19 | End: 2022-02-19

## 2022-02-19 RX ORDER — IPRATROPIUM BROMIDE AND ALBUTEROL SULFATE 2.5; .5 MG/3ML; MG/3ML
3 SOLUTION RESPIRATORY (INHALATION)
Status: DISCONTINUED | OUTPATIENT
Start: 2022-02-19 | End: 2022-02-19

## 2022-02-19 RX ORDER — METOPROLOL SUCCINATE 100 MG/1
100 TABLET, EXTENDED RELEASE ORAL
Status: DISCONTINUED | OUTPATIENT
Start: 2022-02-20 | End: 2022-02-19

## 2022-02-19 RX ORDER — SODIUM CHLORIDE 0.9 % (FLUSH) 0.9 %
10 SYRINGE (ML) INJECTION AS NEEDED
Status: DISCONTINUED | OUTPATIENT
Start: 2022-02-19 | End: 2022-03-11 | Stop reason: HOSPADM

## 2022-02-19 RX ORDER — SODIUM CHLORIDE 0.9 % (FLUSH) 0.9 %
10 SYRINGE (ML) INJECTION AS NEEDED
Status: DISCONTINUED | OUTPATIENT
Start: 2022-02-19 | End: 2022-03-07

## 2022-02-19 RX ORDER — DONEPEZIL HYDROCHLORIDE 10 MG/1
10 TABLET, FILM COATED ORAL NIGHTLY
Status: DISCONTINUED | OUTPATIENT
Start: 2022-02-19 | End: 2022-03-11 | Stop reason: HOSPADM

## 2022-02-19 RX ORDER — DEXTROSE MONOHYDRATE 25 G/50ML
25 INJECTION, SOLUTION INTRAVENOUS
Status: DISCONTINUED | OUTPATIENT
Start: 2022-02-19 | End: 2022-03-11 | Stop reason: HOSPADM

## 2022-02-19 RX ORDER — DIGOXIN 0.25 MG/ML
250 INJECTION INTRAMUSCULAR; INTRAVENOUS EVERY 6 HOURS
Status: COMPLETED | OUTPATIENT
Start: 2022-02-19 | End: 2022-02-20

## 2022-02-19 RX ORDER — ALBUTEROL SULFATE 90 UG/1
2 AEROSOL, METERED RESPIRATORY (INHALATION)
Status: DISCONTINUED | OUTPATIENT
Start: 2022-02-19 | End: 2022-02-19

## 2022-02-19 RX ORDER — DEXAMETHASONE SODIUM PHOSPHATE 4 MG/ML
6 INJECTION, SOLUTION INTRA-ARTICULAR; INTRALESIONAL; INTRAMUSCULAR; INTRAVENOUS; SOFT TISSUE ONCE
Status: COMPLETED | OUTPATIENT
Start: 2022-02-19 | End: 2022-02-19

## 2022-02-19 RX ADMIN — TAZOBACTAM SODIUM AND PIPERACILLIN SODIUM 3.38 G: 375; 3 INJECTION, SOLUTION INTRAVENOUS at 12:23

## 2022-02-19 RX ADMIN — SODIUM CHLORIDE, PRESERVATIVE FREE 10 ML: 5 INJECTION INTRAVENOUS at 14:46

## 2022-02-19 RX ADMIN — APIXABAN 5 MG: 5 TABLET, FILM COATED ORAL at 19:50

## 2022-02-19 RX ADMIN — DOXYCYCLINE 100 MG: 100 CAPSULE ORAL at 17:16

## 2022-02-19 RX ADMIN — SODIUM CHLORIDE, POTASSIUM CHLORIDE, SODIUM LACTATE AND CALCIUM CHLORIDE 2328 ML: 600; 310; 30; 20 INJECTION, SOLUTION INTRAVENOUS at 12:57

## 2022-02-19 RX ADMIN — DEXAMETHASONE SODIUM PHOSPHATE 6 MG: 4 INJECTION, SOLUTION INTRAMUSCULAR; INTRAVENOUS at 14:46

## 2022-02-19 RX ADMIN — VANCOMYCIN HYDROCHLORIDE 2250 MG: 10 INJECTION, POWDER, LYOPHILIZED, FOR SOLUTION INTRAVENOUS at 12:57

## 2022-02-19 RX ADMIN — DIGOXIN 500 MCG: 0.25 INJECTION INTRAMUSCULAR; INTRAVENOUS at 17:15

## 2022-02-19 RX ADMIN — DONEPEZIL HYDROCHLORIDE 10 MG: 10 TABLET, FILM COATED ORAL at 19:50

## 2022-02-19 RX ADMIN — SODIUM CHLORIDE 1 G: 900 INJECTION INTRAVENOUS at 17:16

## 2022-02-19 RX ADMIN — METOPROLOL TARTRATE 75 MG: 50 TABLET, FILM COATED ORAL at 19:54

## 2022-02-19 RX ADMIN — INSULIN DETEMIR 30 UNITS: 100 INJECTION, SOLUTION SUBCUTANEOUS at 20:00

## 2022-02-19 RX ADMIN — ATORVASTATIN CALCIUM 40 MG: 40 TABLET, FILM COATED ORAL at 19:51

## 2022-02-19 RX ADMIN — ALBUTEROL SULFATE 2 PUFF: 90 AEROSOL, METERED RESPIRATORY (INHALATION) at 16:28

## 2022-02-19 RX ADMIN — DIGOXIN 250 MCG: 0.25 INJECTION INTRAMUSCULAR; INTRAVENOUS at 23:11

## 2022-02-20 LAB
ALBUMIN SERPL-MCNC: 3.7 G/DL (ref 3.5–5.2)
ALBUMIN/GLOB SERPL: 1.2 G/DL
ALP SERPL-CCNC: 70 U/L (ref 39–117)
ALT SERPL W P-5'-P-CCNC: 15 U/L (ref 1–41)
ANION GAP SERPL CALCULATED.3IONS-SCNC: 12 MMOL/L (ref 5–15)
AST SERPL-CCNC: 29 U/L (ref 1–40)
BASOPHILS # BLD AUTO: 0.01 10*3/MM3 (ref 0–0.2)
BASOPHILS NFR BLD AUTO: 0.2 % (ref 0–1.5)
BILIRUB CONJ SERPL-MCNC: <0.2 MG/DL (ref 0–0.3)
BILIRUB SERPL-MCNC: 0.3 MG/DL (ref 0–1.2)
BUN SERPL-MCNC: 16 MG/DL (ref 8–23)
BUN/CREAT SERPL: 18.8 (ref 7–25)
CALCIUM SPEC-SCNC: 8.5 MG/DL (ref 8.6–10.5)
CHLORIDE SERPL-SCNC: 100 MMOL/L (ref 98–107)
CO2 SERPL-SCNC: 25 MMOL/L (ref 22–29)
CREAT SERPL-MCNC: 0.85 MG/DL (ref 0.76–1.27)
D-LACTATE SERPL-SCNC: 1.5 MMOL/L (ref 0.5–2)
DEPRECATED RDW RBC AUTO: 48.6 FL (ref 37–54)
EOSINOPHIL # BLD AUTO: 0 10*3/MM3 (ref 0–0.4)
EOSINOPHIL NFR BLD AUTO: 0 % (ref 0.3–6.2)
ERYTHROCYTE [DISTWIDTH] IN BLOOD BY AUTOMATED COUNT: 14.1 % (ref 12.3–15.4)
GFR SERPL CREATININE-BSD FRML MDRD: 89 ML/MIN/1.73
GLOBULIN UR ELPH-MCNC: 3.1 GM/DL
GLUCOSE BLDC GLUCOMTR-MCNC: 103 MG/DL (ref 70–130)
GLUCOSE BLDC GLUCOMTR-MCNC: 154 MG/DL (ref 70–130)
GLUCOSE BLDC GLUCOMTR-MCNC: 181 MG/DL (ref 70–130)
GLUCOSE BLDC GLUCOMTR-MCNC: 187 MG/DL (ref 70–130)
GLUCOSE SERPL-MCNC: 227 MG/DL (ref 65–99)
HCT VFR BLD AUTO: 39.7 % (ref 37.5–51)
HGB BLD-MCNC: 12.3 G/DL (ref 13–17.7)
IMM GRANULOCYTES # BLD AUTO: 0.02 10*3/MM3 (ref 0–0.05)
IMM GRANULOCYTES NFR BLD AUTO: 0.4 % (ref 0–0.5)
LYMPHOCYTES # BLD AUTO: 1.15 10*3/MM3 (ref 0.7–3.1)
LYMPHOCYTES NFR BLD AUTO: 24.4 % (ref 19.6–45.3)
MAGNESIUM SERPL-MCNC: 1.8 MG/DL (ref 1.6–2.4)
MCH RBC QN AUTO: 29.1 PG (ref 26.6–33)
MCHC RBC AUTO-ENTMCNC: 31 G/DL (ref 31.5–35.7)
MCV RBC AUTO: 94.1 FL (ref 79–97)
MONOCYTES # BLD AUTO: 0.52 10*3/MM3 (ref 0.1–0.9)
MONOCYTES NFR BLD AUTO: 11 % (ref 5–12)
NEUTROPHILS NFR BLD AUTO: 3.02 10*3/MM3 (ref 1.7–7)
NEUTROPHILS NFR BLD AUTO: 64 % (ref 42.7–76)
NRBC BLD AUTO-RTO: 0 /100 WBC (ref 0–0.2)
PLATELET # BLD AUTO: 153 10*3/MM3 (ref 140–450)
PMV BLD AUTO: 11.8 FL (ref 6–12)
POTASSIUM SERPL-SCNC: 4.4 MMOL/L (ref 3.5–5.2)
PROT SERPL-MCNC: 6.8 G/DL (ref 6–8.5)
QT INTERVAL: 362 MS
QTC INTERVAL: 528 MS
RBC # BLD AUTO: 4.22 10*6/MM3 (ref 4.14–5.8)
SODIUM SERPL-SCNC: 137 MMOL/L (ref 136–145)
TROPONIN T SERPL-MCNC: 0.06 NG/ML (ref 0–0.03)
WBC NRBC COR # BLD: 4.72 10*3/MM3 (ref 3.4–10.8)

## 2022-02-20 PROCEDURE — 25010000002 CEFTRIAXONE PER 250 MG: Performed by: STUDENT IN AN ORGANIZED HEALTH CARE EDUCATION/TRAINING PROGRAM

## 2022-02-20 PROCEDURE — 93010 ELECTROCARDIOGRAM REPORT: CPT | Performed by: INTERNAL MEDICINE

## 2022-02-20 PROCEDURE — 84484 ASSAY OF TROPONIN QUANT: CPT | Performed by: STUDENT IN AN ORGANIZED HEALTH CARE EDUCATION/TRAINING PROGRAM

## 2022-02-20 PROCEDURE — XW033E5 INTRODUCTION OF REMDESIVIR ANTI-INFECTIVE INTO PERIPHERAL VEIN, PERCUTANEOUS APPROACH, NEW TECHNOLOGY GROUP 5: ICD-10-PCS | Performed by: FAMILY MEDICINE

## 2022-02-20 PROCEDURE — 94799 UNLISTED PULMONARY SVC/PX: CPT

## 2022-02-20 PROCEDURE — 80053 COMPREHEN METABOLIC PANEL: CPT | Performed by: STUDENT IN AN ORGANIZED HEALTH CARE EDUCATION/TRAINING PROGRAM

## 2022-02-20 PROCEDURE — 25010000002 DIGOXIN PER 500 MCG: Performed by: INTERNAL MEDICINE

## 2022-02-20 PROCEDURE — 99232 SBSQ HOSP IP/OBS MODERATE 35: CPT | Performed by: INTERNAL MEDICINE

## 2022-02-20 PROCEDURE — 82962 GLUCOSE BLOOD TEST: CPT

## 2022-02-20 PROCEDURE — 94761 N-INVAS EAR/PLS OXIMETRY MLT: CPT

## 2022-02-20 PROCEDURE — 82248 BILIRUBIN DIRECT: CPT | Performed by: FAMILY MEDICINE

## 2022-02-20 PROCEDURE — 99233 SBSQ HOSP IP/OBS HIGH 50: CPT | Performed by: FAMILY MEDICINE

## 2022-02-20 PROCEDURE — 83605 ASSAY OF LACTIC ACID: CPT | Performed by: EMERGENCY MEDICINE

## 2022-02-20 PROCEDURE — 63710000001 INSULIN DETEMIR PER 5 UNITS: Performed by: STUDENT IN AN ORGANIZED HEALTH CARE EDUCATION/TRAINING PROGRAM

## 2022-02-20 PROCEDURE — 25010000002 REMDESIVIR 100 MG/20ML SOLUTION 1 EACH VIAL: Performed by: FAMILY MEDICINE

## 2022-02-20 PROCEDURE — 85025 COMPLETE CBC W/AUTO DIFF WBC: CPT | Performed by: STUDENT IN AN ORGANIZED HEALTH CARE EDUCATION/TRAINING PROGRAM

## 2022-02-20 PROCEDURE — 93005 ELECTROCARDIOGRAM TRACING: CPT | Performed by: INTERNAL MEDICINE

## 2022-02-20 PROCEDURE — 83735 ASSAY OF MAGNESIUM: CPT

## 2022-02-20 RX ORDER — DEXAMETHASONE SODIUM PHOSPHATE 4 MG/ML
6 INJECTION, SOLUTION INTRA-ARTICULAR; INTRALESIONAL; INTRAMUSCULAR; INTRAVENOUS; SOFT TISSUE EVERY 24 HOURS
Status: DISCONTINUED | OUTPATIENT
Start: 2022-02-20 | End: 2022-02-20

## 2022-02-20 RX ORDER — DIGOXIN 250 MCG
250 TABLET ORAL DAILY
Status: DISCONTINUED | OUTPATIENT
Start: 2022-02-20 | End: 2022-03-11 | Stop reason: HOSPADM

## 2022-02-20 RX ORDER — DEXAMETHASONE SODIUM PHOSPHATE 4 MG/ML
6 INJECTION, SOLUTION INTRA-ARTICULAR; INTRALESIONAL; INTRAMUSCULAR; INTRAVENOUS; SOFT TISSUE EVERY 24 HOURS
Status: DISCONTINUED | OUTPATIENT
Start: 2022-02-20 | End: 2022-02-21

## 2022-02-20 RX ADMIN — APIXABAN 5 MG: 5 TABLET, FILM COATED ORAL at 20:03

## 2022-02-20 RX ADMIN — ALBUTEROL SULFATE 2 PUFF: 90 AEROSOL, METERED RESPIRATORY (INHALATION) at 15:08

## 2022-02-20 RX ADMIN — DIGOXIN 250 MCG: 0.25 INJECTION INTRAMUSCULAR; INTRAVENOUS at 05:08

## 2022-02-20 RX ADMIN — EMPAGLIFLOZIN 10 MG: 10 TABLET, FILM COATED ORAL at 09:07

## 2022-02-20 RX ADMIN — DIGOXIN 250 MCG: 250 TABLET ORAL at 12:00

## 2022-02-20 RX ADMIN — METOPROLOL TARTRATE 75 MG: 50 TABLET, FILM COATED ORAL at 20:01

## 2022-02-20 RX ADMIN — DOXYCYCLINE 100 MG: 100 CAPSULE ORAL at 09:07

## 2022-02-20 RX ADMIN — ATORVASTATIN CALCIUM 40 MG: 40 TABLET, FILM COATED ORAL at 20:03

## 2022-02-20 RX ADMIN — INSULIN DETEMIR 30 UNITS: 100 INJECTION, SOLUTION SUBCUTANEOUS at 20:40

## 2022-02-20 RX ADMIN — SODIUM CHLORIDE 1 G: 900 INJECTION INTRAVENOUS at 17:00

## 2022-02-20 RX ADMIN — ALBUTEROL SULFATE 2 PUFF: 90 AEROSOL, METERED RESPIRATORY (INHALATION) at 19:30

## 2022-02-20 RX ADMIN — DONEPEZIL HYDROCHLORIDE 10 MG: 10 TABLET, FILM COATED ORAL at 20:03

## 2022-02-20 RX ADMIN — APIXABAN 5 MG: 5 TABLET, FILM COATED ORAL at 09:07

## 2022-02-20 RX ADMIN — SODIUM CHLORIDE, PRESERVATIVE FREE 10 ML: 5 INJECTION INTRAVENOUS at 09:08

## 2022-02-20 RX ADMIN — METOPROLOL TARTRATE 75 MG: 50 TABLET, FILM COATED ORAL at 05:08

## 2022-02-20 RX ADMIN — METOPROLOL TARTRATE 75 MG: 50 TABLET, FILM COATED ORAL at 14:00

## 2022-02-20 RX ADMIN — ALBUTEROL SULFATE 2 PUFF: 90 AEROSOL, METERED RESPIRATORY (INHALATION) at 12:07

## 2022-02-20 RX ADMIN — REMDESIVIR 200 MG: 100 INJECTION, POWDER, LYOPHILIZED, FOR SOLUTION INTRAVENOUS at 09:55

## 2022-02-20 RX ADMIN — SODIUM CHLORIDE, PRESERVATIVE FREE 10 ML: 5 INJECTION INTRAVENOUS at 20:03

## 2022-02-20 RX ADMIN — DOXYCYCLINE 100 MG: 100 CAPSULE ORAL at 20:03

## 2022-02-20 RX ADMIN — MICONAZOLE NITRATE: 20 POWDER TOPICAL at 20:01

## 2022-02-20 RX ADMIN — MICONAZOLE NITRATE: 20 POWDER TOPICAL at 12:00

## 2022-02-21 LAB
ALBUMIN SERPL-MCNC: 3.4 G/DL (ref 3.5–5.2)
ALBUMIN/GLOB SERPL: 1.1 G/DL
ALP SERPL-CCNC: 69 U/L (ref 39–117)
ALT SERPL W P-5'-P-CCNC: 17 U/L (ref 1–41)
ANION GAP SERPL CALCULATED.3IONS-SCNC: 9 MMOL/L (ref 5–15)
AST SERPL-CCNC: 31 U/L (ref 1–40)
BACTERIA BLD CULT: NORMAL
BASOPHILS # BLD AUTO: 0.01 10*3/MM3 (ref 0–0.2)
BASOPHILS NFR BLD AUTO: 0.1 % (ref 0–1.5)
BILIRUB CONJ SERPL-MCNC: <0.2 MG/DL (ref 0–0.3)
BILIRUB SERPL-MCNC: 0.4 MG/DL (ref 0–1.2)
BUN SERPL-MCNC: 14 MG/DL (ref 8–23)
BUN/CREAT SERPL: 16.1 (ref 7–25)
CALCIUM SPEC-SCNC: 8.4 MG/DL (ref 8.6–10.5)
CHLORIDE SERPL-SCNC: 102 MMOL/L (ref 98–107)
CO2 SERPL-SCNC: 25 MMOL/L (ref 22–29)
CREAT SERPL-MCNC: 0.87 MG/DL (ref 0.76–1.27)
DEPRECATED RDW RBC AUTO: 48.8 FL (ref 37–54)
EOSINOPHIL # BLD AUTO: 0.04 10*3/MM3 (ref 0–0.4)
EOSINOPHIL NFR BLD AUTO: 0.6 % (ref 0.3–6.2)
ERYTHROCYTE [DISTWIDTH] IN BLOOD BY AUTOMATED COUNT: 13.9 % (ref 12.3–15.4)
GFR SERPL CREATININE-BSD FRML MDRD: 87 ML/MIN/1.73
GLOBULIN UR ELPH-MCNC: 3.2 GM/DL
GLUCOSE BLDC GLUCOMTR-MCNC: 140 MG/DL (ref 70–130)
GLUCOSE BLDC GLUCOMTR-MCNC: 145 MG/DL (ref 70–130)
GLUCOSE BLDC GLUCOMTR-MCNC: 256 MG/DL (ref 70–130)
GLUCOSE BLDC GLUCOMTR-MCNC: 274 MG/DL (ref 70–130)
GLUCOSE SERPL-MCNC: 127 MG/DL (ref 65–99)
HCT VFR BLD AUTO: 39 % (ref 37.5–51)
HGB BLD-MCNC: 11.9 G/DL (ref 13–17.7)
IMM GRANULOCYTES # BLD AUTO: 0.05 10*3/MM3 (ref 0–0.05)
IMM GRANULOCYTES NFR BLD AUTO: 0.7 % (ref 0–0.5)
LYMPHOCYTES # BLD AUTO: 1.46 10*3/MM3 (ref 0.7–3.1)
LYMPHOCYTES NFR BLD AUTO: 20.8 % (ref 19.6–45.3)
MCH RBC QN AUTO: 29.2 PG (ref 26.6–33)
MCHC RBC AUTO-ENTMCNC: 30.5 G/DL (ref 31.5–35.7)
MCV RBC AUTO: 95.6 FL (ref 79–97)
MONOCYTES # BLD AUTO: 0.8 10*3/MM3 (ref 0.1–0.9)
MONOCYTES NFR BLD AUTO: 11.4 % (ref 5–12)
NEUTROPHILS NFR BLD AUTO: 4.65 10*3/MM3 (ref 1.7–7)
NEUTROPHILS NFR BLD AUTO: 66.4 % (ref 42.7–76)
NRBC BLD AUTO-RTO: 0 /100 WBC (ref 0–0.2)
PLATELET # BLD AUTO: 146 10*3/MM3 (ref 140–450)
PMV BLD AUTO: 11.7 FL (ref 6–12)
POTASSIUM SERPL-SCNC: 4 MMOL/L (ref 3.5–5.2)
PROT SERPL-MCNC: 6.6 G/DL (ref 6–8.5)
RBC # BLD AUTO: 4.08 10*6/MM3 (ref 4.14–5.8)
SODIUM SERPL-SCNC: 136 MMOL/L (ref 136–145)
WBC NRBC COR # BLD: 7.01 10*3/MM3 (ref 3.4–10.8)

## 2022-02-21 PROCEDURE — 80053 COMPREHEN METABOLIC PANEL: CPT | Performed by: STUDENT IN AN ORGANIZED HEALTH CARE EDUCATION/TRAINING PROGRAM

## 2022-02-21 PROCEDURE — 94799 UNLISTED PULMONARY SVC/PX: CPT

## 2022-02-21 PROCEDURE — 25010000002 CEFTRIAXONE PER 250 MG: Performed by: STUDENT IN AN ORGANIZED HEALTH CARE EDUCATION/TRAINING PROGRAM

## 2022-02-21 PROCEDURE — 99232 SBSQ HOSP IP/OBS MODERATE 35: CPT | Performed by: INTERNAL MEDICINE

## 2022-02-21 PROCEDURE — 94761 N-INVAS EAR/PLS OXIMETRY MLT: CPT

## 2022-02-21 PROCEDURE — 82248 BILIRUBIN DIRECT: CPT | Performed by: FAMILY MEDICINE

## 2022-02-21 PROCEDURE — 63710000001 INSULIN LISPRO (HUMAN) PER 5 UNITS: Performed by: INTERNAL MEDICINE

## 2022-02-21 PROCEDURE — 25010000002 DEXAMETHASONE PER 1 MG: Performed by: FAMILY MEDICINE

## 2022-02-21 PROCEDURE — 63710000001 INSULIN DETEMIR PER 5 UNITS: Performed by: STUDENT IN AN ORGANIZED HEALTH CARE EDUCATION/TRAINING PROGRAM

## 2022-02-21 PROCEDURE — 82962 GLUCOSE BLOOD TEST: CPT

## 2022-02-21 PROCEDURE — 85025 COMPLETE CBC W/AUTO DIFF WBC: CPT | Performed by: STUDENT IN AN ORGANIZED HEALTH CARE EDUCATION/TRAINING PROGRAM

## 2022-02-21 PROCEDURE — 87040 BLOOD CULTURE FOR BACTERIA: CPT | Performed by: INTERNAL MEDICINE

## 2022-02-21 PROCEDURE — 25010000002 REMDESIVIR 100 MG/20ML SOLUTION 1 EACH VIAL: Performed by: FAMILY MEDICINE

## 2022-02-21 RX ADMIN — REMDESIVIR 100 MG: 100 INJECTION, POWDER, LYOPHILIZED, FOR SOLUTION INTRAVENOUS at 09:46

## 2022-02-21 RX ADMIN — INSULIN DETEMIR 30 UNITS: 100 INJECTION, SOLUTION SUBCUTANEOUS at 20:11

## 2022-02-21 RX ADMIN — APIXABAN 5 MG: 5 TABLET, FILM COATED ORAL at 09:46

## 2022-02-21 RX ADMIN — METOPROLOL TARTRATE 75 MG: 50 TABLET, FILM COATED ORAL at 18:09

## 2022-02-21 RX ADMIN — DOXYCYCLINE 100 MG: 100 CAPSULE ORAL at 20:04

## 2022-02-21 RX ADMIN — EMPAGLIFLOZIN 10 MG: 10 TABLET, FILM COATED ORAL at 09:46

## 2022-02-21 RX ADMIN — METOPROLOL TARTRATE 75 MG: 50 TABLET, FILM COATED ORAL at 20:10

## 2022-02-21 RX ADMIN — ATORVASTATIN CALCIUM 40 MG: 40 TABLET, FILM COATED ORAL at 20:04

## 2022-02-21 RX ADMIN — MICONAZOLE NITRATE: 20 POWDER TOPICAL at 09:46

## 2022-02-21 RX ADMIN — MICONAZOLE NITRATE: 20 POWDER TOPICAL at 22:14

## 2022-02-21 RX ADMIN — ALBUTEROL SULFATE 2 PUFF: 90 AEROSOL, METERED RESPIRATORY (INHALATION) at 11:23

## 2022-02-21 RX ADMIN — DONEPEZIL HYDROCHLORIDE 10 MG: 10 TABLET, FILM COATED ORAL at 20:08

## 2022-02-21 RX ADMIN — ALBUTEROL SULFATE 2 PUFF: 90 AEROSOL, METERED RESPIRATORY (INHALATION) at 07:30

## 2022-02-21 RX ADMIN — METOPROLOL TARTRATE 75 MG: 50 TABLET, FILM COATED ORAL at 06:06

## 2022-02-21 RX ADMIN — APIXABAN 5 MG: 5 TABLET, FILM COATED ORAL at 20:04

## 2022-02-21 RX ADMIN — SODIUM CHLORIDE, PRESERVATIVE FREE 10 ML: 5 INJECTION INTRAVENOUS at 09:47

## 2022-02-21 RX ADMIN — SODIUM CHLORIDE, PRESERVATIVE FREE 10 ML: 5 INJECTION INTRAVENOUS at 20:07

## 2022-02-21 RX ADMIN — INSULIN LISPRO 6 UNITS: 100 INJECTION, SOLUTION INTRAVENOUS; SUBCUTANEOUS at 18:09

## 2022-02-21 RX ADMIN — DEXAMETHASONE SODIUM PHOSPHATE 6 MG: 4 INJECTION, SOLUTION INTRAMUSCULAR; INTRAVENOUS at 09:45

## 2022-02-21 RX ADMIN — DOXYCYCLINE 100 MG: 100 CAPSULE ORAL at 09:46

## 2022-02-21 RX ADMIN — DIGOXIN 250 MCG: 250 TABLET ORAL at 09:46

## 2022-02-21 RX ADMIN — SODIUM CHLORIDE 1 G: 900 INJECTION INTRAVENOUS at 16:00

## 2022-02-21 RX ADMIN — ALBUTEROL SULFATE 2 PUFF: 90 AEROSOL, METERED RESPIRATORY (INHALATION) at 15:56

## 2022-02-22 LAB
GLUCOSE BLDC GLUCOMTR-MCNC: 163 MG/DL (ref 70–130)
GLUCOSE BLDC GLUCOMTR-MCNC: 193 MG/DL (ref 70–130)
GLUCOSE BLDC GLUCOMTR-MCNC: 206 MG/DL (ref 70–130)
GLUCOSE BLDC GLUCOMTR-MCNC: 230 MG/DL (ref 70–130)

## 2022-02-22 PROCEDURE — 97530 THERAPEUTIC ACTIVITIES: CPT

## 2022-02-22 PROCEDURE — 82962 GLUCOSE BLOOD TEST: CPT

## 2022-02-22 PROCEDURE — 63710000001 DEXAMETHASONE PER 0.25 MG: Performed by: INTERNAL MEDICINE

## 2022-02-22 PROCEDURE — 63710000001 INSULIN DETEMIR PER 5 UNITS: Performed by: STUDENT IN AN ORGANIZED HEALTH CARE EDUCATION/TRAINING PROGRAM

## 2022-02-22 PROCEDURE — 94799 UNLISTED PULMONARY SVC/PX: CPT

## 2022-02-22 PROCEDURE — 63710000001 INSULIN LISPRO (HUMAN) PER 5 UNITS: Performed by: INTERNAL MEDICINE

## 2022-02-22 PROCEDURE — 99232 SBSQ HOSP IP/OBS MODERATE 35: CPT | Performed by: INTERNAL MEDICINE

## 2022-02-22 PROCEDURE — 97162 PT EVAL MOD COMPLEX 30 MIN: CPT

## 2022-02-22 PROCEDURE — 97535 SELF CARE MNGMENT TRAINING: CPT

## 2022-02-22 PROCEDURE — 25010000002 REMDESIVIR 100 MG/20ML SOLUTION 1 EACH VIAL: Performed by: FAMILY MEDICINE

## 2022-02-22 PROCEDURE — 94761 N-INVAS EAR/PLS OXIMETRY MLT: CPT

## 2022-02-22 PROCEDURE — 25010000002 VANCOMYCIN 10 G RECONSTITUTED SOLUTION

## 2022-02-22 RX ORDER — ALBUTEROL SULFATE 90 UG/1
2 AEROSOL, METERED RESPIRATORY (INHALATION) 4 TIMES DAILY PRN
Status: DISCONTINUED | OUTPATIENT
Start: 2022-02-22 | End: 2022-03-11 | Stop reason: HOSPADM

## 2022-02-22 RX ADMIN — ATORVASTATIN CALCIUM 40 MG: 40 TABLET, FILM COATED ORAL at 21:42

## 2022-02-22 RX ADMIN — MICONAZOLE NITRATE 1 APPLICATION: 20 POWDER TOPICAL at 09:44

## 2022-02-22 RX ADMIN — APIXABAN 5 MG: 5 TABLET, FILM COATED ORAL at 21:42

## 2022-02-22 RX ADMIN — VANCOMYCIN HYDROCHLORIDE 2250 MG: 10 INJECTION, POWDER, LYOPHILIZED, FOR SOLUTION INTRAVENOUS at 21:56

## 2022-02-22 RX ADMIN — REMDESIVIR 100 MG: 100 INJECTION, POWDER, LYOPHILIZED, FOR SOLUTION INTRAVENOUS at 09:41

## 2022-02-22 RX ADMIN — APIXABAN 5 MG: 5 TABLET, FILM COATED ORAL at 09:43

## 2022-02-22 RX ADMIN — EMPAGLIFLOZIN 10 MG: 10 TABLET, FILM COATED ORAL at 13:00

## 2022-02-22 RX ADMIN — MICONAZOLE NITRATE: 20 POWDER TOPICAL at 22:15

## 2022-02-22 RX ADMIN — INSULIN LISPRO 2 UNITS: 100 INJECTION, SOLUTION INTRAVENOUS; SUBCUTANEOUS at 09:43

## 2022-02-22 RX ADMIN — DIGOXIN 250 MCG: 250 TABLET ORAL at 09:42

## 2022-02-22 RX ADMIN — DOXYCYCLINE 100 MG: 100 CAPSULE ORAL at 09:43

## 2022-02-22 RX ADMIN — METOPROLOL TARTRATE 75 MG: 50 TABLET, FILM COATED ORAL at 09:42

## 2022-02-22 RX ADMIN — INSULIN DETEMIR 30 UNITS: 100 INJECTION, SOLUTION SUBCUTANEOUS at 21:42

## 2022-02-22 RX ADMIN — DEXAMETHASONE 6 MG: 2 TABLET ORAL at 09:42

## 2022-02-22 RX ADMIN — INSULIN LISPRO 4 UNITS: 100 INJECTION, SOLUTION INTRAVENOUS; SUBCUTANEOUS at 12:56

## 2022-02-22 RX ADMIN — DOXYCYCLINE 100 MG: 100 CAPSULE ORAL at 21:42

## 2022-02-22 RX ADMIN — SODIUM CHLORIDE, PRESERVATIVE FREE 10 ML: 5 INJECTION INTRAVENOUS at 09:43

## 2022-02-22 RX ADMIN — INSULIN LISPRO 2 UNITS: 100 INJECTION, SOLUTION INTRAVENOUS; SUBCUTANEOUS at 17:05

## 2022-02-22 RX ADMIN — METOPROLOL TARTRATE 75 MG: 50 TABLET, FILM COATED ORAL at 14:36

## 2022-02-22 RX ADMIN — DONEPEZIL HYDROCHLORIDE 10 MG: 10 TABLET, FILM COATED ORAL at 21:42

## 2022-02-22 RX ADMIN — ALBUTEROL SULFATE 2 PUFF: 90 AEROSOL, METERED RESPIRATORY (INHALATION) at 07:18

## 2022-02-22 RX ADMIN — METOPROLOL TARTRATE 75 MG: 50 TABLET, FILM COATED ORAL at 21:42

## 2022-02-23 ENCOUNTER — APPOINTMENT (OUTPATIENT)
Dept: GENERAL RADIOLOGY | Facility: HOSPITAL | Age: 72
End: 2022-02-23

## 2022-02-23 LAB
ALBUMIN SERPL-MCNC: 3.5 G/DL (ref 3.5–5.2)
ALBUMIN/GLOB SERPL: 1.2 G/DL
ALP SERPL-CCNC: 67 U/L (ref 39–117)
ALT SERPL W P-5'-P-CCNC: 15 U/L (ref 1–41)
ANION GAP SERPL CALCULATED.3IONS-SCNC: 9 MMOL/L (ref 5–15)
AST SERPL-CCNC: 23 U/L (ref 1–40)
BASOPHILS # BLD AUTO: 0.01 10*3/MM3 (ref 0–0.2)
BASOPHILS NFR BLD AUTO: 0.2 % (ref 0–1.5)
BILIRUB SERPL-MCNC: 0.3 MG/DL (ref 0–1.2)
BUN SERPL-MCNC: 19 MG/DL (ref 8–23)
BUN/CREAT SERPL: 21.3 (ref 7–25)
CALCIUM SPEC-SCNC: 8.8 MG/DL (ref 8.6–10.5)
CHLORIDE SERPL-SCNC: 105 MMOL/L (ref 98–107)
CO2 SERPL-SCNC: 26 MMOL/L (ref 22–29)
CREAT SERPL-MCNC: 0.89 MG/DL (ref 0.76–1.27)
DEPRECATED RDW RBC AUTO: 47.2 FL (ref 37–54)
EOSINOPHIL # BLD AUTO: 0 10*3/MM3 (ref 0–0.4)
EOSINOPHIL NFR BLD AUTO: 0 % (ref 0.3–6.2)
ERYTHROCYTE [DISTWIDTH] IN BLOOD BY AUTOMATED COUNT: 13.9 % (ref 12.3–15.4)
GFR SERPL CREATININE-BSD FRML MDRD: 85 ML/MIN/1.73
GLOBULIN UR ELPH-MCNC: 2.9 GM/DL
GLUCOSE BLDC GLUCOMTR-MCNC: 122 MG/DL (ref 70–130)
GLUCOSE BLDC GLUCOMTR-MCNC: 130 MG/DL (ref 70–130)
GLUCOSE BLDC GLUCOMTR-MCNC: 247 MG/DL (ref 70–130)
GLUCOSE SERPL-MCNC: 137 MG/DL (ref 65–99)
HCT VFR BLD AUTO: 37.2 % (ref 37.5–51)
HGB BLD-MCNC: 11.7 G/DL (ref 13–17.7)
IMM GRANULOCYTES # BLD AUTO: 0.06 10*3/MM3 (ref 0–0.05)
IMM GRANULOCYTES NFR BLD AUTO: 1.1 % (ref 0–0.5)
LYMPHOCYTES # BLD AUTO: 1.96 10*3/MM3 (ref 0.7–3.1)
LYMPHOCYTES NFR BLD AUTO: 35 % (ref 19.6–45.3)
MCH RBC QN AUTO: 29.2 PG (ref 26.6–33)
MCHC RBC AUTO-ENTMCNC: 31.5 G/DL (ref 31.5–35.7)
MCV RBC AUTO: 92.8 FL (ref 79–97)
MONOCYTES # BLD AUTO: 0.71 10*3/MM3 (ref 0.1–0.9)
MONOCYTES NFR BLD AUTO: 12.7 % (ref 5–12)
NEUTROPHILS NFR BLD AUTO: 2.86 10*3/MM3 (ref 1.7–7)
NEUTROPHILS NFR BLD AUTO: 51 % (ref 42.7–76)
NRBC BLD AUTO-RTO: 0 /100 WBC (ref 0–0.2)
PLATELET # BLD AUTO: 179 10*3/MM3 (ref 140–450)
PMV BLD AUTO: 11.6 FL (ref 6–12)
POTASSIUM SERPL-SCNC: 4 MMOL/L (ref 3.5–5.2)
PROT SERPL-MCNC: 6.4 G/DL (ref 6–8.5)
RBC # BLD AUTO: 4.01 10*6/MM3 (ref 4.14–5.8)
SODIUM SERPL-SCNC: 140 MMOL/L (ref 136–145)
WBC NRBC COR # BLD: 5.6 10*3/MM3 (ref 3.4–10.8)

## 2022-02-23 PROCEDURE — M0249 HC INTRAVENOUS INFUSION TOCILIZUMAB 1ST DOSE: HCPCS | Performed by: INTERNAL MEDICINE

## 2022-02-23 PROCEDURE — 25010000002 VANCOMYCIN 10 G RECONSTITUTED SOLUTION

## 2022-02-23 PROCEDURE — 99232 SBSQ HOSP IP/OBS MODERATE 35: CPT | Performed by: INTERNAL MEDICINE

## 2022-02-23 PROCEDURE — 80053 COMPREHEN METABOLIC PANEL: CPT | Performed by: STUDENT IN AN ORGANIZED HEALTH CARE EDUCATION/TRAINING PROGRAM

## 2022-02-23 PROCEDURE — 94799 UNLISTED PULMONARY SVC/PX: CPT

## 2022-02-23 PROCEDURE — 86682 HELMINTH ANTIBODY: CPT | Performed by: INTERNAL MEDICINE

## 2022-02-23 PROCEDURE — 97166 OT EVAL MOD COMPLEX 45 MIN: CPT

## 2022-02-23 PROCEDURE — 82962 GLUCOSE BLOOD TEST: CPT

## 2022-02-23 PROCEDURE — 63710000001 DEXAMETHASONE PER 0.25 MG: Performed by: INTERNAL MEDICINE

## 2022-02-23 PROCEDURE — 25010000002 REMDESIVIR 100 MG/20ML SOLUTION 1 EACH VIAL: Performed by: FAMILY MEDICINE

## 2022-02-23 PROCEDURE — 97530 THERAPEUTIC ACTIVITIES: CPT

## 2022-02-23 PROCEDURE — 25010000002 TOCILIZUMAB 400 MG/20ML SOLUTION 20 ML VIAL: Performed by: INTERNAL MEDICINE

## 2022-02-23 PROCEDURE — 71045 X-RAY EXAM CHEST 1 VIEW: CPT

## 2022-02-23 PROCEDURE — 86480 TB TEST CELL IMMUN MEASURE: CPT | Performed by: INTERNAL MEDICINE

## 2022-02-23 PROCEDURE — 63710000001 INSULIN DETEMIR PER 5 UNITS: Performed by: STUDENT IN AN ORGANIZED HEALTH CARE EDUCATION/TRAINING PROGRAM

## 2022-02-23 PROCEDURE — 63710000001 INSULIN LISPRO (HUMAN) PER 5 UNITS: Performed by: INTERNAL MEDICINE

## 2022-02-23 PROCEDURE — 87305 ASPERGILLUS AG IA: CPT | Performed by: INTERNAL MEDICINE

## 2022-02-23 PROCEDURE — 85025 COMPLETE CBC W/AUTO DIFF WBC: CPT | Performed by: STUDENT IN AN ORGANIZED HEALTH CARE EDUCATION/TRAINING PROGRAM

## 2022-02-23 PROCEDURE — 25010000002 CEFTRIAXONE PER 250 MG: Performed by: STUDENT IN AN ORGANIZED HEALTH CARE EDUCATION/TRAINING PROGRAM

## 2022-02-23 PROCEDURE — 87449 NOS EACH ORGANISM AG IA: CPT | Performed by: INTERNAL MEDICINE

## 2022-02-23 PROCEDURE — 97535 SELF CARE MNGMENT TRAINING: CPT

## 2022-02-23 RX ADMIN — INSULIN LISPRO 4 UNITS: 100 INJECTION, SOLUTION INTRAVENOUS; SUBCUTANEOUS at 18:07

## 2022-02-23 RX ADMIN — METOPROLOL TARTRATE 75 MG: 50 TABLET, FILM COATED ORAL at 05:03

## 2022-02-23 RX ADMIN — METOPROLOL TARTRATE 75 MG: 50 TABLET, FILM COATED ORAL at 21:30

## 2022-02-23 RX ADMIN — DONEPEZIL HYDROCHLORIDE 10 MG: 10 TABLET, FILM COATED ORAL at 21:30

## 2022-02-23 RX ADMIN — SODIUM CHLORIDE, PRESERVATIVE FREE 10 ML: 5 INJECTION INTRAVENOUS at 21:30

## 2022-02-23 RX ADMIN — TOCILIZUMAB 800 MG: 20 INJECTION, SOLUTION, CONCENTRATE INTRAVENOUS at 15:44

## 2022-02-23 RX ADMIN — INSULIN DETEMIR 30 UNITS: 100 INJECTION, SOLUTION SUBCUTANEOUS at 21:32

## 2022-02-23 RX ADMIN — MICONAZOLE NITRATE: 20 POWDER TOPICAL at 21:31

## 2022-02-23 RX ADMIN — METOPROLOL TARTRATE 75 MG: 50 TABLET, FILM COATED ORAL at 15:44

## 2022-02-23 RX ADMIN — REMDESIVIR 100 MG: 100 INJECTION, POWDER, LYOPHILIZED, FOR SOLUTION INTRAVENOUS at 09:28

## 2022-02-23 RX ADMIN — EMPAGLIFLOZIN 10 MG: 10 TABLET, FILM COATED ORAL at 09:33

## 2022-02-23 RX ADMIN — MICONAZOLE NITRATE: 20 POWDER TOPICAL at 09:33

## 2022-02-23 RX ADMIN — VANCOMYCIN HYDROCHLORIDE 1250 MG: 10 INJECTION, POWDER, LYOPHILIZED, FOR SOLUTION INTRAVENOUS at 12:20

## 2022-02-23 RX ADMIN — ALBUTEROL SULFATE 2 PUFF: 90 AEROSOL, METERED RESPIRATORY (INHALATION) at 19:21

## 2022-02-23 RX ADMIN — APIXABAN 5 MG: 5 TABLET, FILM COATED ORAL at 21:30

## 2022-02-23 RX ADMIN — SODIUM CHLORIDE, PRESERVATIVE FREE 10 ML: 5 INJECTION INTRAVENOUS at 09:29

## 2022-02-23 RX ADMIN — DEXAMETHASONE 6 MG: 2 TABLET ORAL at 09:28

## 2022-02-23 RX ADMIN — DOXYCYCLINE 100 MG: 100 CAPSULE ORAL at 09:28

## 2022-02-23 RX ADMIN — APIXABAN 5 MG: 5 TABLET, FILM COATED ORAL at 09:29

## 2022-02-23 RX ADMIN — DOXYCYCLINE 100 MG: 100 CAPSULE ORAL at 21:30

## 2022-02-23 RX ADMIN — SODIUM CHLORIDE 1 G: 900 INJECTION INTRAVENOUS at 18:07

## 2022-02-23 RX ADMIN — DIGOXIN 250 MCG: 250 TABLET ORAL at 09:29

## 2022-02-23 RX ADMIN — ATORVASTATIN CALCIUM 40 MG: 40 TABLET, FILM COATED ORAL at 21:31

## 2022-02-24 LAB
ALBUMIN SERPL-MCNC: 3.4 G/DL (ref 3.5–5.2)
ALBUMIN/GLOB SERPL: 1.2 G/DL
ALP SERPL-CCNC: 64 U/L (ref 39–117)
ALT SERPL W P-5'-P-CCNC: 16 U/L (ref 1–41)
ANION GAP SERPL CALCULATED.3IONS-SCNC: 9 MMOL/L (ref 5–15)
AST SERPL-CCNC: 22 U/L (ref 1–40)
BACTERIA SPEC AEROBE CULT: ABNORMAL
BACTERIA SPEC AEROBE CULT: NORMAL
BASOPHILS # BLD MANUAL: 0 10*3/MM3 (ref 0–0.2)
BASOPHILS NFR BLD MANUAL: 0 % (ref 0–1.5)
BILIRUB SERPL-MCNC: 0.2 MG/DL (ref 0–1.2)
BUN SERPL-MCNC: 19 MG/DL (ref 8–23)
BUN/CREAT SERPL: 24.7 (ref 7–25)
CALCIUM SPEC-SCNC: 8.6 MG/DL (ref 8.6–10.5)
CHLORIDE SERPL-SCNC: 104 MMOL/L (ref 98–107)
CO2 SERPL-SCNC: 25 MMOL/L (ref 22–29)
CREAT SERPL-MCNC: 0.77 MG/DL (ref 0.76–1.27)
CRP SERPL-MCNC: 0.45 MG/DL (ref 0–0.5)
DEPRECATED RDW RBC AUTO: 45.6 FL (ref 37–54)
EOSINOPHIL # BLD MANUAL: 0 10*3/MM3 (ref 0–0.4)
EOSINOPHIL NFR BLD MANUAL: 0 % (ref 0.3–6.2)
ERYTHROCYTE [DISTWIDTH] IN BLOOD BY AUTOMATED COUNT: 13.7 % (ref 12.3–15.4)
GFR SERPL CREATININE-BSD FRML MDRD: 100 ML/MIN/1.73
GLOBULIN UR ELPH-MCNC: 2.9 GM/DL
GLUCOSE BLDC GLUCOMTR-MCNC: 132 MG/DL (ref 70–130)
GLUCOSE BLDC GLUCOMTR-MCNC: 149 MG/DL (ref 70–130)
GLUCOSE BLDC GLUCOMTR-MCNC: 231 MG/DL (ref 70–130)
GLUCOSE BLDC GLUCOMTR-MCNC: 298 MG/DL (ref 70–130)
GLUCOSE SERPL-MCNC: 137 MG/DL (ref 65–99)
GRAM STN SPEC: ABNORMAL
HCT VFR BLD AUTO: 36.8 % (ref 37.5–51)
HGB BLD-MCNC: 11.7 G/DL (ref 13–17.7)
ISOLATED FROM: ABNORMAL
LARGE PLATELETS: ABNORMAL
LYMPHOCYTES # BLD MANUAL: 2.41 10*3/MM3 (ref 0.7–3.1)
LYMPHOCYTES NFR BLD MANUAL: 10 % (ref 5–12)
MCH RBC QN AUTO: 29 PG (ref 26.6–33)
MCHC RBC AUTO-ENTMCNC: 31.8 G/DL (ref 31.5–35.7)
MCV RBC AUTO: 91.1 FL (ref 79–97)
MONOCYTES # BLD: 0.54 10*3/MM3 (ref 0.1–0.9)
NEUTROPHILS # BLD AUTO: 2.41 10*3/MM3 (ref 1.7–7)
NEUTROPHILS NFR BLD MANUAL: 44 % (ref 42.7–76)
NEUTS BAND NFR BLD MANUAL: 1 % (ref 0–5)
PLATELET # BLD AUTO: 191 10*3/MM3 (ref 140–450)
PMV BLD AUTO: 11.8 FL (ref 6–12)
POTASSIUM SERPL-SCNC: 3.6 MMOL/L (ref 3.5–5.2)
PROT SERPL-MCNC: 6.3 G/DL (ref 6–8.5)
RBC # BLD AUTO: 4.04 10*6/MM3 (ref 4.14–5.8)
RBC MORPH BLD: NORMAL
SODIUM SERPL-SCNC: 138 MMOL/L (ref 136–145)
VANCOMYCIN SERPL-MCNC: 12.6 MCG/ML (ref 5–40)
VARIANT LYMPHS NFR BLD MANUAL: 39 % (ref 19.6–45.3)
VARIANT LYMPHS NFR BLD MANUAL: 6 % (ref 0–5)
WBC MORPH BLD: NORMAL
WBC NRBC COR # BLD: 5.36 10*3/MM3 (ref 3.4–10.8)

## 2022-02-24 PROCEDURE — 63710000001 DEXAMETHASONE PER 0.25 MG: Performed by: INTERNAL MEDICINE

## 2022-02-24 PROCEDURE — 80202 ASSAY OF VANCOMYCIN: CPT

## 2022-02-24 PROCEDURE — 94799 UNLISTED PULMONARY SVC/PX: CPT

## 2022-02-24 PROCEDURE — 25010000002 VANCOMYCIN 10 G RECONSTITUTED SOLUTION

## 2022-02-24 PROCEDURE — 63710000001 INSULIN DETEMIR PER 5 UNITS: Performed by: STUDENT IN AN ORGANIZED HEALTH CARE EDUCATION/TRAINING PROGRAM

## 2022-02-24 PROCEDURE — 99233 SBSQ HOSP IP/OBS HIGH 50: CPT | Performed by: INTERNAL MEDICINE

## 2022-02-24 PROCEDURE — 86140 C-REACTIVE PROTEIN: CPT

## 2022-02-24 PROCEDURE — 82962 GLUCOSE BLOOD TEST: CPT

## 2022-02-24 PROCEDURE — 63710000001 INSULIN LISPRO (HUMAN) PER 5 UNITS: Performed by: INTERNAL MEDICINE

## 2022-02-24 PROCEDURE — 85025 COMPLETE CBC W/AUTO DIFF WBC: CPT | Performed by: STUDENT IN AN ORGANIZED HEALTH CARE EDUCATION/TRAINING PROGRAM

## 2022-02-24 PROCEDURE — 25010000002 REMDESIVIR 100 MG/20ML SOLUTION 1 EACH VIAL: Performed by: FAMILY MEDICINE

## 2022-02-24 PROCEDURE — 85007 BL SMEAR W/DIFF WBC COUNT: CPT | Performed by: STUDENT IN AN ORGANIZED HEALTH CARE EDUCATION/TRAINING PROGRAM

## 2022-02-24 PROCEDURE — 80053 COMPREHEN METABOLIC PANEL: CPT | Performed by: STUDENT IN AN ORGANIZED HEALTH CARE EDUCATION/TRAINING PROGRAM

## 2022-02-24 PROCEDURE — 25010000002 CEFTRIAXONE PER 250 MG: Performed by: STUDENT IN AN ORGANIZED HEALTH CARE EDUCATION/TRAINING PROGRAM

## 2022-02-24 RX ORDER — POTASSIUM CHLORIDE 1.5 G/1.77G
40 POWDER, FOR SOLUTION ORAL AS NEEDED
Status: DISCONTINUED | OUTPATIENT
Start: 2022-02-24 | End: 2022-03-11 | Stop reason: HOSPADM

## 2022-02-24 RX ORDER — POTASSIUM CHLORIDE 750 MG/1
40 CAPSULE, EXTENDED RELEASE ORAL AS NEEDED
Status: DISCONTINUED | OUTPATIENT
Start: 2022-02-24 | End: 2022-03-11 | Stop reason: HOSPADM

## 2022-02-24 RX ORDER — POTASSIUM CHLORIDE 7.45 MG/ML
10 INJECTION INTRAVENOUS
Status: DISCONTINUED | OUTPATIENT
Start: 2022-02-24 | End: 2022-03-11 | Stop reason: HOSPADM

## 2022-02-24 RX ORDER — MAGNESIUM SULFATE HEPTAHYDRATE 40 MG/ML
4 INJECTION, SOLUTION INTRAVENOUS AS NEEDED
Status: DISCONTINUED | OUTPATIENT
Start: 2022-02-24 | End: 2022-03-11 | Stop reason: HOSPADM

## 2022-02-24 RX ORDER — MAGNESIUM SULFATE HEPTAHYDRATE 40 MG/ML
2 INJECTION, SOLUTION INTRAVENOUS AS NEEDED
Status: DISCONTINUED | OUTPATIENT
Start: 2022-02-24 | End: 2022-03-11 | Stop reason: HOSPADM

## 2022-02-24 RX ADMIN — INSULIN DETEMIR 30 UNITS: 100 INJECTION, SOLUTION SUBCUTANEOUS at 20:38

## 2022-02-24 RX ADMIN — SODIUM CHLORIDE, PRESERVATIVE FREE 10 ML: 5 INJECTION INTRAVENOUS at 20:37

## 2022-02-24 RX ADMIN — EMPAGLIFLOZIN 10 MG: 10 TABLET, FILM COATED ORAL at 09:24

## 2022-02-24 RX ADMIN — SODIUM CHLORIDE 1 G: 900 INJECTION INTRAVENOUS at 16:59

## 2022-02-24 RX ADMIN — DOXYCYCLINE 100 MG: 100 CAPSULE ORAL at 09:23

## 2022-02-24 RX ADMIN — METOPROLOL TARTRATE 75 MG: 50 TABLET, FILM COATED ORAL at 17:00

## 2022-02-24 RX ADMIN — SODIUM CHLORIDE, PRESERVATIVE FREE 10 ML: 5 INJECTION INTRAVENOUS at 09:25

## 2022-02-24 RX ADMIN — REMDESIVIR 100 MG: 100 INJECTION, POWDER, LYOPHILIZED, FOR SOLUTION INTRAVENOUS at 09:23

## 2022-02-24 RX ADMIN — APIXABAN 5 MG: 5 TABLET, FILM COATED ORAL at 09:23

## 2022-02-24 RX ADMIN — ALBUTEROL SULFATE 2 PUFF: 90 AEROSOL, METERED RESPIRATORY (INHALATION) at 14:50

## 2022-02-24 RX ADMIN — DEXAMETHASONE 6 MG: 2 TABLET ORAL at 09:23

## 2022-02-24 RX ADMIN — APIXABAN 5 MG: 5 TABLET, FILM COATED ORAL at 20:37

## 2022-02-24 RX ADMIN — METOPROLOL TARTRATE 75 MG: 50 TABLET, FILM COATED ORAL at 20:37

## 2022-02-24 RX ADMIN — DOXYCYCLINE 100 MG: 100 CAPSULE ORAL at 20:38

## 2022-02-24 RX ADMIN — MICONAZOLE NITRATE: 20 POWDER TOPICAL at 09:25

## 2022-02-24 RX ADMIN — VANCOMYCIN HYDROCHLORIDE 1250 MG: 10 INJECTION, POWDER, LYOPHILIZED, FOR SOLUTION INTRAVENOUS at 00:01

## 2022-02-24 RX ADMIN — INSULIN LISPRO 6 UNITS: 100 INJECTION, SOLUTION INTRAVENOUS; SUBCUTANEOUS at 16:59

## 2022-02-24 RX ADMIN — MICONAZOLE NITRATE: 20 POWDER TOPICAL at 20:38

## 2022-02-24 RX ADMIN — ATORVASTATIN CALCIUM 40 MG: 40 TABLET, FILM COATED ORAL at 20:37

## 2022-02-24 RX ADMIN — Medication: at 12:18

## 2022-02-24 RX ADMIN — METOPROLOL TARTRATE 75 MG: 50 TABLET, FILM COATED ORAL at 05:12

## 2022-02-24 RX ADMIN — DONEPEZIL HYDROCHLORIDE 10 MG: 10 TABLET, FILM COATED ORAL at 20:37

## 2022-02-25 LAB
1,3 BETA GLUCAN SER-MCNC: <31 PG/ML
ALBUMIN SERPL-MCNC: 3.4 G/DL (ref 3.5–5.2)
ANION GAP SERPL CALCULATED.3IONS-SCNC: 10 MMOL/L (ref 5–15)
BASOPHILS # BLD AUTO: 0.01 10*3/MM3 (ref 0–0.2)
BASOPHILS NFR BLD AUTO: 0.2 % (ref 0–1.5)
BUN SERPL-MCNC: 18 MG/DL (ref 8–23)
BUN/CREAT SERPL: 24.3 (ref 7–25)
CALCIUM SPEC-SCNC: 8.6 MG/DL (ref 8.6–10.5)
CHLORIDE SERPL-SCNC: 103 MMOL/L (ref 98–107)
CO2 SERPL-SCNC: 26 MMOL/L (ref 22–29)
CREAT SERPL-MCNC: 0.74 MG/DL (ref 0.76–1.27)
DEPRECATED RDW RBC AUTO: 45.1 FL (ref 37–54)
EOSINOPHIL # BLD AUTO: 0.03 10*3/MM3 (ref 0–0.4)
EOSINOPHIL NFR BLD AUTO: 0.5 % (ref 0.3–6.2)
ERYTHROCYTE [DISTWIDTH] IN BLOOD BY AUTOMATED COUNT: 14 % (ref 12.3–15.4)
GAMMA INTERFERON BACKGROUND BLD IA-ACNC: 0.03 IU/ML
GFR SERPL CREATININE-BSD FRML MDRD: 105 ML/MIN/1.73
GLUCOSE BLDC GLUCOMTR-MCNC: 174 MG/DL (ref 70–130)
GLUCOSE BLDC GLUCOMTR-MCNC: 176 MG/DL (ref 70–130)
GLUCOSE BLDC GLUCOMTR-MCNC: 228 MG/DL (ref 70–130)
GLUCOSE BLDC GLUCOMTR-MCNC: 264 MG/DL (ref 70–130)
GLUCOSE SERPL-MCNC: 161 MG/DL (ref 65–99)
HCT VFR BLD AUTO: 37.8 % (ref 37.5–51)
HGB BLD-MCNC: 12.4 G/DL (ref 13–17.7)
IMM GRANULOCYTES # BLD AUTO: 0.06 10*3/MM3 (ref 0–0.05)
IMM GRANULOCYTES NFR BLD AUTO: 1 % (ref 0–0.5)
LYMPHOCYTES # BLD AUTO: 2.87 10*3/MM3 (ref 0.7–3.1)
LYMPHOCYTES NFR BLD AUTO: 46.1 % (ref 19.6–45.3)
M TB IFN-G BLD-IMP: NEGATIVE
M TB IFN-G CD4+ BCKGRND COR BLD-ACNC: 0.04 IU/ML
M TB IFN-G CD4+CD8+ BCKGRND COR BLD-ACNC: 0.04 IU/ML
MCH RBC QN AUTO: 29.6 PG (ref 26.6–33)
MCHC RBC AUTO-ENTMCNC: 32.8 G/DL (ref 31.5–35.7)
MCV RBC AUTO: 90.2 FL (ref 79–97)
MITOGEN IGNF BLD-ACNC: >10 IU/ML
MONOCYTES # BLD AUTO: 0.64 10*3/MM3 (ref 0.1–0.9)
MONOCYTES NFR BLD AUTO: 10.3 % (ref 5–12)
NEUTROPHILS NFR BLD AUTO: 2.62 10*3/MM3 (ref 1.7–7)
NEUTROPHILS NFR BLD AUTO: 41.9 % (ref 42.7–76)
NRBC BLD AUTO-RTO: 0.3 /100 WBC (ref 0–0.2)
PHOSPHATE SERPL-MCNC: 3.8 MG/DL (ref 2.5–4.5)
PLATELET # BLD AUTO: 227 10*3/MM3 (ref 140–450)
PMV BLD AUTO: 11.3 FL (ref 6–12)
POTASSIUM SERPL-SCNC: 3.5 MMOL/L (ref 3.5–5.2)
POTASSIUM SERPL-SCNC: 4.6 MMOL/L (ref 3.5–5.2)
QUANTIFERON INCUBATION: NORMAL
RBC # BLD AUTO: 4.19 10*6/MM3 (ref 4.14–5.8)
SERVICE CMNT-IMP: NORMAL
SODIUM SERPL-SCNC: 139 MMOL/L (ref 136–145)
STRONGYLOIDES IGG SER QL IA: NEGATIVE
WBC NRBC COR # BLD: 6.23 10*3/MM3 (ref 3.4–10.8)

## 2022-02-25 PROCEDURE — 97530 THERAPEUTIC ACTIVITIES: CPT

## 2022-02-25 PROCEDURE — 85025 COMPLETE CBC W/AUTO DIFF WBC: CPT | Performed by: INTERNAL MEDICINE

## 2022-02-25 PROCEDURE — 82962 GLUCOSE BLOOD TEST: CPT

## 2022-02-25 PROCEDURE — 84132 ASSAY OF SERUM POTASSIUM: CPT | Performed by: INTERNAL MEDICINE

## 2022-02-25 PROCEDURE — 63710000001 INSULIN DETEMIR PER 5 UNITS: Performed by: STUDENT IN AN ORGANIZED HEALTH CARE EDUCATION/TRAINING PROGRAM

## 2022-02-25 PROCEDURE — 25010000002 CEFTRIAXONE PER 250 MG: Performed by: STUDENT IN AN ORGANIZED HEALTH CARE EDUCATION/TRAINING PROGRAM

## 2022-02-25 PROCEDURE — 97116 GAIT TRAINING THERAPY: CPT

## 2022-02-25 PROCEDURE — 80069 RENAL FUNCTION PANEL: CPT | Performed by: INTERNAL MEDICINE

## 2022-02-25 PROCEDURE — 63710000001 INSULIN LISPRO (HUMAN) PER 5 UNITS: Performed by: INTERNAL MEDICINE

## 2022-02-25 PROCEDURE — 63710000001 DEXAMETHASONE PER 0.25 MG: Performed by: INTERNAL MEDICINE

## 2022-02-25 PROCEDURE — 99233 SBSQ HOSP IP/OBS HIGH 50: CPT | Performed by: INTERNAL MEDICINE

## 2022-02-25 RX ADMIN — DIGOXIN 250 MCG: 250 TABLET ORAL at 09:21

## 2022-02-25 RX ADMIN — METOPROLOL TARTRATE 75 MG: 50 TABLET, FILM COATED ORAL at 20:39

## 2022-02-25 RX ADMIN — POTASSIUM CHLORIDE 40 MEQ: 750 CAPSULE, EXTENDED RELEASE ORAL at 16:45

## 2022-02-25 RX ADMIN — POTASSIUM CHLORIDE 40 MEQ: 750 CAPSULE, EXTENDED RELEASE ORAL at 09:19

## 2022-02-25 RX ADMIN — SODIUM CHLORIDE, PRESERVATIVE FREE 10 ML: 5 INJECTION INTRAVENOUS at 20:34

## 2022-02-25 RX ADMIN — METOPROLOL TARTRATE 75 MG: 50 TABLET, FILM COATED ORAL at 06:15

## 2022-02-25 RX ADMIN — EMPAGLIFLOZIN 10 MG: 10 TABLET, FILM COATED ORAL at 09:19

## 2022-02-25 RX ADMIN — INSULIN LISPRO 2 UNITS: 100 INJECTION, SOLUTION INTRAVENOUS; SUBCUTANEOUS at 12:31

## 2022-02-25 RX ADMIN — DOXYCYCLINE 100 MG: 100 CAPSULE ORAL at 20:34

## 2022-02-25 RX ADMIN — INSULIN LISPRO 2 UNITS: 100 INJECTION, SOLUTION INTRAVENOUS; SUBCUTANEOUS at 09:40

## 2022-02-25 RX ADMIN — DONEPEZIL HYDROCHLORIDE 10 MG: 10 TABLET, FILM COATED ORAL at 20:34

## 2022-02-25 RX ADMIN — SODIUM CHLORIDE, PRESERVATIVE FREE 10 ML: 5 INJECTION INTRAVENOUS at 09:20

## 2022-02-25 RX ADMIN — MICONAZOLE NITRATE: 20 POWDER TOPICAL at 20:34

## 2022-02-25 RX ADMIN — DEXAMETHASONE 6 MG: 2 TABLET ORAL at 09:19

## 2022-02-25 RX ADMIN — DOXYCYCLINE 100 MG: 100 CAPSULE ORAL at 09:20

## 2022-02-25 RX ADMIN — APIXABAN 5 MG: 5 TABLET, FILM COATED ORAL at 20:34

## 2022-02-25 RX ADMIN — ATORVASTATIN CALCIUM 40 MG: 40 TABLET, FILM COATED ORAL at 20:34

## 2022-02-25 RX ADMIN — SODIUM CHLORIDE 1 G: 900 INJECTION INTRAVENOUS at 16:45

## 2022-02-25 RX ADMIN — INSULIN DETEMIR 30 UNITS: 100 INJECTION, SOLUTION SUBCUTANEOUS at 20:33

## 2022-02-25 RX ADMIN — METOPROLOL TARTRATE 75 MG: 50 TABLET, FILM COATED ORAL at 14:31

## 2022-02-25 RX ADMIN — APIXABAN 5 MG: 5 TABLET, FILM COATED ORAL at 09:20

## 2022-02-25 RX ADMIN — MICONAZOLE NITRATE: 20 POWDER TOPICAL at 09:20

## 2022-02-25 RX ADMIN — INSULIN LISPRO 6 UNITS: 100 INJECTION, SOLUTION INTRAVENOUS; SUBCUTANEOUS at 16:45

## 2022-02-26 LAB
ALBUMIN SERPL-MCNC: 3.5 G/DL (ref 3.5–5.2)
ANION GAP SERPL CALCULATED.3IONS-SCNC: 14 MMOL/L (ref 5–15)
BACTERIA SPEC AEROBE CULT: NORMAL
BACTERIA SPEC AEROBE CULT: NORMAL
BASOPHILS # BLD AUTO: 0.03 10*3/MM3 (ref 0–0.2)
BASOPHILS NFR BLD AUTO: 0.4 % (ref 0–1.5)
BUN SERPL-MCNC: 18 MG/DL (ref 8–23)
BUN/CREAT SERPL: 17.5 (ref 7–25)
CALCIUM SPEC-SCNC: 8.7 MG/DL (ref 8.6–10.5)
CHLORIDE SERPL-SCNC: 100 MMOL/L (ref 98–107)
CO2 SERPL-SCNC: 21 MMOL/L (ref 22–29)
CREAT SERPL-MCNC: 1.03 MG/DL (ref 0.76–1.27)
DEPRECATED RDW RBC AUTO: 48.2 FL (ref 37–54)
EOSINOPHIL # BLD AUTO: 0.07 10*3/MM3 (ref 0–0.4)
EOSINOPHIL NFR BLD AUTO: 1 % (ref 0.3–6.2)
ERYTHROCYTE [DISTWIDTH] IN BLOOD BY AUTOMATED COUNT: 14.1 % (ref 12.3–15.4)
GFR SERPL CREATININE-BSD FRML MDRD: 71 ML/MIN/1.73
GLUCOSE BLDC GLUCOMTR-MCNC: 111 MG/DL (ref 70–130)
GLUCOSE BLDC GLUCOMTR-MCNC: 175 MG/DL (ref 70–130)
GLUCOSE BLDC GLUCOMTR-MCNC: 186 MG/DL (ref 70–130)
GLUCOSE BLDC GLUCOMTR-MCNC: 221 MG/DL (ref 70–130)
GLUCOSE SERPL-MCNC: 213 MG/DL (ref 65–99)
HCT VFR BLD AUTO: 43.5 % (ref 37.5–51)
HGB BLD-MCNC: 13.4 G/DL (ref 13–17.7)
IMM GRANULOCYTES # BLD AUTO: 0.11 10*3/MM3 (ref 0–0.05)
IMM GRANULOCYTES NFR BLD AUTO: 1.6 % (ref 0–0.5)
LYMPHOCYTES # BLD AUTO: 1.51 10*3/MM3 (ref 0.7–3.1)
LYMPHOCYTES NFR BLD AUTO: 22.6 % (ref 19.6–45.3)
MCH RBC QN AUTO: 29.6 PG (ref 26.6–33)
MCHC RBC AUTO-ENTMCNC: 30.8 G/DL (ref 31.5–35.7)
MCV RBC AUTO: 96 FL (ref 79–97)
MONOCYTES # BLD AUTO: 0.44 10*3/MM3 (ref 0.1–0.9)
MONOCYTES NFR BLD AUTO: 6.6 % (ref 5–12)
NEUTROPHILS NFR BLD AUTO: 4.51 10*3/MM3 (ref 1.7–7)
NEUTROPHILS NFR BLD AUTO: 67.8 % (ref 42.7–76)
NRBC BLD AUTO-RTO: 0.3 /100 WBC (ref 0–0.2)
PHOSPHATE SERPL-MCNC: 3.8 MG/DL (ref 2.5–4.5)
PLATELET # BLD AUTO: 251 10*3/MM3 (ref 140–450)
PMV BLD AUTO: 11.6 FL (ref 6–12)
POTASSIUM SERPL-SCNC: 5 MMOL/L (ref 3.5–5.2)
RBC # BLD AUTO: 4.53 10*6/MM3 (ref 4.14–5.8)
SODIUM SERPL-SCNC: 135 MMOL/L (ref 136–145)
WBC NRBC COR # BLD: 6.67 10*3/MM3 (ref 3.4–10.8)

## 2022-02-26 PROCEDURE — 63710000001 DEXAMETHASONE PER 0.25 MG: Performed by: INTERNAL MEDICINE

## 2022-02-26 PROCEDURE — 85025 COMPLETE CBC W/AUTO DIFF WBC: CPT | Performed by: INTERNAL MEDICINE

## 2022-02-26 PROCEDURE — 80069 RENAL FUNCTION PANEL: CPT | Performed by: INTERNAL MEDICINE

## 2022-02-26 PROCEDURE — 63710000001 INSULIN DETEMIR PER 5 UNITS: Performed by: STUDENT IN AN ORGANIZED HEALTH CARE EDUCATION/TRAINING PROGRAM

## 2022-02-26 PROCEDURE — 63710000001 INSULIN LISPRO (HUMAN) PER 5 UNITS: Performed by: INTERNAL MEDICINE

## 2022-02-26 PROCEDURE — 82962 GLUCOSE BLOOD TEST: CPT

## 2022-02-26 PROCEDURE — 99233 SBSQ HOSP IP/OBS HIGH 50: CPT | Performed by: INTERNAL MEDICINE

## 2022-02-26 RX ORDER — SACCHAROMYCES BOULARDII 250 MG
250 CAPSULE ORAL 2 TIMES DAILY
Status: DISCONTINUED | OUTPATIENT
Start: 2022-02-26 | End: 2022-03-11 | Stop reason: HOSPADM

## 2022-02-26 RX ORDER — ACETAMINOPHEN 325 MG/1
650 TABLET ORAL EVERY 6 HOURS PRN
Status: DISCONTINUED | OUTPATIENT
Start: 2022-02-26 | End: 2022-03-11 | Stop reason: HOSPADM

## 2022-02-26 RX ADMIN — INSULIN LISPRO 4 UNITS: 100 INJECTION, SOLUTION INTRAVENOUS; SUBCUTANEOUS at 17:22

## 2022-02-26 RX ADMIN — METOPROLOL TARTRATE 75 MG: 50 TABLET, FILM COATED ORAL at 05:16

## 2022-02-26 RX ADMIN — Medication 250 MG: at 21:22

## 2022-02-26 RX ADMIN — DONEPEZIL HYDROCHLORIDE 10 MG: 10 TABLET, FILM COATED ORAL at 21:07

## 2022-02-26 RX ADMIN — APIXABAN 5 MG: 5 TABLET, FILM COATED ORAL at 21:07

## 2022-02-26 RX ADMIN — MICONAZOLE NITRATE: 20 POWDER TOPICAL at 21:22

## 2022-02-26 RX ADMIN — SODIUM CHLORIDE, PRESERVATIVE FREE 10 ML: 5 INJECTION INTRAVENOUS at 10:40

## 2022-02-26 RX ADMIN — DOXYCYCLINE 100 MG: 100 CAPSULE ORAL at 10:39

## 2022-02-26 RX ADMIN — SODIUM CHLORIDE, PRESERVATIVE FREE 10 ML: 5 INJECTION INTRAVENOUS at 21:08

## 2022-02-26 RX ADMIN — DEXAMETHASONE 6 MG: 2 TABLET ORAL at 10:39

## 2022-02-26 RX ADMIN — INSULIN LISPRO 2 UNITS: 100 INJECTION, SOLUTION INTRAVENOUS; SUBCUTANEOUS at 13:16

## 2022-02-26 RX ADMIN — APIXABAN 5 MG: 5 TABLET, FILM COATED ORAL at 10:39

## 2022-02-26 RX ADMIN — METOPROLOL TARTRATE 75 MG: 50 TABLET, FILM COATED ORAL at 13:16

## 2022-02-26 RX ADMIN — MICONAZOLE NITRATE: 20 POWDER TOPICAL at 10:40

## 2022-02-26 RX ADMIN — ACETAMINOPHEN 650 MG: 325 TABLET ORAL at 13:16

## 2022-02-26 RX ADMIN — DIGOXIN 250 MCG: 250 TABLET ORAL at 10:39

## 2022-02-26 RX ADMIN — METOPROLOL TARTRATE 75 MG: 50 TABLET, FILM COATED ORAL at 21:07

## 2022-02-26 RX ADMIN — EMPAGLIFLOZIN 10 MG: 10 TABLET, FILM COATED ORAL at 10:44

## 2022-02-26 RX ADMIN — Medication 250 MG: at 13:16

## 2022-02-26 RX ADMIN — INSULIN DETEMIR 30 UNITS: 100 INJECTION, SOLUTION SUBCUTANEOUS at 21:07

## 2022-02-26 RX ADMIN — ATORVASTATIN CALCIUM 40 MG: 40 TABLET, FILM COATED ORAL at 21:07

## 2022-02-27 LAB
GALACTOMANNAN AG SPEC IA-ACNC: 0.03 INDEX (ref 0–0.49)
GLUCOSE BLDC GLUCOMTR-MCNC: 134 MG/DL (ref 70–130)
GLUCOSE BLDC GLUCOMTR-MCNC: 208 MG/DL (ref 70–130)
GLUCOSE BLDC GLUCOMTR-MCNC: 244 MG/DL (ref 70–130)
GLUCOSE BLDC GLUCOMTR-MCNC: 289 MG/DL (ref 70–130)

## 2022-02-27 PROCEDURE — 63710000001 DEXAMETHASONE PER 0.25 MG: Performed by: INTERNAL MEDICINE

## 2022-02-27 PROCEDURE — 99232 SBSQ HOSP IP/OBS MODERATE 35: CPT | Performed by: NURSE PRACTITIONER

## 2022-02-27 PROCEDURE — 63710000001 INSULIN LISPRO (HUMAN) PER 5 UNITS: Performed by: INTERNAL MEDICINE

## 2022-02-27 PROCEDURE — 63710000001 INSULIN DETEMIR PER 5 UNITS: Performed by: STUDENT IN AN ORGANIZED HEALTH CARE EDUCATION/TRAINING PROGRAM

## 2022-02-27 PROCEDURE — 82962 GLUCOSE BLOOD TEST: CPT

## 2022-02-27 RX ADMIN — SODIUM CHLORIDE, PRESERVATIVE FREE 10 ML: 5 INJECTION INTRAVENOUS at 20:00

## 2022-02-27 RX ADMIN — Medication 250 MG: at 20:00

## 2022-02-27 RX ADMIN — INSULIN DETEMIR 30 UNITS: 100 INJECTION, SOLUTION SUBCUTANEOUS at 20:01

## 2022-02-27 RX ADMIN — METOPROLOL TARTRATE 75 MG: 50 TABLET, FILM COATED ORAL at 20:01

## 2022-02-27 RX ADMIN — ATORVASTATIN CALCIUM 40 MG: 40 TABLET, FILM COATED ORAL at 20:00

## 2022-02-27 RX ADMIN — MICONAZOLE NITRATE: 20 POWDER TOPICAL at 08:47

## 2022-02-27 RX ADMIN — DEXAMETHASONE 6 MG: 2 TABLET ORAL at 08:45

## 2022-02-27 RX ADMIN — METOPROLOL TARTRATE 75 MG: 50 TABLET, FILM COATED ORAL at 14:17

## 2022-02-27 RX ADMIN — APIXABAN 5 MG: 5 TABLET, FILM COATED ORAL at 08:45

## 2022-02-27 RX ADMIN — SODIUM CHLORIDE, PRESERVATIVE FREE 10 ML: 5 INJECTION INTRAVENOUS at 08:47

## 2022-02-27 RX ADMIN — INSULIN LISPRO 4 UNITS: 100 INJECTION, SOLUTION INTRAVENOUS; SUBCUTANEOUS at 12:15

## 2022-02-27 RX ADMIN — APIXABAN 5 MG: 5 TABLET, FILM COATED ORAL at 20:00

## 2022-02-27 RX ADMIN — EMPAGLIFLOZIN 10 MG: 10 TABLET, FILM COATED ORAL at 08:49

## 2022-02-27 RX ADMIN — Medication 250 MG: at 08:45

## 2022-02-27 RX ADMIN — METOPROLOL TARTRATE 75 MG: 50 TABLET, FILM COATED ORAL at 05:35

## 2022-02-27 RX ADMIN — MICONAZOLE NITRATE: 20 POWDER TOPICAL at 20:13

## 2022-02-27 RX ADMIN — DIGOXIN 250 MCG: 250 TABLET ORAL at 08:45

## 2022-02-27 RX ADMIN — DONEPEZIL HYDROCHLORIDE 10 MG: 10 TABLET, FILM COATED ORAL at 20:00

## 2022-02-28 LAB
GLUCOSE BLDC GLUCOMTR-MCNC: 159 MG/DL (ref 70–130)
GLUCOSE BLDC GLUCOMTR-MCNC: 258 MG/DL (ref 70–130)
GLUCOSE BLDC GLUCOMTR-MCNC: 264 MG/DL (ref 70–130)
GLUCOSE BLDC GLUCOMTR-MCNC: 303 MG/DL (ref 70–130)

## 2022-02-28 PROCEDURE — 99232 SBSQ HOSP IP/OBS MODERATE 35: CPT | Performed by: NURSE PRACTITIONER

## 2022-02-28 PROCEDURE — 63710000001 DEXAMETHASONE PER 0.25 MG: Performed by: INTERNAL MEDICINE

## 2022-02-28 PROCEDURE — 63710000001 INSULIN DETEMIR PER 5 UNITS: Performed by: STUDENT IN AN ORGANIZED HEALTH CARE EDUCATION/TRAINING PROGRAM

## 2022-02-28 PROCEDURE — 97535 SELF CARE MNGMENT TRAINING: CPT

## 2022-02-28 PROCEDURE — 82962 GLUCOSE BLOOD TEST: CPT

## 2022-02-28 PROCEDURE — 63710000001 INSULIN LISPRO (HUMAN) PER 5 UNITS: Performed by: INTERNAL MEDICINE

## 2022-02-28 PROCEDURE — 97530 THERAPEUTIC ACTIVITIES: CPT

## 2022-02-28 RX ADMIN — Medication 250 MG: at 21:38

## 2022-02-28 RX ADMIN — INSULIN LISPRO 7 UNITS: 100 INJECTION, SOLUTION INTRAVENOUS; SUBCUTANEOUS at 16:43

## 2022-02-28 RX ADMIN — Medication 250 MG: at 09:09

## 2022-02-28 RX ADMIN — DONEPEZIL HYDROCHLORIDE 10 MG: 10 TABLET, FILM COATED ORAL at 21:38

## 2022-02-28 RX ADMIN — EMPAGLIFLOZIN 10 MG: 10 TABLET, FILM COATED ORAL at 09:10

## 2022-02-28 RX ADMIN — DIGOXIN 250 MCG: 250 TABLET ORAL at 09:10

## 2022-02-28 RX ADMIN — MICONAZOLE NITRATE: 20 POWDER TOPICAL at 21:39

## 2022-02-28 RX ADMIN — APIXABAN 5 MG: 5 TABLET, FILM COATED ORAL at 21:38

## 2022-02-28 RX ADMIN — INSULIN DETEMIR 30 UNITS: 100 INJECTION, SOLUTION SUBCUTANEOUS at 21:39

## 2022-02-28 RX ADMIN — SODIUM CHLORIDE, PRESERVATIVE FREE 10 ML: 5 INJECTION INTRAVENOUS at 21:38

## 2022-02-28 RX ADMIN — MICONAZOLE NITRATE: 20 POWDER TOPICAL at 09:10

## 2022-02-28 RX ADMIN — ATORVASTATIN CALCIUM 40 MG: 40 TABLET, FILM COATED ORAL at 21:38

## 2022-02-28 RX ADMIN — SODIUM CHLORIDE, PRESERVATIVE FREE 10 ML: 5 INJECTION INTRAVENOUS at 09:10

## 2022-02-28 RX ADMIN — APIXABAN 5 MG: 5 TABLET, FILM COATED ORAL at 09:10

## 2022-02-28 RX ADMIN — METOPROLOL TARTRATE 75 MG: 50 TABLET, FILM COATED ORAL at 05:47

## 2022-02-28 RX ADMIN — DEXAMETHASONE 6 MG: 2 TABLET ORAL at 09:09

## 2022-02-28 RX ADMIN — METOPROLOL TARTRATE 75 MG: 50 TABLET, FILM COATED ORAL at 21:37

## 2022-03-01 LAB
GLUCOSE BLDC GLUCOMTR-MCNC: 141 MG/DL (ref 70–130)
GLUCOSE BLDC GLUCOMTR-MCNC: 331 MG/DL (ref 70–130)
GLUCOSE BLDC GLUCOMTR-MCNC: 342 MG/DL (ref 70–130)
GLUCOSE BLDC GLUCOMTR-MCNC: 369 MG/DL (ref 70–130)

## 2022-03-01 PROCEDURE — 63710000001 DEXAMETHASONE PER 0.25 MG: Performed by: INTERNAL MEDICINE

## 2022-03-01 PROCEDURE — 63710000001 INSULIN LISPRO (HUMAN) PER 5 UNITS: Performed by: INTERNAL MEDICINE

## 2022-03-01 PROCEDURE — 99232 SBSQ HOSP IP/OBS MODERATE 35: CPT | Performed by: FAMILY MEDICINE

## 2022-03-01 PROCEDURE — 82962 GLUCOSE BLOOD TEST: CPT

## 2022-03-01 PROCEDURE — 63710000001 INSULIN LISPRO (HUMAN) PER 5 UNITS: Performed by: FAMILY MEDICINE

## 2022-03-01 PROCEDURE — 63710000001 INSULIN DETEMIR PER 5 UNITS: Performed by: FAMILY MEDICINE

## 2022-03-01 RX ORDER — ESCITALOPRAM OXALATE 10 MG/1
10 TABLET ORAL NIGHTLY
Status: DISCONTINUED | OUTPATIENT
Start: 2022-03-01 | End: 2022-03-06

## 2022-03-01 RX ADMIN — DEXAMETHASONE 6 MG: 2 TABLET ORAL at 09:55

## 2022-03-01 RX ADMIN — DIGOXIN 250 MCG: 250 TABLET ORAL at 09:55

## 2022-03-01 RX ADMIN — EMPAGLIFLOZIN 10 MG: 10 TABLET, FILM COATED ORAL at 11:22

## 2022-03-01 RX ADMIN — Medication 250 MG: at 20:23

## 2022-03-01 RX ADMIN — ESCITALOPRAM OXALATE 10 MG: 10 TABLET ORAL at 20:22

## 2022-03-01 RX ADMIN — APIXABAN 5 MG: 5 TABLET, FILM COATED ORAL at 20:22

## 2022-03-01 RX ADMIN — METOPROLOL TARTRATE 75 MG: 50 TABLET, FILM COATED ORAL at 15:10

## 2022-03-01 RX ADMIN — INSULIN DETEMIR 40 UNITS: 100 INJECTION, SOLUTION SUBCUTANEOUS at 20:24

## 2022-03-01 RX ADMIN — DONEPEZIL HYDROCHLORIDE 10 MG: 10 TABLET, FILM COATED ORAL at 20:23

## 2022-03-01 RX ADMIN — INSULIN LISPRO 10 UNITS: 100 INJECTION, SOLUTION INTRAVENOUS; SUBCUTANEOUS at 17:11

## 2022-03-01 RX ADMIN — METOPROLOL TARTRATE 75 MG: 50 TABLET, FILM COATED ORAL at 20:22

## 2022-03-01 RX ADMIN — INSULIN LISPRO 8 UNITS: 100 INJECTION, SOLUTION INTRAVENOUS; SUBCUTANEOUS at 17:11

## 2022-03-01 RX ADMIN — MICONAZOLE NITRATE: 20 POWDER TOPICAL at 20:23

## 2022-03-01 RX ADMIN — ATORVASTATIN CALCIUM 40 MG: 40 TABLET, FILM COATED ORAL at 20:23

## 2022-03-01 RX ADMIN — Medication 250 MG: at 09:55

## 2022-03-01 RX ADMIN — MICONAZOLE NITRATE: 20 POWDER TOPICAL at 09:55

## 2022-03-01 RX ADMIN — SODIUM CHLORIDE, PRESERVATIVE FREE 10 ML: 5 INJECTION INTRAVENOUS at 09:55

## 2022-03-01 RX ADMIN — METOPROLOL TARTRATE 75 MG: 50 TABLET, FILM COATED ORAL at 05:34

## 2022-03-01 RX ADMIN — APIXABAN 5 MG: 5 TABLET, FILM COATED ORAL at 09:55

## 2022-03-01 NOTE — PROGRESS NOTES
[facility] Medicine Services  PROGRESS NOTE    Patient Name: Chito Rod  : 1950  MRN: [id number]    Date of Admission: 2022  Primary Care Physician: Provider, No Known    Subjective   Subjective     CC:  Found down    HPI:  No comlaints.  We discussed his down mood, says he feels depressed from hospital stays and going to rehab.     ROS:  Gen- No fevers, chills  CV- No chest pain, palpitations  Resp- No cough, dyspnea  GI- No N/V, abd pain    Objective   Objective     Vital Signs:   Temp:  [97 °F (36.1 °C)-97.6 °F (36.4 °C)] 97.6 °F (36.4 °C)  Heart Rate:  [52-74] 52  Resp:  [17-18] 17  BP: (112-125)/(65-93) 112/65  Flow (L/min):  [3] 3     Physical Exam:  With patient's consent, physical exam was conducted viatelemedicine encounter due to patient's current isolation requirements in the interest of PPE conservation.    Constitutional: No acute distress, awake, alert, nontoxic, normal body habitus  Respiratory: Good effort, nonlabored respirations 3L  Cardiovascular:  tele with NSR  Psychiatric: Flat depressed affect, good insight and judgement, cooperative      Results Reviewed:  LAB RESULTS:      Lab 22  1420 22  0846 22  0425 22  0528   WBC 6.67 6.23 5.36 5.60   HEMOGLOBIN 13.4 12.4* 11.7* 11.7*   HEMATOCRIT 43.5 37.8 36.8* 37.2*   PLATELETS 251 227 191 179   NEUTROS ABS 4.51 2.62 2.41 2.86   IMMATURE GRANS (ABS) 0.11* 0.06*  --  0.06*   LYMPHS ABS 1.51 2.87  --  1.96   MONOS ABS 0.44 0.64  --  0.71   EOS ABS 0.07 0.03 0.00 0.00   MCV 96.0 90.2 91.1 92.8   CRP  --   --  0.45  --          Lab 22  1420 22  0846 22  0425 22  0528   SODIUM 135*  --  139 138 140   POTASSIUM 5.0 4.6 3.5 3.6 4.0   CHLORIDE 100  --  103 104 105   CO2 21.0*  --  26.0 25.0 26.0   ANION GAP 14.0  --  10.0 9.0 9.0   BUN 18  --  18 19 19   CREATININE 1.03  --  0.74* 0.77 0.89   GLUCOSE 213*  --  161* 137* 137*   CALCIUM 8.7  --  8.6 8.6 8.8    PHOSPHORUS 3.8  --  3.8  --   --          Lab 02/26/22  1420 02/25/22  0846 02/24/22  0425 02/23/22  0528   TOTAL PROTEIN  --   --  6.3 6.4   ALBUMIN 3.50 3.40* 3.40* 3.50   GLOBULIN  --   --  2.9 2.9   ALT (SGPT)  --   --  16 15   AST (SGOT)  --   --  22 23   BILIRUBIN  --   --  0.2 0.3   ALK PHOS  --   --  64 67                     Brief Urine Lab Results  (Last result in the past 365 days)      Color   Clarity   Blood   Leuk Est   Nitrite   Protein   CREAT   Urine HCG        02/19/22 1217 Dark Yellow   Cloudy   Negative   Trace   Negative   >=300 mg/dL (3+)                 Microbiology Results Abnormal     Procedure Component Value - Date/Time    Aspergillus Galactomannan Antigen - Blood, Arm, Left [[id number]] Collected: 02/23/22 1416    Lab Status: Final result Specimen: Blood from Arm, Left Updated: 02/27/22 0006     Aspergillus Ag, BAL/Serum 0.03 Index     Narrative:      Performed at:  [address] [place] [address]  [id number]  : Baldemar Head MD, Phone:  [phone]    Blood Culture - Blood, Arm, Right [[id number]]  (Normal) Collected: 02/21/22 1933    Lab Status: Final result Specimen: Blood from Arm, Right Updated: 02/26/22 2015     Blood Culture No growth at 5 days    Blood Culture - Blood, Hand, Left [[id number]]  (Normal) Collected: 02/21/22 1932    Lab Status: Final result Specimen: Blood from Hand, Left Updated: 02/26/22 2015     Blood Culture No growth at 5 days    Blood Culture - Blood, Arm, Right [[id number]]  (Normal) Collected: 02/19/22 1200    Lab Status: Final result Specimen: Blood from Arm, Right Updated: 02/24/22 1230     Blood Culture No growth at 5 days    Blood Culture ID, PCR - Blood, Arm, Right [[id number]]  (Normal) Collected: 02/19/22 1150    Lab Status: Final result Specimen: Blood from Arm, Right Updated: 02/21/22 0734     BCID, PCR Negative by BCID PCR. Culture to Follow.          No radiology results from the last 24 hrs    Results for orders  placed during the hospital encounter of 02/19/22    Adult Transthoracic Echo Complete W/ Cont if Necessary Per Protocol    Interpretation Summary  · Technically difficult study due to body habitus, atrial fibrillation with RVR, and on gated study due to Covid protocol.  · Left ventricular systolic function is moderately decreased. Estimated left ventricular EF = 30%. Regional wall motion abnormality cannot be properly assessed on the basis of the study  · The cardiac valves are poorly visualized. No significant stenosis or regurgitation of the aortic, mitral, or tricuspid valves was seen.  · Estimated right ventricular systolic pressure from tricuspid regurgitation is normal (<35 mmHg).      I have reviewed the medications:  Scheduled Meds:apixaban, 5 mg, Oral, Q12H  atorvastatin, 40 mg, Oral, Nightly  dexamethasone, 6 mg, Oral, Daily With Breakfast  digoxin, 250 mcg, Oral, Daily  donepezil, 10 mg, Oral, Nightly  empagliflozin, 10 mg, Oral, Daily  escitalopram, 10 mg, Oral, Nightly  insulin detemir, 30 Units, Subcutaneous, Nightly  insulin lispro, 0-9 Units, Subcutaneous, TID AC  metoprolol tartrate, 75 mg, Oral, Q8H  miconazole, , Topical, Q12H  saccharomyces boulardii, 250 mg, Oral, BID  sodium chloride, 10 mL, Intravenous, Q12H      Continuous Infusions:   PRN Meds:.•  acetaminophen  •  albuterol sulfate HFA  •  dextrose  •  dextrose  •  glucagon (human recombinant)  •  magnesium sulfate **OR** magnesium sulfate **OR** magnesium sulfate  •  metoprolol tartrate  •  potassium chloride **OR** potassium chloride **OR** potassium chloride  •  sodium chloride  •  sodium chloride    Assessment/Plan   Assessment & Plan     Active Hospital Problems    Diagnosis  POA   • **Pneumonia due to COVID-19 virus [U07.1, J12.82]  Yes   • Permanent atrial fibrillation (HCC) [I48.21]  Yes   • Coronary artery disease involving native coronary artery of native heart with angina pectoris (HCC) [I25.119]  Yes   • Chronic systolic  congestive heart failure (HCC) [I50.22]  Yes   • Hyperlipidemia LDL goal <70 [E78.5]  Yes   • Type 2 diabetes mellitus with hyperglycemia, with long-term current use of insulin (HCC) [E11.65, Z79.4]  Not Applicable   • Oxygen dependent [Z99.81]  Not Applicable   • COPD (chronic obstructive pulmonary disease) ([facility]) [J44.9]  Yes      Resolved Hospital Problems    Diagnosis Date Resolved POA   • Pneumonia due to infectious organism [J18.9] 02/19/2022 Yes   • Persistent atrial fibrillation ([facility]) [I48.19] 02/19/2022 Yes   • Dementia ([facility]) [F03.90] 02/19/2022 Yes   • CKD (chronic kidney disease) stage 3, GFR 30-59 ml/min ([facility]) [N18.30] 02/19/2022 Yes   • Essential hypertension [I10] 02/19/2022 Yes        Brief Hospital Course to date:  Chito Rod is a 71 y.o. male with a history of A. fib on chronic anticoagulation, hypertension, CHF, COPD, FAUSTO, diabetes who is being brought in by his caregiver because he was found down.      Partner prior discussed with his sister who said that his roommate was killed a few weeks ago -   Interested in trying to get long term care     This patient's problems and plans were partially entered by my partner and updated as appropriate by me 03/01/22.    Assessment/plan:    Pneumonia secondary to COVID-19  Presumed secondary bacterial pneumonia, community-acquired (right middle and right lower lobe on imaging)  Acute sepsis  Acute encephalopathy, improved  History of COPD on chronic oxygen  -CT chest with right middle lobe pneumonia, mild atelectasis, likely emphysema  -Blood cultures 1 out of 2 positive for gram-positive cocci in chains, --Covid testing + 2/19/2022. Would continue isolation through 3/11/2022   - completed 5 days of remdesivir  -To complete 10 days of Decadron  -Received Actemra 2/23, ID following  -Oxygen requirements improving. Currently on 3 LNC with sats 94%.  -Anticoagulated with Eliquis  -Completed 7 days of empiric antibiotics with doxycycline and Rocephin       Diarrhea  - continue probiotic  - off abx. Improving.    Positive blood culture = contaminate    A. fib with RVR  NSTEMI type 2  History of systolic congestive heart failure  Hypertension  CAD  Hyperlipidemia  -Cardiology evaluated  - No clinical signs of ACS  -continue metoprolol, dig and Eliquis  -Continue Lipitor     History of dementia  - CT head with no acute findings  --Previously had caregiver who was living with him and also somewhat of a roommate who was recently killed a few weeks ago.  The patient had been living by himself and having difficulty caring and taking medications, feeding himself which resulted in him being found down.  --Family interested in long-term care, discussed with case management and also his Sister Kristyn who is POA  --CM following for disposition    Diabetes mellitus type 2  Steroid related hyperglycemia  -Increase Levemir, continued on his home Jardiance  --add mealtime Lispro for next 2 meals, then decadron will be finished and hopefully will see drift down of his FSBS    Depression  - pt agreeable to initiation Lexapro 10mg, if no side effect plan to increase to 20mg on 3/6/22    DVT prophylaxis:  Medical and mechanical DVT prophylaxis orders are present.       AM-PAC 6 Clicks Score (PT): 18 (02/28/22 1531)    Disposition: I expect the patient to be discharged pending placement. CM following.    CODE STATUS:   Code Status and Medical Interventions:   Ordered at: 02/19/22 1442     Level Of Support Discussed With:    Patient     Code Status (Patient has no pulse and is not breathing):    CPR (Attempt to Resuscitate)     Medical Interventions (Patient has pulse or is breathing):    Full Support       Alisha Dobbins MD  03/01/22

## 2022-03-01 NOTE — PROGRESS NOTES
Chito Rod  1950  [id number]    Date of Consult: 2/22/22  Requesting Provider: Dr. Pierce  Evaluating Physician: Heber Stein MD    Chief Complaint: Shortness of breath    Reason for Consultation: COVID-19 infection, hypoxia on 5-6L    History of present illness:    Chito Rod is a 71 y.o.  Yr old male with history of Hypertension, Diabetes mellitus, CHF, COPD, obstructive sleep apnea, and atrial fibrillation on chronic anticoagulation who was brought to the ER by his caregiver on 2/19 because he was found down.  He did not remember events leading up to his hospitalization but was able to state that he was short of breath and was having cough. On arrival, the patient was initially requiring 6 L nasal cannula, this has improved somewhat to 4 L nasal cannula. He has remained afebrile since arrival. He has exprienced A fib with RVR and cardiology is following. COVID-19 PCR was detected. He does have GPC's in chains on the gram stain of one blood culture set so far. BCID PCR was negative. Repeat blood culture have been sent. A CT chest abdomen and pelvis was done on arrival and showed right middle and lower lobe pneumonia with mild dependent atelectasis in the lung bases.  Had some signs of emphysema.  No acute intra-abdominal findings noted. CT head was without any acute changes. Labs have been significant for a proBNP of 1265, pro calcitonin of 0.08, lactic acid 4.5 initially, White blood cell count of 8.19-->7.01, troponin of less than 0.01 Initially but trended up to 0.06, Creatinine of 0.99, normal LFTs. Hemoglobin slightly low at 11.9. Urinalysis showed trace leukocyte esterase, negative nitrite, and 0-2 white blood cells with no bacteria. The patient has been on ceftriaxone and doxycycline for empiric CAP coverage. He is on decadron 6 mg daily and Remdesivir (day 3/5). ID is consulted for recs in the setting of his COVID-19 and hypoxia.      Subjective:    2/23/22: patient is still short of breath  but no worse. Took his supplemental O2 out of nares prior to my arrival and was on 6L NC with difficulty recovering his O2 sats for a couple minutes so I had to turn him up to 8L NC. He denies productive cough. No n/v. Did have some fecal incontinence in his bed per nursing staff. No fevers.     2/24/22: the patient's shortness of breath is stable. He has been weaned slightly to 5L NC. No fevers. No productive cough. No n/v or diarrhea.     2/25/22: The patient is doing a bit better today. Breathing is improving. Now on 3L NC. No productive cough. No fevers. No nv/ or diarrhea.     2/26/22: doing better. Breathing is improving. Still on 3L NC with SPO2 in 90s. No fevers. No productive cough. No n/v or diarrhea reported. Eating well.    2/8/22: Breathing has improved. Cough is only minimally productive. No fevers. He was on 3L NC when I went to the room but I was able to wean him to 2L NC. No nausea or diarrhea.     Past Medical History:   Diagnosis Date   • Atrial fibrillation ([facility])    • CAD (coronary artery disease)    • CHF (congestive heart failure) ([facility])    • Chronic anticoagulation    • CKD (chronic kidney disease) stage 3, GFR 30-59 ml/min ([facility])    • COPD (chronic obstructive pulmonary disease) ([facility])    • DM2 (diabetes mellitus, type 2) ([facility])    • GERD (gastroesophageal reflux disease)    • History of MI (myocardial infarction)    • HLD (hyperlipidemia)    • HTN (hypertension)    • Morbid obesity ([facility])    • Noncompliance    • FAUSTO (obstructive sleep apnea)    • Oxygen dependent        Past Surgical History:   Procedure Laterality Date   • APPENDECTOMY  1961   • CORONARY STENT PLACEMENT  2008   • HAND SURGERY Left 2002   • KNEE ARTHROSCOPY Left 1965       Pediatric History   Patient Parents   • Not on file     Other Topics Concern   • Not on file   Social History Narrative    Patient consumes 4 sodas  daily.     Patient lives at home alone.    Lives in [place]. Has some sisters    family history includes  Diabetes in his father and mother; Heart disease in his father, mother, and sister.    No Known Allergies    Medication:  Current Facility-Administered Medications   Medication Dose Route Frequency Provider Last Rate Last Admin   • acetaminophen (TYLENOL) tablet 650 mg  650 mg Oral Q6H PRN Debbie Arana DO   650 mg at 02/26/22 1316   • albuterol sulfate HFA (PROVENTIL HFA;VENTOLIN HFA;PROAIR HFA) inhaler 2 puff  2 puff Inhalation 4x Daily PRN Estephanie Pierce DO   2 puff at 02/24/22 1450   • apixaban (ELIQUIS) tablet 5 mg  5 mg Oral Q12H Navya Dang MD   5 mg at 02/28/22 2138   • atorvastatin (LIPITOR) tablet 40 mg  40 mg Oral Nightly Jamaal Liu IV, MD   40 mg at 02/28/22 2138   • dexamethasone (DECADRON) tablet 6 mg  6 mg Oral Daily With Breakfast Estephanie Pierce DO   6 mg at 02/28/22 0909   • dextrose (D50W) (25 g/50 mL) IV injection 25 g  25 g Intravenous Q15 Min PRN Navya Dang MD       • dextrose (GLUTOSE) oral gel 15 g  15 g Oral Q15 Min PRN Navya Dang MD       • digoxin (LANOXIN) tablet 250 mcg  250 mcg Oral Daily Jamaal Liu IV, MD   250 mcg at 02/28/22 0910   • donepezil (ARICEPT) tablet 10 mg  10 mg Oral Nightly Navya Dang MD   10 mg at 02/28/22 2138   • empagliflozin (JARDIANCE) tablet 10 mg  10 mg Oral Daily Jamaal Liu IV, MD   10 mg at 02/28/22 0910   • glucagon (human recombinant) (GLUCAGEN DIAGNOSTIC) injection 1 mg  1 mg Intramuscular Q15 Min PRN Navya Dang MD       • insulin detemir (LEVEMIR) injection 30 Units  30 Units Subcutaneous Nightly Navya Dang MD   30 Units at 02/27/22 2001   • insulin lispro (humaLOG) injection 0-9 Units  0-9 Units Subcutaneous TID AC Estephanie Pierce DO   7 Units at 02/28/22 1643   • Magnesium Sulfate 2 gram Bolus, followed by 8 gram infusion (total Mg dose 10 grams)- Mg less than or equal to 1mg/dL  2 g Intravenous PRN Debbie Arana, DO        Or   • Magnesium Sulfate 2 gram / 50mL Infusion (GIVE  "X 3 BAGS TO EQUAL 6GM TOTAL DOSE) - Mg 1.1 - 1.5 mg/dl  2 g Intravenous PRN Debbie Arana DO        Or   • Magnesium Sulfate 4 gram infusion- Mg 1.6-1.9 mg/dL  4 g Intravenous PRN Debbie Arana DO       • metoprolol tartrate (LOPRESSOR) injection 5 mg  5 mg Intravenous Q5 Min PRN Navya Dang MD       • metoprolol tartrate (LOPRESSOR) tablet 75 mg  75 mg Oral Q8H Jamaal Liu IV, MD   75 mg at 02/28/22 2137   • miconazole (MICOTIN) 2 % powder   Topical Q12H Nisreen Fam MD   Given at 02/28/22 2139   • potassium chloride (MICRO-K) CR capsule 40 mEq  40 mEq Oral PRN Debbie Arana DO   40 mEq at 02/25/22 1645    Or   • potassium chloride (KLOR-CON) packet 40 mEq  40 mEq Oral PRN Debbie Arana DO        Or   • potassium chloride 10 mEq in 100 mL IVPB  10 mEq Intravenous Q1H PRN Debbie Arana DO       • saccharomyces boulardii (FLORASTOR) capsule 250 mg  250 mg Oral BID Debbie Arana DO   250 mg at 02/28/22 2138   • sodium chloride 0.9 % flush 10 mL  10 mL Intravenous PRN Navya Dang MD       • sodium chloride 0.9 % flush 10 mL  10 mL Intravenous Q12H Navya Dang MD   10 mL at 02/28/22 2138   • sodium chloride 0.9 % flush 10 mL  10 mL Intravenous PRN Navya Dang MD             Antibiotics:  Anti-Infectives (From admission, onward)    Ordered     Dose/Rate Route Frequency Start Stop    02/22/22 1826  vancomycin 2250 mg/500 mL 0.9% NS IVPB ([facility])        Ordering Provider: Ary Blanton RPH    20 mg/kg × 112 kg  over 135 Minutes Intravenous Once 02/22/22 2000 02/23/22 0011    02/20/22 0855  remdesivir 100 mg in sodium chloride 0.9 % 250 mL IVPB (powder vial)        Ordering Provider: Nisreen Fam MD   \"Followed by\" Linked Group Details    100 mg  over 60 Minutes Intravenous Every 24 Hours 02/21/22 0900 02/24/22 1023    02/20/22 0855  remdesivir 200 mg in sodium chloride 0.9 % 290 mL IVPB (powder vial)        Ordering Provider: " "Nisreen Fam MD   \"Followed by\" Linked Group Details    200 mg  over 60 Minutes Intravenous Every 24 Hours 02/20/22 0945 02/20/22 1055    02/19/22 1150  vancomycin 2250 mg/500 mL 0.9% NS IVPB ([facility])        Ordering Provider: Brian Malone DO    20 mg/kg × 109 kg  over 135 Minutes Intravenous Once 02/19/22 1230 02/19/22 1512    02/19/22 1150  piperacillin-tazobactam (ZOSYN) 3.375 g in iso-osmotic dextrose 50 ml (premix)        Ordering Provider: Brian Malone DO    3.375 g  over 30 Minutes Intravenous Once 02/19/22 1152 02/19/22 1235            Review of Systems  As above    Physical Exam:   Vital Signs   /67 (BP Location: Left arm, Patient Position: Lying)   Pulse 73   Temp 97.2 °F (36.2 °C) (Axillary)   Resp 18   Ht 182.9 cm (72.01\")   Wt 112 kg (246 lb 14.6 oz)   SpO2 92%   BMI 33.48 kg/m²     GENERAL: awake. NAD. Disheveled appearing. Lying in bed  HENT: Normocephalic, atraumatic. No external oral lesions   Eyes: No conjunctival injection. No icterus.  HEART: RRR; No murmur, rubs, gallops.   LUNGS: CTAB. non-labored breathing on 2L NC. Not coughing  ABDOMEN: Obese abdomen. Non-distended  EXT:  No cyanosis, clubbing or edema. No cord.  :  Without Kolb catheter.  SKIN: No new rash  NEURO: AAOX3. Normal speech  PSYCHIATRIC: poor insight. Cooperative with PE    Laboratory Data    Results from last 7 days   Lab Units 02/26/22  1420 02/25/22  0846 02/24/22  0425   WBC 10*3/mm3 6.67 6.23 5.36   HEMOGLOBIN g/dL 13.4 12.4* 11.7*   HEMATOCRIT % 43.5 37.8 36.8*   PLATELETS 10*3/mm3 251 227 191     Results from last 7 days   Lab Units 02/26/22  1420   SODIUM mmol/L 135*   POTASSIUM mmol/L 5.0   CHLORIDE mmol/L 100   CO2 mmol/L 21.0*   BUN mg/dL 18   CREATININE mg/dL 1.03   GLUCOSE mg/dL 213*   CALCIUM mg/dL 8.7     Results from last 7 days   Lab Units 02/24/22  0425   ALK PHOS U/L 64   BILIRUBIN mg/dL 0.2   ALT (SGPT) U/L 16   AST (SGOT) U/L 22         Results from last 7 days   Lab " Units 02/24/22  0425   CRP mg/dL 0.45       Estimated Creatinine Clearance: 86.3 mL/min (by C-G formula based on SCr of 1.03 mg/dL).       Microbiology:    Blood Culture   Date Value Ref Range Status   02/19/2022 No growth at 2 days  Preliminary   02/19/2022 Abnormal Stain (C)  Preliminary        strongyloides Ab, Quant gold, Fungitell BDG, and aspergillus gal- all negative    Radiology:  XR Chest 1 View    Result Date: 2/23/2022  Diffuse interstitial prominence is again noted, similar to recent CT, with areas of right middle and lower lobe pneumonia somewhat difficult to visualize on radiographs. There is no new focal lobar consolidation. There is no significant pleural effusion or distinct pneumothorax. Unchanged degree of cardiac enlargement.  This report was finalized on 2/23/2022 8:48 AM by William Mccarthy.      Impression:       Problems:  -Severe COVID-19 Pneumonia  -Acute hypoxic respiratory failure- improving, on 2L NC  -streptococcus acidominimus in 1 blood cx- likely a contaminate  -A fib with RVR in the setting of chronic A fib, on antiicoagulation  -Acute toxic/metabolic encephalopathy/found down  -Normocytic anemia  -Elevated troponin level  -Lactic acidosis  -DM II with hyperglycemia  -HTN  -COPD  -FAUSTO    PLAN: Thank you for asking us to see Chito Rod, I recommend the following:     -blood cultures likely represent a contaminate  -s/p ceftriaxone and doxycycline empirically for 7 days for bacterial pneumonia coverage  -s/p remdesivir x5 days  -decadron 6 mg daily for 10 days  -s/p Actemra x1 dose 2/23/22  -encourage prone position and incentive spirometry use    May need SNF for rehab/placement    UM/BRUCE:  Improving, now weaned to 2-3L NC. If he continues to improve then we may be able to discharge him soon. He will need to complete 10 days of decadron. He will need to be evaluated for home O2.    Complex MDM    Heber Stein MD  2/28/2022

## 2022-03-01 NOTE — PLAN OF CARE
Goal Outcome Evaluation:  Plan of Care Reviewed With: patient        Progress: no change  Outcome Summary: VSS. Pt remains on 3L  NC with no signs of SOA. Pt remains in A. Fib on the monitor. No pain noted during shift. Pt A&Ox4 with some direction however still seems confused. Pt pleasant throughout the night.

## 2022-03-02 LAB
GLUCOSE BLDC GLUCOMTR-MCNC: 172 MG/DL (ref 70–130)
GLUCOSE BLDC GLUCOMTR-MCNC: 225 MG/DL (ref 70–130)
GLUCOSE BLDC GLUCOMTR-MCNC: 346 MG/DL (ref 70–130)

## 2022-03-02 PROCEDURE — 63710000001 INSULIN DETEMIR PER 5 UNITS: Performed by: FAMILY MEDICINE

## 2022-03-02 PROCEDURE — 63710000001 INSULIN LISPRO (HUMAN) PER 5 UNITS: Performed by: FAMILY MEDICINE

## 2022-03-02 PROCEDURE — 82962 GLUCOSE BLOOD TEST: CPT

## 2022-03-02 PROCEDURE — 63710000001 DEXAMETHASONE PER 0.25 MG: Performed by: INTERNAL MEDICINE

## 2022-03-02 PROCEDURE — 97530 THERAPEUTIC ACTIVITIES: CPT

## 2022-03-02 PROCEDURE — 99231 SBSQ HOSP IP/OBS SF/LOW 25: CPT | Performed by: FAMILY MEDICINE

## 2022-03-02 RX ADMIN — EMPAGLIFLOZIN 10 MG: 10 TABLET, FILM COATED ORAL at 09:00

## 2022-03-02 RX ADMIN — DONEPEZIL HYDROCHLORIDE 10 MG: 10 TABLET, FILM COATED ORAL at 21:14

## 2022-03-02 RX ADMIN — APIXABAN 5 MG: 5 TABLET, FILM COATED ORAL at 21:15

## 2022-03-02 RX ADMIN — METOPROLOL TARTRATE 75 MG: 50 TABLET, FILM COATED ORAL at 16:15

## 2022-03-02 RX ADMIN — INSULIN DETEMIR 40 UNITS: 100 INJECTION, SOLUTION SUBCUTANEOUS at 21:18

## 2022-03-02 RX ADMIN — MICONAZOLE NITRATE 1 APPLICATION: 20 POWDER TOPICAL at 21:18

## 2022-03-02 RX ADMIN — METOPROLOL TARTRATE 75 MG: 50 TABLET, FILM COATED ORAL at 05:28

## 2022-03-02 RX ADMIN — ATORVASTATIN CALCIUM 40 MG: 40 TABLET, FILM COATED ORAL at 21:14

## 2022-03-02 RX ADMIN — Medication 250 MG: at 21:14

## 2022-03-02 RX ADMIN — INSULIN LISPRO 10 UNITS: 100 INJECTION, SOLUTION INTRAVENOUS; SUBCUTANEOUS at 10:03

## 2022-03-02 RX ADMIN — MICONAZOLE NITRATE 1 APPLICATION: 20 POWDER TOPICAL at 09:00

## 2022-03-02 RX ADMIN — ESCITALOPRAM OXALATE 10 MG: 10 TABLET ORAL at 21:15

## 2022-03-02 RX ADMIN — APIXABAN 5 MG: 5 TABLET, FILM COATED ORAL at 10:03

## 2022-03-02 RX ADMIN — DIGOXIN 250 MCG: 250 TABLET ORAL at 10:02

## 2022-03-02 RX ADMIN — Medication 250 MG: at 10:02

## 2022-03-02 RX ADMIN — DEXAMETHASONE 6 MG: 2 TABLET ORAL at 10:03

## 2022-03-02 RX ADMIN — SODIUM CHLORIDE, PRESERVATIVE FREE 10 ML: 5 INJECTION INTRAVENOUS at 09:00

## 2022-03-02 NOTE — PROGRESS NOTES
[facility] Medicine Services  PROGRESS NOTE    Patient Name: Chito Rod  : 1950  MRN: [id number]    Date of Admission: 2022  Primary Care Physician: Provider, No Known    Subjective   Subjective     CC:  Found down    HPI:  No comlaints. Napping off and on today.     ROS:  Gen- No fevers, chills  CV- No chest pain, palpitations  Resp- No cough, dyspnea  GI- No N/V, abd pain    Objective   Objective     Vital Signs:   Temp:  [97 °F (36.1 °C)-97.5 °F (36.4 °C)] 97.5 °F (36.4 °C)  Heart Rate:  [60-71] 71  Resp:  [17-20] 20  BP: (102-158)/(62-92) 158/83  Flow (L/min):  [2] 2     Physical Exam:  With patient's consent, physical exam was conducted viatelemedicine encounter due to patient's current isolation requirements in the interest of PPE conservation.    Constitutional: No acute distress, awake, alert, nontoxic, normal body habitus  Respiratory: Good effort, nonlabored respirations 3L  Cardiovascular:  tele with NSR      Results Reviewed:  LAB RESULTS:      Lab 220 22  0846 22  0425   WBC 6.67 6.23 5.36   HEMOGLOBIN 13.4 12.4* 11.7*   HEMATOCRIT 43.5 37.8 36.8*   PLATELETS 251 227 191   NEUTROS ABS 4.51 2.62 2.41   IMMATURE GRANS (ABS) 0.11* 0.06*  --    LYMPHS ABS 1.51 2.87  --    MONOS ABS 0.44 0.64  --    EOS ABS 0.07 0.03 0.00   MCV 96.0 90.2 91.1   CRP  --   --  0.45         Lab 22  1420 22  0846 22  0425   SODIUM 135*  --  139 138   POTASSIUM 5.0 4.6 3.5 3.6   CHLORIDE 100  --  103 104   CO2 21.0*  --  26.0 25.0   ANION GAP 14.0  --  10.0 9.0   BUN 18  --  18 19   CREATININE 1.03  --  0.74* 0.77   GLUCOSE 213*  --  161* 137*   CALCIUM 8.7  --  8.6 8.6   PHOSPHORUS 3.8  --  3.8  --          Lab 22  1420 22  0846 22  0425   TOTAL PROTEIN  --   --  6.3   ALBUMIN 3.50 3.40* 3.40*   GLOBULIN  --   --  2.9   ALT (SGPT)  --   --  16   AST (SGOT)  --   --  22   BILIRUBIN  --   --  0.2   ALK PHOS  --   --   64                     Brief Urine Lab Results  (Last result in the past 365 days)      Color   Clarity   Blood   Leuk Est   Nitrite   Protein   CREAT   Urine HCG        02/19/22 1217 Dark Yellow   Cloudy   Negative   Trace   Negative   >=300 mg/dL (3+)                 Microbiology Results Abnormal     Procedure Component Value - Date/Time    Aspergillus Galactomannan Antigen - Blood, Arm, Left [[id number]] Collected: 02/23/22 1416    Lab Status: Final result Specimen: Blood from Arm, Left Updated: 02/27/22 0006     Aspergillus Ag, BAL/Serum 0.03 Index     Narrative:      Performed at:  [address] [place] [address]  [id number]  : Baldemar Head MD, Phone:  [phone]    Blood Culture - Blood, Arm, Right [[id number]]  (Normal) Collected: 02/21/22 1933    Lab Status: Final result Specimen: Blood from Arm, Right Updated: 02/26/22 2015     Blood Culture No growth at 5 days    Blood Culture - Blood, Hand, Left [[id number]]  (Normal) Collected: 02/21/22 1932    Lab Status: Final result Specimen: Blood from Hand, Left Updated: 02/26/22 2015     Blood Culture No growth at 5 days    Blood Culture - Blood, Arm, Right [[id number]]  (Normal) Collected: 02/19/22 1200    Lab Status: Final result Specimen: Blood from Arm, Right Updated: 02/24/22 1230     Blood Culture No growth at 5 days    Blood Culture ID, PCR - Blood, Arm, Right [[id number]]  (Normal) Collected: 02/19/22 1150    Lab Status: Final result Specimen: Blood from Arm, Right Updated: 02/21/22 0734     BCID, PCR Negative by BCID PCR. Culture to Follow.          No radiology results from the last 24 hrs    Results for orders placed during the hospital encounter of 02/19/22    Adult Transthoracic Echo Complete W/ Cont if Necessary Per Protocol    Interpretation Summary  · Technically difficult study due to body habitus, atrial fibrillation with RVR, and on gated study due to Covid protocol.  · Left ventricular systolic function is  moderately decreased. Estimated left ventricular EF = 30%. Regional wall motion abnormality cannot be properly assessed on the basis of the study  · The cardiac valves are poorly visualized. No significant stenosis or regurgitation of the aortic, mitral, or tricuspid valves was seen.  · Estimated right ventricular systolic pressure from tricuspid regurgitation is normal (<35 mmHg).      I have reviewed the medications:  Scheduled Meds:apixaban, 5 mg, Oral, Q12H  atorvastatin, 40 mg, Oral, Nightly  digoxin, 250 mcg, Oral, Daily  donepezil, 10 mg, Oral, Nightly  empagliflozin, 10 mg, Oral, Daily  escitalopram, 10 mg, Oral, Nightly  insulin detemir, 40 Units, Subcutaneous, Nightly  insulin lispro, 0-9 Units, Subcutaneous, TID AC  metoprolol tartrate, 75 mg, Oral, Q8H  miconazole, , Topical, Q12H  saccharomyces boulardii, 250 mg, Oral, BID  sodium chloride, 10 mL, Intravenous, Q12H      Continuous Infusions:   PRN Meds:.•  acetaminophen  •  albuterol sulfate HFA  •  dextrose  •  dextrose  •  glucagon (human recombinant)  •  magnesium sulfate **OR** magnesium sulfate **OR** magnesium sulfate  •  metoprolol tartrate  •  potassium chloride **OR** potassium chloride **OR** potassium chloride  •  sodium chloride  •  sodium chloride    Assessment/Plan   Assessment & Plan     Active Hospital Problems    Diagnosis  POA   • **Pneumonia due to COVID-19 virus [U07.1, J12.82]  Yes   • Permanent atrial fibrillation ([facility]) [I48.21]  Yes   • Coronary artery disease involving native coronary artery of native heart with angina pectoris ([facility]) [I25.119]  Yes   • Chronic systolic congestive heart failure ([facility]) [I50.22]  Yes   • Hyperlipidemia LDL goal <70 [E78.5]  Yes   • Type 2 diabetes mellitus with hyperglycemia, with long-term current use of insulin ([facility]) [E11.65, Z79.4]  Not Applicable   • Oxygen dependent [Z99.81]  Not Applicable   • COPD (chronic obstructive pulmonary disease) ([facility]) [J44.9]  Yes      Resolved Hospital Problems     Diagnosis Date Resolved POA   • Pneumonia due to infectious organism [J18.9] 02/19/2022 Yes   • Persistent atrial fibrillation (HCC) [I48.19] 02/19/2022 Yes   • Dementia (HCC) [F03.90] 02/19/2022 Yes   • CKD (chronic kidney disease) stage 3, GFR 30-59 ml/min (HCC) [N18.30] 02/19/2022 Yes   • Essential hypertension [I10] 02/19/2022 Yes        Brief Hospital Course to date:  Chito Rod is a 71 y.o. male with a history of A. fib on chronic anticoagulation, hypertension, CHF, COPD, FAUSTO, diabetes who is being brought in by his caregiver because he was found down.      Patient's roommate was killed a few weeks ago (data deficit) and he can not live alone safely, interested in trying to get long term care bed.        Pneumonia secondary to COVID-19  Presumed secondary bacterial pneumonia, community-acquired (right middle and right lower lobe on imaging)  Acute sepsis  Acute encephalopathy, improved  History of COPD on chronic oxygen  -CT chest with right middle lobe pneumonia, mild atelectasis, likely emphysema  -Blood cultures 1 out of 2 positive for gram-positive cocci in chains, --Covid testing + 2/19/2022. Would continue isolation through 3/11/2022   - completed 5 days of remdesivir  -To complete 10 days of Decadron  -Received Actemra 2/23  -Oxygen requirements improving. Currently on 3 LNC   -Anticoagulated with Eliquis  -Completed 7 days of empiric antibiotics with doxycycline and Rocephin      Diarrhea  - continue probiotic  - off abx. Improving.    Positive blood culture = contaminate    A. fib with RVR  NSTEMI type 2  History of systolic congestive heart failure  Hypertension  CAD  Hyperlipidemia  -Cardiology evaluated  - No clinical signs of ACS  -continue metoprolol, dig and Eliquis  -Continue Lipitor     History of dementia  - CT head with no acute findings  --Previously had caregiver who was living with him and also somewhat of a roommate who was recently killed a few weeks ago.  The patient had been living  by himself and having difficulty caring and taking medications, feeding himself which resulted in him being found down.  --Family interested in long-term care, discussed with case management and also his Sister Kristyn who is POA  --CM following for disposition    Diabetes mellitus type 2  Steroid related hyperglycemia  -Continue Levemir, schedule qac and SSI   - continue home Jardiance    Depression  - pt agreeable to initiation Lexapro 10mg, if no side effect plan to increase to 20mg on 3/6/22    DVT prophylaxis:  Medical and mechanical DVT prophylaxis orders are present.       AM-PAC 6 Clicks Score (PT): 17 (03/02/22 1310)    Disposition: I expect the patient to be discharged pending placement. CM following.    CODE STATUS:   Code Status and Medical Interventions:   Ordered at: 02/19/22 1442     Level Of Support Discussed With:    Patient     Code Status (Patient has no pulse and is not breathing):    CPR (Attempt to Resuscitate)     Medical Interventions (Patient has pulse or is breathing):    Full Support       Alisha Dobbins MD  03/02/22

## 2022-03-02 NOTE — CASE MANAGEMENT/SOCIAL WORK
Discharge Planning Assessment  [facility]     Patient Name: Chito Rod  MRN: [id number]  Today's Date: 3/2/2022    Admit Date: 2/19/2022     Discharge Needs Assessment    No documentation.                Discharge Plan     Row Name 03/02/22 1517       Plan    Plan SNF to LTC    Patient/Family in Agreement with Plan yes    Plan Comments Called Randi with [facility] and there are no LTC beds at any of the referred facilities. Asked to look in other counties as well. Called Ophelia with [facility] and she's going to inquire about the Covid isolation date and if they can accomodate skilled to LTC.    Final Discharge Disposition Code 30 - still a patient              Continued Care and Services - Admitted Since 2/19/2022     Destination     Service Provider Request Status Selected Services Address Phone Fax Patient Preferred    [facility]  Pending - No Request Sent N/A [address] [facility] [facility] [facility] [address] [phone] --       Internal Comment last updated by Grace Hoover, RN 3/2/2022 1512    3/2 referral called               [facility]  Declined  No Medicaid Bed Available N/A [address] [facility] [id number] [phone] --    [facility]  Declined  No Medicaid Bed Available N/A [address] [address] [phone] --    [facility]  Declined  No Medicaid Bed Available N/A [address] [address] [phone] --    [facility]  Declined  No Medicaid Bed Available N/A [address] [facility] [facility] [address] [phone] --    [facility]  Declined  No Bed Available N/A [address] [facility] [address] [phone] --    [facility]  Declined  No Medicaid Bed Available N/A [address] [facility] [address] [phone] --     [facility]  No Medicaid Bed Available N/A [address] [facility] [address] [phone] --              Expected Discharge Date and Time     Expected Discharge Date Expected Discharge Time    Feb 26, 2022          Demographic Summary    No documentation.                Functional Status    No documentation.                Psychosocial    No documentation.                Abuse/Neglect    No documentation.                Legal    No documentation.                Substance Abuse    No documentation.                Patient Forms    No documentation.                   Grace Hoover RN

## 2022-03-02 NOTE — PLAN OF CARE
Goal Outcome Evaluation:  Plan of Care Reviewed With: patient        Progress: no change  Outcome Summary: Pt rested well overnight. No complaints. VSS. Continue to await placement.

## 2022-03-02 NOTE — THERAPY TREATMENT NOTE
Patient Name: Chito Rod  : 1950    MRN: [id number]                              Today's Date: 3/2/2022       Admit Date: 2022    Visit Dx:     ICD-10-CM ICD-9-CM   1. Altered mental status, unspecified altered mental status type  R41.82 780.97   2. COVID-19 virus infection  U07.1 079.89   3. Dehydration  E86.0 276.51     Patient Active Problem List   Diagnosis   • FAUSTO (obstructive sleep apnea)   • COPD (chronic obstructive pulmonary disease) ([facility])   • GERD (gastroesophageal reflux disease)   • Type 2 diabetes mellitus with hyperglycemia, with long-term current use of insulin ([facility])   • Hyperlipidemia LDL goal <70   • Oxygen dependent   • Chronic systolic congestive heart failure ([facility])   • Pneumonia due to COVID-19 virus   • Permanent atrial fibrillation ([facility])   • Coronary artery disease involving native coronary artery of native heart with angina pectoris ([facility])     Past Medical History:   Diagnosis Date   • Atrial fibrillation ([facility])    • CAD (coronary artery disease)    • CHF (congestive heart failure) ([facility])    • Chronic anticoagulation    • CKD (chronic kidney disease) stage 3, GFR 30-59 ml/min ([facility])    • COPD (chronic obstructive pulmonary disease) ([facility])    • DM2 (diabetes mellitus, type 2) ([facility])    • GERD (gastroesophageal reflux disease)    • History of MI (myocardial infarction)    • HLD (hyperlipidemia)    • HTN (hypertension)    • Morbid obesity ([facility])    • Noncompliance    • FAUSTO (obstructive sleep apnea)    • Oxygen dependent      Past Surgical History:   Procedure Laterality Date   • APPENDECTOMY     • CORONARY STENT PLACEMENT     • HAND SURGERY Left    • KNEE ARTHROSCOPY Left       General Information     Row Name 22 1304          Physical Therapy Time and Intention    Document Type therapy note (daily note)  -HP     Mode of Treatment physical therapy  -HP     Row Name 22 1304          General Information    Patient Profile Reviewed yes  -HP     Existing  Precautions/Restrictions fall; oxygen therapy device and L/min; other (see comments)  Contact and airborne (COVID)  -     Row Name 03/02/22 1304          Cognition    Orientation Status (Cognition) oriented to; person; disoriented to; place; situation; time  -     Row Name 03/02/22 1304          Safety Issues, Functional Mobility    Impairments Affecting Function (Mobility) balance; cognition; endurance/activity tolerance; motor planning; shortness of breath  -           User Key  (r) = Recorded By, (t) = Taken By, (c) = Cosigned By    Initials Name Provider Type     Melissa Morrison PT Physical Therapist               Mobility     Row Name 03/02/22 1305          Bed Mobility    Bed Mobility supine-sit; sit-supine  -     Supine-Sit Lane (Bed Mobility) minimum assist (75% patient effort)  -     Sit-Supine Lane (Bed Mobility) standby assist  -     Comment (Bed Mobility) Pt performed bed mobility with Min A for trunk management; Pt sat EOB for 20 minutes due to refusal to perform transfers or return to supine safely.  -     Row Name 03/02/22 1305          Transfers    Comment (Transfers) Pt declined despite max encouragement  -           User Key  (r) = Recorded By, (t) = Taken By, (c) = Cosigned By    Initials Name Provider Type     Melissa Morrison PT Physical Therapist               Obj/Interventions     Row Name 03/02/22 1307          Motor Skills    Motor Skills therapeutic exercise  -     Therapeutic Exercise --  pt declined  -     Row Name 03/02/22 1307          Balance    Balance Assessment sitting static balance; sitting dynamic balance  -     Static Sitting Balance WFL  -     Dynamic Sitting Balance mild impairment  -           User Key  (r) = Recorded By, (t) = Taken By, (c) = Cosigned By    Initials Name Provider Type     Melissa Morrison PT Physical Therapist               Goals/Plan    No documentation.                Clinical Impression     Row Name 03/02/22  1307          Pain    Additional Documentation Pain Scale: Numbers Pre/Post-Treatment (Group)  -HP     Row Name 03/02/22 1307          Pain Scale: Numbers Pre/Post-Treatment    Pretreatment Pain Rating 0/10 - no pain  -HP     Posttreatment Pain Rating 0/10 - no pain  -HP     Row Name 03/02/22 1307          Plan of Care Review    Plan of Care Reviewed With patient  -HP     Progress no change  -HP     Outcome Summary Pt agreeable to sit up on EOB. Pt declined further mobility despite max encouragement and education. Will see pt one more session and will discharge if participation does not improve.  -HP     Row Name 03/02/22 1307          Vital Signs    Pre Systolic BP Rehab --  VSS; RN cleared for therapy  -HP     Pre Patient Position Supine  -HP     Intra Patient Position Sitting  -HP     Post Patient Position Supine  -HP     Row Name 03/02/22 1307          Positioning and Restraints    Pre-Treatment Position in bed  -HP     Post Treatment Position bed  -HP     In Bed supine; fowlers; call light within reach; encouraged to call for assist; exit alarm on; side rails up x3  -HP           User Key  (r) = Recorded By, (t) = Taken By, (c) = Cosigned By    Initials Name Provider Type    HP Melissa Morrison, PT Physical Therapist               Outcome Measures     Row Name 03/02/22 1310          How much help from another person do you currently need...    Turning from your back to your side while in flat bed without using bedrails? 4  -HP     Moving from lying on back to sitting on the side of a flat bed without bedrails? 3  -HP     Moving to and from a bed to a chair (including a wheelchair)? 3  -HP     Standing up from a chair using your arms (e.g., wheelchair, bedside chair)? 3  -HP     Climbing 3-5 steps with a railing? 2  -HP     To walk in hospital room? 2  -HP     AM-PAC 6 Clicks Score (PT) 17  -HP     Row Name 03/02/22 1310          Functional Assessment    Outcome Measure Options AM-PAC 6 Clicks Basic Mobility (PT)   -           User Key  (r) = Recorded By, (t) = Taken By, (c) = Cosigned By    Initials Name Provider Type     Melissa Morrison, PT Physical Therapist                             Physical Therapy Education                 Title: PT OT SLP Therapies (In Progress)     Topic: Physical Therapy (In Progress)     Point: Mobility training (Done)     Learning Progress Summary           Patient Acceptance, E,D, VU,NR by  at 3/2/2022 1311    Acceptance, E, NR by NS at 2/28/2022 1628    Acceptance, E,D, VU,NR by HP at 2/25/2022 1347    Acceptance, E,D, VU,NR by HP at 2/22/2022 1400                   Point: Home exercise program (In Progress)     Learning Progress Summary           Patient Acceptance, E, NR by  at 2/28/2022 1638                   Point: Body mechanics (Done)     Learning Progress Summary           Patient Acceptance, E,D, VU,NR by HP at 3/2/2022 1311    Acceptance, E, NR by NS at 2/28/2022 1628    Acceptance, E,D, VU,NR by  at 2/25/2022 1347    Acceptance, E,D, VU,NR by  at 2/22/2022 1400                   Point: Precautions (Done)     Learning Progress Summary           Patient Acceptance, E,D, VU,NR by  at 3/2/2022 1311    Acceptance, E, NR by NS at 2/28/2022 1628    Acceptance, E,D, VU,NR by  at 2/25/2022 1347    Acceptance, E,D, VU,NR by  at 2/22/2022 1400                               User Key     Initials Effective Dates Name Provider Type Discipline    NS 06/16/21 -  Berta Beltre, PT Physical Therapist PT     06/01/21 -  Melissa Morrison PT Physical Therapist PT     01/24/22 -  Landen Messer, RN Registered Nurse Nurse              PT Recommendation and Plan  Planned Therapy Interventions (PT): balance training, bed mobility training, gait training, home exercise program, patient/family education, strengthening, stretching, postural re-education, transfer training, stair training  Plan of Care Reviewed With: patient  Progress: no change  Outcome Summary: Pt agreeable to sit up on EOB. Pt  declined further mobility despite max encouragement and education. Will see pt one more session and will discharge if participation does not improve.     Time Calculation:    PT Charges     Row Name 03/02/22 1144             Time Calculation    Start Time 1144  -HP      PT Received On 03/02/22  -HP              Timed Charges    09228 - PT Therapeutic Activity Minutes 24  -HP              Total Minutes    Timed Charges Total Minutes 24  -HP       Total Minutes 24  -HP            User Key  (r) = Recorded By, (t) = Taken By, (c) = Cosigned By    Initials Name Provider Type    HP Melissa Morrison PT Physical Therapist              Therapy Charges for Today     Code Description Service Date Service Provider Modifiers Qty    [id number]  PT THERAPEUTIC ACT EA 15 MIN 3/2/2022 Melissa Morrison, PT GP 2          PT G-Codes  Outcome Measure Options: AM-PAC 6 Clicks Basic Mobility (PT)  AM-PAC 6 Clicks Score (PT): 17  AM-PAC 6 Clicks Score (OT): 8    Melissa Morrison PT  3/2/2022

## 2022-03-02 NOTE — PLAN OF CARE
Goal Outcome Evaluation:  Plan of Care Reviewed With: patient        Progress: no change  Outcome Summary: Pt agreeable to sit up on EOB. Pt declined further mobility despite max encouragement and education. Will see pt one more session and will discharge if participation does not improve.

## 2022-03-03 LAB
DIGOXIN SERPL-MCNC: 1.12 NG/ML (ref 0.6–1.2)
GLUCOSE BLDC GLUCOMTR-MCNC: 175 MG/DL (ref 70–130)
GLUCOSE BLDC GLUCOMTR-MCNC: 211 MG/DL (ref 70–130)
GLUCOSE BLDC GLUCOMTR-MCNC: 226 MG/DL (ref 70–130)
GLUCOSE BLDC GLUCOMTR-MCNC: 293 MG/DL (ref 70–130)

## 2022-03-03 PROCEDURE — 80162 ASSAY OF DIGOXIN TOTAL: CPT | Performed by: INTERNAL MEDICINE

## 2022-03-03 PROCEDURE — 82962 GLUCOSE BLOOD TEST: CPT

## 2022-03-03 PROCEDURE — 99231 SBSQ HOSP IP/OBS SF/LOW 25: CPT | Performed by: FAMILY MEDICINE

## 2022-03-03 PROCEDURE — 63710000001 INSULIN LISPRO (HUMAN) PER 5 UNITS: Performed by: INTERNAL MEDICINE

## 2022-03-03 PROCEDURE — 63710000001 INSULIN DETEMIR PER 5 UNITS: Performed by: FAMILY MEDICINE

## 2022-03-03 RX ORDER — LOPERAMIDE HYDROCHLORIDE 2 MG/1
2 CAPSULE ORAL 4 TIMES DAILY PRN
Status: DISCONTINUED | OUTPATIENT
Start: 2022-03-03 | End: 2022-03-11 | Stop reason: HOSPADM

## 2022-03-03 RX ORDER — GUAR GUM
2 PACKET (EA) ORAL 2 TIMES DAILY
Status: DISCONTINUED | OUTPATIENT
Start: 2022-03-03 | End: 2022-03-11 | Stop reason: HOSPADM

## 2022-03-03 RX ADMIN — ESCITALOPRAM OXALATE 10 MG: 10 TABLET ORAL at 20:49

## 2022-03-03 RX ADMIN — LOPERAMIDE HYDROCHLORIDE 2 MG: 2 CAPSULE ORAL at 16:10

## 2022-03-03 RX ADMIN — INSULIN LISPRO 2 UNITS: 100 INJECTION, SOLUTION INTRAVENOUS; SUBCUTANEOUS at 09:35

## 2022-03-03 RX ADMIN — BISMUTH SUBSALICYLATE 30 ML: 525 LIQUID ORAL at 20:48

## 2022-03-03 RX ADMIN — INSULIN LISPRO 4 UNITS: 100 INJECTION, SOLUTION INTRAVENOUS; SUBCUTANEOUS at 20:11

## 2022-03-03 RX ADMIN — Medication 250 MG: at 09:35

## 2022-03-03 RX ADMIN — MICONAZOLE NITRATE: 20 POWDER TOPICAL at 20:49

## 2022-03-03 RX ADMIN — METOPROLOL TARTRATE 75 MG: 50 TABLET, FILM COATED ORAL at 16:10

## 2022-03-03 RX ADMIN — BISMUTH SUBSALICYLATE 30 ML: 525 LIQUID ORAL at 16:09

## 2022-03-03 RX ADMIN — ATORVASTATIN CALCIUM 40 MG: 40 TABLET, FILM COATED ORAL at 20:47

## 2022-03-03 RX ADMIN — DONEPEZIL HYDROCHLORIDE 10 MG: 10 TABLET, FILM COATED ORAL at 20:48

## 2022-03-03 RX ADMIN — SODIUM CHLORIDE, PRESERVATIVE FREE 10 ML: 5 INJECTION INTRAVENOUS at 09:38

## 2022-03-03 RX ADMIN — EMPAGLIFLOZIN 10 MG: 10 TABLET, FILM COATED ORAL at 09:41

## 2022-03-03 RX ADMIN — METOPROLOL TARTRATE 75 MG: 50 TABLET, FILM COATED ORAL at 20:47

## 2022-03-03 RX ADMIN — APIXABAN 5 MG: 5 TABLET, FILM COATED ORAL at 09:35

## 2022-03-03 RX ADMIN — MICONAZOLE NITRATE: 20 POWDER TOPICAL at 09:38

## 2022-03-03 RX ADMIN — INSULIN DETEMIR 40 UNITS: 100 INJECTION, SOLUTION SUBCUTANEOUS at 20:52

## 2022-03-03 RX ADMIN — METOPROLOL TARTRATE 75 MG: 50 TABLET, FILM COATED ORAL at 06:30

## 2022-03-03 RX ADMIN — DIGOXIN 250 MCG: 250 TABLET ORAL at 09:35

## 2022-03-03 RX ADMIN — Medication 2 PACKET: at 21:50

## 2022-03-03 RX ADMIN — Medication 250 MG: at 20:48

## 2022-03-03 RX ADMIN — APIXABAN 5 MG: 5 TABLET, FILM COATED ORAL at 20:47

## 2022-03-03 NOTE — PLAN OF CARE
Goal Outcome Evaluation:  Plan of Care Reviewed With: patient        Progress: no change  Outcome Summary: Pt rested well this shift. VSS, however slightly hypotensive. Metoprolol held this shift for borderline BP. Continue to await placement.

## 2022-03-03 NOTE — PROGRESS NOTES
Chito Rod  1950  [id number]    Date of Consult: 2/22/22  Requesting Provider: Dr. Pierce  Evaluating Physician: Heber Stein MD    Chief Complaint: Shortness of breath    Reason for Consultation: COVID-19 infection, hypoxia on 5-6L    History of present illness:    Chito Rod is a 71 y.o.  Yr old male with history of Hypertension, Diabetes mellitus, CHF, COPD, obstructive sleep apnea, and atrial fibrillation on chronic anticoagulation who was brought to the ER by his caregiver on 2/19 because he was found down.  He did not remember events leading up to his hospitalization but was able to state that he was short of breath and was having cough. On arrival, the patient was initially requiring 6 L nasal cannula, this has improved somewhat to 4 L nasal cannula. He has remained afebrile since arrival. He has exprienced A fib with RVR and cardiology is following. COVID-19 PCR was detected. He does have GPC's in chains on the gram stain of one blood culture set so far. BCID PCR was negative. Repeat blood culture have been sent. A CT chest abdomen and pelvis was done on arrival and showed right middle and lower lobe pneumonia with mild dependent atelectasis in the lung bases.  Had some signs of emphysema.  No acute intra-abdominal findings noted. CT head was without any acute changes. Labs have been significant for a proBNP of 1265, pro calcitonin of 0.08, lactic acid 4.5 initially, White blood cell count of 8.19-->7.01, troponin of less than 0.01 Initially but trended up to 0.06, Creatinine of 0.99, normal LFTs. Hemoglobin slightly low at 11.9. Urinalysis showed trace leukocyte esterase, negative nitrite, and 0-2 white blood cells with no bacteria. The patient has been on ceftriaxone and doxycycline for empiric CAP coverage. He is on decadron 6 mg daily and Remdesivir (day 3/5). ID is consulted for recs in the setting of his COVID-19 and hypoxia.      Subjective:    2/23/22: patient is still short of breath  but no worse. Took his supplemental O2 out of nares prior to my arrival and was on 6L NC with difficulty recovering his O2 sats for a couple minutes so I had to turn him up to 8L NC. He denies productive cough. No n/v. Did have some fecal incontinence in his bed per nursing staff. No fevers.     2/24/22: the patient's shortness of breath is stable. He has been weaned slightly to 5L NC. No fevers. No productive cough. No n/v or diarrhea.     2/25/22: The patient is doing a bit better today. Breathing is improving. Now on 3L NC. No productive cough. No fevers. No nv/ or diarrhea.     2/26/22: doing better. Breathing is improving. Still on 3L NC with SPO2 in 90s. No fevers. No productive cough. No n/v or diarrhea reported. Eating well.    2/8/22: Breathing has improved. Cough is only minimally productive. No fevers. He was on 3L NC when I went to the room but I was able to wean him to 2L NC. No nausea or diarrhea.     3/2/22: The patient is doing okay.  Breathing has improved. Still on 2 L nasal cannula with SPO2 in the mid 90s.  No fevers. He complains that he is eating a lot. Awaiting rehab placement.     Past Medical History:   Diagnosis Date   • Atrial fibrillation ([facility])    • CAD (coronary artery disease)    • CHF (congestive heart failure) ([facility])    • Chronic anticoagulation    • CKD (chronic kidney disease) stage 3, GFR 30-59 ml/min ([facility])    • COPD (chronic obstructive pulmonary disease) ([facility])    • DM2 (diabetes mellitus, type 2) ([facility])    • GERD (gastroesophageal reflux disease)    • History of MI (myocardial infarction)    • HLD (hyperlipidemia)    • HTN (hypertension)    • Morbid obesity ([facility])    • Noncompliance    • FAUSTO (obstructive sleep apnea)    • Oxygen dependent        Past Surgical History:   Procedure Laterality Date   • APPENDECTOMY  1961   • CORONARY STENT PLACEMENT  2008   • HAND SURGERY Left 2002   • KNEE ARTHROSCOPY Left 1965       Pediatric History   Patient Parents   • Not on file     Other Topics  Concern   • Not on file   Social History Narrative    Patient consumes 4 sodas  daily.     Patient lives at home alone.    Lives in [place]. Has some sisters    family history includes Diabetes in his father and mother; Heart disease in his father, mother, and sister.    No Known Allergies    Medication:  Current Facility-Administered Medications   Medication Dose Route Frequency Provider Last Rate Last Admin   • acetaminophen (TYLENOL) tablet 650 mg  650 mg Oral Q6H PRN Debbie Arana, DO   650 mg at 02/26/22 1316   • albuterol sulfate HFA (PROVENTIL HFA;VENTOLIN HFA;PROAIR HFA) inhaler 2 puff  2 puff Inhalation 4x Daily PRN Estephanie Pierce DO   2 puff at 02/24/22 1450   • apixaban (ELIQUIS) tablet 5 mg  5 mg Oral Q12H Navya Dang MD   5 mg at 03/02/22 1003   • atorvastatin (LIPITOR) tablet 40 mg  40 mg Oral Nightly Jamaal Liu IV, MD   40 mg at 03/01/22 2023   • dextrose (D50W) (25 g/50 mL) IV injection 25 g  25 g Intravenous Q15 Min PRN Navya Dang MD       • dextrose (GLUTOSE) oral gel 15 g  15 g Oral Q15 Min PRN Navya Dang MD       • digoxin (LANOXIN) tablet 250 mcg  250 mcg Oral Daily Jamaal Liu IV, MD   250 mcg at 03/02/22 1002   • donepezil (ARICEPT) tablet 10 mg  10 mg Oral Nightly Navya Dang MD   10 mg at 03/01/22 2023   • empagliflozin (JARDIANCE) tablet 10 mg  10 mg Oral Daily Jamaal Liu IV, MD   10 mg at 03/02/22 0900   • escitalopram (LEXAPRO) tablet 10 mg  10 mg Oral Nightly Alisha Dobbins MD   10 mg at 03/01/22 2022   • glucagon (human recombinant) (GLUCAGEN DIAGNOSTIC) injection 1 mg  1 mg Intramuscular Q15 Min PRN Navya Dang MD       • insulin detemir (LEVEMIR) injection 40 Units  40 Units Subcutaneous Nightly Alisha Dobbins MD   40 Units at 03/01/22 2024   • insulin lispro (humaLOG) injection 0-9 Units  0-9 Units Subcutaneous TID AC Estephanie Pierce, DO   8 Units at 03/01/22 1711   • Magnesium Sulfate 2 gram Bolus, followed by 8 gram  "infusion (total Mg dose 10 grams)- Mg less than or equal to 1mg/dL  2 g Intravenous PRN Debbie Arana DO        Or   • Magnesium Sulfate 2 gram / 50mL Infusion (GIVE X 3 BAGS TO EQUAL 6GM TOTAL DOSE) - Mg 1.1 - 1.5 mg/dl  2 g Intravenous PRN Debbie Arana DO        Or   • Magnesium Sulfate 4 gram infusion- Mg 1.6-1.9 mg/dL  4 g Intravenous PRN Debbie Arana DO       • metoprolol tartrate (LOPRESSOR) injection 5 mg  5 mg Intravenous Q5 Min PRN Navya Dang MD       • metoprolol tartrate (LOPRESSOR) tablet 75 mg  75 mg Oral Q8H Jamaal Liu IV, MD   75 mg at 03/02/22 1615   • miconazole (MICOTIN) 2 % powder   Topical Q12H Nisreen Fam MD   1 application at 03/02/22 0900   • potassium chloride (MICRO-K) CR capsule 40 mEq  40 mEq Oral PRN Debbie Arana DO   40 mEq at 02/25/22 1645    Or   • potassium chloride (KLOR-CON) packet 40 mEq  40 mEq Oral PRN Debbie Arana DO        Or   • potassium chloride 10 mEq in 100 mL IVPB  10 mEq Intravenous Q1H PRN Debbie Arana DO       • saccharomyces boulardii (FLORASTOR) capsule 250 mg  250 mg Oral BID Debbie Arana, DO   250 mg at 03/02/22 1002   • sodium chloride 0.9 % flush 10 mL  10 mL Intravenous PRN Navya Dang MD       • sodium chloride 0.9 % flush 10 mL  10 mL Intravenous Q12H Navya Dang MD   10 mL at 03/02/22 0900   • sodium chloride 0.9 % flush 10 mL  10 mL Intravenous PRN Navya Dang MD             Antibiotics:  Anti-Infectives (From admission, onward)    Ordered     Dose/Rate Route Frequency Start Stop    02/22/22 1826  vancomycin 2250 mg/500 mL 0.9% NS IVPB ([facility])        Ordering Provider: Ary Blanton RPH    20 mg/kg × 112 kg  over 135 Minutes Intravenous Once 02/22/22 2000 02/23/22 0011    02/20/22 0855  remdesivir 100 mg in sodium chloride 0.9 % 250 mL IVPB (powder vial)        Ordering Provider: Nisreen Fam MD   \"Followed by\" Linked Group Details    100 " "mg  over 60 Minutes Intravenous Every 24 Hours 02/21/22 0900 02/24/22 1023    02/20/22 0855  remdesivir 200 mg in sodium chloride 0.9 % 290 mL IVPB (powder vial)        Ordering Provider: Nisreen Fam MD   \"Followed by\" Linked Group Details    200 mg  over 60 Minutes Intravenous Every 24 Hours 02/20/22 0945 02/20/22 1055    02/19/22 1150  vancomycin 2250 mg/500 mL 0.9% NS IVPB ([facility])        Ordering Provider: Brian Malone DO    20 mg/kg × 109 kg  over 135 Minutes Intravenous Once 02/19/22 1230 02/19/22 1512    02/19/22 1150  piperacillin-tazobactam (ZOSYN) 3.375 g in iso-osmotic dextrose 50 ml (premix)        Ordering Provider: Brian Malone DO    3.375 g  over 30 Minutes Intravenous Once 02/19/22 1152 02/19/22 1235            Review of Systems  As above    Physical Exam:   Vital Signs   /83 (BP Location: Left arm, Patient Position: Lying)   Pulse 71   Temp 97.5 °F (36.4 °C) (Oral)   Resp 20   Ht 182.9 cm (72.01\")   Wt 112 kg (246 lb 14.6 oz)   SpO2 92%   BMI 33.48 kg/m²     GENERAL: awake. NAD. Disheveled appearing. Lying in bed  HENT: Normocephalic, atraumatic. No external oral lesions   HEART: RRR; No murmur, rubs, gallops.   LUNGS: CTAB. non-labored breathing on 2L NC. Not coughing  ABDOMEN: Obese abdomen. Non-distended  EXT:  No cyanosis, clubbing or edema. No cord.  :  Deferred  SKIN: No new rash.  No jaundice   NEURO: AAOX3. Normal speech  PSYCHIATRIC: poor insight. Cooperative with PE    Laboratory Data    Results from last 7 days   Lab Units 02/26/22  1420 02/25/22  0846 02/24/22  0425   WBC 10*3/mm3 6.67 6.23 5.36   HEMOGLOBIN g/dL 13.4 12.4* 11.7*   HEMATOCRIT % 43.5 37.8 36.8*   PLATELETS 10*3/mm3 251 227 191     Results from last 7 days   Lab Units 02/26/22  1420   SODIUM mmol/L 135*   POTASSIUM mmol/L 5.0   CHLORIDE mmol/L 100   CO2 mmol/L 21.0*   BUN mg/dL 18   CREATININE mg/dL 1.03   GLUCOSE mg/dL 213*   CALCIUM mg/dL 8.7     Results from last 7 days   Lab Units " 02/24/22  0425   ALK PHOS U/L 64   BILIRUBIN mg/dL 0.2   ALT (SGPT) U/L 16   AST (SGOT) U/L 22         Results from last 7 days   Lab Units 02/24/22  0425   CRP mg/dL 0.45       Estimated Creatinine Clearance: 86.3 mL/min (by C-G formula based on SCr of 1.03 mg/dL).       Microbiology:    Blood Culture   Date Value Ref Range Status   02/19/2022 No growth at 2 days  Preliminary   02/19/2022 Abnormal Stain (C)  Preliminary        strongyloides Ab, Quant gold, Fungitell BDG, and aspergillus gal- all negative    Radiology:  XR Chest 1 View    Result Date: 2/23/2022  Diffuse interstitial prominence is again noted, similar to recent CT, with areas of right middle and lower lobe pneumonia somewhat difficult to visualize on radiographs. There is no new focal lobar consolidation. There is no significant pleural effusion or distinct pneumothorax. Unchanged degree of cardiac enlargement.  This report was finalized on 2/23/2022 8:48 AM by William Mccarthy.      Impression:       Problems:  -Severe COVID-19 Pneumonia  -Acute hypoxic respiratory failure- improving, on 2L NC  -streptococcus acidominimus in 1 blood cx- likely a contaminate  -A fib with RVR in the setting of chronic A fib, on antiicoagulation  -Acute toxic/metabolic encephalopathy/found down- Improved  -Normocytic anemia  -Elevated troponin level  -Lactic acidosis  -DM II with hyperglycemia  -HTN  -COPD  -FAUSTO    PLAN: Thank you for asking us to see Chito Rod, I recommend the following:     -s/p ceftriaxone and doxycycline empirically for 7 days for bacterial pneumonia coverage  -s/p remdesivir x5 days  -decadron 6 mg daily for 10 days. Now on day 10 of 10  -s/p Actemra x1 dose 2/23/22    Awaiting placement at a skilled nursing facility.    UM/BRUCE:  Improving, now weaned to 2-3L NC. If he continues to improve then we may be able to discharge him soon.     Complex MDM    Heber Stein MD  3/2/2022

## 2022-03-03 NOTE — PROGRESS NOTES
[facility]  PROGRESS NOTE    Patient Name: Chito Rod  : 1950  MRN: [id number]    Date of Admission: 2022  Primary Care Physician: Provider, No Known    Subjective   Subjective     CC:  Found down    HPI:  No comlaints. Napping again today.  Apparently likes to play in his feces, nursing watching closely for sanitary needs.     ROS:  Gen- No fevers, chills  CV- No chest pain, palpitations  Resp- No cough, dyspnea  GI- No N/V, abd pain    Objective   Objective     Vital Signs:   Temp:  [97.9 °F (36.6 °C)-98.4 °F (36.9 °C)] 98.4 °F (36.9 °C)  Heart Rate:  [65-94] 73  Resp:  [20] 20  BP: ()/(49-83) 131/49  Flow (L/min):  [2] 2     Physical Exam:  With patient's consent, physical exam was conducted viatelemedicine encounter due to patient's current isolation requirements in the interest of PPE conservation.    Constitutional: No acute distress, awake, alert, nontoxic, normal body habitus  Respiratory: Good effort, nonlabored respirations 3L  Cardiovascular:  tele with NSR  Unchanged    Results Reviewed:  LAB RESULTS:      Lab 22  0846   WBC 6.67 6.23   HEMOGLOBIN 13.4 12.4*   HEMATOCRIT 43.5 37.8   PLATELETS 251 227   NEUTROS ABS 4.51 2.62   IMMATURE GRANS (ABS) 0.11* 0.06*   LYMPHS ABS 1.51 2.87   MONOS ABS 0.44 0.64   EOS ABS 0.07 0.03   MCV 96.0 90.2         Lab 22  0846   SODIUM 135*  --  139   POTASSIUM 5.0 4.6 3.5   CHLORIDE 100  --  103   CO2 21.0*  --  26.0   ANION GAP 14.0  --  10.0   BUN 18  --  18   CREATININE 1.03  --  0.74*   GLUCOSE 213*  --  161*   CALCIUM 8.7  --  8.6   PHOSPHORUS 3.8  --  3.8         Lab 22  0846   ALBUMIN 3.50 3.40*                     Brief Urine Lab Results  (Last result in the past 365 days)      Color   Clarity   Blood   Leuk Est   Nitrite   Protein   CREAT   Urine HCG        22 1217 Dark Yellow   Cloudy   Negative   Trace    Negative   >=300 mg/dL (3+)                 Microbiology Results Abnormal     Procedure Component Value - Date/Time    Aspergillus Galactomannan Antigen - Blood, Arm, Left [[id number]] Collected: 02/23/22 1416    Lab Status: Final result Specimen: Blood from Arm, Left Updated: 02/27/22 0006     Aspergillus Ag, BAL/Serum 0.03 Index     Narrative:      Performed at:  [address] [place] [address]  [id number]  : Baldemar Head MD, Phone:  [phone]    Blood Culture - Blood, Arm, Right [[id number]]  (Normal) Collected: 02/21/22 1933    Lab Status: Final result Specimen: Blood from Arm, Right Updated: 02/26/22 2015     Blood Culture No growth at 5 days    Blood Culture - Blood, Hand, Left [[id number]]  (Normal) Collected: 02/21/22 1932    Lab Status: Final result Specimen: Blood from Hand, Left Updated: 02/26/22 2015     Blood Culture No growth at 5 days    Blood Culture - Blood, Arm, Right [[id number]]  (Normal) Collected: 02/19/22 1200    Lab Status: Final result Specimen: Blood from Arm, Right Updated: 02/24/22 1230     Blood Culture No growth at 5 days    Blood Culture ID, PCR - Blood, Arm, Right [[id number]]  (Normal) Collected: 02/19/22 1150    Lab Status: Final result Specimen: Blood from Arm, Right Updated: 02/21/22 0734     BCID, PCR Negative by BCID PCR. Culture to Follow.          No radiology results from the last 24 hrs    Results for orders placed during the hospital encounter of 02/19/22    Adult Transthoracic Echo Complete W/ Cont if Necessary Per Protocol    Interpretation Summary  · Technically difficult study due to body habitus, atrial fibrillation with RVR, and on gated study due to Covid protocol.  · Left ventricular systolic function is moderately decreased. Estimated left ventricular EF = 30%. Regional wall motion abnormality cannot be properly assessed on the basis of the study  · The cardiac valves are poorly visualized. No significant stenosis or regurgitation  of the aortic, mitral, or tricuspid valves was seen.  · Estimated right ventricular systolic pressure from tricuspid regurgitation is normal (<35 mmHg).      I have reviewed the medications:  Scheduled Meds:apixaban, 5 mg, Oral, Q12H  atorvastatin, 40 mg, Oral, Nightly  bismuth subsalicylate, 30 mL, Oral, 4x Daily  digoxin, 250 mcg, Oral, Daily  donepezil, 10 mg, Oral, Nightly  empagliflozin, 10 mg, Oral, Daily  escitalopram, 10 mg, Oral, Nightly  insulin detemir, 40 Units, Subcutaneous, Nightly  insulin lispro, 0-9 Units, Subcutaneous, TID AC  metoprolol tartrate, 75 mg, Oral, Q8H  miconazole, , Topical, Q12H  Nutrisource fiber, 2 packet, Oral, BID  saccharomyces boulardii, 250 mg, Oral, BID  sodium chloride, 10 mL, Intravenous, Q12H      Continuous Infusions:   PRN Meds:.•  acetaminophen  •  albuterol sulfate HFA  •  dextrose  •  dextrose  •  glucagon (human recombinant)  •  loperamide  •  magnesium sulfate **OR** magnesium sulfate **OR** magnesium sulfate  •  metoprolol tartrate  •  potassium chloride **OR** potassium chloride **OR** potassium chloride  •  sodium chloride  •  sodium chloride    Assessment/Plan   Assessment & Plan     Active Hospital Problems    Diagnosis  POA   • **Pneumonia due to COVID-19 virus [U07.1, J12.82]  Yes   • Permanent atrial fibrillation (HCC) [I48.21]  Yes   • Coronary artery disease involving native coronary artery of native heart with angina pectoris ([facility]) [I25.119]  Yes   • Chronic systolic congestive heart failure ([facility]) [I50.22]  Yes   • Hyperlipidemia LDL goal <70 [E78.5]  Yes   • Type 2 diabetes mellitus with hyperglycemia, with long-term current use of insulin ([facility]) [E11.65, Z79.4]  Not Applicable   • Oxygen dependent [Z99.81]  Not Applicable   • COPD (chronic obstructive pulmonary disease) ([facility]) [J44.9]  Yes      Resolved Hospital Problems    Diagnosis Date Resolved POA   • Pneumonia due to infectious organism [J18.9] 02/19/2022 Yes   • Persistent atrial fibrillation (HCC)  [I48.19] 02/19/2022 Yes   • Dementia ([facility]) [F03.90] 02/19/2022 Yes   • CKD (chronic kidney disease) stage 3, GFR 30-59 ml/min ([facility]) [N18.30] 02/19/2022 Yes   • Essential hypertension [I10] 02/19/2022 Yes        Brief Hospital Course to date:  Chito Rod is a 71 y.o. male with a history of A. fib on chronic anticoagulation, hypertension, CHF, COPD, FAUSTO, diabetes who is being brought in by his caregiver because he was found down.      Patient's roommate was killed a few weeks ago (data deficit) and he can not live alone safely, interested in trying to get long term care bed.        Pneumonia secondary to COVID-19  Presumed secondary bacterial pneumonia, community-acquired (right middle and right lower lobe on imaging)  Acute sepsis  Acute encephalopathy, improved  History of COPD on chronic oxygen  -CT chest with right middle lobe pneumonia, mild atelectasis, likely emphysema  -Blood cultures 1 out of 2 positive for gram-positive cocci in chains, --Covid testing + 2/19/2022. Would continue isolation through 3/11/2022   - completed 5 days of remdesivir  -To complete 10 days of Decadron  -Received Actemra 2/23  -Oxygen requirements improving. Currently on 3 LNC   -Anticoagulated with Eliquis  -Completed 7 days of empiric antibiotics with doxycycline and Rocephin      Diarrhea  - continue probiotic  - off abx. Improving.    Positive blood culture = contaminate    A. fib with RVR  NSTEMI type 2  History of systolic congestive heart failure  Hypertension  CAD  Hyperlipidemia  -Cardiology evaluated  - No clinical signs of ACS  -continue metoprolol, dig and Eliquis  -Continue Lipitor     History of dementia  - CT head with no acute findings  --Previously had caregiver who was living with him and also somewhat of a roommate who was recently killed a few weeks ago.  The patient had been living by himself and having difficulty caring and taking medications, feeding himself which resulted in him being found down.  --Family  interested in long-term care, discussed with case management and also his Sister Kristyn who is POA  --CM following for disposition    Diabetes mellitus type 2  Steroid related hyperglycemia  -Continue Levemir, schedule qac and SSI   - continue home Jardiance    Depression  - pt agreeable to initiation Lexapro 10mg, if no side effect plan to increase to 20mg on 3/6/22    DVT prophylaxis:  Medical and mechanical DVT prophylaxis orders are present.       AM-PAC 6 Clicks Score (PT): 17 (03/02/22 1310)    Disposition: I expect the patient to be discharged pending placement. CM following.    CODE STATUS:   Code Status and Medical Interventions:   Ordered at: 02/19/22 1442     Level Of Support Discussed With:    Patient     Code Status (Patient has no pulse and is not breathing):    CPR (Attempt to Resuscitate)     Medical Interventions (Patient has pulse or is breathing):    Full Support       Alisha Dobbins MD  03/03/22

## 2022-03-04 LAB
GLUCOSE BLDC GLUCOMTR-MCNC: 100 MG/DL (ref 70–130)
GLUCOSE BLDC GLUCOMTR-MCNC: 179 MG/DL (ref 70–130)
GLUCOSE BLDC GLUCOMTR-MCNC: 182 MG/DL (ref 70–130)
GLUCOSE BLDC GLUCOMTR-MCNC: 210 MG/DL (ref 70–130)

## 2022-03-04 PROCEDURE — 63710000001 INSULIN LISPRO (HUMAN) PER 5 UNITS: Performed by: INTERNAL MEDICINE

## 2022-03-04 PROCEDURE — 63710000001 INSULIN DETEMIR PER 5 UNITS: Performed by: FAMILY MEDICINE

## 2022-03-04 PROCEDURE — 82962 GLUCOSE BLOOD TEST: CPT

## 2022-03-04 PROCEDURE — 99232 SBSQ HOSP IP/OBS MODERATE 35: CPT | Performed by: INTERNAL MEDICINE

## 2022-03-04 RX ADMIN — INSULIN LISPRO 2 UNITS: 100 INJECTION, SOLUTION INTRAVENOUS; SUBCUTANEOUS at 17:37

## 2022-03-04 RX ADMIN — APIXABAN 5 MG: 5 TABLET, FILM COATED ORAL at 20:30

## 2022-03-04 RX ADMIN — INSULIN DETEMIR 40 UNITS: 100 INJECTION, SOLUTION SUBCUTANEOUS at 20:31

## 2022-03-04 RX ADMIN — DONEPEZIL HYDROCHLORIDE 10 MG: 10 TABLET, FILM COATED ORAL at 20:29

## 2022-03-04 RX ADMIN — Medication 250 MG: at 10:00

## 2022-03-04 RX ADMIN — ATORVASTATIN CALCIUM 40 MG: 40 TABLET, FILM COATED ORAL at 20:29

## 2022-03-04 RX ADMIN — Medication 2 PACKET: at 20:29

## 2022-03-04 RX ADMIN — METOPROLOL TARTRATE 75 MG: 50 TABLET, FILM COATED ORAL at 10:00

## 2022-03-04 RX ADMIN — MICONAZOLE NITRATE: 20 POWDER TOPICAL at 20:30

## 2022-03-04 RX ADMIN — METOPROLOL TARTRATE 75 MG: 50 TABLET, FILM COATED ORAL at 15:00

## 2022-03-04 RX ADMIN — DIGOXIN 250 MCG: 250 TABLET ORAL at 10:00

## 2022-03-04 RX ADMIN — BISMUTH SUBSALICYLATE 30 ML: 525 LIQUID ORAL at 12:00

## 2022-03-04 RX ADMIN — METOPROLOL TARTRATE 75 MG: 50 TABLET, FILM COATED ORAL at 20:29

## 2022-03-04 RX ADMIN — APIXABAN 5 MG: 5 TABLET, FILM COATED ORAL at 10:00

## 2022-03-04 RX ADMIN — ESCITALOPRAM OXALATE 10 MG: 10 TABLET ORAL at 20:30

## 2022-03-04 RX ADMIN — EMPAGLIFLOZIN 10 MG: 10 TABLET, FILM COATED ORAL at 11:20

## 2022-03-04 RX ADMIN — BISMUTH SUBSALICYLATE 30 ML: 525 LIQUID ORAL at 17:32

## 2022-03-04 RX ADMIN — Medication 250 MG: at 20:29

## 2022-03-04 RX ADMIN — Medication 2 PACKET: at 09:00

## 2022-03-04 NOTE — PLAN OF CARE
Goal Outcome Evaluation:            Pt much more alert today, having relatively normal conversations with me. Diarrhea seems resolved.

## 2022-03-04 NOTE — PROGRESS NOTES
[facility] Medicine Services  PROGRESS NOTE    Patient Name: Chito Rod  : 1950  MRN: [id number]    Date of Admission: 2022  Primary Care Physician: Provider, No Known    Subjective   Subjective     CC:  Found down    HPI:  Intermittently confused, was laying in his bed in the opposite direction this morning.  Has no complaints.    ROS:  General: denies fevers or chills  CV: denies chest pain  Resp: denies shortness of breath  Abd: denies abd pain, nausea      Objective   Objective     Vital Signs:   Temp:  [97.5 °F (36.4 °C)-99.1 °F (37.3 °C)] 99.1 °F (37.3 °C)  Heart Rate:  [54-73] 67  Resp:  [20] 20  BP: ()/(49-91) 94/57     Physical Exam:  Constitutional: No acute distress, awake, alert laying upside down in bed  Respiratory: Clear to auscultation bilaterally, respiratory effort normal on room air  Cardiovascular: RRR, no murmurs, rubs, or gallops  Gastrointestinal: Positive bowel sounds, soft, nontender, nondistended  Musculoskeletal: No bilateral ankle edema  Psychiatric: Appropriate affect, cooperative  Neurologic: Oriented x 1, no focal neurological deficits  Skin: No rashes      Results Reviewed:  LAB RESULTS:      Lab 22  1420   WBC 6.67   HEMOGLOBIN 13.4   HEMATOCRIT 43.5   PLATELETS 251   NEUTROS ABS 4.51   IMMATURE GRANS (ABS) 0.11*   LYMPHS ABS 1.51   MONOS ABS 0.44   EOS ABS 0.07   MCV 96.0         Lab 22  1420 22  2050   SODIUM 135*  --    POTASSIUM 5.0 4.6   CHLORIDE 100  --    CO2 21.0*  --    ANION GAP 14.0  --    BUN 18  --    CREATININE 1.03  --    GLUCOSE 213*  --    CALCIUM 8.7  --    PHOSPHORUS 3.8  --          Lab 22  1420   ALBUMIN 3.50                     Brief Urine Lab Results  (Last result in the past 365 days)      Color   Clarity   Blood   Leuk Est   Nitrite   Protein   CREAT   Urine HCG        22 1217 Dark Yellow   Cloudy   Negative   Trace   Negative   >=300 mg/dL (3+)                 Microbiology Results  Abnormal     Procedure Component Value - Date/Time    Aspergillus Galactomannan Antigen - Blood, Arm, Left [[id number]] Collected: 02/23/22 1416    Lab Status: Final result Specimen: Blood from Arm, Left Updated: 02/27/22 0006     Aspergillus Ag, BAL/Serum 0.03 Index     Narrative:      Performed at:  [address] [place] [address]  [id number]  : Baldemar Head MD, Phone:  [phone]    Blood Culture - Blood, Arm, Right [[id number]]  (Normal) Collected: 02/21/22 1933    Lab Status: Final result Specimen: Blood from Arm, Right Updated: 02/26/22 2015     Blood Culture No growth at 5 days    Blood Culture - Blood, Hand, Left [[id number]]  (Normal) Collected: 02/21/22 1932    Lab Status: Final result Specimen: Blood from Hand, Left Updated: 02/26/22 2015     Blood Culture No growth at 5 days    Blood Culture - Blood, Arm, Right [[id number]]  (Normal) Collected: 02/19/22 1200    Lab Status: Final result Specimen: Blood from Arm, Right Updated: 02/24/22 1230     Blood Culture No growth at 5 days    Blood Culture ID, PCR - Blood, Arm, Right [[id number]]  (Normal) Collected: 02/19/22 1150    Lab Status: Final result Specimen: Blood from Arm, Right Updated: 02/21/22 0734     BCID, PCR Negative by BCID PCR. Culture to Follow.          No radiology results from the last 24 hrs    Results for orders placed during the hospital encounter of 02/19/22    Adult Transthoracic Echo Complete W/ Cont if Necessary Per Protocol    Interpretation Summary  · Technically difficult study due to body habitus, atrial fibrillation with RVR, and on gated study due to Covid protocol.  · Left ventricular systolic function is moderately decreased. Estimated left ventricular EF = 30%. Regional wall motion abnormality cannot be properly assessed on the basis of the study  · The cardiac valves are poorly visualized. No significant stenosis or regurgitation of the aortic, mitral, or tricuspid valves was seen.  ·  Estimated right ventricular systolic pressure from tricuspid regurgitation is normal (<35 mmHg).      I have reviewed the medications:  Scheduled Meds:apixaban, 5 mg, Oral, Q12H  atorvastatin, 40 mg, Oral, Nightly  bismuth subsalicylate, 30 mL, Oral, 4x Daily  digoxin, 250 mcg, Oral, Daily  donepezil, 10 mg, Oral, Nightly  empagliflozin, 10 mg, Oral, Daily  escitalopram, 10 mg, Oral, Nightly  insulin detemir, 40 Units, Subcutaneous, Nightly  insulin lispro, 0-9 Units, Subcutaneous, TID AC  metoprolol tartrate, 75 mg, Oral, Q8H  miconazole, , Topical, Q12H  Nutrisource fiber, 2 packet, Oral, BID  saccharomyces boulardii, 250 mg, Oral, BID  sodium chloride, 10 mL, Intravenous, Q12H      Continuous Infusions:   PRN Meds:.•  acetaminophen  •  albuterol sulfate HFA  •  dextrose  •  dextrose  •  glucagon (human recombinant)  •  loperamide  •  magnesium sulfate **OR** magnesium sulfate **OR** magnesium sulfate  •  metoprolol tartrate  •  potassium chloride **OR** potassium chloride **OR** potassium chloride  •  sodium chloride  •  sodium chloride    Assessment/Plan   Assessment & Plan     Active Hospital Problems    Diagnosis  POA   • **Pneumonia due to COVID-19 virus [U07.1, J12.82]  Yes   • Permanent atrial fibrillation (HCC) [I48.21]  Yes   • Coronary artery disease involving native coronary artery of native heart with angina pectoris ([facility]) [I25.119]  Yes   • Chronic systolic congestive heart failure ([facility]) [I50.22]  Yes   • Hyperlipidemia LDL goal <70 [E78.5]  Yes   • Type 2 diabetes mellitus with hyperglycemia, with long-term current use of insulin ([facility]) [E11.65, Z79.4]  Not Applicable   • Oxygen dependent [Z99.81]  Not Applicable   • COPD (chronic obstructive pulmonary disease) ([facility]) [J44.9]  Yes      Resolved Hospital Problems    Diagnosis Date Resolved POA   • Pneumonia due to infectious organism [J18.9] 02/19/2022 Yes   • Persistent atrial fibrillation (HCC) [I48.19] 02/19/2022 Yes   • Dementia ([facility]) [F03.90]  02/19/2022 Yes   • CKD (chronic kidney disease) stage 3, GFR 30-59 ml/min ([facility]) [N18.30] 02/19/2022 Yes   • Essential hypertension [I10] 02/19/2022 Yes        Brief Hospital Course to date:  Chito Rod is a 71 y.o. male with a history of A. fib on chronic anticoagulation, hypertension, CHF, COPD, FAUSTO, diabetes who is being brought in by his caregiver because he was found down.       Pneumonia secondary to COVID-19  Presumed secondary bacterial pneumonia, community-acquired (right middle and right lower lobe on imaging)  Acute sepsis  Acute encephalopathy, improved  History of COPD on chronic oxygen  -CT chest with right middle lobe pneumonia, mild atelectasis, likely emphysema  -Blood cultures 1 out of 2 positive for gram-positive cocci in chains,  -Tested positive for Covid on 2/19/2022.  Continue isolation through 3/11/2022   -Completed 5 days of remdesivir  -Completed 10 days of Decadron.  -Received Actemra 2/23  -Anticoagulated with Eliquis  -Completed 7 days of empiric antibiotics with doxycycline and Rocephin   -Oxygen requirements have resolved and he is now on room air     Diarrhea  - continue probiotic  - off abx. Improving.     Positive blood culture   -Likely contaminant    A. fib with RVR  NSTEMI type 2  History of systolic congestive heart failure  Hypertension  CAD  Hyperlipidemia  -Cardiology evaluated  - No clinical signs of ACS  -continue metoprolol, dig and Eliquis  -Continue Lipitor     History of dementia  - CT head with no acute findings  -Family interested in long-term care, discussed with case management and also his Sister Kristyn who is POA  -CM following for disposition     Diabetes mellitus type 2  Steroid related hyperglycemia  -Continue Levemir, schedule qac and SSI   - continue home Jardiance     Depression  - pt agreeable to initiation Lexapro 10mg, if no side effect plan to increase to 20mg on 3/6/22    DVT prophylaxis:  Medical and mechanical DVT prophylaxis orders are present.        AM-PAC 6 Clicks Score (PT): 17 (03/02/22 1310)    Disposition: I expect the patient to be discharged to SNF when bed available. Medically ready.    CODE STATUS:   Code Status and Medical Interventions:   Ordered at: 02/19/22 1442     Level Of Support Discussed With:    Patient     Code Status (Patient has no pulse and is not breathing):    CPR (Attempt to Resuscitate)     Medical Interventions (Patient has pulse or is breathing):    Full Support       This patient's problems and plans were partially entered by my partner and updated as appropriate by me 03/04/22. Today is my first day evaluating this patient's active medical problems. I Personally reviewed chart and adjusted note to reflect daily changes in management/clinical condition      Grace Saeed MD  03/04/22

## 2022-03-04 NOTE — PLAN OF CARE
Goal Outcome Evaluation:  Plan of Care Reviewed With: patient        Progress: no change  Outcome Summary: No acute events overnight. Afib on monitor. Tele dc'd per MD order.  Denies CP or SOA. Slept on and off. VSS. Will cont with POC

## 2022-03-05 LAB
GLUCOSE BLDC GLUCOMTR-MCNC: 106 MG/DL (ref 70–130)
GLUCOSE BLDC GLUCOMTR-MCNC: 131 MG/DL (ref 70–130)
GLUCOSE BLDC GLUCOMTR-MCNC: 152 MG/DL (ref 70–130)
GLUCOSE BLDC GLUCOMTR-MCNC: 211 MG/DL (ref 70–130)

## 2022-03-05 PROCEDURE — 63710000001 INSULIN LISPRO (HUMAN) PER 5 UNITS: Performed by: INTERNAL MEDICINE

## 2022-03-05 PROCEDURE — 99232 SBSQ HOSP IP/OBS MODERATE 35: CPT | Performed by: NURSE PRACTITIONER

## 2022-03-05 PROCEDURE — 82962 GLUCOSE BLOOD TEST: CPT

## 2022-03-05 RX ADMIN — MICONAZOLE NITRATE: 20 POWDER TOPICAL at 22:44

## 2022-03-05 RX ADMIN — METOPROLOL TARTRATE 75 MG: 50 TABLET, FILM COATED ORAL at 22:28

## 2022-03-05 RX ADMIN — ESCITALOPRAM OXALATE 10 MG: 10 TABLET ORAL at 22:28

## 2022-03-05 RX ADMIN — APIXABAN 5 MG: 5 TABLET, FILM COATED ORAL at 22:28

## 2022-03-05 RX ADMIN — Medication 2 PACKET: at 09:42

## 2022-03-05 RX ADMIN — EMPAGLIFLOZIN 10 MG: 10 TABLET, FILM COATED ORAL at 09:41

## 2022-03-05 RX ADMIN — APIXABAN 5 MG: 5 TABLET, FILM COATED ORAL at 09:41

## 2022-03-05 RX ADMIN — INSULIN LISPRO 2 UNITS: 100 INJECTION, SOLUTION INTRAVENOUS; SUBCUTANEOUS at 09:41

## 2022-03-05 RX ADMIN — METOPROLOL TARTRATE 75 MG: 50 TABLET, FILM COATED ORAL at 06:44

## 2022-03-05 RX ADMIN — Medication 250 MG: at 22:28

## 2022-03-05 RX ADMIN — DONEPEZIL HYDROCHLORIDE 10 MG: 10 TABLET, FILM COATED ORAL at 22:28

## 2022-03-05 RX ADMIN — Medication 250 MG: at 09:41

## 2022-03-05 RX ADMIN — INSULIN LISPRO 4 UNITS: 100 INJECTION, SOLUTION INTRAVENOUS; SUBCUTANEOUS at 17:15

## 2022-03-05 RX ADMIN — ATORVASTATIN CALCIUM 40 MG: 40 TABLET, FILM COATED ORAL at 22:28

## 2022-03-05 RX ADMIN — DIGOXIN 250 MCG: 250 TABLET ORAL at 09:41

## 2022-03-05 NOTE — PLAN OF CARE
Goal Outcome Evaluation:  Plan of Care Reviewed With: patient        Progress: no change  Outcome Evaluation: No acute events overnight. Slept most of the night. No c/o pain or discomfort. VSS. Will cont with POC

## 2022-03-05 NOTE — PROGRESS NOTES
[facility] Medicine Services  PROGRESS NOTE    Patient Name: Chito Rod  : 1950  MRN: [id number]    Date of Admission: 2022  Primary Care Physician: Provider, No Known    Subjective   Subjective     CC:  Follow-up COVID-19 pneumonia    HPI:  Patient seen resting in bed no apparent distress.  No acute events overnight per nursing.  Reports she is feeling well currently.  Slept well overnight.  He enjoyed breakfast.  Denies any shortness of breath or cough. No new complaints at this time.    ROS:  Gen- No fevers, chills  CV- No chest pain, palpitations  Resp- No cough, dyspnea  GI- No N/V/D, abd pain    Objective   Objective     Vital Signs:   Temp:  [97.8 °F (36.6 °C)] 97.8 °F (36.6 °C)  Heart Rate:  [54-67] 60  Resp:  [16-20] 16  BP: ()/(57-79) 106/67     Physical Exam:  Constitutional: No acute distress, awake, alert, chronically ill-appearing  HENT: NCAT, mucous membranes moist  Respiratory: Clear to auscultation bilaterally, respiratory effort normal   Cardiovascular: irregular, no murmurs, cap refill brisk   Gastrointestinal: Positive bowel sounds, soft, nontender, nondistended  Musculoskeletal: No BLE edema   Psychiatric: Appropriate affect, cooperative  Neurologic: Oriented to person/place, confused at times, moves all extremities, speech clear  Skin: warm, dry, no visible rash     Results Reviewed:  LAB RESULTS:      Lab 22  1420   WBC 6.67   HEMOGLOBIN 13.4   HEMATOCRIT 43.5   PLATELETS 251   NEUTROS ABS 4.51   IMMATURE GRANS (ABS) 0.11*   LYMPHS ABS 1.51   MONOS ABS 0.44   EOS ABS 0.07   MCV 96.0         Lab 22  1420   SODIUM 135*   POTASSIUM 5.0   CHLORIDE 100   CO2 21.0*   ANION GAP 14.0   BUN 18   CREATININE 1.03   GLUCOSE 213*   CALCIUM 8.7   PHOSPHORUS 3.8         Lab 22  1420   ALBUMIN 3.50                     Brief Urine Lab Results  (Last result in the past 365 days)      Color   Clarity   Blood   Leuk Est   Nitrite   Protein   CREAT    Urine HCG        02/19/22 1217 Dark Yellow   Cloudy   Negative   Trace   Negative   >=300 mg/dL (3+)                 Microbiology Results Abnormal     Procedure Component Value - Date/Time    Aspergillus Galactomannan Antigen - Blood, Arm, Left [[id number]] Collected: 02/23/22 1416    Lab Status: Final result Specimen: Blood from Arm, Left Updated: 02/27/22 0006     Aspergillus Ag, BAL/Serum 0.03 Index     Narrative:      Performed at:  [address] [place] [address]  [id number]  : Baldemar Head MD, Phone:  [phone]    Blood Culture - Blood, Arm, Right [[id number]]  (Normal) Collected: 02/21/22 1933    Lab Status: Final result Specimen: Blood from Arm, Right Updated: 02/26/22 2015     Blood Culture No growth at 5 days    Blood Culture - Blood, Hand, Left [[id number]]  (Normal) Collected: 02/21/22 1932    Lab Status: Final result Specimen: Blood from Hand, Left Updated: 02/26/22 2015     Blood Culture No growth at 5 days    Blood Culture - Blood, Arm, Right [[id number]]  (Normal) Collected: 02/19/22 1200    Lab Status: Final result Specimen: Blood from Arm, Right Updated: 02/24/22 1230     Blood Culture No growth at 5 days    Blood Culture ID, PCR - Blood, Arm, Right [[id number]]  (Normal) Collected: 02/19/22 1150    Lab Status: Final result Specimen: Blood from Arm, Right Updated: 02/21/22 0734     BCID, PCR Negative by BCID PCR. Culture to Follow.          No radiology results from the last 24 hrs    Results for orders placed during the hospital encounter of 02/19/22    Adult Transthoracic Echo Complete W/ Cont if Necessary Per Protocol    Interpretation Summary  · Technically difficult study due to body habitus, atrial fibrillation with RVR, and on gated study due to Covid protocol.  · Left ventricular systolic function is moderately decreased. Estimated left ventricular EF = 30%. Regional wall motion abnormality cannot be properly assessed on the basis of the study  · The  cardiac valves are poorly visualized. No significant stenosis or regurgitation of the aortic, mitral, or tricuspid valves was seen.  · Estimated right ventricular systolic pressure from tricuspid regurgitation is normal (<35 mmHg).      I have reviewed the medications:  Scheduled Meds:apixaban, 5 mg, Oral, Q12H  atorvastatin, 40 mg, Oral, Nightly  digoxin, 250 mcg, Oral, Daily  donepezil, 10 mg, Oral, Nightly  empagliflozin, 10 mg, Oral, Daily  escitalopram, 10 mg, Oral, Nightly  insulin detemir, 40 Units, Subcutaneous, Nightly  insulin lispro, 0-9 Units, Subcutaneous, TID AC  metoprolol tartrate, 75 mg, Oral, Q8H  miconazole, , Topical, Q12H  Nutrisource fiber, 2 packet, Oral, BID  saccharomyces boulardii, 250 mg, Oral, BID  sodium chloride, 10 mL, Intravenous, Q12H      Continuous Infusions:   PRN Meds:.•  acetaminophen  •  albuterol sulfate HFA  •  dextrose  •  dextrose  •  glucagon (human recombinant)  •  loperamide  •  magnesium sulfate **OR** magnesium sulfate **OR** magnesium sulfate  •  metoprolol tartrate  •  potassium chloride **OR** potassium chloride **OR** potassium chloride  •  sodium chloride  •  sodium chloride    Assessment/Plan   Assessment & Plan     Active Hospital Problems    Diagnosis  POA   • **Pneumonia due to COVID-19 virus [U07.1, J12.82]  Yes   • Permanent atrial fibrillation ([facility]) [I48.21]  Yes   • Coronary artery disease involving native coronary artery of native heart with angina pectoris ([facility]) [I25.119]  Yes   • Chronic systolic congestive heart failure ([facility]) [I50.22]  Yes   • Hyperlipidemia LDL goal <70 [E78.5]  Yes   • Type 2 diabetes mellitus with hyperglycemia, with long-term current use of insulin ([facility]) [E11.65, Z79.4]  Not Applicable   • Oxygen dependent [Z99.81]  Not Applicable   • COPD (chronic obstructive pulmonary disease) ([facility]) [J44.9]  Yes      Resolved Hospital Problems    Diagnosis Date Resolved POA   • Pneumonia due to infectious organism [J18.9] 02/19/2022 Yes   •  Persistent atrial fibrillation (HCC) [I48.19] 02/19/2022 Yes   • Dementia (HCC) [F03.90] 02/19/2022 Yes   • CKD (chronic kidney disease) stage 3, GFR 30-59 ml/min (HCC) [N18.30] 02/19/2022 Yes   • Essential hypertension [I10] 02/19/2022 Yes        Brief Hospital Course to date:  Chito Rod is a 71 y.o. male with a history of A. fib on chronic anticoagulation, hypertension, CHF, COPD, FAUSTO, diabetes who presented on 2/19/2022 after being found down by his caregiver. CT chest with right middle lobe pneumonia, mild atelectasis, likely emphysema. Tested positive for Covid on 2/19/2022. He completed treatment with remdesivir and dexamethasone. Currently stable on room air. CM is following for LTC/SNF placement.      This patient's problems and plans were partially entered by my partner and updated as appropriate by me 03/05/22.    Pneumonia secondary to COVID-19  Presumed secondary bacterial pneumonia, community-acquired (right middle and right lower lobe on imaging)  Acute sepsis  Acute encephalopathy, improved  History of COPD on chronic oxygen  -CT chest with right middle lobe pneumonia, mild atelectasis, likely emphysema  -Blood cultures 1 out of 2 positive for gram-positive cocci in chains,  -Tested positive for Covid on 2/19/2022.  Continue isolation through 3/11/2022   -Completed 5 days of remdesivir  -Completed 10 days of Decadron.  -Received Actemra 2/23  -Anticoagulated with Eliquis  -Completed 7 days of empiric antibiotics with doxycycline and Rocephin   -Currently stable on room air  -A.m. labs     Diarrhea  - continue probiotic  - off abx. Improving     Positive blood culture   -Likely contaminant     A. fib with RVR  NSTEMI type 2  History of systolic congestive heart failure  Hypertension  CAD  Hyperlipidemia  -Cardiology evaluated  - No clinical signs of ACS  -continue metoprolol, dig and Eliquis  -Continue Lipitor     History of dementia  - CT head with no acute findings  -Family interested in  long-term care, discussed with case management and also his Sister Kristyn who is POA  -CM following for disposition     Diabetes mellitus type 2  Steroid related hyperglycemia  -Continue Levemir, schedule qac and SSI   - continue home Jardiance     Depression  - pt agreeable to initiation Lexapro 10mg, if no side effect plan to increase to 20mg on 3/6/22     DVT prophylaxis:  Medical and mechanical DVT prophylaxis orders are present.         AM-PAC 6 Clicks Score (PT): 17 (03/02/22 1310)     Disposition: I expect the patient to be discharged to SNF when bed available. Medically ready.    CODE STATUS:   Code Status and Medical Interventions:   Ordered at: 02/19/22 1442     Level Of Support Discussed With:    Patient     Code Status (Patient has no pulse and is not breathing):    CPR (Attempt to Resuscitate)     Medical Interventions (Patient has pulse or is breathing):    Full Support       Cornell Dudley, OTILIA  03/05/22

## 2022-03-05 NOTE — PLAN OF CARE
Goal Outcome Evaluation:   Patient had a good day. Blood pressure on the lower end in which afternoon metoprolol was held; AFIB on the monitor as patient has kept telemetry on and has remained on room air. Multiple episodes of incontinent urine in which patient lays in it until nursing staff gets him motivated to move and get cleaned up. Disoriented to time, place and situation. Up with the assistance of one; expresses the want to just rest today. No IV in place; orders for this are present. No other comlaints; will continue to monitor.

## 2022-03-06 LAB
ANION GAP SERPL CALCULATED.3IONS-SCNC: 9 MMOL/L (ref 5–15)
BUN SERPL-MCNC: 18 MG/DL (ref 8–23)
BUN/CREAT SERPL: 20.7 (ref 7–25)
CALCIUM SPEC-SCNC: 8.4 MG/DL (ref 8.6–10.5)
CHLORIDE SERPL-SCNC: 106 MMOL/L (ref 98–107)
CO2 SERPL-SCNC: 27 MMOL/L (ref 22–29)
CREAT SERPL-MCNC: 0.87 MG/DL (ref 0.76–1.27)
DEPRECATED RDW RBC AUTO: 49.6 FL (ref 37–54)
EGFRCR SERPLBLD CKD-EPI 2021: 92.8 ML/MIN/1.73
ERYTHROCYTE [DISTWIDTH] IN BLOOD BY AUTOMATED COUNT: 14.7 % (ref 12.3–15.4)
GLUCOSE BLDC GLUCOMTR-MCNC: 106 MG/DL (ref 70–130)
GLUCOSE BLDC GLUCOMTR-MCNC: 125 MG/DL (ref 70–130)
GLUCOSE BLDC GLUCOMTR-MCNC: 158 MG/DL (ref 70–130)
GLUCOSE BLDC GLUCOMTR-MCNC: 164 MG/DL (ref 70–130)
GLUCOSE BLDC GLUCOMTR-MCNC: 215 MG/DL (ref 70–130)
GLUCOSE SERPL-MCNC: 140 MG/DL (ref 65–99)
HCT VFR BLD AUTO: 46.7 % (ref 37.5–51)
HGB BLD-MCNC: 14.7 G/DL (ref 13–17.7)
MAGNESIUM SERPL-MCNC: 2 MG/DL (ref 1.6–2.4)
MCH RBC QN AUTO: 29.6 PG (ref 26.6–33)
MCHC RBC AUTO-ENTMCNC: 31.5 G/DL (ref 31.5–35.7)
MCV RBC AUTO: 94 FL (ref 79–97)
PLATELET # BLD AUTO: 141 10*3/MM3 (ref 140–450)
PMV BLD AUTO: 11.2 FL (ref 6–12)
POTASSIUM SERPL-SCNC: 4.3 MMOL/L (ref 3.5–5.2)
RBC # BLD AUTO: 4.97 10*6/MM3 (ref 4.14–5.8)
SODIUM SERPL-SCNC: 142 MMOL/L (ref 136–145)
WBC NRBC COR # BLD: 10.81 10*3/MM3 (ref 3.4–10.8)

## 2022-03-06 PROCEDURE — 85027 COMPLETE CBC AUTOMATED: CPT | Performed by: NURSE PRACTITIONER

## 2022-03-06 PROCEDURE — 97530 THERAPEUTIC ACTIVITIES: CPT

## 2022-03-06 PROCEDURE — 63710000001 INSULIN DETEMIR PER 5 UNITS: Performed by: FAMILY MEDICINE

## 2022-03-06 PROCEDURE — 83735 ASSAY OF MAGNESIUM: CPT | Performed by: NURSE PRACTITIONER

## 2022-03-06 PROCEDURE — 99232 SBSQ HOSP IP/OBS MODERATE 35: CPT | Performed by: NURSE PRACTITIONER

## 2022-03-06 PROCEDURE — 63710000001 INSULIN LISPRO (HUMAN) PER 5 UNITS: Performed by: INTERNAL MEDICINE

## 2022-03-06 PROCEDURE — 82962 GLUCOSE BLOOD TEST: CPT

## 2022-03-06 PROCEDURE — 80048 BASIC METABOLIC PNL TOTAL CA: CPT | Performed by: NURSE PRACTITIONER

## 2022-03-06 RX ORDER — ESCITALOPRAM OXALATE 20 MG/1
20 TABLET ORAL NIGHTLY
Status: DISCONTINUED | OUTPATIENT
Start: 2022-03-06 | End: 2022-03-11 | Stop reason: HOSPADM

## 2022-03-06 RX ADMIN — DIGOXIN 250 MCG: 250 TABLET ORAL at 08:59

## 2022-03-06 RX ADMIN — DONEPEZIL HYDROCHLORIDE 10 MG: 10 TABLET, FILM COATED ORAL at 21:50

## 2022-03-06 RX ADMIN — Medication 250 MG: at 08:59

## 2022-03-06 RX ADMIN — EMPAGLIFLOZIN 10 MG: 10 TABLET, FILM COATED ORAL at 08:59

## 2022-03-06 RX ADMIN — MICONAZOLE NITRATE: 20 POWDER TOPICAL at 21:50

## 2022-03-06 RX ADMIN — Medication 2 PACKET: at 09:00

## 2022-03-06 RX ADMIN — Medication 2 PACKET: at 21:52

## 2022-03-06 RX ADMIN — Medication 250 MG: at 21:50

## 2022-03-06 RX ADMIN — ESCITSLOPRAM 20 MG: 20 TABLET ORAL at 21:50

## 2022-03-06 RX ADMIN — APIXABAN 5 MG: 5 TABLET, FILM COATED ORAL at 21:50

## 2022-03-06 RX ADMIN — MICONAZOLE NITRATE: 20 POWDER TOPICAL at 09:00

## 2022-03-06 RX ADMIN — METOPROLOL TARTRATE 75 MG: 50 TABLET, FILM COATED ORAL at 21:50

## 2022-03-06 RX ADMIN — INSULIN LISPRO 2 UNITS: 100 INJECTION, SOLUTION INTRAVENOUS; SUBCUTANEOUS at 18:28

## 2022-03-06 RX ADMIN — SODIUM CHLORIDE, PRESERVATIVE FREE 10 ML: 5 INJECTION INTRAVENOUS at 09:00

## 2022-03-06 RX ADMIN — APIXABAN 5 MG: 5 TABLET, FILM COATED ORAL at 08:59

## 2022-03-06 RX ADMIN — ATORVASTATIN CALCIUM 40 MG: 40 TABLET, FILM COATED ORAL at 21:50

## 2022-03-06 RX ADMIN — METOPROLOL TARTRATE 75 MG: 50 TABLET, FILM COATED ORAL at 15:19

## 2022-03-06 RX ADMIN — INSULIN DETEMIR 40 UNITS: 100 INJECTION, SOLUTION SUBCUTANEOUS at 22:00

## 2022-03-06 NOTE — PLAN OF CARE
Goal Outcome Evaluation:              Outcome Evaluation: No changes overnight. Pt rested well. VSS, RA, Afib on monitor. Metoprolol held once this am for HR 59 and SBP 92. No further complaints at this time.

## 2022-03-06 NOTE — THERAPY DISCHARGE NOTE
Patient Name: Chito Rod  : 1950    MRN: [id number]                              Today's Date: 3/6/2022       Admit Date: 2022    Visit Dx:     ICD-10-CM ICD-9-CM   1. Altered mental status, unspecified altered mental status type  R41.82 780.97   2. COVID-19 virus infection  U07.1 079.89   3. Dehydration  E86.0 276.51     Patient Active Problem List   Diagnosis   • FAUSTO (obstructive sleep apnea)   • COPD (chronic obstructive pulmonary disease) ([facility])   • GERD (gastroesophageal reflux disease)   • Type 2 diabetes mellitus with hyperglycemia, with long-term current use of insulin ([facility])   • Hyperlipidemia LDL goal <70   • Oxygen dependent   • Chronic systolic congestive heart failure ([facility])   • Pneumonia due to COVID-19 virus   • Permanent atrial fibrillation ([facility])   • Coronary artery disease involving native coronary artery of native heart with angina pectoris ([facility])     Past Medical History:   Diagnosis Date   • Atrial fibrillation ([facility])    • CAD (coronary artery disease)    • CHF (congestive heart failure) ([facility])    • Chronic anticoagulation    • CKD (chronic kidney disease) stage 3, GFR 30-59 ml/min ([facility])    • COPD (chronic obstructive pulmonary disease) ([facility])    • DM2 (diabetes mellitus, type 2) ([facility])    • GERD (gastroesophageal reflux disease)    • History of MI (myocardial infarction)    • HLD (hyperlipidemia)    • HTN (hypertension)    • Morbid obesity ([facility])    • Noncompliance    • FAUSTO (obstructive sleep apnea)    • Oxygen dependent      Past Surgical History:   Procedure Laterality Date   • APPENDECTOMY     • CORONARY STENT PLACEMENT     • HAND SURGERY Left    • KNEE ARTHROSCOPY Left       General Information    No documentation.                Mobility    No documentation.                Obj/Interventions    No documentation.                Goals/Plan     Row Name 22 1420          Bed Mobility Goal 1 (PT)    Progress/Outcomes (Bed Mobility Goal 1, PT) goal not met  -LS     Row  Name 03/06/22 1420          Transfer Goal 1 (PT)    Progress/Outcome (Transfer Goal 1, PT) goal not met  -LS     Row Name 03/06/22 1420          Gait Training Goal 1 (PT)    Progress/Outcome (Gait Training Goal 1, PT) goal not met  -LS           User Key  (r) = Recorded By, (t) = Taken By, (c) = Cosigned By    Initials Name Provider Type    LS Carol Ramirez, PT Physical Therapist               Clinical Impression    No documentation.                Outcome Measures    No documentation.               Physical Therapy Education                 Title: PT OT SLP Therapies (In Progress)     Topic: Physical Therapy (In Progress)     Point: Mobility training (Done)     Learning Progress Summary           Patient Acceptance, E,D, VU,NR by  at 3/2/2022 1311    Acceptance, E, NR by NS at 2/28/2022 1628    Acceptance, E,D, VU,NR by  at 2/25/2022 1347    Acceptance, E,D, VU,NR by  at 2/22/2022 1400                   Point: Home exercise program (In Progress)     Learning Progress Summary           Patient Acceptance, E, NR by  at 2/28/2022 1638                   Point: Body mechanics (Done)     Learning Progress Summary           Patient Acceptance, E,D, VU,NR by  at 3/2/2022 1311    Acceptance, E, NR by NS at 2/28/2022 1628    Acceptance, E,D, VU,NR by  at 2/25/2022 1347    Acceptance, E,D, VU,NR by  at 2/22/2022 1400                   Point: Precautions (Done)     Learning Progress Summary           Patient Acceptance, E,D, VU,NR by  at 3/2/2022 1311    Acceptance, E, NR by NS at 2/28/2022 1628    Acceptance, E,D, VU,NR by  at 2/25/2022 1347    Acceptance, E,D, VU,NR by  at 2/22/2022 1400                               User Key     Initials Effective Dates Name Provider Type Discipline    NS 06/16/21 -  Berta Beltre, PT Physical Therapist PT    HP 06/01/21 -  Melissa Morrison, PT Physical Therapist PT    RS 01/24/22 -  Landen Messer, RN Registered Nurse Nurse              PT Recommendation and Plan   Pt  continues to decline therapy participation despite significant encouragement and education. Discussed POC with pt and RN. Will d/c PT at this time.        Time Calculation:         PT G-Codes  Outcome Measure Options: AM-PAC 6 Clicks Basic Mobility (PT)  AM-PAC 6 Clicks Score (PT): 17  AM-PAC 6 Clicks Score (OT): 8    PT Discharge Summary  Reason for Discharge: Non-compliant  Outcomes Achieved: Refer to plan of care for updates on goals achieved    Carol Ramirez, PT  3/6/2022

## 2022-03-06 NOTE — PLAN OF CARE
Goal Outcome Evaluation:  Plan of Care Reviewed With: patient        Progress: no change  Outcome Evaluation: Pt declining to participate in skilled OT session this date despite multiple attempts, continuous education and encouragement. Mod I for bed mobility - rolling from sidelying to the L to speak w/ therapist. Pt to be d/c from skilled OT services at this time d/t lack of participation. Recommendation upon d/c for SNF.

## 2022-03-06 NOTE — THERAPY DISCHARGE NOTE
Acute Care - Occupational Therapy Discharge  [facility]    Patient Name: Chito Rod  : 1950    MRN: [id number]                              Today's Date: 3/6/2022       Admit Date: 2022    Visit Dx:     ICD-10-CM ICD-9-CM   1. Altered mental status, unspecified altered mental status type  R41.82 780.97   2. COVID-19 virus infection  U07.1 079.89   3. Dehydration  E86.0 276.51     Patient Active Problem List   Diagnosis   • FAUSTO (obstructive sleep apnea)   • COPD (chronic obstructive pulmonary disease) ([facility])   • GERD (gastroesophageal reflux disease)   • Type 2 diabetes mellitus with hyperglycemia, with long-term current use of insulin ([facility])   • Hyperlipidemia LDL goal <70   • Oxygen dependent   • Chronic systolic congestive heart failure ([facility])   • Pneumonia due to COVID-19 virus   • Permanent atrial fibrillation ([facility])   • Coronary artery disease involving native coronary artery of native heart with angina pectoris ([facility])     Past Medical History:   Diagnosis Date   • Atrial fibrillation ([facility])    • CAD (coronary artery disease)    • CHF (congestive heart failure) ([facility])    • Chronic anticoagulation    • CKD (chronic kidney disease) stage 3, GFR 30-59 ml/min ([facility])    • COPD (chronic obstructive pulmonary disease) ([facility])    • DM2 (diabetes mellitus, type 2) ([facility])    • GERD (gastroesophageal reflux disease)    • History of MI (myocardial infarction)    • HLD (hyperlipidemia)    • HTN (hypertension)    • Morbid obesity ([facility])    • Noncompliance    • FAUSTO (obstructive sleep apnea)    • Oxygen dependent      Past Surgical History:   Procedure Laterality Date   • APPENDECTOMY     • CORONARY STENT PLACEMENT     • HAND SURGERY Left    • KNEE ARTHROSCOPY Left       General Information     Row Name 22 1416          OT Time and Intention    Document Type discharge treatment  -MR     Mode of Treatment occupational therapy;individual therapy  -MR     Row Name 22 1416          General  Information    Patient Profile Reviewed yes  -MR     Existing Precautions/Restrictions fall;oxygen therapy device and L/min;other (see comments)  Contact and airborne (COVID)  -MR     Barriers to Rehab medically complex;uncooperative;ineffective coping  -MR     Row Name 03/06/22 1416          Cognition    Orientation Status (Cognition) oriented to;person;disoriented to;place;situation;time  -MR     Row Name 03/06/22 1416          Safety Issues, Functional Mobility    Safety Issues Affecting Function (Mobility) ability to follow commands;at risk behavior observed;awareness of need for assistance;impulsivity;insight into deficits/self-awareness;judgment;problem-solving;safety precaution awareness;safety precautions follow-through/compliance;sequencing abilities  -MR     Impairments Affecting Function (Mobility) balance;cognition;endurance/activity tolerance;motor planning;shortness of breath  -MR     Cognitive Impairments, Mobility Safety/Performance attention;awareness, need for assistance;impulsivity;insight into deficits/self-awareness;judgment;problem-solving/reasoning;safety precaution awareness;safety precaution follow-through;sequencing abilities  -MR           User Key  (r) = Recorded By, (t) = Taken By, (c) = Cosigned By    Initials Name Provider Type    Yeni Castillo, OT Occupational Therapist               Mobility/ADL's     Row Name 03/06/22 1417          Bed Mobility    Bed Mobility rolling left  -MR     Rolling Left [place] (Bed Mobility) modified independence  -MR     Comment, (Bed Mobility) Pt rolling from sidelying to the L upon OT arrival. Deferred OOB/EOB or further engagement in OT session.  -MR     Row Name 03/06/22 1417          Transfers    Comment, (Transfers) Pt deferred despite max encouragement, education and multiple attempts  -MR           User Key  (r) = Recorded By, (t) = Taken By, (c) = Cosigned By    Initials Name Provider Type    Yeni Castillo, OT Occupational Therapist                Obj/Interventions    No documentation.                Goals/Plan     Row Name 03/06/22 1423          Bed Mobility Goal 1 (OT)    Activity/Assistive Device (Bed Mobility Goal 1, OT) sit to supine;supine to sit  -MR     [place] Level/Cues Needed (Bed Mobility Goal 1, OT) minimum assist (75% or more patient effort);verbal cues required  -MR     Time Frame (Bed Mobility Goal 1, OT) long term goal (LTG);10 days  -MR     Progress/Outcomes (Bed Mobility Goal 1, OT) goal met  -MR     Row Name 03/06/22 1423          Transfer Goal 1 (OT)    Activity/Assistive Device (Transfer Goal 1, OT) bed-to-chair/chair-to-bed;commode, bedside without drop arms;walker, rolling  -MR     [place] Level/Cues Needed (Transfer Goal 1, OT) minimum assist (75% or more patient effort);verbal cues required  -MR     Time Frame (Transfer Goal 1, OT) long term goal (LTG);10 days  -MR     Progress/Outcome (Transfer Goal 1, OT) goal not met  -MR     Row Name 03/06/22 1423          Dressing Goal 1 (OT)    Activity/Device (Dressing Goal 1, OT) lower body dressing  -MR     [place]/Cues Needed (Dressing Goal 1, OT) moderate assist (50-74% patient effort);verbal cues required;tactile cues required  -MR     Time Frame (Dressing Goal 1, OT) long term goal (LTG);10 days  -MR     Progress/Outcome (Dressing Goal 1, OT) goal not met  -MR     Row Name 03/06/22 1423          Toileting Goal 1 (OT)    Activity/Device (Toileting Goal 1, OT) adjust/manage clothing;perform perineal hygiene;commode, bedside without drop arms  -MR     [place] Level/Cues Needed (Toileting Goal 1, OT) maximum assist (25-49% patient effort);verbal cues required  -MR     Time Frame (Toileting Goal 1, OT) long term goal (LTG);10 days  -MR     Progress/Outcome (Toileting Goal 1, OT) goal not met  -MR           User Key  (r) = Recorded By, (t) = Taken By, (c) = Cosigned By    Initials Name Provider Type    Yeni Castillo OT Occupational Therapist                Clinical Impression     Row Name 03/06/22 1419          Pain Assessment    Pretreatment Pain Rating 0/10 - no pain  -MR     Posttreatment Pain Rating 0/10 - no pain  -MR     Pre/Posttreatment Pain Comment Denied pain pre and post session  -MR     Pain Intervention(s) Ambulation/increased activity;Repositioned  -MR     Row Name 03/06/22 1419          Plan of Care Review    Plan of Care Reviewed With patient  -MR     Progress no change  -MR     Outcome Evaluation Pt declining to participate in skilled OT session this date despite multiple attempts, continuous education and encouragement. Mod I for bed mobility - rolling from sidelying to the L to speak w/ therapist. Pt to be d/c from skilled OT services at this time d/t lack of participation. Recommendation upon d/c for SNF.  -MR     Row Name 03/06/22 1419          Therapy Plan Review/Discharge Plan (OT)    Anticipated Discharge Disposition (OT) skilled nursing facility  -MR     Row Name 03/06/22 1419          Vital Signs    Pre Systolic BP Rehab --  VSS. Cleared by RN for treatment  -MR     Row Name 03/06/22 1419          Positioning and Restraints    Pre-Treatment Position in bed  -MR     Post Treatment Position bed  -MR     In Bed notified nsg;side lying right;exit alarm on;encouraged to call for assist;call light within reach;side rails up x2  -MR           User Key  (r) = Recorded By, (t) = Taken By, (c) = Cosigned By    Initials Name Provider Type    Yeni Castillo, OT Occupational Therapist               Outcome Measures     Row Name 03/06/22 1424          How much help from another is currently needed...    Putting on and taking off regular lower body clothing? 1  -MR     Bathing (including washing, rinsing, and drying) 1  -MR     Toileting (which includes using toilet bed pan or urinal) 1  -MR     Putting on and taking off regular upper body clothing 1  -MR     Taking care of personal grooming (such as brushing teeth) 1  -MR     Eating meals 3  -MR      AM-PAC 6 Clicks Score (OT) 8  -MR     Row Name 03/06/22 1424          Functional Assessment    Outcome Measure Options AM-PAC 6 Clicks Daily Activity (OT)  -MR           User Key  (r) = Recorded By, (t) = Taken By, (c) = Cosigned By    Initials Name Provider Type    Yeni Castillo, OT Occupational Therapist              Occupational Therapy Education                 Title: PT OT SLP Therapies (In Progress)     Topic: Occupational Therapy (In Progress)     Point: ADL training (In Progress)     Description:   Instruct learner(s) on proper safety adaptation and remediation techniques during self care or transfers.   Instruct in proper use of assistive devices.              Learning Progress Summary           Patient Acceptance, E, NR,NL by MR at 3/6/2022 1425    Nonacceptance, E, NL,NR by FRANCESCO at 2/28/2022 1635    Comment: OT POC; safety awareness    Acceptance, E, NR by MA at 2/23/2022 0917                   Point: Home exercise program (In Progress)     Description:   Instruct learner(s) on appropriate technique for monitoring, assisting and/or progressing therapeutic exercises/activities.              Learning Progress Summary           Patient Acceptance, E, NR,NL by MR at 3/6/2022 1425    Acceptance, E, VU by  at 2/26/2022 0448                   Point: Precautions (In Progress)     Description:   Instruct learner(s) on prescribed precautions during self-care and functional transfers.              Learning Progress Summary           Patient Acceptance, E, NR,NL by MR at 3/6/2022 1425    Nonacceptance, E, NL,NR by FRANCESCO at 2/28/2022 1635    Comment: OT POC; safety awareness    Acceptance, E, NR by MA at 2/23/2022 0917                   Point: Body mechanics (In Progress)     Description:   Instruct learner(s) on proper positioning and spine alignment during self-care, functional mobility activities and/or exercises.              Learning Progress Summary           Patient Acceptance, E, NR,NL by MR at 3/6/2022 1425     Acceptance, E, NR by MA at 2/23/2022 0917                               User Key     Initials Effective Dates Name Provider Type Discipline    FRANCESCO 06/16/21 -  Marcia Holden OT Occupational Therapist OT    MA 11/19/20 -  Kate Amor, OT Occupational Therapist OT     10/06/21 -  Zain Yeni, OT Occupational Therapist OT     02/01/22 -  Kya Menon, RN Registered Nurse Nurse              OT Recommendation and Plan  Retired Outcome Summary/Treatment Plan (OT)  Anticipated Discharge Disposition (OT): skilled nursing facility  Plan of Care Review  Plan of Care Reviewed With: patient  Progress: no change  Outcome Evaluation: Pt declining to participate in skilled OT session this date despite multiple attempts, continuous education and encouragement. Mod I for bed mobility - rolling from sidelying to the L to speak w/ therapist. Pt to be d/c from skilled OT services at this time d/t lack of participation. Recommendation upon d/c for SNF.  Plan of Care Reviewed With: patient  Outcome Evaluation: Pt declining to participate in skilled OT session this date despite multiple attempts, continuous education and encouragement. Mod I for bed mobility - rolling from sidelying to the L to speak w/ therapist. Pt to be d/c from skilled OT services at this time d/t lack of participation. Recommendation upon d/c for SNF.     Time Calculation:    Time Calculation- OT     Row Name 03/06/22 1426             Time Calculation- OT    OT Start Time 0939  -MR      OT Received On 03/06/22  -MR              Timed Charges    46113 - OT Therapeutic Activity Minutes 12  -MR              Total Minutes    Timed Charges Total Minutes 12  -MR       Total Minutes 12  -MR            User Key  (r) = Recorded By, (t) = Taken By, (c) = Cosigned By    Initials Name Provider Type    MR Yeni Pittman, OT Occupational Therapist              Therapy Charges for Today     Code Description Service Date Service Provider Modifiers Qty    [id number]  HC OT THERAPEUTIC ACT EA 15 MIN 3/6/2022 Kevin Pittman OT GO 1               KEVIN PITTMAN OT  3/6/2022

## 2022-03-06 NOTE — PROGRESS NOTES
[facility] Medicine Services  PROGRESS NOTE    Patient Name: Chito Rod  : 1950  MRN: [id number]    Date of Admission: 2022  Primary Care Physician: Provider, No Known    Subjective   Subjective     CC:  Follow-up COVID-19 pneumonia    HPI:  Patient seen resting in bed no apparent distress. No acute events overnight per nursing. Reports that he is feeling well. Currently awaiting LTC placement. No new complaints at this time.    ROS:  Gen- No fevers, chills  CV- No chest pain, palpitations  Resp- No cough, dyspnea  GI- No N/V/D, abd pain    Objective   Objective     Vital Signs:   Temp:  [96.9 °F (36.1 °C)-98.2 °F (36.8 °C)] 97.9 °F (36.6 °C)  Heart Rate:  [59-93] 60  Resp:  [16-18] 18  BP: ()/(49-77) 119/60     Physical Exam:  Constitutional: No acute distress, awake, alert, chronically ill-appearing  HENT: NCAT, mucous membranes moist  Respiratory: Clear to auscultation bilaterally, respiratory effort normal   Cardiovascular: Irregular, no murmurs, cap refill brisk   Gastrointestinal: Positive bowel sounds, soft, nontender, nondistended  Musculoskeletal: Trace BLE edema   Psychiatric: Appropriate affect, cooperative  Neurologic: Oriented person/place, moves all extremities, speech clear  Skin: warm, dry, no visible rash      Results Reviewed:  LAB RESULTS:      Lab 22  0256   WBC 10.81*   HEMOGLOBIN 14.7   HEMATOCRIT 46.7   PLATELETS 141   MCV 94.0         Lab 22  0256   SODIUM 142   POTASSIUM 4.3   CHLORIDE 106   CO2 27.0   ANION GAP 9.0   BUN 18   CREATININE 0.87   EGFR 92.8   GLUCOSE 140*   CALCIUM 8.4*   MAGNESIUM 2.0                         Brief Urine Lab Results  (Last result in the past 365 days)      Color   Clarity   Blood   Leuk Est   Nitrite   Protein   CREAT   Urine HCG        22 1217 Dark Yellow   Cloudy   Negative   Trace   Negative   >=300 mg/dL (3+)                 Microbiology Results Abnormal     Procedure Component Value -  Date/Time    Aspergillus Galactomannan Antigen - Blood, Arm, Left [[id number]] Collected: 02/23/22 1416    Lab Status: Final result Specimen: Blood from Arm, Left Updated: 02/27/22 0006     Aspergillus Ag, BAL/Serum 0.03 Index     Narrative:      Performed at:  [address] [place] [address]  [id number]  : Baldemar Head MD, Phone:  [phone]    Blood Culture - Blood, Arm, Right [[id number]]  (Normal) Collected: 02/21/22 1933    Lab Status: Final result Specimen: Blood from Arm, Right Updated: 02/26/22 2015     Blood Culture No growth at 5 days    Blood Culture - Blood, Hand, Left [[id number]]  (Normal) Collected: 02/21/22 1932    Lab Status: Final result Specimen: Blood from Hand, Left Updated: 02/26/22 2015     Blood Culture No growth at 5 days    Blood Culture - Blood, Arm, Right [[id number]]  (Normal) Collected: 02/19/22 1200    Lab Status: Final result Specimen: Blood from Arm, Right Updated: 02/24/22 1230     Blood Culture No growth at 5 days    Blood Culture ID, PCR - Blood, Arm, Right [[id number]]  (Normal) Collected: 02/19/22 1150    Lab Status: Final result Specimen: Blood from Arm, Right Updated: 02/21/22 0734     BCID, PCR Negative by BCID PCR. Culture to Follow.          No radiology results from the last 24 hrs    Results for orders placed during the hospital encounter of 02/19/22    Adult Transthoracic Echo Complete W/ Cont if Necessary Per Protocol    Interpretation Summary  · Technically difficult study due to body habitus, atrial fibrillation with RVR, and on gated study due to Covid protocol.  · Left ventricular systolic function is moderately decreased. Estimated left ventricular EF = 30%. Regional wall motion abnormality cannot be properly assessed on the basis of the study  · The cardiac valves are poorly visualized. No significant stenosis or regurgitation of the aortic, mitral, or tricuspid valves was seen.  · Estimated right ventricular systolic pressure  from tricuspid regurgitation is normal (<35 mmHg).      I have reviewed the medications:  Scheduled Meds:apixaban, 5 mg, Oral, Q12H  atorvastatin, 40 mg, Oral, Nightly  digoxin, 250 mcg, Oral, Daily  donepezil, 10 mg, Oral, Nightly  empagliflozin, 10 mg, Oral, Daily  escitalopram, 10 mg, Oral, Nightly  insulin detemir, 40 Units, Subcutaneous, Nightly  insulin lispro, 0-9 Units, Subcutaneous, TID AC  metoprolol tartrate, 75 mg, Oral, Q8H  miconazole, , Topical, Q12H  Nutrisource fiber, 2 packet, Oral, BID  saccharomyces boulardii, 250 mg, Oral, BID  sodium chloride, 10 mL, Intravenous, Q12H      Continuous Infusions:   PRN Meds:.•  acetaminophen  •  albuterol sulfate HFA  •  dextrose  •  dextrose  •  glucagon (human recombinant)  •  loperamide  •  magnesium sulfate **OR** magnesium sulfate **OR** magnesium sulfate  •  metoprolol tartrate  •  potassium chloride **OR** potassium chloride **OR** potassium chloride  •  sodium chloride  •  sodium chloride    Assessment/Plan   Assessment & Plan     Active Hospital Problems    Diagnosis  POA   • **Pneumonia due to COVID-19 virus [U07.1, J12.82]  Yes   • Permanent atrial fibrillation ([facility]) [I48.21]  Yes   • Coronary artery disease involving native coronary artery of native heart with angina pectoris ([facility]) [I25.119]  Yes   • Chronic systolic congestive heart failure ([facility]) [I50.22]  Yes   • Hyperlipidemia LDL goal <70 [E78.5]  Yes   • Type 2 diabetes mellitus with hyperglycemia, with long-term current use of insulin ([facility]) [E11.65, Z79.4]  Not Applicable   • Oxygen dependent [Z99.81]  Not Applicable   • COPD (chronic obstructive pulmonary disease) ([facility]) [J44.9]  Yes      Resolved Hospital Problems    Diagnosis Date Resolved POA   • Pneumonia due to infectious organism [J18.9] 02/19/2022 Yes   • Persistent atrial fibrillation ([facility]) [I48.19] 02/19/2022 Yes   • Dementia ([facility]) [F03.90] 02/19/2022 Yes   • CKD (chronic kidney disease) stage 3, GFR 30-59 ml/min ([facility]) [N18.30]  02/19/2022 Yes   • Essential hypertension [I10] 02/19/2022 Yes        Brief Hospital Course to date:  Chito Rod is a 71 y.o. male  with a history of A. fib on chronic anticoagulation, hypertension, CHF, COPD, FAUSTO, diabetes who presented on 2/19/2022 after being found down by his caregiver. CT chest with right middle lobe pneumonia, mild atelectasis, likely emphysema. Tested positive for Covid on 2/19/2022. He completed treatment with remdesivir and dexamethasone. Currently stable on room air. CM is following for LTC/SNF placement.      This patient's problems and plans were partially entered by my partner and updated as appropriate by me 03/06/22.     Pneumonia secondary to COVID-19  Presumed secondary bacterial pneumonia, community-acquired (right middle and right lower lobe on imaging)  Acute sepsis  Acute encephalopathy, improved  History of COPD on chronic oxygen  -CT chest with right middle lobe pneumonia, mild atelectasis, likely emphysema  -Blood cultures 1 out of 2 positive for gram-positive cocci in chains,  -Tested positive for Covid on 2/19/2022.  Continue isolation through 3/11/2022   -Completed 5 days of remdesivir  -Completed 10 days of Decadron.  -Received Actemra 2/23  -Anticoagulated with Eliquis  -Completed 7 days of empiric antibiotics with doxycycline and Rocephin   -Currently stable on room air     Diarrhea  - continue probiotic  - off abx. Improving     Positive blood culture   -Likely contaminant     A. fib with RVR  NSTEMI type 2  History of systolic congestive heart failure  Hypertension  CAD  Hyperlipidemia  -Cardiology evaluated  - No clinical signs of ACS  -continue metoprolol, dig and Eliquis  -Continue Lipitor     History of dementia  - CT head with no acute findings  -Family interested in long-term care, discussed with case management and also his Sister Kristyn who is POA  - following for disposition     Diabetes mellitus type 2  Steroid related hyperglycemia  -Continue Levemir,  schedule qac and SSI   - continue home Jardiance     Depression   - Lexapro increased to 20 mg daily       DVT prophylaxis:  Medical and mechanical DVT prophylaxis orders are present.         AM-PAC 6 Clicks Score (PT): 17 (03/02/22 1310)     Disposition: I expect the patient to be discharged to SNF when bed available. Medically ready.    CODE STATUS:   Code Status and Medical Interventions:   Ordered at: 02/19/22 1442     Level Of Support Discussed With:    Patient     Code Status (Patient has no pulse and is not breathing):    CPR (Attempt to Resuscitate)     Medical Interventions (Patient has pulse or is breathing):    Full Support       Cornell Dudley, OTILIA  03/06/22

## 2022-03-07 LAB
GLUCOSE BLDC GLUCOMTR-MCNC: 100 MG/DL (ref 70–130)
GLUCOSE BLDC GLUCOMTR-MCNC: 124 MG/DL (ref 70–130)
GLUCOSE BLDC GLUCOMTR-MCNC: 126 MG/DL (ref 70–130)
GLUCOSE BLDC GLUCOMTR-MCNC: 155 MG/DL (ref 70–130)
GLUCOSE BLDC GLUCOMTR-MCNC: 167 MG/DL (ref 70–130)

## 2022-03-07 PROCEDURE — 99231 SBSQ HOSP IP/OBS SF/LOW 25: CPT | Performed by: INTERNAL MEDICINE

## 2022-03-07 PROCEDURE — 63710000001 INSULIN LISPRO (HUMAN) PER 5 UNITS: Performed by: INTERNAL MEDICINE

## 2022-03-07 PROCEDURE — 82962 GLUCOSE BLOOD TEST: CPT

## 2022-03-07 RX ADMIN — INSULIN LISPRO 2 UNITS: 100 INJECTION, SOLUTION INTRAVENOUS; SUBCUTANEOUS at 11:28

## 2022-03-07 RX ADMIN — ATORVASTATIN CALCIUM 40 MG: 40 TABLET, FILM COATED ORAL at 21:03

## 2022-03-07 RX ADMIN — DONEPEZIL HYDROCHLORIDE 10 MG: 10 TABLET, FILM COATED ORAL at 21:03

## 2022-03-07 RX ADMIN — Medication 250 MG: at 09:08

## 2022-03-07 RX ADMIN — EMPAGLIFLOZIN 10 MG: 10 TABLET, FILM COATED ORAL at 09:08

## 2022-03-07 RX ADMIN — METOPROLOL TARTRATE 75 MG: 50 TABLET, FILM COATED ORAL at 06:16

## 2022-03-07 RX ADMIN — Medication 250 MG: at 21:03

## 2022-03-07 RX ADMIN — MICONAZOLE NITRATE: 20 POWDER TOPICAL at 21:03

## 2022-03-07 RX ADMIN — MICONAZOLE NITRATE: 20 POWDER TOPICAL at 09:08

## 2022-03-07 RX ADMIN — METOPROLOL TARTRATE 25 MG: 25 TABLET, FILM COATED ORAL at 21:03

## 2022-03-07 RX ADMIN — DIGOXIN 250 MCG: 250 TABLET ORAL at 09:08

## 2022-03-07 RX ADMIN — APIXABAN 5 MG: 5 TABLET, FILM COATED ORAL at 09:08

## 2022-03-07 RX ADMIN — APIXABAN 5 MG: 5 TABLET, FILM COATED ORAL at 21:03

## 2022-03-07 RX ADMIN — ESCITSLOPRAM 20 MG: 20 TABLET ORAL at 21:03

## 2022-03-07 NOTE — PROGRESS NOTES
[facility] Medicine Services  PROGRESS NOTE    Patient Name: Chito Rod  : 1950  MRN: [id number]    Date of Admission: 2022  Primary Care Physician: Provider, No Known    Subjective   Subjective     CC:  Covid    HPI:  No acute events overnight.  Blood pressures low this morning but asymptomatic.    ROS:  General: denies fevers or chills  CV: denies chest pain  Resp: denies shortness of breath  Abd: denies abd pain, nausea      Objective   Objective     Vital Signs:   Temp:  [97.2 °F (36.2 °C)-98.2 °F (36.8 °C)] 97.9 °F (36.6 °C)  Heart Rate:  [61-88] 68  Resp:  [18] 18  BP: ()/(50-84) 70/50     Physical Exam:  Constitutional: No acute distress, awake, alert, laying in stool  Respiratory: Respiratory effort normal on room air  Gastrointestinal: Positive bowel sounds, soft, nontender, nondistended  Musculoskeletal: No bilateral ankle edema  Psychiatric: Flat affect, cooperative  Skin: No rashes      Results Reviewed:  LAB RESULTS:      Lab 22  0256   WBC 10.81*   HEMOGLOBIN 14.7   HEMATOCRIT 46.7   PLATELETS 141   MCV 94.0         Lab 22  0256   SODIUM 142   POTASSIUM 4.3   CHLORIDE 106   CO2 27.0   ANION GAP 9.0   BUN 18   CREATININE 0.87   EGFR 92.8   GLUCOSE 140*   CALCIUM 8.4*   MAGNESIUM 2.0                         Brief Urine Lab Results  (Last result in the past 365 days)      Color   Clarity   Blood   Leuk Est   Nitrite   Protein   CREAT   Urine HCG        22 1217 Dark Yellow   Cloudy   Negative   Trace   Negative   >=300 mg/dL (3+)                 Microbiology Results Abnormal     Procedure Component Value - Date/Time    Aspergillus Galactomannan Antigen - Blood, Arm, Left [[id number]] Collected: 22 1416    Lab Status: Final result Specimen: Blood from Arm, Left Updated: 22 000     Aspergillus Ag, BAL/Serum 0.03 Index     Narrative:      Performed at:  [address] [place] [address]  [id number]  Lab  Director: Baldemar Head MD, Phone:  [phone]    Blood Culture - Blood, Arm, Right [[id number]]  (Normal) Collected: 02/21/22 1933    Lab Status: Final result Specimen: Blood from Arm, Right Updated: 02/26/22 2015     Blood Culture No growth at 5 days    Blood Culture - Blood, Hand, Left [[id number]]  (Normal) Collected: 02/21/22 1932    Lab Status: Final result Specimen: Blood from Hand, Left Updated: 02/26/22 2015     Blood Culture No growth at 5 days    Blood Culture - Blood, Arm, Right [[id number]]  (Normal) Collected: 02/19/22 1200    Lab Status: Final result Specimen: Blood from Arm, Right Updated: 02/24/22 1230     Blood Culture No growth at 5 days    Blood Culture ID, PCR - Blood, Arm, Right [[id number]]  (Normal) Collected: 02/19/22 1150    Lab Status: Final result Specimen: Blood from Arm, Right Updated: 02/21/22 0734     BCID, PCR Negative by BCID PCR. Culture to Follow.          No radiology results from the last 24 hrs    Results for orders placed during the hospital encounter of 02/19/22    Adult Transthoracic Echo Complete W/ Cont if Necessary Per Protocol    Interpretation Summary  · Technically difficult study due to body habitus, atrial fibrillation with RVR, and on gated study due to Covid protocol.  · Left ventricular systolic function is moderately decreased. Estimated left ventricular EF = 30%. Regional wall motion abnormality cannot be properly assessed on the basis of the study  · The cardiac valves are poorly visualized. No significant stenosis or regurgitation of the aortic, mitral, or tricuspid valves was seen.  · Estimated right ventricular systolic pressure from tricuspid regurgitation is normal (<35 mmHg).      I have reviewed the medications:  Scheduled Meds:apixaban, 5 mg, Oral, Q12H  atorvastatin, 40 mg, Oral, Nightly  digoxin, 250 mcg, Oral, Daily  donepezil, 10 mg, Oral, Nightly  empagliflozin, 10 mg, Oral, Daily  escitalopram, 20 mg, Oral, Nightly  insulin detemir, 40 Units,  Subcutaneous, Nightly  insulin lispro, 0-9 Units, Subcutaneous, TID AC  metoprolol tartrate, 75 mg, Oral, Q8H  miconazole, , Topical, Q12H  Nutrisource fiber, 2 packet, Oral, BID  saccharomyces boulardii, 250 mg, Oral, BID  sodium chloride, 10 mL, Intravenous, Q12H      Continuous Infusions:   PRN Meds:.•  acetaminophen  •  albuterol sulfate HFA  •  dextrose  •  dextrose  •  glucagon (human recombinant)  •  loperamide  •  magnesium sulfate **OR** magnesium sulfate **OR** magnesium sulfate  •  metoprolol tartrate  •  potassium chloride **OR** potassium chloride **OR** potassium chloride  •  sodium chloride  •  sodium chloride    Assessment/Plan   Assessment & Plan     Active Hospital Problems    Diagnosis  POA   • **Pneumonia due to COVID-19 virus [U07.1, J12.82]  Yes   • Permanent atrial fibrillation (HCC) [I48.21]  Yes   • Coronary artery 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) [I25.119]  Yes   • Chronic systolic congestive heart failure ([facility]) [I50.22]  Yes   • Hyperlipidemia LDL goal <70 [E78.5]  Yes   • Type 2 diabetes mellitus with hyperglycemia, with long-term current use of insulin ([facility]) [E11.65, Z79.4]  Not Applicable   • Oxygen dependent [Z99.81]  Not Applicable   • COPD (chronic obstructive pulmonary disease) ([facility]) [J44.9]  Yes      Resolved Hospital Problems    Diagnosis Date Resolved POA   • Pneumonia due to infectious organism [J18.9] 02/19/2022 Yes   • Persistent atrial fibrillation (HCC) [I48.19] 02/19/2022 Yes   • Dementia ([facility]) [F03.90] 02/19/2022 Yes   • CKD (chronic kidney disease) stage 3, GFR 30-59 ml/min ([facility]) [N18.30] 02/19/2022 Yes   • Essential hypertension [I10] 02/19/2022 Yes        Brief Hospital Course to date:  Chito Rod is a 71 y.o. male  with a history of A. fib on chronic anticoagulation, hypertension, CHF, COPD, FAUSTO, diabetes who presented on 2/19/2022 after being found down by his caregiver. CT chest with right middle lobe pneumonia,  mild atelectasis, likely emphysema. Tested positive for Covid on 2/19/2022. He completed treatment with remdesivir and dexamethasone. Currently stable on room air. CM is following for LTC/SNF placement.      Pneumonia secondary to COVID-19  Presumed secondary bacterial pneumonia, community-acquired (right middle and right lower lobe on imaging)  Acute sepsis  Acute encephalopathy, improved  History of COPD on chronic oxygen  -CT chest with right middle lobe pneumonia, mild atelectasis, likely emphysema  -Blood cultures 1 out of 2 positive for gram-positive cocci in chains  -Tested positive for Covid on 2/19/2022.  Continue isolation through 3/11/2022   -Completed 5 days of remdesivir  -Completed 10 days of Decadron.  -Received Actemra 2/23  -Anticoagulated with Eliquis  -Completed 7 days of empiric antibiotics with doxycycline and Rocephin   -Currently stable on room air     Diarrhea  - continue probiotic     Positive blood culture   -Likely contaminant     A. fib with RVR  NSTEMI type 2  History of systolic congestive heart failure  Hypertension  CAD  Hyperlipidemia  -Cardiology evaluated  --No clinical signs of ACS  -continue metoprolol, dig and Eliquis  -Continue Lipitor     History of dementia  -CT head with no acute findings  -CM following for disposition     Diabetes mellitus type 2  Steroid related hyperglycemia  -Continue Levemir, schedule qac and SSI   - continue home Jardiance     Depression   - Lexapro increased to 20 mg daily        DVT prophylaxis:  Medical and mechanical DVT prophylaxis orders are present.       AM-PAC 6 Clicks Score (PT): 17 (03/06/22 2000)    Disposition: I expect the patient to be discharged to SNF when bed available, medically ready.    CODE STATUS:   Code Status and Medical Interventions:   Ordered at: 02/19/22 1442     Level Of Support Discussed With:    Patient     Code Status (Patient has no pulse and is not breathing):    CPR (Attempt to Resuscitate)     Medical Interventions  (Patient has pulse or is breathing):    Full Support     This patient's problems and plans were partially entered by my partner and updated as appropriate by me 03/07/22. Today is my first day evaluating this patient's active medical problems. I Personally reviewed chart and adjusted note to reflect daily changes in management/clinical condition      Grace Saeed MD  03/07/22

## 2022-03-08 LAB
DIGOXIN SERPL-MCNC: 0.54 NG/ML (ref 0.6–1.2)
GLUCOSE BLDC GLUCOMTR-MCNC: 106 MG/DL (ref 70–130)
GLUCOSE BLDC GLUCOMTR-MCNC: 192 MG/DL (ref 70–130)
GLUCOSE BLDC GLUCOMTR-MCNC: 204 MG/DL (ref 70–130)
GLUCOSE BLDC GLUCOMTR-MCNC: 215 MG/DL (ref 70–130)

## 2022-03-08 PROCEDURE — 99233 SBSQ HOSP IP/OBS HIGH 50: CPT | Performed by: FAMILY MEDICINE

## 2022-03-08 PROCEDURE — 63710000001 INSULIN LISPRO (HUMAN) PER 5 UNITS: Performed by: INTERNAL MEDICINE

## 2022-03-08 PROCEDURE — 82962 GLUCOSE BLOOD TEST: CPT

## 2022-03-08 PROCEDURE — 63710000001 INSULIN DETEMIR PER 5 UNITS: Performed by: FAMILY MEDICINE

## 2022-03-08 PROCEDURE — 80162 ASSAY OF DIGOXIN TOTAL: CPT

## 2022-03-08 RX ADMIN — INSULIN DETEMIR 40 UNITS: 100 INJECTION, SOLUTION SUBCUTANEOUS at 20:36

## 2022-03-08 RX ADMIN — EMPAGLIFLOZIN 10 MG: 10 TABLET, FILM COATED ORAL at 08:53

## 2022-03-08 RX ADMIN — INSULIN LISPRO 2 UNITS: 100 INJECTION, SOLUTION INTRAVENOUS; SUBCUTANEOUS at 08:53

## 2022-03-08 RX ADMIN — INSULIN LISPRO 4 UNITS: 100 INJECTION, SOLUTION INTRAVENOUS; SUBCUTANEOUS at 12:13

## 2022-03-08 RX ADMIN — ATORVASTATIN CALCIUM 40 MG: 40 TABLET, FILM COATED ORAL at 20:36

## 2022-03-08 RX ADMIN — METOPROLOL TARTRATE 25 MG: 25 TABLET, FILM COATED ORAL at 20:36

## 2022-03-08 RX ADMIN — Medication 250 MG: at 20:36

## 2022-03-08 RX ADMIN — Medication 250 MG: at 08:53

## 2022-03-08 RX ADMIN — DIGOXIN 250 MCG: 250 TABLET ORAL at 08:53

## 2022-03-08 RX ADMIN — MICONAZOLE NITRATE: 20 POWDER TOPICAL at 20:36

## 2022-03-08 RX ADMIN — APIXABAN 5 MG: 5 TABLET, FILM COATED ORAL at 08:53

## 2022-03-08 RX ADMIN — APIXABAN 5 MG: 5 TABLET, FILM COATED ORAL at 20:36

## 2022-03-08 RX ADMIN — METOPROLOL TARTRATE 25 MG: 25 TABLET, FILM COATED ORAL at 08:53

## 2022-03-08 RX ADMIN — MICONAZOLE NITRATE: 20 POWDER TOPICAL at 08:54

## 2022-03-08 RX ADMIN — DONEPEZIL HYDROCHLORIDE 10 MG: 10 TABLET, FILM COATED ORAL at 20:36

## 2022-03-08 RX ADMIN — ESCITSLOPRAM 20 MG: 20 TABLET ORAL at 20:36

## 2022-03-08 NOTE — CASE MANAGEMENT/SOCIAL WORK
Continued Stay Note  [facility]     Patient Name: Chito Rod  MRN: [id number]  Today's Date: 3/8/2022    Admit Date: 2/19/2022     Discharge Plan     Row Name 03/08/22 1507       Plan    Plan update    Patient/Family in Agreement with Plan yes    Plan Comments Called [facility] in [place] and spoke to Jeanne regarding potential Skilled bed transitioning into LTC who is interested in referral and advises that they have EPIC.  She will review referral and call back.  Called [facility] in [place] and spoke to liaison who advises she has requested answer from admin and will call when she knows if a bed is offered or not.  Called [facility] and left voicemail for admissions with name, title and callback number.  Called [facility] in [place] and left voicemail for admissions with name, title and callback number.  Called [facility] and left voicemail for admissions with name, title and callback number.  Spoke with patient daughter, Erum, regarding discharge plan to advise of CM efforts to find placement.  She is open to [place] for placement as well.  CM advised that search includes [place] at this time.  No new discharge needs verbalized.  CM continues to follow.    Final Discharge Disposition Code 03 - skilled nursing facility (SNF)               Discharge Codes    No documentation.               Expected Discharge Date and Time     Expected Discharge Date Expected Discharge Time    Mar 10, 2022             Kate Kumar RN

## 2022-03-08 NOTE — PLAN OF CARE
Goal Outcome Evaluation:           Progress: improving  Outcome Evaluation: VSS. Patient continues on room air. A-fib on telemetry with rate 70s-90s. Levemir held per provider related to blood glucose of 126 at bedtime. No acute events overnight. Patient awaiting LTC/SNF placement.

## 2022-03-08 NOTE — PROGRESS NOTES
[facility] Medicine Services  PROGRESS NOTE    Patient Name: Chito Rod  : 1950  MRN: [id number]    Date of Admission: 2022  Primary Care Physician: Provider, No Known    Subjective   Subjective     CC:  F/U Covid    HPI:  Patient seen and examined. RN notes reviewed. No acute events overnight. No issues with behaviors. Patient has no new complaints.     ROS:  Gen- No fevers, chills  CV- No chest pain, palpitations  Resp- No cough, dyspnea  GI- No N/V/D, abd pain    Objective   Objective     Vital Signs:   Temp:  [96.5 °F (35.8 °C)-97.1 °F (36.2 °C)] 97.1 °F (36.2 °C)  Heart Rate:  [62-83] 81  Resp:  [18] 18  BP: ()/(49-81) 127/69     Physical Exam:  Constitutional: No acute distress, awake, alert, lying on his side  Respiratory: Respiratory effort normal on room air  Gastrointestinal: Positive bowel sounds, soft, nontender, nondistended  Musculoskeletal: No bilateral ankle edema  Psychiatric: Flat affect, cooperative  Skin: No rashes to exposed skin    Results Reviewed:  LAB RESULTS:      Lab 22  0256   WBC 10.81*   HEMOGLOBIN 14.7   HEMATOCRIT 46.7   PLATELETS 141   MCV 94.0         Lab 22  0256   SODIUM 142   POTASSIUM 4.3   CHLORIDE 106   CO2 27.0   ANION GAP 9.0   BUN 18   CREATININE 0.87   EGFR 92.8   GLUCOSE 140*   CALCIUM 8.4*   MAGNESIUM 2.0                         Brief Urine Lab Results  (Last result in the past 365 days)      Color   Clarity   Blood   Leuk Est   Nitrite   Protein   CREAT   Urine HCG        22 1217 Dark Yellow   Cloudy   Negative   Trace   Negative   >=300 mg/dL (3+)                 Microbiology Results Abnormal     Procedure Component Value - Date/Time    Aspergillus Galactomannan Antigen - Blood, Arm, Left [[id number]] Collected: 22 1416    Lab Status: Final result Specimen: Blood from Arm, Left Updated: 22 0006     Aspergillus Ag, BAL/Serum 0.03 Index     Narrative:      Performed at:  [address] [place] [address]  [address]  [id number]  : Baldemar Head MD, Phone:  [phone]    Blood Culture - Blood, Arm, Right [[id number]]  (Normal) Collected: 02/21/22 1933    Lab Status: Final result Specimen: Blood from Arm, Right Updated: 02/26/22 2015     Blood Culture No growth at 5 days    Blood Culture - Blood, Hand, Left [[id number]]  (Normal) Collected: 02/21/22 1932    Lab Status: Final result Specimen: Blood from Hand, Left Updated: 02/26/22 2015     Blood Culture No growth at 5 days    Blood Culture - Blood, Arm, Right [[id number]]  (Normal) Collected: 02/19/22 1200    Lab Status: Final result Specimen: Blood from Arm, Right Updated: 02/24/22 1230     Blood Culture No growth at 5 days    Blood Culture ID, PCR - Blood, Arm, Right [[id number]]  (Normal) Collected: 02/19/22 1150    Lab Status: Final result Specimen: Blood from Arm, Right Updated: 02/21/22 0734     BCID, PCR Negative by BCID PCR. Culture to Follow.          No radiology results from the last 24 hrs    Results for orders placed during the hospital encounter of 02/19/22    Adult Transthoracic Echo Complete W/ Cont if Necessary Per Protocol    Interpretation Summary  · Technically difficult study due to body habitus, atrial fibrillation with RVR, and on gated study due to Covid protocol.  · Left ventricular systolic function is moderately decreased. Estimated left ventricular EF = 30%. Regional wall motion abnormality cannot be properly assessed on the basis of the study  · The cardiac valves are poorly visualized. No significant stenosis or regurgitation of the aortic, mitral, or tricuspid valves was seen.  · Estimated right ventricular systolic pressure from tricuspid regurgitation is normal (<35 mmHg).      I have reviewed the medications:  Scheduled Meds:apixaban, 5 mg, Oral, Q12H  atorvastatin, 40 mg, Oral, Nightly  digoxin, 250 mcg, Oral, Daily  donepezil, 10 mg, Oral, Nightly  empagliflozin, 10 mg, Oral,  Daily  escitalopram, 20 mg, Oral, Nightly  insulin detemir, 40 Units, Subcutaneous, Nightly  insulin lispro, 0-9 Units, Subcutaneous, TID AC  metoprolol tartrate, 25 mg, Oral, Q12H  miconazole, , Topical, Q12H  Nutrisource fiber, 2 packet, Oral, BID  saccharomyces boulardii, 250 mg, Oral, BID  sodium chloride, 10 mL, Intravenous, Q12H      Continuous Infusions:   PRN Meds:.•  acetaminophen  •  albuterol sulfate HFA  •  dextrose  •  dextrose  •  glucagon (human recombinant)  •  loperamide  •  magnesium sulfate **OR** magnesium sulfate **OR** magnesium sulfate  •  metoprolol tartrate  •  potassium chloride **OR** potassium chloride **OR** potassium chloride  •  sodium chloride    Assessment/Plan   Assessment & Plan     Active Hospital Problems    Diagnosis  POA   • **Pneumonia due to COVID-19 virus [U07.1, J12.82]  Yes   • Permanent atrial fibrillation (HCC) [I48.21]  Yes   • Coronary artery disease involving native coronary artery of native heart with angina pectoris ([facility]) [I25.119]  Yes   • Chronic systolic congestive heart failure ([facility]) [I50.22]  Yes   • Hyperlipidemia LDL goal <70 [E78.5]  Yes   • Type 2 diabetes mellitus with hyperglycemia, with long-term current use of insulin ([facility]) [E11.65, Z79.4]  Not Applicable   • Oxygen dependent [Z99.81]  Not Applicable   • COPD (chronic obstructive pulmonary disease) ([facility]) [J44.9]  Yes      Resolved Hospital Problems    Diagnosis Date Resolved POA   • Pneumonia due to infectious organism [J18.9] 02/19/2022 Yes   • Persistent atrial fibrillation (HCC) [I48.19] 02/19/2022 Yes   • Dementia ([facility]) [F03.90] 02/19/2022 Yes   • CKD (chronic kidney disease) stage 3, GFR 30-59 ml/min ([facility]) [N18.30] 02/19/2022 Yes   • Essential hypertension [I10] 02/19/2022 Yes        Brief Hospital Course to date:  Chito Rod is a 71 y.o. male  with a history of A. fib on chronic anticoagulation, hypertension, CHF, COPD, FAUSTO, diabetes who presented on 2/19/2022 after being found down by his  caregiver. CT chest with right middle lobe pneumonia, mild atelectasis, likely emphysema. Tested positive for Covid on 2/19/2022. He completed treatment with remdesivir and dexamethasone. Currently stable on room air. CM is following for LTC/SNF placement.    This patient's problems and plans were partially entered by my partner and updated as appropriate by me 03/08/22.  All problems are new to me today       Pneumonia secondary to COVID-19  Presumed secondary bacterial pneumonia, community-acquired (right middle and right lower lobe on imaging)  Acute sepsis  Acute encephalopathy, improved  History of COPD on chronic oxygen  -CT chest with right middle lobe pneumonia, mild atelectasis, likely emphysema  -Blood cultures 1 out of 2 positive for gram-positive cocci in chains  -Tested positive for Covid on 2/19/2022.  Continue isolation through 3/11/2022   -Completed 5 days of remdesivir  -Completed 10 days of Decadron.  -Received Actemra 2/23  -Anticoagulated with Eliquis  -Completed 7 days of empiric antibiotics with doxycycline and Rocephin   -Currently stable on room air     Diarrhea  - continue probiotic     Positive blood culture   -Likely contaminant     A. fib with RVR  NSTEMI type 2  History of systolic congestive heart failure  Hypertension  CAD  Hyperlipidemia  -Cardiology evaluated  --No clinical signs of ACS  -continue metoprolol, dig and Eliquis  -Continue Lipitor     History of dementia  -CT head with no acute findings  -CM following for disposition     Diabetes mellitus type 2  Steroid related hyperglycemia  -Continue Levemir, schedule qac and SSI   - continue home Jardiance     Depression   - Lexapro increased to 20 mg daily this admit     DVT prophylaxis:  Medical and mechanical DVT prophylaxis orders are present.       AM-PAC 6 Clicks Score (PT): 17 (03/06/22 2000)    Disposition: I expect the patient to be discharged to SNF/placement when bed available, medically ready.    CODE STATUS:   Code  Status and Medical Interventions:   Ordered at: 02/19/22 1442     Level Of Support Discussed With:    Patient     Code Status (Patient has no pulse and is not breathing):    CPR (Attempt to Resuscitate)     Medical Interventions (Patient has pulse or is breathing):    Full Support     This patient's problems and plans were partially entered by my partner and updated as appropriate by me 03/08/22. Today is my first day evaluating this patient's active medical problems. I Personally reviewed chart and adjusted note to reflect daily changes in management/clinical condition      Manasa Tsai DO  03/08/22

## 2022-03-09 LAB
GLUCOSE BLDC GLUCOMTR-MCNC: 136 MG/DL (ref 70–130)
GLUCOSE BLDC GLUCOMTR-MCNC: 147 MG/DL (ref 70–130)
GLUCOSE BLDC GLUCOMTR-MCNC: 152 MG/DL (ref 70–130)
GLUCOSE BLDC GLUCOMTR-MCNC: 155 MG/DL (ref 70–130)

## 2022-03-09 PROCEDURE — 99231 SBSQ HOSP IP/OBS SF/LOW 25: CPT | Performed by: FAMILY MEDICINE

## 2022-03-09 PROCEDURE — 63710000001 INSULIN LISPRO (HUMAN) PER 5 UNITS: Performed by: INTERNAL MEDICINE

## 2022-03-09 PROCEDURE — 82962 GLUCOSE BLOOD TEST: CPT

## 2022-03-09 RX ADMIN — Medication 2 PACKET: at 21:24

## 2022-03-09 RX ADMIN — DIGOXIN 250 MCG: 250 TABLET ORAL at 08:36

## 2022-03-09 RX ADMIN — APIXABAN 5 MG: 5 TABLET, FILM COATED ORAL at 21:18

## 2022-03-09 RX ADMIN — INSULIN LISPRO 2 UNITS: 100 INJECTION, SOLUTION INTRAVENOUS; SUBCUTANEOUS at 12:36

## 2022-03-09 RX ADMIN — ESCITSLOPRAM 20 MG: 20 TABLET ORAL at 21:18

## 2022-03-09 RX ADMIN — EMPAGLIFLOZIN 10 MG: 10 TABLET, FILM COATED ORAL at 08:36

## 2022-03-09 RX ADMIN — APIXABAN 5 MG: 5 TABLET, FILM COATED ORAL at 08:36

## 2022-03-09 RX ADMIN — Medication 250 MG: at 08:36

## 2022-03-09 RX ADMIN — Medication 2 PACKET: at 12:36

## 2022-03-09 RX ADMIN — METOPROLOL TARTRATE 25 MG: 25 TABLET, FILM COATED ORAL at 08:39

## 2022-03-09 RX ADMIN — MICONAZOLE NITRATE: 20 POWDER TOPICAL at 08:39

## 2022-03-09 RX ADMIN — Medication 250 MG: at 21:18

## 2022-03-09 RX ADMIN — ATORVASTATIN CALCIUM 40 MG: 40 TABLET, FILM COATED ORAL at 21:18

## 2022-03-09 RX ADMIN — DONEPEZIL HYDROCHLORIDE 10 MG: 10 TABLET, FILM COATED ORAL at 21:18

## 2022-03-09 RX ADMIN — MICONAZOLE NITRATE: 20 POWDER TOPICAL at 21:18

## 2022-03-09 NOTE — CASE MANAGEMENT/SOCIAL WORK
Continued Stay Note  [facility]     Patient Name: Chito Rod  MRN: [id number]  Today's Date: 3/9/2022    Admit Date: 2/19/2022     Discharge Plan     Row Name 03/09/22 1038       Plan    Plan update    Patient/Family in Agreement with Plan yes    Plan Comments Received a call from Jan[facility] with [facility] who has a bed for patient pending financials.  They will reach out to the family to discuss.  [facility] to [facility] and they have been notified of develpment.  CM following.    Final Discharge Disposition Code 03 - skilled nursing facility (SNF)               Discharge Codes    No documentation.               Expected Discharge Date and Time     Expected Discharge Date Expected Discharge Time    Mar 10, 2022             Kate Kumar RN

## 2022-03-09 NOTE — CASE MANAGEMENT/SOCIAL WORK
Continued Stay Note  [facility]     Patient Name: Chito Rod  MRN: [id number]  Today's Date: 3/9/2022    Admit Date: 2/19/2022     Discharge Plan     Row Name 03/09/22 1500       Plan    Plan Social work is following Mr. Rod for complex care on 3/10/2022. He has a bed at [facility] possibly on Friday. He will require a pre cert through [employer]. He requires a Covid test to be done before he is able to go to  [facility].               Discharge Codes    No documentation.               Expected Discharge Date and Time     Expected Discharge Date Expected Discharge Time    Mar 10, 2022             TAURUS Payne

## 2022-03-09 NOTE — CASE MANAGEMENT/SOCIAL WORK
Continued Stay Note  [facility]     Patient Name: Chito Rod  MRN: [id number]  Today's Date: 3/9/2022    Admit Date: 2/19/2022     Discharge Plan     Row Name 03/09/22 1500       Plan    Plan Social work is following Mr. Rod for complex care on 3/10/2022. He has a bed at [facility] pending financials. He will require a pre cert through [employer].               Discharge Codes    No documentation.               Expected Discharge Date and Time     Expected Discharge Date Expected Discharge Time    Mar 10, 2022             TAURUS Payne

## 2022-03-09 NOTE — PROGRESS NOTES
[facility]  PROGRESS NOTE    Patient Name: Chito Rod  : 1950  MRN: [id number]    Date of Admission: 2022  Primary Care Physician: Provider, No Known    Subjective   Subjective     CC:  F/U Covid    HPI:  Patient seen and examined. RN notes reviewed. No acute events overnight. He denies any complaints. States he ate breakfast. No behavioral issues overnight.     ROS:  Gen- No fevers, chills  CV- No chest pain, palpitations  Resp- No cough, dyspnea  GI- No N/V/D, abd pain    Objective   Objective     Vital Signs:   Temp:  [97 °F (36.1 °C)-98.4 °F (36.9 °C)] 98.4 °F (36.9 °C)  Heart Rate:  [63-86] 81  Resp:  [18-22] 21  BP: ()/(50-93) 107/79  Flow (L/min):  [2] 2     Physical Exam:  Constitutional: No acute distress, awake, alert, lying on his side  Respiratory: Respiratory effort normal on room air  Gastrointestinal: Positive bowel sounds, soft, nontender, nondistended  Musculoskeletal: No bilateral ankle edema  Psychiatric: Flat affect, cooperative  Skin: No rashes to exposed skin    Results Reviewed:  LAB RESULTS:      Lab 22  0256   WBC 10.81*   HEMOGLOBIN 14.7   HEMATOCRIT 46.7   PLATELETS 141   MCV 94.0         Lab 22  0256   SODIUM 142   POTASSIUM 4.3   CHLORIDE 106   CO2 27.0   ANION GAP 9.0   BUN 18   CREATININE 0.87   EGFR 92.8   GLUCOSE 140*   CALCIUM 8.4*   MAGNESIUM 2.0                         Brief Urine Lab Results  (Last result in the past 365 days)      Color   Clarity   Blood   Leuk Est   Nitrite   Protein   CREAT   Urine HCG        22 1217 Dark Yellow   Cloudy   Negative   Trace   Negative   >=300 mg/dL (3+)                 Microbiology Results Abnormal     Procedure Component Value - Date/Time    Aspergillus Galactomannan Antigen - Blood, Arm, Left [[id number]] Collected: 22 1416    Lab Status: Final result Specimen: Blood from Arm, Left Updated: 22 000     Aspergillus Ag, BAL/Serum 0.03 Index      Narrative:      Performed at:  [address] [facility], [place], [place]  [id number]  : Baldemar Head MD, Phone:  [phone]    Blood Culture - Blood, Arm, Right [[id number]]  (Normal) Collected: 02/21/22 1933    Lab Status: Final result Specimen: Blood from Arm, Right Updated: 02/26/22 2015     Blood Culture No growth at 5 days    Blood Culture - Blood, Hand, Left [[id number]]  (Normal) Collected: 02/21/22 1932    Lab Status: Final result Specimen: Blood from Hand, Left Updated: 02/26/22 2015     Blood Culture No growth at 5 days    Blood Culture - Blood, Arm, Right [[id number]]  (Normal) Collected: 02/19/22 1200    Lab Status: Final result Specimen: Blood from Arm, Right Updated: 02/24/22 1230     Blood Culture No growth at 5 days    Blood Culture ID, PCR - Blood, Arm, Right [[id number]]  (Normal) Collected: 02/19/22 1150    Lab Status: Final result Specimen: Blood from Arm, Right Updated: 02/21/22 0734     BCID, PCR Negative by BCID PCR. Culture to Follow.          No radiology results from the last 24 hrs    Results for orders placed during the hospital encounter of 02/19/22    Adult Transthoracic Echo Complete W/ Cont if Necessary Per Protocol    Interpretation Summary  · Technically difficult study due to body habitus, atrial fibrillation with RVR, and on gated study due to Covid protocol.  · Left ventricular systolic function is moderately decreased. Estimated left ventricular EF = 30%. Regional wall motion abnormality cannot be properly assessed on the basis of the study  · The cardiac valves are poorly visualized. No significant stenosis or regurgitation of the aortic, mitral, or tricuspid valves was seen.  · Estimated right ventricular systolic pressure from tricuspid regurgitation is normal (<35 mmHg).      I have reviewed the medications:  Scheduled Meds:apixaban, 5 mg, Oral, Q12H  atorvastatin, 40 mg, Oral, Nightly  digoxin, 250 mcg, Oral, Daily  donepezil, 10 mg, Oral,  Nightly  empagliflozin, 10 mg, Oral, Daily  escitalopram, 20 mg, Oral, Nightly  insulin detemir, 40 Units, Subcutaneous, Nightly  insulin lispro, 0-9 Units, Subcutaneous, TID AC  metoprolol tartrate, 25 mg, Oral, Q12H  miconazole, , Topical, Q12H  Nutrisource fiber, 2 packet, Oral, BID  saccharomyces boulardii, 250 mg, Oral, BID  sodium chloride, 10 mL, Intravenous, Q12H      Continuous Infusions:   PRN Meds:.•  acetaminophen  •  albuterol sulfate HFA  •  dextrose  •  dextrose  •  glucagon (human recombinant)  •  loperamide  •  magnesium sulfate **OR** magnesium sulfate **OR** magnesium sulfate  •  metoprolol tartrate  •  potassium chloride **OR** potassium chloride **OR** potassium chloride  •  sodium chloride    Assessment/Plan   Assessment & Plan     Active Hospital Problems    Diagnosis  POA   • **Pneumonia due to COVID-19 virus [U07.1, J12.82]  Yes   • Permanent atrial fibrillation (HCC) [I48.21]  Yes   • Coronary artery disease involving native coronary artery of native heart with angina pectoris ([facility]) [I25.119]  Yes   • Chronic systolic congestive heart failure ([facility]) [I50.22]  Yes   • Hyperlipidemia LDL goal <70 [E78.5]  Yes   • Type 2 diabetes mellitus with hyperglycemia, with long-term current use of insulin ([facility]) [E11.65, Z79.4]  Not Applicable   • Oxygen dependent [Z99.81]  Not Applicable   • COPD (chronic obstructive pulmonary disease) ([facility]) [J44.9]  Yes      Resolved Hospital Problems    Diagnosis Date Resolved POA   • Pneumonia due to infectious organism [J18.9] 02/19/2022 Yes   • Persistent atrial fibrillation (HCC) [I48.19] 02/19/2022 Yes   • Dementia ([facility]) [F03.90] 02/19/2022 Yes   • CKD (chronic kidney disease) stage 3, GFR 30-59 ml/min ([facility]) [N18.30] 02/19/2022 Yes   • Essential hypertension [I10] 02/19/2022 Yes        Brief Hospital Course to date:  Chito Rod is a 71 y.o. male  with a history of A. fib on chronic anticoagulation, hypertension, CHF, COPD, FAUSTO, diabetes who presented on  2/19/2022 after being found down by his caregiver. CT chest with right middle lobe pneumonia, mild atelectasis, likely emphysema. Tested positive for Covid on 2/19/2022. He completed treatment with remdesivir and dexamethasone. Currently stable on room air. CM is following for LTC/SNF placement.    This patient's problems and plans were partially entered by my partner and updated as appropriate by me 03/09/22.      Pneumonia secondary to COVID-19  Presumed secondary bacterial pneumonia, community-acquired (right middle and right lower lobe on imaging)  Acute sepsis  Acute encephalopathy, improved  History of COPD on chronic oxygen  -CT chest with right middle lobe pneumonia, mild atelectasis, likely emphysema  -Blood cultures 1 out of 2 positive for gram-positive cocci in chains  -Tested positive for Covid on 2/19/2022.  Continue isolation through 3/11/2022   -Completed 5 days of remdesivir  -Completed 10 days of Decadron.  -Received Actemra 2/23  -Anticoagulated with Eliquis  -Completed 7 days of empiric antibiotics with doxycycline and Rocephin   -Currently stable on room air (placed on 2L when he sleeps due to suspected FAUSTO)     Diarrhea  - continue probiotic     Positive blood culture   -Likely contaminant     A. fib with RVR  NSTEMI type 2  History of systolic congestive heart failure  Hypertension  CAD  Hyperlipidemia  -Cardiology evaluated  --No clinical signs of ACS  -continue metoprolol, dig and Eliquis  -Continue Lipitor     History of dementia  -CT head with no acute findings  - following for disposition     Diabetes mellitus type 2  Steroid related hyperglycemia  -Continue Levemir, schedule qac and SSI   - continue home Jardiance     Depression   - Lexapro increased to 20 mg daily this admit     DVT prophylaxis:  Medical and mechanical DVT prophylaxis orders are present.       AM-PAC 6 Clicks Score (PT): 17 (03/06/22 2000)    Disposition: I expect the patient to be discharged to SNF/placement when bed  available, medically ready.    CODE STATUS:   Code Status and Medical Interventions:   Ordered at: 02/19/22 1442     Level Of Support Discussed With:    Patient     Code Status (Patient has no pulse and is not breathing):    CPR (Attempt to Resuscitate)     Medical Interventions (Patient has pulse or is breathing):    Full Support     This patient's problems and plans were partially entered by my partner and updated as appropriate by me 03/09/22. Today is my first day evaluating this patient's active medical problems. I Personally reviewed chart and adjusted note to reflect daily changes in management/clinical condition      Manasa Tsai, DO  03/09/22

## 2022-03-09 NOTE — PLAN OF CARE
Goal Outcome Evaluation:           Progress: improving  Outcome Evaluation: VSS. Patient on room air while awake, but placed on 2L NC when sleeping to maintain O2 sats. A-fib on telemetry with rate controlled. No acute events overnight.

## 2022-03-10 LAB
B PARAPERT DNA SPEC QL NAA+PROBE: NOT DETECTED
B PERT DNA SPEC QL NAA+PROBE: NOT DETECTED
C PNEUM DNA NPH QL NAA+NON-PROBE: NOT DETECTED
FLUAV SUBTYP SPEC NAA+PROBE: NOT DETECTED
FLUBV RNA ISLT QL NAA+PROBE: NOT DETECTED
GLUCOSE BLDC GLUCOMTR-MCNC: 123 MG/DL (ref 70–130)
GLUCOSE BLDC GLUCOMTR-MCNC: 174 MG/DL (ref 70–130)
GLUCOSE BLDC GLUCOMTR-MCNC: 183 MG/DL (ref 70–130)
GLUCOSE BLDC GLUCOMTR-MCNC: 190 MG/DL (ref 70–130)
GLUCOSE BLDC GLUCOMTR-MCNC: 216 MG/DL (ref 70–130)
HADV DNA SPEC NAA+PROBE: NOT DETECTED
HCOV 229E RNA SPEC QL NAA+PROBE: NOT DETECTED
HCOV HKU1 RNA SPEC QL NAA+PROBE: NOT DETECTED
HCOV NL63 RNA SPEC QL NAA+PROBE: NOT DETECTED
HCOV OC43 RNA SPEC QL NAA+PROBE: NOT DETECTED
HMPV RNA NPH QL NAA+NON-PROBE: NOT DETECTED
HPIV1 RNA ISLT QL NAA+PROBE: NOT DETECTED
HPIV2 RNA SPEC QL NAA+PROBE: NOT DETECTED
HPIV3 RNA NPH QL NAA+PROBE: NOT DETECTED
HPIV4 P GENE NPH QL NAA+PROBE: NOT DETECTED
M PNEUMO IGG SER IA-ACNC: NOT DETECTED
RHINOVIRUS RNA SPEC NAA+PROBE: NOT DETECTED
RSV RNA NPH QL NAA+NON-PROBE: NOT DETECTED
SARS-COV-2 RNA NPH QL NAA+NON-PROBE: DETECTED

## 2022-03-10 PROCEDURE — 82962 GLUCOSE BLOOD TEST: CPT

## 2022-03-10 PROCEDURE — 63710000001 INSULIN LISPRO (HUMAN) PER 5 UNITS: Performed by: INTERNAL MEDICINE

## 2022-03-10 PROCEDURE — 99232 SBSQ HOSP IP/OBS MODERATE 35: CPT | Performed by: NURSE PRACTITIONER

## 2022-03-10 PROCEDURE — 0202U NFCT DS 22 TRGT SARS-COV-2: CPT | Performed by: NURSE PRACTITIONER

## 2022-03-10 RX ADMIN — Medication 2 PACKET: at 21:15

## 2022-03-10 RX ADMIN — Medication 250 MG: at 21:16

## 2022-03-10 RX ADMIN — APIXABAN 5 MG: 5 TABLET, FILM COATED ORAL at 21:15

## 2022-03-10 RX ADMIN — METOPROLOL TARTRATE 25 MG: 25 TABLET, FILM COATED ORAL at 21:15

## 2022-03-10 RX ADMIN — Medication 2 PACKET: at 11:12

## 2022-03-10 RX ADMIN — INSULIN LISPRO 2 UNITS: 100 INJECTION, SOLUTION INTRAVENOUS; SUBCUTANEOUS at 11:08

## 2022-03-10 RX ADMIN — ESCITSLOPRAM 20 MG: 20 TABLET ORAL at 21:15

## 2022-03-10 RX ADMIN — DONEPEZIL HYDROCHLORIDE 10 MG: 10 TABLET, FILM COATED ORAL at 21:15

## 2022-03-10 RX ADMIN — APIXABAN 5 MG: 5 TABLET, FILM COATED ORAL at 11:08

## 2022-03-10 RX ADMIN — MICONAZOLE NITRATE: 20 POWDER TOPICAL at 11:12

## 2022-03-10 RX ADMIN — METOPROLOL TARTRATE 25 MG: 25 TABLET, FILM COATED ORAL at 11:09

## 2022-03-10 RX ADMIN — Medication 250 MG: at 11:09

## 2022-03-10 RX ADMIN — INSULIN LISPRO 2 UNITS: 100 INJECTION, SOLUTION INTRAVENOUS; SUBCUTANEOUS at 17:24

## 2022-03-10 RX ADMIN — ATORVASTATIN CALCIUM 40 MG: 40 TABLET, FILM COATED ORAL at 21:15

## 2022-03-10 RX ADMIN — EMPAGLIFLOZIN 10 MG: 10 TABLET, FILM COATED ORAL at 11:11

## 2022-03-10 RX ADMIN — DIGOXIN 250 MCG: 250 TABLET ORAL at 11:09

## 2022-03-10 RX ADMIN — MICONAZOLE NITRATE: 20 POWDER TOPICAL at 21:15

## 2022-03-10 NOTE — PLAN OF CARE
Goal Outcome Evaluation:           Progress: no change  Outcome Evaluation: VSS. Patient continues on 2L throughout night while asleep. A-fib on telemetry with rate controlled. No acute events overnight. Continue with POC.

## 2022-03-10 NOTE — PLAN OF CARE
Goal Outcome Evaluation:              Outcome Evaluation: VSS, patient on room air when awake but 2L NC when sleeping. No complaints of pain, patient mood labile at times. Skin interventions in place (incontinence pads, waffle mattress), patient encouraged to shift weight, patient declined out of bed this shift. Airborne and contact precautions maintained, will continue to monitor.

## 2022-03-10 NOTE — PROGRESS NOTES
[facility] Medicine Services  PROGRESS NOTE    Patient Name: Chito Rod  : 1950  MRN: [id number]    Date of Admission: 2022  Primary Care Physician: Provider, No Known    Subjective   Subjective     CC:  F/U Covid    HPI:  Patient sleeping upon arrival awaken easily with conversation.  Denies any issues overnight.    ROS:  Gen- No fevers, chills  CV- No chest pain, palpitations  Resp- No cough, dyspnea  GI- No N/V/D, abd pain    Objective   Objective     Vital Signs:   Temp:  [97.4 °F (36.3 °C)-98.4 °F (36.9 °C)] 98.1 °F (36.7 °C)  Heart Rate:  [69-99] 79  Resp:  [18-21] 20  BP: ()/(59-81) 106/71  Flow (L/min):  [2] 2     Physical Exam:  Constitutional: sleeping but awakened easily with conversation.  HENT: NCAT, mucous membranes moist  Respiratory: Clear to auscultation bilaterally, nonlabored respirations 92% 2 L  Cardiovascular: RRR, no murmurs, rubs, or gallops HR 72  Gastrointestinal: Positive bowel sounds, soft, nontender, nondistended  Musculoskeletal: No bilateral ankle edema  Psychiatric: flat affect, cooperative  Neurologic: No obvious neuro focal deficit, clear speech  Skin: No rashes      Results Reviewed:  LAB RESULTS:      Lab 22  0256   WBC 10.81*   HEMOGLOBIN 14.7   HEMATOCRIT 46.7   PLATELETS 141   MCV 94.0         Lab 22  0256   SODIUM 142   POTASSIUM 4.3   CHLORIDE 106   CO2 27.0   ANION GAP 9.0   BUN 18   CREATININE 0.87   EGFR 92.8   GLUCOSE 140*   CALCIUM 8.4*   MAGNESIUM 2.0         Brief Urine Lab Results  (Last result in the past 365 days)      Color   Clarity   Blood   Leuk Est   Nitrite   Protein   CREAT   Urine HCG        22 1217 Dark Yellow   Cloudy   Negative   Trace   Negative   >=300 mg/dL (3+)                 Microbiology Results Abnormal     Procedure Component Value - Date/Time    Aspergillus Galactomannan Antigen - Blood, Arm, Left [[id number]] Collected: 22 1416    Lab Status: Final result Specimen: Blood from  Arm, Left Updated: 02/27/22 0006     Aspergillus Ag, BAL/Serum 0.03 Index     Narrative:      Performed at:  [address] [place] [address],   [id number]  : Baldemar Head MD, Phone:  [phone]    Blood Culture - Blood, Arm, Right [[id number]]  (Normal) Collected: 02/21/22 1933    Lab Status: Final result Specimen: Blood from Arm, Right Updated: 02/26/22 2015     Blood Culture No growth at 5 days    Blood Culture - Blood, Hand, Left [[id number]]  (Normal) Collected: 02/21/22 1932    Lab Status: Final result Specimen: Blood from Hand, Left Updated: 02/26/22 2015     Blood Culture No growth at 5 days    Blood Culture - Blood, Arm, Right [[id number]]  (Normal) Collected: 02/19/22 1200    Lab Status: Final result Specimen: Blood from Arm, Right Updated: 02/24/22 1230     Blood Culture No growth at 5 days    Blood Culture ID, PCR - Blood, Arm, Right [[id number]]  (Normal) Collected: 02/19/22 1150    Lab Status: Final result Specimen: Blood from Arm, Right Updated: 02/21/22 0734     BCID, PCR Negative by BCID PCR. Culture to Follow.          No radiology results from the last 24 hrs    Results for orders placed during the hospital encounter of 02/19/22    Adult Transthoracic Echo Complete W/ Cont if Necessary Per Protocol    Interpretation Summary  · Technically difficult study due to body habitus, atrial fibrillation with RVR, and on gated study due to Covid protocol.  · Left ventricular systolic function is moderately decreased. Estimated left ventricular EF = 30%. Regional wall motion abnormality cannot be properly assessed on the basis of the study  · The cardiac valves are poorly visualized. No significant stenosis or regurgitation of the aortic, mitral, or tricuspid valves was seen.  · Estimated right ventricular systolic pressure from tricuspid regurgitation is normal (<35 mmHg).      I have reviewed the medications:  Scheduled Meds:apixaban, 5 mg, Oral, Q12H  atorvastatin, 40 mg,  Oral, Nightly  digoxin, 250 mcg, Oral, Daily  donepezil, 10 mg, Oral, Nightly  empagliflozin, 10 mg, Oral, Daily  escitalopram, 20 mg, Oral, Nightly  insulin detemir, 40 Units, Subcutaneous, Nightly  insulin lispro, 0-9 Units, Subcutaneous, TID AC  metoprolol tartrate, 25 mg, Oral, Q12H  miconazole, , Topical, Q12H  Nutrisource fiber, 2 packet, Oral, BID  saccharomyces boulardii, 250 mg, Oral, BID  sodium chloride, 10 mL, Intravenous, Q12H      Continuous Infusions:   PRN Meds:.•  acetaminophen  •  albuterol sulfate HFA  •  dextrose  •  dextrose  •  glucagon (human recombinant)  •  loperamide  •  magnesium sulfate **OR** magnesium sulfate **OR** magnesium sulfate  •  metoprolol tartrate  •  potassium chloride **OR** potassium chloride **OR** potassium chloride  •  sodium chloride    Assessment/Plan   Assessment & Plan     Active Hospital Problems    Diagnosis  POA   • **Pneumonia due to COVID-19 virus [U07.1, J12.82]  Yes   • Permanent atrial fibrillation (HCC) [I48.21]  Yes   • Coronary artery disease involving native coronary artery of native heart with angina pectoris ([facility]) [I25.119]  Yes   • Chronic systolic congestive heart failure ([facility]) [I50.22]  Yes   • Hyperlipidemia LDL goal <70 [E78.5]  Yes   • Type 2 diabetes mellitus with hyperglycemia, with long-term current use of insulin ([facility]) [E11.65, Z79.4]  Not Applicable   • Oxygen dependent [Z99.81]  Not Applicable   • COPD (chronic obstructive pulmonary disease) ([facility]) [J44.9]  Yes      Resolved Hospital Problems    Diagnosis Date Resolved POA   • Pneumonia due to infectious organism [J18.9] 02/19/2022 Yes   • Persistent atrial fibrillation (HCC) [I48.19] 02/19/2022 Yes   • Dementia ([facility]) [F03.90] 02/19/2022 Yes   • CKD (chronic kidney disease) stage 3, GFR 30-59 ml/min ([facility]) [N18.30] 02/19/2022 Yes   • Essential hypertension [I10] 02/19/2022 Yes     Brief Hospital Course to date:  Chito Rod is a 71 y.o. male  with a history of A. fib on chronic  anticoagulation, hypertension, CHF, COPD, FAUSTO, diabetes who presented on 2/19/2022 after being found down by his caregiver. CT chest with right middle lobe pneumonia, mild atelectasis, likely emphysema. Tested positive for Covid on 2/19/2022. He completed treatment with remdesivir and dexamethasone. Currently stable on room air. CM is following for LTC/SNF placement.    This patient's problems and plans were partially entered by my partner and updated as appropriate by me 03/10/22.      Pneumonia secondary to COVID-19  Presumed secondary bacterial pneumonia, community-acquired (right middle and right lower lobe on imaging)  Acute sepsis  Acute encephalopathy, improved  History of COPD on chronic oxygen  -CT chest with right middle lobe pneumonia, mild atelectasis, likely emphysema  -Blood cultures 1 out of 2 positive for gram-positive cocci in chains  -Tested positive for Covid on 2/19/2022.  Continue isolation through 3/11/2022   -Completed 5 days of remdesivir  -Completed 10 days of Decadron.  -Received Actemra 2/23  -Anticoagulated with Eliquis  -Completed 7 days of empiric antibiotics with doxycycline and Rocephin   -Currently stable on room air (placed on 2L when he sleeps due to suspected FAUSTO)     Diarrhea  - continue probiotic     Positive blood culture   -Likely contaminant     A. fib with RVR  NSTEMI type 2  History of systolic congestive heart failure  Hypertension  CAD  Hyperlipidemia  -Cardiology evaluated  --No clinical signs of ACS  -continue metoprolol, dig and Eliquis  -Continue Lipitor     History of dementia  -CT head with no acute findings  - following for disposition     Diabetes mellitus type 2  Steroid related hyperglycemia  -Continue Levemir, schedule qac and SSI   - continue home Jardiance     Depression   - Lexapro increased to 20 mg daily this admit     DVT prophylaxis:  Medical and mechanical DVT prophylaxis orders are present.       AM-PAC 6 Clicks Score (PT): 17 (03/06/22  2000)    Disposition: I expect the patient to be discharged to SNF/placement when bed available, medically ready.    CODE STATUS:   Code Status and Medical Interventions:   Ordered at: 02/19/22 1442     Level Of Support Discussed With:    Patient     Code Status (Patient has no pulse and is not breathing):    CPR (Attempt to Resuscitate)     Medical Interventions (Patient has pulse or is breathing):    Full Support     This patient's problems and plans were partially entered by my partner and updated as appropriate by me 03/10/22. Today is my first day evaluating this patient's active medical problems. I Personally reviewed chart and adjusted note to reflect daily changes in management/clinical condition      Tatum Wolf, OTILIA  03/10/22

## 2022-03-10 NOTE — CASE MANAGEMENT/SOCIAL WORK
BiPAP per RT done. Pt tolerating well. Will continue to closely monitor.    Continued Stay Note  [facility]     Patient Name: Chito Rod  MRN: [id number]  Today's Date: 3/10/2022    Admit Date: 2/19/2022     Discharge Plan     Row Name 03/10/22 0937       Plan    Plan [facility] has approved Mr. Rod pending the [employer] pre cert. An ambulance is scheduled for 3:00 pm on Friday if [facility] appro[facility] Mr. Rod. [facility] said they now do not require a Covid test because the patient has been recovering from Covid.               Discharge Codes    No documentation.               Expected Discharge Date and Time     Expected Discharge Date Expected Discharge Time    Mar 10, 2022             TAURUS Payne

## 2022-03-11 VITALS
DIASTOLIC BLOOD PRESSURE: 36 MMHG | RESPIRATION RATE: 18 BRPM | WEIGHT: 246.91 LBS | SYSTOLIC BLOOD PRESSURE: 77 MMHG | BODY MASS INDEX: 33.44 KG/M2 | TEMPERATURE: 97.6 F | HEIGHT: 72 IN | HEART RATE: 80 BPM | OXYGEN SATURATION: 92 %

## 2022-03-11 PROBLEM — J12.82 PNEUMONIA DUE TO COVID-19 VIRUS: Status: RESOLVED | Noted: 2022-02-19 | Resolved: 2022-03-11

## 2022-03-11 PROBLEM — U07.1 PNEUMONIA DUE TO COVID-19 VIRUS: Status: RESOLVED | Noted: 2022-02-19 | Resolved: 2022-03-11

## 2022-03-11 PROBLEM — D89.832 CYTOKINE RELEASE SYNDROME, GRADE 2: Status: ACTIVE | Noted: 2022-03-11

## 2022-03-11 LAB
GLUCOSE BLDC GLUCOMTR-MCNC: 125 MG/DL (ref 70–130)
GLUCOSE BLDC GLUCOMTR-MCNC: 265 MG/DL (ref 70–130)

## 2022-03-11 PROCEDURE — 63710000001 INSULIN LISPRO (HUMAN) PER 5 UNITS: Performed by: INTERNAL MEDICINE

## 2022-03-11 PROCEDURE — 82962 GLUCOSE BLOOD TEST: CPT

## 2022-03-11 PROCEDURE — 99239 HOSP IP/OBS DSCHRG MGMT >30: CPT | Performed by: NURSE PRACTITIONER

## 2022-03-11 RX ORDER — ATORVASTATIN CALCIUM 40 MG/1
40 TABLET, FILM COATED ORAL NIGHTLY
Qty: 30 TABLET | Refills: 0 | Status: SHIPPED | OUTPATIENT
Start: 2022-03-11 | End: 2022-10-19

## 2022-03-11 RX ORDER — METOPROLOL SUCCINATE 25 MG/1
50 TABLET, EXTENDED RELEASE ORAL DAILY
Qty: 30 TABLET | Refills: 0 | Status: SHIPPED | OUTPATIENT
Start: 2022-03-11 | End: 2022-10-19

## 2022-03-11 RX ORDER — GUAR GUM
2 PACKET (EA) ORAL 2 TIMES DAILY
Qty: 75 EACH | Refills: 0 | Status: SHIPPED | OUTPATIENT
Start: 2022-03-11 | End: 2022-10-19

## 2022-03-11 RX ORDER — ALBUTEROL SULFATE 90 UG/1
2 AEROSOL, METERED RESPIRATORY (INHALATION) 4 TIMES DAILY PRN
Qty: 18 G | Refills: 0 | Status: SHIPPED | OUTPATIENT
Start: 2022-03-11 | End: 2022-10-19

## 2022-03-11 RX ORDER — ESCITALOPRAM OXALATE 20 MG/1
20 TABLET ORAL NIGHTLY
Qty: 30 TABLET | Refills: 0 | Status: SHIPPED | OUTPATIENT
Start: 2022-03-11 | End: 2022-10-19

## 2022-03-11 RX ORDER — ACETAMINOPHEN 325 MG/1
650 TABLET ORAL EVERY 6 HOURS PRN
Start: 2022-03-11 | End: 2022-10-19

## 2022-03-11 RX ORDER — SACCHAROMYCES BOULARDII 250 MG
250 CAPSULE ORAL 2 TIMES DAILY
Qty: 60 CAPSULE | Refills: 0 | Status: SHIPPED | OUTPATIENT
Start: 2022-03-11 | End: 2022-10-19

## 2022-03-11 RX ADMIN — METOPROLOL TARTRATE 25 MG: 25 TABLET, FILM COATED ORAL at 10:25

## 2022-03-11 RX ADMIN — MICONAZOLE NITRATE: 20 POWDER TOPICAL at 10:26

## 2022-03-11 RX ADMIN — Medication 250 MG: at 10:25

## 2022-03-11 RX ADMIN — EMPAGLIFLOZIN 10 MG: 10 TABLET, FILM COATED ORAL at 10:25

## 2022-03-11 RX ADMIN — DIGOXIN 250 MCG: 250 TABLET ORAL at 10:25

## 2022-03-11 RX ADMIN — Medication 2 PACKET: at 10:26

## 2022-03-11 RX ADMIN — INSULIN LISPRO 6 UNITS: 100 INJECTION, SOLUTION INTRAVENOUS; SUBCUTANEOUS at 12:37

## 2022-03-11 RX ADMIN — APIXABAN 5 MG: 5 TABLET, FILM COATED ORAL at 10:25

## 2022-03-11 NOTE — CASE MANAGEMENT/SOCIAL WORK
Case Management Discharge Note      Final Note: [facility] will accept Mr. Rod and the report number for [facility] is [phone]. [facility] will get the discharge summary out of [facility]. An ambulance is scheduled for 3:00 pm today.         Selected Continued Care - Admitted Since 2/19/2022     Destination Coordination complete.    Service Provider Selected Services Address Phone Fax Patient Preferred    [facility]  Skilled Nursing [address] [facility] [id number] [phone] --       Internal Comment last updated by Grace Hoover, RN 3/2/2022 1514    3/2 referral called                       Durable Medical Equipment    No services have been selected for the patient.              Dialysis/Infusion    No services have been selected for the patient.              Home Medical Care    No services have been selected for the patient.              Therapy    No services have been selected for the patient.              Community Resources    No services have been selected for the patient.              Community & DME    No services have been selected for the patient.                  Transportation Services  Ambulance: [facility]    Final Discharge Disposition Code: 03 - skilled nursing facility (SNF)

## 2022-03-11 NOTE — NURSING NOTE
Attempted to call report to [facility] several times.  With no answer and no ability to leave a message, an attempt will be made again before transport is scheduled at 3 pm.

## 2022-03-11 NOTE — PLAN OF CARE
Goal Outcome Evaluation:           Progress: improving  Outcome Evaluation: VSS. Patient on 2L NC while sleeping overnight. A-fib on telemetry. Levemir held due to nightime blood glucose of 126. Patient rested well overnight. Patient to d/c to [facility] today pending insurance. No acute events overnight. Continue POC.

## 2022-03-11 NOTE — DISCHARGE SUMMARY
[facility] Medicine Services  DISCHARGE SUMMARY    Patient Name: Chito Rod  : 1950  MRN: [id number]    Date of Admission: 2022  Date of Discharge:  3/11/2022  Primary Care Physician: Provider, No Known    Consults     Date and Time Order Name Status Description    2022  6:37 AM Inpatient Infectious Diseases Consult Completed     2022  2:43 PM Inpatient Cardiology Consult Completed           Hospital Course     Presenting Problem:   COVID [U07.1]    Active Hospital Problems    Diagnosis  POA   • Cytokine release syndrome, grade 2 [D89.832]  No   • Permanent atrial fibrillation (HCC) [I48.21]  Yes   • Coronary artery disease involving native coronary artery of native heart with angina pectoris ([facility]) [I25.119]  Yes   • Chronic systolic congestive heart failure (HCC) [I50.22]  Yes   • Hyperlipidemia LDL goal <70 [E78.5]  Yes   • Type 2 diabetes mellitus with hyperglycemia, with long-term current use of insulin ([facility]) [E11.65, Z79.4]  Not Applicable   • Oxygen dependent [Z99.81]  Not Applicable   • COPD (chronic obstructive pulmonary disease) ([facility]) [J44.9]  Yes      Resolved Hospital Problems    Diagnosis Date Resolved POA   • **Pneumonia due to COVID-19 virus [U07.1, J12.82] 2022 Yes   • Pneumonia due to infectious organism [J18.9] 2022 Yes   • Persistent atrial fibrillation (HCC) [I48.19] 2022 Yes   • Dementia ([facility]) [F03.90] 2022 Yes   • CKD (chronic kidney disease) stage 3, GFR 30-59 ml/min ([facility]) [N18.30] 2022 Yes   • Essential hypertension [I10] 2022 Yes      Hospital Course:  Chito Rod is a 71 y.o. male PMH A. fib (Eliquis), HTN/HLD, CHF, COPD, FAUSTO, diabetes presenting on 22 after his caregiver for found him down.  CT chest showed right middle lobe pneumonia, mild atelectasis, likely emphysema.  He tested positive for Covid, completing remdesivir and dexamethasone and actemra.  He also completed 7 days of empiric  antibiotics with doxycycline and ceftriaxone.  Recently stable on room air with O2 at 2 L while sleeping due to suspected FAUSTO.  During hospitalization, patient found with 1 of 2+ blood cultures thought to be contaminant.  There was a cardiology consult due to concerns for NSTEMI type II.  There were no clinical signs of ACH.  Patient was continued on metoprolol, digoxin, Eliquis, Lipitor.  DM2 controlled during hospitalization.  Patient's A1c was 7.9.  Due to depression, Lexapro was increased from 10 mg to 20 mg this admission.    Discharge Follow Up Recommendations for labs/diagnostics:  -Isolation ends on 3/11/2022  -Follow-up with PCP 1 week after discharge from rehab  -Follow-up with [facility] heart and valve clinic after discharge from rehab    Day of Discharge     HPI:   Patient sitting up in bed eating breakfast, alert and orient x2, pleasant, talkative.  No issues overnight.    Review of Systems  Gen- No fevers, chills  CV- No chest pain, palpitations  Resp- No cough, dyspnea  GI- No N/V/D, abd pain  Otherwise ROS is negative except as mentioned in the HPI.    Vital Signs:   Temp:  [97.3 °F (36.3 °C)-98.6 °F (37 °C)] 97.3 °F (36.3 °C)  Heart Rate:  [67-84] 84  Resp:  [18] 18  BP: ()/(50-81) 77/61     Physical Exam:  Constitutional: Awake, alert, NAD  HENT: NCAT, mucous membranes moist  Respiratory: Clear to auscultation bilaterally, nonlabored respirations   Cardiovascular: RRR, no murmurs, rubs, or gallops  Gastrointestinal: Positive bowel sounds, soft, nontender, nondistended  Musculoskeletal: No bilateral ankle edema  Psychiatric: Flat affect, cooperative  Neurologic: Oriented x 2, strength symmetric in all extremities, Cranial Nerves grossly intact to confrontation, speech clear  Skin: No rashes    Assessment  Pneumonia secondary to COVID-19-resolved  Presumed secondary bacterial pneumonia, community-acquired (right middle and right lower lobe on imaging)-resolved  Acute sepsis-resolved  Acute  encephalopathy-resolved  History of COPD on chronic oxygen-stable  -CT chest with right middle lobe pneumonia, mild atelectasis, likely emphysema  -Blood cultures 1 out of 2 positive for gram-positive cocci in chains, likely contamination.  -Tested positive for Covid on 2/19/2022.  Continue isolation through 3/11/2022   -Completed 5 days of remdesivir  -Completed 10 days of Decadron.  -Received Actemra 2/23  -Anticoagulated with Eliquis  -Completed 7 days of empiric antibiotics with doxycycline and Rocephin   -Currently stable on room air (placed on 2L when he sleeps due to suspected FAUSTO)     Diarrhea- resolved  - continue probiotic     Positive blood culture-stable  -Likely contaminant     A. fib with RVR-stable  NSTEMI type 2-resolved  History of systolic congestive heart failure-stable  Hypertension-stable  CAD-stable  Hyperlipidemia-stable  -Cardiology evaluated  --No clinical signs of ACS  -continue metoprolol, dig and Eliquis  -Continue Lipitor     History of dementia-stable  -CT head with no acute findings  -CM following for disposition     Diabetes mellitus type 2-stable  Steroid related hyperglycemia-stable  -Continue Levemir, schedule qac and SSI   - continue home Jardiance     Depression-stable  - Lexapro increased to 20 mg daily this admit       Pertinent  and/or Most Recent Results     Results from last 7 days   Lab Units 03/06/22  0256   WBC 10*3/mm3 10.81*   HEMOGLOBIN g/dL 14.7   HEMATOCRIT % 46.7   PLATELETS 10*3/mm3 141   SODIUM mmol/L 142   POTASSIUM mmol/L 4.3   CHLORIDE mmol/L 106   CO2 mmol/L 27.0   BUN mg/dL 18   CREATININE mg/dL 0.87   GLUCOSE mg/dL 140*   CALCIUM mg/dL 8.4*           Invalid input(s): PROT, LABALBU        Invalid input(s): TG, LDLCALC, LDLREALC        Brief Urine Lab Results  (Last result in the past 365 days)      Color   Clarity   Blood   Leuk Est   Nitrite   Protein   CREAT   Urine HCG        02/19/22 1217 Dark Yellow   Cloudy   Negative   Trace   Negative   >=300 mg/dL  (3+)                 Microbiology Results Abnormal     Procedure Component Value - Date/Time    Aspergillus Galactomannan Antigen - Blood, Arm, Left [[id number]] Collected: 02/23/22 1416    Lab Status: Final result Specimen: Blood from Arm, Left Updated: 02/27/22 0006     Aspergillus Ag, BAL/Serum 0.03 Index     Narrative:      Performed at:  [address] [place] [address]  [id number]  : Baldemar Head MD, Phone:  [phone]    Blood Culture - Blood, Arm, Right [[id number]]  (Normal) Collected: 02/21/22 1933    Lab Status: Final result Specimen: Blood from Arm, Right Updated: 02/26/22 2015     Blood Culture No growth at 5 days    Blood Culture - Blood, Hand, Left [[id number]]  (Normal) Collected: 02/21/22 1932    Lab Status: Final result Specimen: Blood from Hand, Left Updated: 02/26/22 2015     Blood Culture No growth at 5 days    Blood Culture - Blood, Arm, Right [[id number]]  (Normal) Collected: 02/19/22 1200    Lab Status: Final result Specimen: Blood from Arm, Right Updated: 02/24/22 1230     Blood Culture No growth at 5 days    Blood Culture ID, PCR - Blood, Arm, Right [[id number]]  (Normal) Collected: 02/19/22 1150    Lab Status: Final result Specimen: Blood from Arm, Right Updated: 02/21/22 0734     BCID, PCR Negative by BCID PCR. Culture to Follow.          Imaging Results (All)     Procedure Component Value Units Date/Time    XR Chest 1 View [[id number]] Collected: 02/23/22 0846     Updated: 02/23/22 0851    Narrative:      EXAMINATION: XR CHEST 1 VW-      INDICATION: hypoxia; COVID; R41.82-Altered mental status, unspecified;  U07.1-COVID-19; E86.0-Dehydration      COMPARISON: CT chest 2/19/2022     FINDINGS: Diffuse interstitial prominence is again noted, similar to  recent CT, with areas of right middle and lower lobe pneumonia somewhat  difficult to visualize on radiographs. There is no new focal lobar  consolidation. There is no significant pleural effusion or  distinct  pneumothorax. Unchanged degree of cardiac enlargement.       Impression:      Diffuse interstitial prominence is again noted, similar to  recent CT, with areas of right middle and lower lobe pneumonia somewhat  difficult to visualize on radiographs. There is no new focal lobar  consolidation. There is no significant pleural effusion or distinct  pneumothorax. Unchanged degree of cardiac enlargement.     This report was finalized on 2/23/2022 8:48 AM by William Mccarthy.       CT Chest Without Contrast Diagnostic [[id number]] Collected: 02/19/22 1257     Updated: 02/19/22 1308    Narrative:         DATE OF EXAM:  2/19/2022 12:28 PM     PROCEDURE:  CT CHEST WO CONTRAST DIAGNOSTIC-, CT ABDOMEN PELVIS WO CONTRAST-     INDICATIONS:   Altered mental status, fall, patient on blood thinners.     COMPARISON:   CT the chest performed on 04/19/2019.     TECHNIQUE:  Routine transaxial slices were obtained through the chest, abdomen, and  pelvis without the administration of intravenous contrast. Reconstructed  coronal and sagittal images were also obtained. Automated exposure  control and iterative construction methods were used.      FINDINGS:  Chest: There is some respiratory motion artifact apparent. There are  patchy areas of consolidation in the lateral segment of the right middle  lobe and basilar segments of the right lower lobe felt to represent  pneumonia. There is suggestion of emphysema. There is mild dependent  atelectasis in the lung bases. There is no pleural effusion or  pneumothorax. The patient has a stable prominent right paratracheal  lymph node measuring 1.8 x 1.3 cm. There are atherosclerotic vascular  calcifications including involvement of the coronary arteries. There are  old healed left-sided rib fractures. There are no definite acute bony  abnormalities.     Abdomen and pelvis: There is no retroperitoneal or pelvic hematoma. The  exam is limited by noncontrast technique. There are round fluid  density  masses protruding off both kidneys felt to represent benign cysts. The  gallbladder, liver, spleen, adrenal glands, and pancreas are normal.  There are atherosclerotic vascular calcifications throughout the abdomen  and pelvis. There are no dilated loops of bowel to indicate an  obstructive process. There is no abnormal bowel wall thickening. The  prostate gland, seminal vesicles, and urinary bladder are normal. There  is no free fluid within the abdomen or pelvis. There are no acute bony  abnormalities.       Impression:         1. Right middle and lower lobe pneumonia.  2. Mild dependent atelectasis in the lung bases.  3. Suggestion of emphysema.  4. No acute findings within the abdomen or pelvis.  5. Additional findings as noted above.     This report was finalized on 2/19/2022 1:05 PM by Ramesh Pedro MD.       CT Abdomen Pelvis Without Contrast [[id number]] Collected: 02/19/22 1257     Updated: 02/19/22 1308    Narrative:         DATE OF EXAM:  2/19/2022 12:28 PM     PROCEDURE:  CT CHEST WO CONTRAST DIAGNOSTIC-, CT ABDOMEN PELVIS WO CONTRAST-     INDICATIONS:   Altered mental status, fall, patient on blood thinners.     COMPARISON:   CT the chest performed on 04/19/2019.     TECHNIQUE:  Routine transaxial slices were obtained through the chest, abdomen, and  pelvis without the administration of intravenous contrast. Reconstructed  coronal and sagittal images were also obtained. Automated exposure  control and iterative construction methods were used.      FINDINGS:  Chest: There is some respiratory motion artifact apparent. There are  patchy areas of consolidation in the lateral segment of the right middle  lobe and basilar segments of the right lower lobe felt to represent  pneumonia. There is suggestion of emphysema. There is mild dependent  atelectasis in the lung bases. There is no pleural effusion or  pneumothorax. The patient has a stable prominent right paratracheal  lymph node measuring 1.8 x 1.3  cm. There are atherosclerotic vascular  calcifications including involvement of the coronary arteries. There are  old healed left-sided rib fractures. There are no definite acute bony  abnormalities.     Abdomen and pelvis: There is no retroperitoneal or pelvic hematoma. The  exam is limited by noncontrast technique. There are round fluid density  masses protruding off both kidneys felt to represent benign cysts. The  gallbladder, liver, spleen, adrenal glands, and pancreas are normal.  There are atherosclerotic vascular calcifications throughout the abdomen  and pelvis. There are no dilated loops of bowel to indicate an  obstructive process. There is no abnormal bowel wall thickening. The  prostate gland, seminal vesicles, and urinary bladder are normal. There  is no free fluid within the abdomen or pelvis. There are no acute bony  abnormalities.       Impression:         1. Right middle and lower lobe pneumonia.  2. Mild dependent atelectasis in the lung bases.  3. Suggestion of emphysema.  4. No acute findings within the abdomen or pelvis.  5. Additional findings as noted above.     This report was finalized on 2/19/2022 1:05 PM by Ramesh Pedro MD.       CT Head Without Contrast [[id number]] Collected: 02/19/22 1253     Updated: 02/19/22 1258    Narrative:         DATE OF EXAM:  2/19/2022 12:28 PM     PROCEDURE:   CT HEAD WO CONTRAST-     INDICATIONS:   Fall, altered mental status, patient on blood thinners.     COMPARISON:  02/11/2019.     TECHNIQUE:   Routine transaxial cuts were obtained through the head without the  administration of contrast. Automated exposure control and iterative  reconstruction methods were used.      FINDINGS:  There is a prominence of the sulci, fissures, and ventricles indicating  mild volume loss secondary to age-appropriate atrophy. The basal  cisterns are well-maintained. There are areas of decreased density in  the white matter tracts consistent with chronic microvascular  ischemia.  There is no acute hemorrhage, midline shift, or suspicious extra-axial  fluid collections. There are atherosclerotic vascular calcifications in  the distal vertebral arteries and carotid siphons. The orbital contents  are normal. The visualized paranasal and mastoid sinuses are clear.  There is no skull fracture.        Impression:         1. No acute findings.  2. Mild volume loss due to cerebral atrophy.  3. Chronic ischemic changes.     This report was finalized on 2/19/2022 12:55 PM by Ramesh Pedro MD.                     Results for orders placed during the hospital encounter of 02/19/22    Adult Transthoracic Echo Complete W/ Cont if Necessary Per Protocol    Interpretation Summary  · Technically difficult study due to body habitus, atrial fibrillation with RVR, and on gated study due to Covid protocol.  · Left ventricular systolic function is moderately decreased. Estimated left ventricular EF = 30%. Regional wall motion abnormality cannot be properly assessed on the basis of the study  · The cardiac valves are poorly visualized. No significant stenosis or regurgitation of the aortic, mitral, or tricuspid valves was seen.  · Estimated right ventricular systolic pressure from tricuspid regurgitation is normal (<35 mmHg).        Discharge Details        Discharge Medications      New Medications      Instructions Start Date   acetaminophen 325 MG tablet  Commonly known as: TYLENOL   650 mg, Oral, Every 6 Hours PRN      albuterol sulfate  (90 Base) MCG/ACT inhaler  Commonly known as: PROVENTIL HFA;VENTOLIN HFA;PROAIR HFA   2 puffs, Inhalation, 4 Times Daily PRN      empagliflozin 10 MG tablet tablet  Commonly known as: JARDIANCE   10 mg, Oral, Daily      escitalopram 20 MG tablet  Commonly known as: LEXAPRO   20 mg, Oral, Nightly      miconazole 2 % powder  Commonly known as: MICOTIN   Topical, Every 12 Hours Scheduled      Nutrisource fiber pack pack   2 packets, Oral, 2 Times Daily       saccharomyces boulardii 250 MG capsule  Commonly known as: FLORASTOR   250 mg, Oral, 2 Times Daily         Changes to Medications      Instructions Start Date   atorvastatin 40 MG tablet  Commonly known as: LIPITOR  What changed:   · medication strength  · how much to take  · additional instructions   40 mg, Oral, Nightly      metoprolol succinate XL 25 MG 24 hr tablet  Commonly known as: Toprol XL  What changed:   · medication strength  · how much to take  · when to take this   50 mg, Oral, Daily         Continue These Medications      Instructions Start Date   apixaban 5 MG tablet tablet  Commonly known as: ELIQUIS   5 mg, Oral, 2 Times Daily      digoxin 250 MCG tablet  Commonly known as: LANOXIN   250 mcg, Oral, Daily Digoxin      donepezil 10 MG tablet  Commonly known as: ARICEPT   10 mg, Oral, Nightly      glimepiride 4 MG tablet  Commonly known as: AMARYL   4 mg, Oral, Every Morning Before Breakfast      glucose blood test strip   Use as instructed      glucose monitor monitoring kit   Use to check blood glucose two times per day      insulin detemir 100 UNIT/ML injection  Commonly known as: LEVEMIR   40 Units, Subcutaneous, Nightly      Insulin Pen Needle 30G X 8 MM misc   Use to administer insulin subcutaneously every evening      metFORMIN 1000 MG tablet  Commonly known as: GLUCOPHAGE   1,000 mg, Oral, 2 Times Daily With Meals         Stop These Medications    furosemide 40 MG tablet  Commonly known as: LASIX     potassium chloride 20 MEQ CR tablet  Commonly known as: K-DUR,KLOR-CON            No Known Allergies      Discharge Disposition:  Rehab Facility or Unit (DC - External)    Discharge Diet:  Diet Order   Procedures   • Diet Regular         Discharge Activity:   Activity Instructions     Activity as Tolerated      Up WIth Assist              CODE STATUS:    Code Status and Medical Interventions:   Ordered at: 02/19/22 [facility]     Level Of Support Discussed With:    Patient     Code Status (Patient has  no pulse and is not breathing):    CPR (Attempt to Resuscitate)     Medical Interventions (Patient has pulse or is breathing):    Full Support         Future Appointments   Date Time Provider Department Center   3/11/2022  3:00 PM [facility]       Additional Instructions for the Follow-ups that You Need to Schedule     Discharge Follow-up with PCP   As directed       Currently Documented PCP:    Provider, No Known    PCP Phone Number:    None     Follow Up Details: Follow-up with PCP 1 week after discharge from rehab               Time Spent on Discharge:  35 minutes    Electronically signed by OTILIA Ghosh, 03/11/22, 9:42 AM EST.

## 2022-03-14 LAB
QT INTERVAL: 406 MS
QTC INTERVAL: 447 MS

## 2022-03-16 ENCOUNTER — OFFICE VISIT (OUTPATIENT)
Dept: CARDIOLOGY | Facility: HOSPITAL | Age: 72
End: 2022-03-16

## 2022-03-16 VITALS
BODY MASS INDEX: 32.95 KG/M2 | RESPIRATION RATE: 16 BRPM | SYSTOLIC BLOOD PRESSURE: 121 MMHG | WEIGHT: 243 LBS | OXYGEN SATURATION: 93 % | HEART RATE: 88 BPM | DIASTOLIC BLOOD PRESSURE: 63 MMHG

## 2022-03-16 DIAGNOSIS — Z86.16 HISTORY OF COVID-19: ICD-10-CM

## 2022-03-16 DIAGNOSIS — I50.20 HEART FAILURE WITH REDUCED EJECTION FRACTION: Primary | ICD-10-CM

## 2022-03-16 DIAGNOSIS — I25.810 CORONARY ARTERY DISEASE INVOLVING CORONARY BYPASS GRAFT OF NATIVE HEART WITHOUT ANGINA PECTORIS: ICD-10-CM

## 2022-03-16 DIAGNOSIS — I48.20 ATRIAL FIBRILLATION, CHRONIC: ICD-10-CM

## 2022-03-16 NOTE — PROGRESS NOTES
Pt had scheduled telehealth visit.  Called for scheduled telehealth visit.  Staff not available to assist patient with visit and he was not able to complete without assistance.  Current staff is unclear of when staff would be available to assist.  Family was not present.    Patient will be rescheduled for an office visit.

## 2022-03-16 NOTE — PROGRESS NOTES
[facility], [facility]  This was an audio enabled telemedicine encounter. Pt d/c'd to [facility]    You have chosen to receive care through the use of telemedicine. Telemedicine enables health care providers at different locations to provide safe, effective, and convenient care through the use of technology. As with any health care service, there are risks associated with the use of telemedicine, including equipment failure, poor connections, and  issues.    • Do you understand the risks and benefits of telemedicine as I have explained them to you? {yes and no:42465}  • Have your questions regarding telemedicine been answered? {yes and no:27352}  • Do you consent to the use of telemedicine in your medical care today? {yes and no:35835}      Chief Complaint  No chief complaint on file.    Subjective    History of Present Illness {CC  Problem List  Visit  Diagnosis   Encounters  Notes  Medications  Labs  Result Review Imaging  Media :23}     Chito Rod presents to [facility] for   History of Present Illness     71-year-old male presented to [facility] on 2/19/2022 with Covid pneumonia.  Patient discharged to [facility] for rehab.  Isolation ends 3/11/2022.  Patient has a history of permanent atrial fibrillation on Eliquis, hypertension, CAD, hyperlipidemia, COPD, FAUSTO, heart failure, diabetes.  Poor cardiology follow-up (missed several appointments in the past)    Echocardiogram 2/19/2022: EF 30%, cardiac valves poorly visualized  Objective     Vital Signs:   There were no vitals filed for this visit.  There is no height or weight on file to calculate BMI.  Physical Exam         Result Review  Data Reviewed:{ Labs  Result Review  Imaging  Med Tab  Media :23}     {Data reviewed (Optional):68167}            Assessment and Plan {CC Problem List  Visit Diagnosis  ROS  Review (Popup)  [facility]  Maintenance  Quality  BestPractice  Medications  SmartSets  SnapShot Encounters  Media :23}   1. Heart failure with reduced ejection fraction (HCC)  ***    2. Atrial fibrillation, chronic (HCC)  ***    3. Coronary artery disease involving coronary bypass graft of native heart without angina pectoris  ***    4. History of COVID-19  ***      {Time Spent (Optional):64716}    Follow Up {Instructions Charge Capture  Follow-up Communications :23}   No follow-ups on file.    Patient was given instructions and counseling regarding his condition or for health maintenance advice. Please see specific information pulled into the AVS if appropriate.  Patient was instructed to call the Heart [facility] Valve Center with any questions, concerns, or worsening symptoms.

## 2022-10-18 ENCOUNTER — HOSPITAL ENCOUNTER (INPATIENT)
Facility: HOSPITAL | Age: 72
LOS: 1 days | Discharge: ANOTHER HOSPITAL | End: 2022-10-19
Attending: EMERGENCY MEDICINE | Admitting: INTERNAL MEDICINE

## 2022-10-18 ENCOUNTER — APPOINTMENT (OUTPATIENT)
Dept: GENERAL RADIOLOGY | Facility: HOSPITAL | Age: 72
End: 2022-10-18

## 2022-10-18 DIAGNOSIS — I50.41 ACUTE COMBINED SYSTOLIC AND DIASTOLIC CONGESTIVE HEART FAILURE: ICD-10-CM

## 2022-10-18 DIAGNOSIS — C95.00 ACUTE LEUKEMIA NOT HAVING ACHIEVED REMISSION: ICD-10-CM

## 2022-10-18 DIAGNOSIS — I48.91 ATRIAL FIBRILLATION WITH RVR: ICD-10-CM

## 2022-10-18 DIAGNOSIS — D69.6 THROMBOCYTOPENIA: ICD-10-CM

## 2022-10-18 DIAGNOSIS — R06.02 SHORTNESS OF BREATH: Primary | ICD-10-CM

## 2022-10-18 PROBLEM — N17.9 AKI (ACUTE KIDNEY INJURY): Status: ACTIVE | Noted: 2022-10-18

## 2022-10-18 PROBLEM — R82.4 KETONURIA: Status: ACTIVE | Noted: 2022-10-18

## 2022-10-18 PROBLEM — D72.829 LEUKOCYTOSIS: Status: ACTIVE | Noted: 2022-10-18

## 2022-10-18 PROBLEM — E87.1 HYPONATREMIA: Status: ACTIVE | Noted: 2022-10-18

## 2022-10-18 PROBLEM — R80.9 PROTEINURIA: Status: ACTIVE | Noted: 2022-10-18

## 2022-10-18 PROBLEM — R74.8 ELEVATED LIVER ENZYMES: Status: ACTIVE | Noted: 2022-10-18

## 2022-10-18 LAB
ALBUMIN SERPL-MCNC: 3.7 G/DL (ref 3.5–5.2)
ALBUMIN/GLOB SERPL: 1.2 G/DL
ALP SERPL-CCNC: 105 U/L (ref 39–117)
ALT SERPL W P-5'-P-CCNC: 238 U/L (ref 1–41)
ANION GAP SERPL CALCULATED.3IONS-SCNC: 15 MMOL/L (ref 5–15)
AST SERPL-CCNC: 333 U/L (ref 1–40)
BASOPHILS # BLD AUTO: 0.24 10*3/MM3 (ref 0–0.2)
BASOPHILS # BLD MANUAL: 0 10*3/MM3 (ref 0–0.2)
BASOPHILS NFR BLD AUTO: 0.2 % (ref 0–1.5)
BASOPHILS NFR BLD MANUAL: 0 % (ref 0–1.5)
BILIRUB SERPL-MCNC: 0.8 MG/DL (ref 0–1.2)
BLASTS NFR BLD MANUAL: 80 % (ref 0–0)
BUN SERPL-MCNC: 36 MG/DL (ref 8–23)
BUN/CREAT SERPL: 27.9 (ref 7–25)
CALCIUM SPEC-SCNC: 8.8 MG/DL (ref 8.6–10.5)
CHLORIDE SERPL-SCNC: 90 MMOL/L (ref 98–107)
CO2 SERPL-SCNC: 22 MMOL/L (ref 22–29)
CREAT SERPL-MCNC: 1.29 MG/DL (ref 0.76–1.27)
D-LACTATE SERPL-SCNC: 2.2 MMOL/L (ref 0.5–2)
DEPRECATED RDW RBC AUTO: 48.7 FL (ref 37–54)
DIGOXIN SERPL-MCNC: 1.05 NG/ML (ref 0.6–1.2)
EGFRCR SERPLBLD CKD-EPI 2021: 59.3 ML/MIN/1.73
EOSINOPHIL # BLD AUTO: 0.04 10*3/MM3 (ref 0–0.4)
EOSINOPHIL # BLD MANUAL: 0 10*3/MM3 (ref 0–0.4)
EOSINOPHIL NFR BLD AUTO: 0 % (ref 0.3–6.2)
EOSINOPHIL NFR BLD MANUAL: 0 % (ref 0.3–6.2)
ERYTHROCYTE [DISTWIDTH] IN BLOOD BY AUTOMATED COUNT: 15.6 % (ref 12.3–15.4)
FLUAV SUBTYP SPEC NAA+PROBE: NOT DETECTED
FLUBV RNA ISLT QL NAA+PROBE: NOT DETECTED
GLOBULIN UR ELPH-MCNC: 3.2 GM/DL
GLUCOSE SERPL-MCNC: 186 MG/DL (ref 65–99)
HCT VFR BLD AUTO: 31.4 % (ref 37.5–51)
HGB BLD-MCNC: 10.6 G/DL (ref 13–17.7)
LYMPHOCYTES # BLD AUTO: 13.69 10*3/MM3 (ref 0.7–3.1)
LYMPHOCYTES # BLD MANUAL: 12.65 10*3/MM3 (ref 0.7–3.1)
LYMPHOCYTES NFR BLD AUTO: 11.9 % (ref 19.6–45.3)
LYMPHOCYTES NFR BLD MANUAL: 1 % (ref 5–12)
MAGNESIUM SERPL-MCNC: 1.5 MG/DL (ref 1.6–2.4)
MCH RBC QN AUTO: 29.4 PG (ref 26.6–33)
MCHC RBC AUTO-ENTMCNC: 33.8 G/DL (ref 31.5–35.7)
MCV RBC AUTO: 87.2 FL (ref 79–97)
METAMYELOCYTES NFR BLD MANUAL: 1 % (ref 0–0)
MONOCYTES # BLD AUTO: 85.58 10*3/MM3 (ref 0.1–0.9)
MONOCYTES # BLD: 1.15 10*3/MM3 (ref 0.1–0.9)
MONOCYTES NFR BLD AUTO: 74.4 % (ref 5–12)
NEUTROPHILS # BLD AUTO: 8.05 10*3/MM3 (ref 1.7–7)
NEUTROPHILS NFR BLD AUTO: 10.78 10*3/MM3 (ref 1.7–7)
NEUTROPHILS NFR BLD AUTO: 9.4 % (ref 42.7–76)
NEUTROPHILS NFR BLD MANUAL: 4 % (ref 42.7–76)
NEUTS BAND NFR BLD MANUAL: 3 % (ref 0–5)
NT-PROBNP SERPL-MCNC: ABNORMAL PG/ML (ref 0–900)
PLAT MORPH BLD: NORMAL
PLATELET # BLD AUTO: 58 10*3/MM3 (ref 140–450)
PMV BLD AUTO: 12.6 FL (ref 6–12)
POTASSIUM SERPL-SCNC: 5 MMOL/L (ref 3.5–5.2)
PROCALCITONIN SERPL-MCNC: 0.65 NG/ML (ref 0–0.25)
PROT SERPL-MCNC: 6.9 G/DL (ref 6–8.5)
RBC # BLD AUTO: 3.6 10*6/MM3 (ref 4.14–5.8)
RBC MORPH BLD: NORMAL
SARS-COV-2 RNA PNL SPEC NAA+PROBE: NOT DETECTED
SODIUM SERPL-SCNC: 127 MMOL/L (ref 136–145)
TROPONIN T SERPL-MCNC: 0.1 NG/ML (ref 0–0.03)
TSH SERPL DL<=0.05 MIU/L-ACNC: 6.01 UIU/ML (ref 0.27–4.2)
VARIANT LYMPHS NFR BLD MANUAL: 11 % (ref 19.6–45.3)
WBC MORPH BLD: NORMAL
WBC NRBC COR # BLD: 114.99 10*3/MM3 (ref 3.4–10.8)

## 2022-10-18 PROCEDURE — 94660 CPAP INITIATION&MGMT: CPT

## 2022-10-18 PROCEDURE — 25010000002 FUROSEMIDE PER 20 MG: Performed by: PHYSICIAN ASSISTANT

## 2022-10-18 PROCEDURE — 80162 ASSAY OF DIGOXIN TOTAL: CPT | Performed by: EMERGENCY MEDICINE

## 2022-10-18 PROCEDURE — 99291 CRITICAL CARE FIRST HOUR: CPT | Performed by: INTERNAL MEDICINE

## 2022-10-18 PROCEDURE — 85007 BL SMEAR W/DIFF WBC COUNT: CPT | Performed by: PHYSICIAN ASSISTANT

## 2022-10-18 PROCEDURE — 87636 SARSCOV2 & INF A&B AMP PRB: CPT | Performed by: PHYSICIAN ASSISTANT

## 2022-10-18 PROCEDURE — 94799 UNLISTED PULMONARY SVC/PX: CPT

## 2022-10-18 PROCEDURE — 94640 AIRWAY INHALATION TREATMENT: CPT

## 2022-10-18 PROCEDURE — 84145 PROCALCITONIN (PCT): CPT | Performed by: PHYSICIAN ASSISTANT

## 2022-10-18 PROCEDURE — 84443 ASSAY THYROID STIM HORMONE: CPT | Performed by: PHYSICIAN ASSISTANT

## 2022-10-18 PROCEDURE — 83605 ASSAY OF LACTIC ACID: CPT | Performed by: PHYSICIAN ASSISTANT

## 2022-10-18 PROCEDURE — 80053 COMPREHEN METABOLIC PANEL: CPT | Performed by: PHYSICIAN ASSISTANT

## 2022-10-18 PROCEDURE — 83880 ASSAY OF NATRIURETIC PEPTIDE: CPT | Performed by: PHYSICIAN ASSISTANT

## 2022-10-18 PROCEDURE — 99285 EMERGENCY DEPT VISIT HI MDM: CPT

## 2022-10-18 PROCEDURE — 93005 ELECTROCARDIOGRAM TRACING: CPT | Performed by: EMERGENCY MEDICINE

## 2022-10-18 PROCEDURE — 71045 X-RAY EXAM CHEST 1 VIEW: CPT

## 2022-10-18 PROCEDURE — 84484 ASSAY OF TROPONIN QUANT: CPT | Performed by: PHYSICIAN ASSISTANT

## 2022-10-18 PROCEDURE — 85025 COMPLETE CBC W/AUTO DIFF WBC: CPT | Performed by: PHYSICIAN ASSISTANT

## 2022-10-18 PROCEDURE — 83735 ASSAY OF MAGNESIUM: CPT | Performed by: PHYSICIAN ASSISTANT

## 2022-10-18 RX ORDER — FUROSEMIDE 10 MG/ML
60 INJECTION INTRAMUSCULAR; INTRAVENOUS ONCE
Status: COMPLETED | OUTPATIENT
Start: 2022-10-18 | End: 2022-10-18

## 2022-10-18 RX ORDER — METOPROLOL TARTRATE 5 MG/5ML
5 INJECTION INTRAVENOUS ONCE
Status: COMPLETED | OUTPATIENT
Start: 2022-10-18 | End: 2022-10-18

## 2022-10-18 RX ORDER — ACETAMINOPHEN 650 MG/1
650 SUPPOSITORY RECTAL EVERY 4 HOURS PRN
Status: DISCONTINUED | OUTPATIENT
Start: 2022-10-18 | End: 2022-10-19 | Stop reason: HOSPADM

## 2022-10-18 RX ORDER — ONDANSETRON 4 MG/1
4 TABLET, FILM COATED ORAL EVERY 6 HOURS PRN
Status: DISCONTINUED | OUTPATIENT
Start: 2022-10-18 | End: 2022-10-19 | Stop reason: HOSPADM

## 2022-10-18 RX ORDER — SODIUM CHLORIDE 0.9 % (FLUSH) 0.9 %
10 SYRINGE (ML) INJECTION AS NEEDED
Status: DISCONTINUED | OUTPATIENT
Start: 2022-10-18 | End: 2022-10-19 | Stop reason: HOSPADM

## 2022-10-18 RX ORDER — IPRATROPIUM BROMIDE AND ALBUTEROL SULFATE 2.5; .5 MG/3ML; MG/3ML
3 SOLUTION RESPIRATORY (INHALATION) ONCE
Status: COMPLETED | OUTPATIENT
Start: 2022-10-18 | End: 2022-10-18

## 2022-10-18 RX ORDER — ONDANSETRON 2 MG/ML
4 INJECTION INTRAMUSCULAR; INTRAVENOUS EVERY 6 HOURS PRN
Status: DISCONTINUED | OUTPATIENT
Start: 2022-10-18 | End: 2022-10-19 | Stop reason: HOSPADM

## 2022-10-18 RX ORDER — PANTOPRAZOLE SODIUM 40 MG/10ML
40 INJECTION, POWDER, LYOPHILIZED, FOR SOLUTION INTRAVENOUS
Status: DISCONTINUED | OUTPATIENT
Start: 2022-10-19 | End: 2022-10-19 | Stop reason: HOSPADM

## 2022-10-18 RX ORDER — SODIUM CHLORIDE 0.9 % (FLUSH) 0.9 %
10 SYRINGE (ML) INJECTION EVERY 12 HOURS SCHEDULED
Status: DISCONTINUED | OUTPATIENT
Start: 2022-10-18 | End: 2022-10-19 | Stop reason: HOSPADM

## 2022-10-18 RX ORDER — ACETAMINOPHEN 325 MG/1
650 TABLET ORAL EVERY 4 HOURS PRN
Status: DISCONTINUED | OUTPATIENT
Start: 2022-10-18 | End: 2022-10-19 | Stop reason: HOSPADM

## 2022-10-18 RX ADMIN — IPRATROPIUM BROMIDE AND ALBUTEROL SULFATE 3 ML: .5; 3 SOLUTION RESPIRATORY (INHALATION) at 17:54

## 2022-10-18 RX ADMIN — METOPROLOL TARTRATE 5 MG: 5 INJECTION INTRAVENOUS at 18:52

## 2022-10-18 RX ADMIN — FUROSEMIDE 60 MG: 10 INJECTION INTRAMUSCULAR; INTRAVENOUS at 18:44

## 2022-10-18 NOTE — ED PROVIDER NOTES
[place]    EMERGENCY DEPARTMENT ENCOUNTER      Pt Name: Chito Rod  MRN: [id number]  YOB: 1950  Date of evaluation: 10/18/2022  Provider: Mychal Beltre MD    CHIEF COMPLAINT       Chief Complaint   Patient presents with   • Shortness of Breath         HISTORY OF PRESENT ILLNESS  (Location/Symptom, Timing/Onset, Context/Setting, Quality, Duration, Modifying Factors, Severity.)   Chito Rod is a 72 y.o. male who presents to the emergency department ***       Nursing notes were reviewed.    REVIEW OF SYSTEMS    (2-9 systems for level 4, 10 or more for level 5)   ROS:  General:  No fevers, no chills, no weakness  Cardiovascular:  No chest pain, no palpitations  Respiratory:  No shortness of breath, no cough, no wheezing  Gastrointestinal:  No pain, no nausea, no vomiting, no diarrhea  Musculoskeletal:  No muscle pain, no joint pain  Skin:  No rash  Neurologic:  No speech problems, no headache, no extremity numbness, no extremity tingling, no extremity weakness  Psychiatric:  No anxiety  Genitourinary:  No dysuria, no hematuria    Except as noted above the remainder of the review of systems was reviewed and negative.       PAST MEDICAL HISTORY     Past Medical History:   Diagnosis Date   • Atrial fibrillation ([facility])    • CAD (coronary artery disease)    • CHF (congestive heart failure) ([facility])    • Chronic anticoagulation    • CKD (chronic kidney disease) stage 3, GFR 30-59 ml/min ([facility])    • COPD (chronic obstructive pulmonary disease) ([facility])    • DM2 (diabetes mellitus, type 2) ([facility])    • GERD (gastroesophageal reflux disease)    • History of MI (myocardial infarction)    • HLD (hyperlipidemia)    • HTN (hypertension)    • Morbid obesity ([facility])    • Noncompliance    • FAUSTO (obstructive sleep apnea)    • Oxygen dependent          SURGICAL HISTORY       Past Surgical History:   Procedure Laterality Date   • APPENDECTOMY  1961   • CORONARY STENT PLACEMENT  2008   • HAND SURGERY Left 2002   • KNEE  ARTHROSCOPY Left 1965         CURRENT MEDICATIONS     No current facility-administered medications for this encounter.    Current Outpatient Medications:   •  acetaminophen (TYLENOL) 325 MG tablet, Take 2 tablets by mouth Every 6 (Six) Hours As Needed for Mild Pain ., Disp: , Rfl:   •  albuterol sulfate  (90 Base) MCG/ACT inhaler, Inhale 2 puffs 4 (Four) Times a Day As Needed for Wheezing for up to 498 doses., Disp: 18 g, Rfl: 0  •  apixaban (ELIQUIS) 5 MG tablet tablet, Take 5 mg by mouth 2 (Two) Times a Day., Disp: , Rfl:   •  atorvastatin (LIPITOR) 40 MG tablet, Take 1 tablet by mouth Every Night., Disp: 30 tablet, Rfl: 0  •  digoxin (LANOXIN) 250 MCG tablet, Take 1 tablet by mouth Daily., Disp: 90 tablet, Rfl: 4  •  donepezil (ARICEPT) 10 MG tablet, Take 1 tablet by mouth Every Night., Disp: 30 tablet, Rfl: 5  •  empagliflozin (JARDIANCE) 10 MG tablet tablet, Take 1 tablet by mouth Daily., Disp: 30 tablet, Rfl: 0  •  escitalopram (LEXAPRO) 20 MG tablet, Take 1 tablet by mouth Every Night., Disp: 30 tablet, Rfl: 0  •  glimepiride (AMARYL) 4 MG tablet, Take 1 tablet by mouth Every Morning Before Breakfast., Disp: 30 tablet, Rfl: 5  •  glucose blood test strip, Use as instructed, Disp: 100 each, Rfl: 12  •  glucose monitor monitoring kit, Use to check blood glucose two times per day, Disp: 1 each, Rfl: 0  •  Guar Gum (Nutrisource fiber) pack pack, Take 2 packets by mouth 2 (Two) Times a Day., Disp: 75 each, Rfl: 0  •  insulin detemir (LEVEMIR) 100 UNIT/ML injection, Inject 40 Units under the skin into the appropriate area as directed Every Night., Disp: 3 mL, Rfl: 11  •  Insulin Pen Needle 30G X 8 MM misc, Use to administer insulin subcutaneously every evening, Disp: 100 each, Rfl: 11  •  metFORMIN (GLUCOPHAGE) 1000 MG tablet, Take 1 tablet by mouth 2 (Two) Times a Day With Meals., Disp: , Rfl: 5  •  metoprolol succinate XL (Toprol XL) 25 MG 24 hr tablet, Take 2 tablets by mouth Daily., Disp: 30 tablet, Rfl:  0  •  miconazole (MICOTIN) 2 % powder, Apply  topically to the appropriate area as directed Every 12 (Twelve) Hours., Disp: 85 g, Rfl: 0  •  saccharomyces boulardii (FLORASTOR) 250 MG capsule, Take 1 capsule by mouth 2 (Two) Times a Day., Disp: 60 capsule, Rfl: 0    ALLERGIES     Patient has no known allergies.    FAMILY HISTORY       Family History   Problem Relation Age of Onset   • Diabetes Mother    • Heart disease Mother    • Diabetes Father    • Heart disease Father    • Heart disease Sister           SOCIAL HISTORY       Social History     Socioeconomic History   • Marital status:      Spouse name: N/A   • Number of children: 5   • Years of education: H.S.   Tobacco Use   • Smoking status: Former     Packs/day: 1.00     Years: 47.00     Pack years: 47.00     Types: Cigarettes     Quit date:      Years since quittin.8   • Smokeless tobacco: Never   Vaping Use   • Vaping Use: Never used   Substance and Sexual Activity   • Alcohol use: No   • Drug use: No   • Sexual activity: Never     Comment:          PHYSICAL EXAM    (up to 7 for level 4, 8 or more for level 5)     Vitals:    10/18/22 1721   Resp: 24   SpO2: 94%       Physical Exam  General : Patient is awake, alert, oriented, in no acute distress, nontoxic appearing  HEENT: Pupils are equally round, EOMI, conjunctivae clear, there is no injection no icterus.  Oral mucosa is moist, uvula midline  Neck: Neck is supple, full range of motion, trachea midline  Cardiac: Heart regular rate, rhythm, no murmurs, rubs, or gallops  Lungs: Lungs are clear to auscultation, there is no wheezing, rhonchi, or rales. There is no use of accessory muscles  Chest wall: There is no tenderness to palpation over the chest wall or over ribs  Abdomen: Abdomen is soft, nontender, nondistended. There are no firm or pulsatile masses, no rebound rigidity or guarding  Musculoskeletal: 5 out of 5 strength in all 4 extremities.  No focal muscle deficits are  appreciated  Neuro: Motor intact, sensory intact, level of consciousness is normal, cerebellar function is normal, reflexes are grossly normal. No evidence of incontinence or loss of bowel or bladder function, no saddle anesthesia noted   Dermatology: Skin is warm and dry  Psych: Mentation is grossly normal, cognition is grossly normal. Affect is appropriate      DIAGNOSTIC RESULTS     EKG: All EKGs are interpreted by the Emergency Department Physician who either signs or Co-signs this chart in the absence of a cardiologist.    No orders to display       RADIOLOGY:   Non-plain film images such as CT, Ultrasound and MRI are read by the radiologist. Plain radiographic images are visualized and preliminarily interpreted by the emergency physician with the below findings:      [] Radiologist's Report Reviewed:  No orders to display         ED BEDSIDE ULTRASOUND:   Performed by ED Physician - none    LABS:    I have reviewed and interpreted all of the currently available lab results from this visit (if applicable):  Results for orders placed or performed in visit on 10/18/22   Basic Metabolic Panel    Specimen: Blood   Result Value Ref Range    Glucose 225 (H) 65 - 99 mg/dL    BUN 34 (H) 8 - 23 mg/dL    Creatinine 1.22 0.76 - 1.27 mg/dL    Sodium 130 (L) 136 - 145 mmol/L    Potassium 4.6 3.5 - 5.2 mmol/L    Chloride 93 (L) 98 - 107 mmol/L    CO2 20.0 (L) 22.0 - 29.0 mmol/L    Calcium 8.8 8.6 - 10.5 mg/dL    BUN/Creatinine Ratio 27.9 (H) 7.0 - 25.0    Anion Gap 17.0 (H) 5.0 - 15.0 mmol/L    eGFR 63.4 >60.0 mL/min/1.73   CBC Auto Differential    Specimen: Blood   Result Value Ref Range    .49 (C) 3.40 - 10.80 10*3/mm3    RBC 3.73 (L) 4.14 - 5.80 10*6/mm3    Hemoglobin 10.7 (L) 13.0 - 17.7 g/dL    Hematocrit 33.9 (L) 37.5 - 51.0 %    MCV 90.9 79.0 - 97.0 fL    MCH 28.7 26.6 - 33.0 pg    MCHC 31.6 31.5 - 35.7 g/dL    RDW 15.7 (H) 12.3 - 15.4 %    RDW-SD 50.7 37.0 - 54.0 fl    MPV 11.3 6.0 - 12.0 fL    Platelets 61 (L)  140 - 450 10*3/mm3   Urinalysis With Microscopic If Indicated (No Culture) - Urine, Clean Catch    Specimen: Urine, Clean Catch   Result Value Ref Range    Color, UA Dark Yellow (A) Yellow, Straw    Appearance, UA Cloudy (A) Clear    pH, UA <=5.0 5.0 - 8.0    Specific Gravity, UA 1.028 1.001 - 1.030    Glucose, UA >=1000 mg/dL (3+) (A) Negative    Ketones, UA 15 mg/dL (1+) (A) Negative    Bilirubin, UA Negative Negative    Blood, UA Negative Negative    Protein,  mg/dL (2+) (A) Negative    Leuk Esterase, UA Negative Negative    Nitrite, UA Negative Negative    Urobilinogen, UA 1.0 E.U./dL 0.2 - 1.0 E.U./dL   Urinalysis, Microscopic Only - Urine, Clean Catch    Specimen: Urine, Clean Catch   Result Value Ref Range    RBC, UA 0-2 None Seen, 0-2 /HPF    WBC, UA 3-5 (A) None Seen, 0-2 /HPF    Bacteria, UA 2+ (A) None Seen, Trace /HPF    Squamous Epithelial Cells, UA 0-2 None Seen, 0-2 /HPF    Hyaline Casts, UA Too Numerous to Count 0 - 6 /LPF    Granular Casts, UA 13-20 None Seen /LPF    Methodology Manual Light Microscopy    Manual Differential    Specimen: Blood   Result Value Ref Range    Neutrophil % 2.0 (L) 42.7 - 76.0 %    Lymphocyte % 16.0 (L) 19.6 - 45.3 %    Monocyte % 1.0 (L) 5.0 - 12.0 %    Eosinophil % 0.0 (L) 0.3 - 6.2 %    Basophil % 0.0 0.0 - 1.5 %    Atypical Lymphocyte % 19.0 (H) 0.0 - 5.0 %    Blasts % 62.0 (H) 0.0 - 0.0 %    Neutrophils Absolute 2.15 1.70 - 7.00 10*3/mm3    Lymphocytes Absolute 37.62 (H) 0.70 - 3.10 10*3/mm3    Monocytes Absolute 1.07 (H) 0.10 - 0.90 10*3/mm3    Eosinophils Absolute 0.00 0.00 - 0.40 10*3/mm3    Basophils Absolute 0.00 0.00 - 0.20 10*3/mm3    nRBC 2.0 (H) 0.0 - 0.2 /100 WBC    RBC Morphology Normal Normal    WBC Morphology Normal Normal    Platelet Morphology Normal Normal   Slide Review, Hematology    Specimen: Blood   Result Value Ref Range    Performed by: EMANUEL Perdomo MD     Pathologist Interpretation       Numerous circulating blasts consistent with  acute leukemia        All other labs were within normal range or not returned as of this dictation.      EMERGENCY DEPARTMENT COURSE and DIFFERENTIAL DIAGNOSIS/MDM:   Vitals:    Vitals:    10/18/22 1721   Resp: 24   SpO2: 94%            ***    I had a discussion with the patient/family regarding diagnosis, diagnostic results, treatment plan, and medications.  The patient/family indicated understanding of these instructions.  I spent adequate time at the bedside preceding discharge necessary to personally discuss the aftercare instructions, giving patient education, providing explanations of the results of our evaluations/findings, and my decision making to assure that the patient/family understand the plan of care.  Time was allotted to answer questions at that time and throughout the ED course.  Emphasis was placed on timely follow-up after discharge.  I also discussed the potential for the development of an acute emergent condition requiring further evaluation, admission, or even surgical intervention. I discussed that we found nothing during the visit today indicating the need for further workup, admission, or the presence of an unstable medical condition.  I encouraged the patient to return to the emergency department immediately for ANY concerns, worsening, new complaints, or if symptoms persist and unable to seek follow-up in a timely fashion.  The patient/family expressed understanding and agreement with this plan.  The patient will follow-up with their PCP in 1-2 days for reevaluation.       MEDICATIONS ADMINISTERED IN ED:  Medications - No data to display    PROCEDURES:  Procedures    CRITICAL CARE TIME    Total Critical Care time was 0 minutes, excluding separately reportable procedures.   There was a high probability of clinically significant/life threatening deterioration in the patient's condition which required my urgent intervention.      FINAL IMPRESSION    No diagnosis found.      DISPOSITION/PLAN      ED Disposition     None          PATIENT REFERRED TO:  No follow-up provider specified.    DISCHARGE MEDICATIONS:     Medication List      CONTINUE taking these medications    glucose monitor monitoring kit  Use to check blood glucose two times per day     Insulin Pen Needle 30G X 8 MM misc  Use to administer insulin subcutaneously every evening        ASK your doctor about these medications    acetaminophen 325 MG tablet  Commonly known as: TYLENOL  Take 2 tablets by mouth Every 6 (Six) Hours As Needed for Mild Pain .     albuterol sulfate  (90 Base) MCG/ACT inhaler  Commonly known as: PROVENTIL HFA;VENTOLIN HFA;PROAIR HFA  Inhale 2 puffs 4 (Four) Times a Day As Needed for Wheezing for up to 498 doses.     apixaban 5 MG tablet tablet  Commonly known as: ELIQUIS     atorvastatin 40 MG tablet  Commonly known as: LIPITOR  Take 1 tablet by mouth Every Night.     digoxin 250 MCG tablet  Commonly known as: LANOXIN  Take 1 tablet by mouth Daily.     donepezil 10 MG tablet  Commonly known as: ARICEPT  Take 1 tablet by mouth Every Night.     empagliflozin 10 MG tablet tablet  Commonly known as: JARDIANCE  Take 1 tablet by mouth Daily.     escitalopram 20 MG tablet  Commonly known as: LEXAPRO  Take 1 tablet by mouth Every Night.     glimepiride 4 MG tablet  Commonly known as: AMARYL  Take 1 tablet by mouth Every Morning Before Breakfast.     glucose blood test strip  Use as instructed     insulin detemir 100 UNIT/ML injection  Commonly known as: LEVEMIR  Inject 40 Units under the skin into the appropriate area as directed Every Night.     metFORMIN 1000 MG tablet  Commonly known as: GLUCOPHAGE  Take 1 tablet by mouth 2 (Two) Times a Day With Meals.     metoprolol succinate XL 25 MG 24 hr tablet  Commonly known as: Toprol XL  Take 2 tablets by mouth Daily.     miconazole 2 % powder  Commonly known as: MICOTIN  Apply  topically to the appropriate area as directed Every 12 (Twelve) Hours.     Nutrisource fiber pack  pack  Take 2 packets by mouth 2 (Two) Times a Day.     saccharomyces boulardii 250 MG capsule  Commonly known as: FLORASTOR  Take 1 capsule by mouth 2 (Two) Times a Day.                Comment: Please note this report has been produced using speech recognition software.      Mychal Beltre MD  Attending Emergency Physician

## 2022-10-18 NOTE — ED PROVIDER NOTES
Subjective   History of Present Illness  Mr. Rod is a 71 yo male with a hx of CAD, CHF, IDDM II, COPD (on 2 LPM ), atrial fibrillation (on Eliquis), CKD, HTN, FAUSTO, and moderate dementia, who presents via EMS from [facility] with increased shortness of breath over the past 2 days.  He has had mild cough with no fever.  No chest pain.  No abdominal pain, nausea or vomiting.  No known exposure to COVID.  Former smoker, quit 6 months ago.          Review of Systems   Constitutional: Negative for chills, diaphoresis and fever.   HENT: Negative for sore throat.    Respiratory: Positive for cough and shortness of breath.    Cardiovascular: Negative for chest pain, palpitations and leg swelling.   Gastrointestinal: Negative for abdominal pain, diarrhea, nausea and vomiting.   Genitourinary: Negative for dysuria.   Skin: Positive for pallor.   Neurological: Positive for weakness (generalized).   Hematological: Bruises/bleeds easily (on Eliquis).   Psychiatric/Behavioral:        Moderate dementia         Past Medical History:   Diagnosis Date   • Atrial fibrillation (HCC)    • CAD (coronary artery disease)    • CHF (congestive heart failure) ([facility]    • Chronic anticoagulation    • CKD (chronic kidney disease) stage 3, GFR 30-59 ml/min ([facility])    • COPD (chronic obstructive pulmonary disease) ([facility])    • DM2 (diabetes mellitus, type 2) ([facility])    • GERD (gastroesophageal reflux disease)    • History of MI (myocardial infarction)    • HLD (hyperlipidemia)    • HTN (hypertension)    • Morbid obesity ([facility])    • Noncompliance    • FAUSTO (obstructive sleep apnea)    • Oxygen dependent        Allergies   Allergen Reactions   • Bee Venom Hives       Past Surgical History:   Procedure Laterality Date   • APPENDECTOMY  1961   • CORONARY STENT PLACEMENT  2008   • HAND SURGERY Left 2002   • KNEE ARTHROSCOPY Left 1965       Family History   Problem Relation Age of Onset   • Diabetes Mother    • Heart disease Mother    • Diabetes Father     • Heart disease Father    • Heart disease Sister        Social History     Socioeconomic History   • Marital status:      Spouse name: N/A   • Number of children: 5   • Years of education: H.S.   Tobacco Use   • Smoking status: Former     Packs/day: 1.00     Years: 47.00     Pack years: 47.00     Types: Cigarettes     Quit date:      Years since quittin.8   • Smokeless tobacco: Never   Vaping Use   • Vaping Use: Never used   Substance and Sexual Activity   • Alcohol use: No   • Drug use: No   • Sexual activity: Never     Comment:            Objective   Physical Exam  Constitutional:       Appearance: He is ill-appearing.      Comments: Pale, agitated.     HENT:      Head: Normocephalic.      Nose: Nose normal.      Mouth/Throat:      Mouth: Mucous membranes are moist.   Eyes:      Pupils: Pupils are equal, round, and reactive to light.   Cardiovascular:      Rate and Rhythm: Tachycardia present. Rhythm irregular.      Comments: Rate of 110.  Irregularly irregular.    Pulmonary:      Comments: Mild bibasilar rales.  O2 sats in mid 80s on 6 LPM N/C.  Switched to NRB and then to BIPAP in order to achieve sats in the mid 90s.    Abdominal:      General: Bowel sounds are normal.      Tenderness: There is no abdominal tenderness.   Musculoskeletal:         General: No tenderness. Normal range of motion.      Cervical back: Normal range of motion.      Right lower leg: No edema.      Left lower leg: No edema.   Skin:     General: Skin is warm and dry.   Neurological:      General: No focal deficit present.      Mental Status: He is alert and oriented to person, place, and time.      Comments: Somewhat agitated.     Psychiatric:         Mood and Affect: Mood normal.      Comments: Agitated           Critical Care  Performed by: Vicente Escobedo PA  Authorized by: Mychal Beltre MD     Critical care provider statement:     Critical care time (minutes):  90    Critical care time was exclusive  of:  Separately billable procedures and treating other patients    Critical care was necessary to treat or prevent imminent or life-threatening deterioration of the following conditions:  Respiratory failure and cardiac failure (acute leukemia with blast crisis)    Critical care was time spent personally by me on the following activities:  Development of treatment plan with patient or surrogate, discussions with consultants, evaluation of patient's response to treatment, examination of patient, obtaining history from patient or surrogate, ordering and performing treatments and interventions, ordering and review of laboratory studies, ordering and review of radiographic studies, pulse oximetry, re-evaluation of patient's condition and review of old charts               ED Course  ED Course as of 10/18/22 2212   Tue Oct 18, 2022   1851 Platelets(!): 58 [HB]      ED Course User Index  [HB] Vicente Escobedo, PA    72 yr old male with hx of COPD, CAD, HLD, CHF, dementia, A-fib, CKD, FAUSTO, presents with increased shortness of breath and hypoxemia on EMS arrival.  Pt required BIPAP to achieve sats in 90s.  Arrives in a-[facility] with RVR with rate around 110.  Labs, EKG, CXR, COVID swab collected.   White count is acutely up at 114.99.  Just 7 months ago it was 10.  80% blasts.  Platelets at 58 (acute).    ProBNP is up at 98567.  Troponin is 0.101, likely secondary to CHF stress.    CXR:  Chronic interstitial disease.  No infiltrate seen.  COVID swab negative.      I spoke with hematology (Dr. Jewell) who advised pt likely had an acute leukemia.  He further advised that we don't treat acute leukemia here and he would need transfer to [facility] if treatment was desired by pt or family.  He felt that prognosis was very poor either way.    Pt was started on Lasix 60mg IV and was given a dose of metoprolol 5mg IV.  He was maintained on BiPAP.      An ABG on initial NRM showed respiratory alkalosis with pH of 7.53, pCO2 of 21 and pO2 of  91.8.  BIPAP was maintained for treatment of his CHF.          I spoke with the pt's sister (Kristyn) who is POA, by phone.  She indicated that he was a full code and after talking with his son and daughter, wished to have treatment of his acute leukemia as well as all other life supporting measures at this time.  I explained that we cannot treat acute leukemia here and would need to transfer him to  and they agreed that they would want him transferred.  I spoke with [facility] about transfer.  Unfortunately, they don't have a bed at this time but stated they would list him for transfer as soon as a unit bed became available, likely in the morning.      22:12 EDT  Pt is maintaining good sats on BIPAP.  He understands the bed situation at  and is agreeable with admission here.                                                   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       Medication List      No changes were made to your prescriptions during this visit.          Vicente Escboedo PA  10/18/22 2139       Vicente Escobedo PA  10/18/22 2139       Vicente Escobedo PA  10/18/22 2212

## 2022-10-19 ENCOUNTER — DOCUMENTATION (OUTPATIENT)
Dept: CARDIAC REHAB | Facility: HOSPITAL | Age: 72
End: 2022-10-19

## 2022-10-19 ENCOUNTER — APPOINTMENT (OUTPATIENT)
Dept: GENERAL RADIOLOGY | Facility: HOSPITAL | Age: 72
End: 2022-10-19

## 2022-10-19 VITALS
RESPIRATION RATE: 26 BRPM | DIASTOLIC BLOOD PRESSURE: 73 MMHG | OXYGEN SATURATION: 100 % | WEIGHT: 218.7 LBS | SYSTOLIC BLOOD PRESSURE: 102 MMHG | HEART RATE: 101 BPM | HEIGHT: 71 IN | TEMPERATURE: 97.7 F | BODY MASS INDEX: 30.62 KG/M2

## 2022-10-19 PROBLEM — R06.02 SHORTNESS OF BREATH: Status: ACTIVE | Noted: 2022-10-19

## 2022-10-19 LAB
ANION GAP SERPL CALCULATED.3IONS-SCNC: 21 MMOL/L (ref 5–15)
ARTERIAL PATENCY WRIST A: ABNORMAL
ATMOSPHERIC PRESS: ABNORMAL MM[HG]
BASE EXCESS BLDA CALC-SCNC: -2.1 MMOL/L (ref 0–2)
BASOPHILS # BLD MANUAL: 0 10*3/MM3 (ref 0–0.2)
BASOPHILS NFR BLD MANUAL: 0 % (ref 0–1.5)
BDY SITE: ABNORMAL
BLASTS NFR BLD MANUAL: 63 % (ref 0–0)
BODY TEMPERATURE: 37 C
BUN SERPL-MCNC: 39 MG/DL (ref 8–23)
BUN/CREAT SERPL: 35.8 (ref 7–25)
CA-I SERPL ISE-MCNC: 1.17 MMOL/L (ref 1.12–1.32)
CALCIUM SPEC-SCNC: 8.5 MG/DL (ref 8.6–10.5)
CHLORIDE SERPL-SCNC: 96 MMOL/L (ref 98–107)
CO2 BLDA-SCNC: 23.6 MMOL/L (ref 22–33)
CO2 SERPL-SCNC: 15 MMOL/L (ref 22–29)
COHGB MFR BLD: 0.9 % (ref 0–2)
CREAT SERPL-MCNC: 1.09 MG/DL (ref 0.76–1.27)
DEPRECATED RDW RBC AUTO: 50.8 FL (ref 37–54)
EGFRCR SERPLBLD CKD-EPI 2021: 72.6 ML/MIN/1.73
EOSINOPHIL # BLD MANUAL: 0 10*3/MM3 (ref 0–0.4)
EOSINOPHIL NFR BLD MANUAL: 0 % (ref 0.3–6.2)
EPAP: 0
ERYTHROCYTE [DISTWIDTH] IN BLOOD BY AUTOMATED COUNT: 15.7 % (ref 12.3–15.4)
GLUCOSE SERPL-MCNC: 172 MG/DL (ref 65–99)
HBA1C MFR BLD: 6.5 % (ref 4.8–5.6)
HCO3 BLDA-SCNC: 22.5 MMOL/L (ref 20–26)
HCT VFR BLD AUTO: 32.9 % (ref 37.5–51)
HCT VFR BLD CALC: 32.6 % (ref 38–51)
HGB BLD-MCNC: 10.6 G/DL (ref 13–17.7)
HGB BLDA-MCNC: 10.6 G/DL (ref 13.5–17.5)
INHALED O2 CONCENTRATION: 60 %
IPAP: 0
LYMPHOCYTES # BLD MANUAL: 29.8 10*3/MM3 (ref 0.7–3.1)
LYMPHOCYTES NFR BLD MANUAL: 1 % (ref 5–12)
MAGNESIUM SERPL-MCNC: 2 MG/DL (ref 1.6–2.4)
MCH RBC QN AUTO: 28.9 PG (ref 26.6–33)
MCHC RBC AUTO-ENTMCNC: 32.2 G/DL (ref 31.5–35.7)
MCV RBC AUTO: 89.6 FL (ref 79–97)
METHGB BLD QL: 0.5 % (ref 0–1.5)
MODALITY: ABNORMAL
MONOCYTES # BLD: 1.06 10*3/MM3 (ref 0.1–0.9)
NEUTROPHILS # BLD AUTO: 8.52 10*3/MM3 (ref 1.7–7)
NEUTROPHILS NFR BLD MANUAL: 6 % (ref 42.7–76)
NEUTS BAND NFR BLD MANUAL: 2 % (ref 0–5)
NOTE: ABNORMAL
OXYHGB MFR BLDV: 95 % (ref 94–99)
PAW @ PEAK INSP FLOW SETTING VENT: 0 CMH2O
PCO2 BLDA: 36.8 MM HG (ref 35–45)
PCO2 TEMP ADJ BLD: 36.8 MM HG (ref 35–48)
PH BLDA: 7.39 PH UNITS (ref 7.35–7.45)
PH, TEMP CORRECTED: 7.39 PH UNITS
PHOSPHATE SERPL-MCNC: 5.6 MG/DL (ref 2.5–4.5)
PLAT MORPH BLD: NORMAL
PLATELET # BLD AUTO: 47 10*3/MM3 (ref 140–450)
PO2 BLDA: 93 MM HG (ref 83–108)
PO2 TEMP ADJ BLD: 93 MM HG (ref 83–108)
POTASSIUM SERPL-SCNC: 5.1 MMOL/L (ref 3.5–5.2)
RBC # BLD AUTO: 3.67 10*6/MM3 (ref 4.14–5.8)
RBC MORPH BLD: NORMAL
SODIUM SERPL-SCNC: 132 MMOL/L (ref 136–145)
TOTAL RATE: 0 BREATHS/MINUTE
TROPONIN T SERPL-MCNC: 0.09 NG/ML (ref 0–0.03)
TSH SERPL DL<=0.05 MIU/L-ACNC: 3.24 UIU/ML (ref 0.27–4.2)
VARIANT LYMPHS NFR BLD MANUAL: 13 % (ref 0–5)
VARIANT LYMPHS NFR BLD MANUAL: 15 % (ref 19.6–45.3)
WBC MORPH BLD: NORMAL
WBC NRBC COR # BLD: 106.44 10*3/MM3 (ref 3.4–10.8)

## 2022-10-19 PROCEDURE — 36600 WITHDRAWAL OF ARTERIAL BLOOD: CPT

## 2022-10-19 PROCEDURE — 82805 BLOOD GASES W/O2 SATURATION: CPT

## 2022-10-19 PROCEDURE — 84100 ASSAY OF PHOSPHORUS: CPT | Performed by: NURSE PRACTITIONER

## 2022-10-19 PROCEDURE — 76937 US GUIDE VASCULAR ACCESS: CPT | Performed by: NURSE PRACTITIONER

## 2022-10-19 PROCEDURE — 83735 ASSAY OF MAGNESIUM: CPT | Performed by: NURSE PRACTITIONER

## 2022-10-19 PROCEDURE — 71045 X-RAY EXAM CHEST 1 VIEW: CPT

## 2022-10-19 PROCEDURE — 02HV33Z INSERTION OF INFUSION DEVICE INTO SUPERIOR VENA CAVA, PERCUTANEOUS APPROACH: ICD-10-PCS | Performed by: NURSE PRACTITIONER

## 2022-10-19 PROCEDURE — 82375 ASSAY CARBOXYHB QUANT: CPT

## 2022-10-19 PROCEDURE — B548ZZA ULTRASONOGRAPHY OF SUPERIOR VENA CAVA, GUIDANCE: ICD-10-PCS | Performed by: NURSE PRACTITIONER

## 2022-10-19 PROCEDURE — 84443 ASSAY THYROID STIM HORMONE: CPT | Performed by: INTERNAL MEDICINE

## 2022-10-19 PROCEDURE — 25010000002 PIPERACILLIN SOD-TAZOBACTAM PER 1 G

## 2022-10-19 PROCEDURE — 82330 ASSAY OF CALCIUM: CPT | Performed by: NURSE PRACTITIONER

## 2022-10-19 PROCEDURE — 84484 ASSAY OF TROPONIN QUANT: CPT | Performed by: INTERNAL MEDICINE

## 2022-10-19 PROCEDURE — 36556 INSERT NON-TUNNEL CV CATH: CPT | Performed by: NURSE PRACTITIONER

## 2022-10-19 PROCEDURE — 25010000002 VANCOMYCIN 10 G RECONSTITUTED SOLUTION

## 2022-10-19 PROCEDURE — 85027 COMPLETE CBC AUTOMATED: CPT | Performed by: INTERNAL MEDICINE

## 2022-10-19 PROCEDURE — 36415 COLL VENOUS BLD VENIPUNCTURE: CPT

## 2022-10-19 PROCEDURE — 83050 HGB METHEMOGLOBIN QUAN: CPT

## 2022-10-19 PROCEDURE — 87040 BLOOD CULTURE FOR BACTERIA: CPT | Performed by: NURSE PRACTITIONER

## 2022-10-19 PROCEDURE — 85007 BL SMEAR W/DIFF WBC COUNT: CPT | Performed by: INTERNAL MEDICINE

## 2022-10-19 PROCEDURE — 80048 BASIC METABOLIC PNL TOTAL CA: CPT | Performed by: NURSE PRACTITIONER

## 2022-10-19 PROCEDURE — 83036 HEMOGLOBIN GLYCOSYLATED A1C: CPT | Performed by: INTERNAL MEDICINE

## 2022-10-19 RX ORDER — MAGNESIUM SULFATE HEPTAHYDRATE 40 MG/ML
2 INJECTION, SOLUTION INTRAVENOUS AS NEEDED
Status: DISCONTINUED | OUTPATIENT
Start: 2022-10-19 | End: 2022-10-19 | Stop reason: HOSPADM

## 2022-10-19 RX ORDER — SODIUM CHLORIDE 0.9 % (FLUSH) 0.9 %
10 SYRINGE (ML) INJECTION EVERY 12 HOURS SCHEDULED
Status: DISCONTINUED | OUTPATIENT
Start: 2022-10-19 | End: 2022-10-19 | Stop reason: HOSPADM

## 2022-10-19 RX ORDER — NOREPINEPHRINE BIT/0.9 % NACL 8 MG/250ML
.02-.3 INFUSION BOTTLE (ML) INTRAVENOUS
Status: DISCONTINUED | OUTPATIENT
Start: 2022-10-19 | End: 2022-10-19 | Stop reason: HOSPADM

## 2022-10-19 RX ORDER — SODIUM CHLORIDE 0.9 % (FLUSH) 0.9 %
10 SYRINGE (ML) INJECTION AS NEEDED
Status: DISCONTINUED | OUTPATIENT
Start: 2022-10-19 | End: 2022-10-19 | Stop reason: HOSPADM

## 2022-10-19 RX ORDER — MAGNESIUM SULFATE HEPTAHYDRATE 40 MG/ML
4 INJECTION, SOLUTION INTRAVENOUS AS NEEDED
Status: DISCONTINUED | OUTPATIENT
Start: 2022-10-19 | End: 2022-10-19 | Stop reason: HOSPADM

## 2022-10-19 RX ORDER — NOREPINEPHRINE BIT/0.9 % NACL 8 MG/250ML
.02-.3 INFUSION BOTTLE (ML) INTRAVENOUS
Qty: 100 ML | Refills: 0 | Status: SHIPPED | OUTPATIENT
Start: 2022-10-19

## 2022-10-19 RX ADMIN — TAZOBACTAM SODIUM AND PIPERACILLIN SODIUM 4.5 G: 500; 4 INJECTION, SOLUTION INTRAVENOUS at 02:52

## 2022-10-19 RX ADMIN — VANCOMYCIN HYDROCHLORIDE 2750 MG: 10 INJECTION, POWDER, LYOPHILIZED, FOR SOLUTION INTRAVENOUS at 02:53

## 2022-10-19 NOTE — PROGRESS NOTES
Cardiac Rehab staff mailed referral letter to patient regarding Phase II Cardiac Rehab program. Instruction for patient to contact [facility] Cardiac Rehab Department for additional program information and to forward referral to closest Cardiac Rehab program.

## 2022-10-19 NOTE — DISCHARGE SUMMARY
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]  Yes   • Shortness of breath [R06.02]  Yes   • SOB (shortness of breath) [R06.02]  Yes   • MAGNO (acute kidney injury) (HCC) [N17.9]  Yes   • Elevated liver enzymes [R74.8]  Yes   • Ketonuria [R82.4]  Yes   • Proteinuria [R80.9]  Yes   • Hyponatremia [E87.1]  Yes   • Thrombocytopenia (HCC) [D69.6]  Yes   • Leukocytosis [D72.829]  Yes   • Chronic systolic congestive heart failure (HCC) [I50.22]  Yes   • Type 2 diabetes mellitus with hyperglycemia, with long-term current use of insulin (HCC) [E11.65, Z79.4]  Not Applicable   • Atrial fibrillation with RVR (HCC) [I48.91]  Yes      Resolved Hospital Problems   No resolved problems to display.       Past Surgical History:   Procedure Laterality Date   • APPENDECTOMY  1961   • CORONARY STENT PLACEMENT  2008   • HAND SURGERY Left 2002   • KNEE ARTHROSCOPY Left 1965       History of Present Illness    Patient is a 72 y.o. male past medical history A. fib on anticoagulation with Eliquis, CAD, CHF, CKD stage III, COPD, type 2 diabetes, moderate dementia, history of MI, hyperlipidemia, hypertension, FAUSTO on oxygen who presents to the emergency department on 10/18/2022 with 2-day history of shortness of breath.  According to limited reports patient lives at [facility]. Per reports from nursing home patient has had decreased mentation and SOB over the past 2 days. Today he was brought to [facility] ED for evaluation.     Vital signs on admission: Temp 98.3, heart rate 106, respiratory rate 24, blood pressure 116/83, pulse ox 94%.  Significant labs include white count of 114,000 with 80% blasts, hemoglobin of 10.6, platelets of 58, COVID and influenza is all negative, troponin 0.101, procalcitonin 0.65, proBNP 13,813, TSH 6.010, glucose 186, BUN 36, creatinine 1.29, sodium 127, , ,  alk phos 105, total bili 0.8, albumin 3.7, magnesium 1.5, lactate 2.2,.     Chest x-ray with chronic changes including diffuse interstitial prominence and cardiac enlargement.  No acute findings.     EKG shows atrial fibrillation with RVR     While in the ED he received DuoNeb treatment, 60 mg IV Lasix and 5 mg IV metoprolol.  Dr. Jewell with oncology was consulted and felt this likely represented acute leukemia.          I spent 20 minutes of time on this.  Francisca Guillen, MSN, AGACNP-BC, APRN     Electronically signed by OTILIA Gayle, 10/18/22, 10:32 PM EDT.     His 3 daughters are present at the bedside.  He had to be placed in a nursing home because of some dementia.  He did have a further decline in his functional status after admission to the nursing home and is incontinent and needing a diaper.  Prior to entering the nursing home he was confused but could feed himself bathe and dress himself and was continent.  Daughter was notified that he was more short of air and had a low oxygen saturation and was being transferred to the emergency room.  In the emergency room he was hypoxic and was placed on initially nonrebreather mask and then BiPAP.  He was in atrial fibrillation with a rate of 110 after a dose of Lopressor.  White blood cell count was 100,000 suspicious for acute leukemia.  Emergency room physician spoke to hematology and they note that we do not treat acute leukemia here and recommended transporting to .   does not have any beds.     Hospital Course    Admitted for reasons stated above in HPI. TLDL placed @ 0200. He was started on empiric Vanc and Zosyn. Had anticipated need for vasoactive medication but pressures have improved. With [facility] oncology not able to treat Leukemia, he is being transferred to [facility] for further treatment.     Physical Exam:  General Appearance:   Ill-appearing older gentleman   Head:   Atraumatic   Eyes:          Conjunctiva pink   Ears:      Throat:  Wearing full face  BiPAP   Neck:  Trachea midline, unable to assess JVD, beard   Back:       Lungs:    Increased respiratory rate, breath sounds are bilateral with bibasilar crackles    Heart:   Irregular rhythm, S1, S2 auscultated   Abdomen:    Bowel sounds present, obese, soft   Rectal:     Deferred   Extremities:  No pretibial edema   Pulses:  Thready radial pulses, no pedal pulses palpated   Skin:  Cool and clammy   Lymph nodes:  No cervical adenopathy   Neurologic:  Very lethargic, he will arouse and tell me his name but will not answer any other questions and will not follow commands               Procedures Performed: Right TLDL placed at 0200 on 10/19/2022  Consults: Oncology    Pertinent Test Results:   Results from last 7 days   Lab Units 10/18/22  1742   WBC 10*3/mm3 114.99*   HEMOGLOBIN g/dL 10.6*   HEMATOCRIT % 31.4*   PLATELETS 10*3/mm3 58*     Results from last 7 days   Lab Units 10/18/22  1742 10/18/22  1220   SODIUM mmol/L 127* 130*   POTASSIUM mmol/L 5.0 4.6   CHLORIDE mmol/L 90* 93*   CO2 mmol/L 22.0 20.0*   BUN mg/dL 36* 34*   CREATININE mg/dL 1.29* 1.22   EGFR mL/min/1.73 59.3* 63.4   GLUCOSE mg/dL 186* 225*   CALCIUM mg/dL 8.8 8.8           Condition on Discharge:  Stable for transfer    Discharge Disposition: Short Term Hospital (DC - External)    Discharge Medications:      Discharge Medications      New Medications      Instructions Start Date   Norepinephrine-Sodium Chloride 8-0.9 MG/250ML-% solution infusion  Commonly known as: LEVOPHED   0.02-0.3 mcg/kg/min (2.72-40.8 mcg/min), Intravenous, Titrated      piperacillin-tazobactam 4-0.5 GM/100ML solution IVPB  Commonly known as: ZOSYN   4.5 g, Intravenous, Once      piperacillin-tazobactam 4-0.5 GM/100ML solution IVPB  Commonly known as: ZOSYN   4.5 g, Intravenous, Every 8 Hours      vancomycin   2,750 mg, Intravenous, Once         Continue These Medications      Instructions Start Date   glucose monitor monitoring kit   Use to check blood glucose two times  per day      Insulin Pen Needle 30G X 8 MM misc   Use to administer insulin subcutaneously every evening         Stop These Medications    acetaminophen 325 MG tablet  Commonly known as: TYLENOL     albuterol sulfate  (90 Base) MCG/ACT inhaler  Commonly known as: PROVENTIL HFA;VENTOLIN HFA;PROAIR HFA     apixaban 5 MG tablet tablet  Commonly known as: ELIQUIS     atorvastatin 40 MG tablet  Commonly known as: LIPITOR     digoxin 250 MCG tablet  Commonly known as: LANOXIN     donepezil 10 MG tablet  Commonly known as: ARICEPT     empagliflozin 10 MG tablet tablet  Commonly known as: JARDIANCE     escitalopram 20 MG tablet  Commonly known as: LEXAPRO     glimepiride 4 MG tablet  Commonly known as: AMARYL     glucose blood test strip     insulin detemir 100 UNIT/ML injection  Commonly known as: LEVEMIR     metFORMIN 1000 MG tablet  Commonly known as: GLUCOPHAGE     metoprolol succinate XL 25 MG 24 hr tablet  Commonly known as: Toprol XL     miconazole 2 % powder  Commonly known as: MICOTIN     Nutrisource fiber pack pack     saccharomyces boulardii 250 MG capsule  Commonly known as: FLORASTOR            Discharge Diet: NPO Diet NPO Type: Strict NPO    Activity at Discharge: Bedrest    Follow-up Appointments  No future appointments.      Test Results Pending at Discharge       Code Status and Medical Interventions:   Ordered at: 10/19/22 0029     Level Of Support Discussed With:    Health Care Surrogate     Code Status (Patient has no pulse and is not breathing):    CPR (Attempt to Resuscitate)     Medical Interventions (Patient has pulse or is breathing):    Full Support     Comments:    sister       Francisca Guillen, OTILIA  10/19/22  02:27 EDT    Discharge Time Spent:     I spent  35  minutes on this discharge activity which included: face-to-face encounter with the patient, reviewing the data in the system, coordination of the care with the nursing staff as well as consultants, documentation, and entering orders.         Electronically signed by OTILIA Gayle, 10/19/22, 2:18 AM EDT.

## 2022-10-19 NOTE — CONSULTS
"Pharmacy Consult-Vancomycin Dosing  Chito Rod is a  72 y.o. male receiving vancomycin therapy.     Indication: Sepsis  Consulting Provider: Intensivist  ID Consult:     Goal AUC: 400 - 600 mg/L*hr    Current Antimicrobial Therapy  Anti-Infectives (From admission, onward)      Ordered     Dose/Rate Route Frequency Start Stop    10/18/22 2312  piperacillin-tazobactam (ZOSYN) 4.5 g in iso-osmotic dextrose 100 mL IVPB (premix)        Ordering Provider: William Flynn IV, PharmD    4.5 g  over 4 Hours Intravenous Every 8 Hours 10/19/22 0800 10/26/22 0759    10/18/22 2312  vancomycin 2750 mg/500 mL 0.9% NS IVPB ([facility])        Ordering Provider: William Flynn IV, PharmD    20 mg/kg × 136 kg  over 180 Minutes Intravenous Once 10/19/22 0200      10/18/22 2312  piperacillin-tazobactam (ZOSYN) 4.5 g in iso-osmotic dextrose 100 mL IVPB (premix)        Ordering Provider: William Flynn IV, PharmD    4.5 g  over 30 Minutes Intravenous Once 10/19/22 0200      10/19/22 0029  Pharmacy to dose vancomycin        Ordering Provider: Aleshia Flynn MD     Does not apply Continuous PRN 10/19/22 0029 10/26/22 0028            Allergies  Allergies as of 10/18/2022 - Reviewed 10/18/2022   Allergen Reaction Noted    Bee venom Hives 10/18/2022       Labs    Results from last 7 days   Lab Units 10/18/22  1742 10/18/22  1220   BUN mg/dL 36* 34*   CREATININE mg/dL 1.29* 1.22       Results from last 7 days   Lab Units 10/18/22  1742 10/18/22  1220   WBC 10*3/mm3 114.99* 107.49*       Evaluation of Dosing     Last Dose Received in the ED/Outside Facility: N/A  Is Patient on Dialysis or Renal Replacement: N    Ht - 189.2 cm (74.49\")  Wt - 136 kg (300 lb)    Estimated Creatinine Clearance: 77.3 mL/min (A) (by C-G formula based on SCr of 1.29 mg/dL (H)).    Intake & Output (last 3 days)       None            Microbiology and Radiology  Microbiology Results (last 10 days)       Procedure Component Value - Date/Time    COVID PRE-OP " / PRE-PROCEDURE SCREENING ORDER (NO ISOLATION) - Swab, Nasal Cavity [[id number]]  (Normal) Collected: 10/18/22 1818    Lab Status: Final result Specimen: Swab from Nasal Cavity Updated: 10/18/22 1851    Narrative:      The following orders were created for panel order COVID PRE-OP / PRE-PROCEDURE SCREENING ORDER (NO ISOLATION) - Swab, Nasal Cavity.  Procedure                               Abnormality         Status                     ---------                               -----------         ------                     COVID-19 and FLU A/B PCR...[[id number]]  Normal              Final result                 Please view results for these tests on the individual orders.    COVID-19 and FLU A/B PCR - Swab, Nasopharynx [[id number]]  (Normal) Collected: 10/18/22 1818    Lab Status: Final result Specimen: Swab from Nasopharynx Updated: 10/18/22 1851     COVID19 Not Detected     Influenza A PCR Not Detected     Influenza B PCR Not Detected    Narrative:      Fact sheet for providers: https://www.fda.gov/media/191624/download    Fact sheet for patients: https://www.fda.gov/media/616565/download    Test performed by PCR.            Reported Vancomycin Levels                         [employer] AUC Calculation:    Current AUC:    mg/L*hr    Predicted Steady State AUC on Current Dose:  mg/L*hr  _________________________________    Predicted Steady State AUC on New Dose:   400-600 mg/L*hr    Assessment/Plan:    Advanced age chronically ill male with new acute leukemia. PMH significant for CKD III.    Limit to 2750 mg IV vancomycin load. Defer further dosing/levels to rounding pharmacist pending reassessment given habitus and pressor requirement on top of CKD III.    Zosyn 4.5 gm IV q8h for body weight >120 kg.    Thank you for this consult.  William Flynn IV, PharmD, BCPS  10/19/2022  01:47 EDT

## 2022-10-19 NOTE — PLAN OF CARE
Goal Outcome Evaluation:  Plan of Care Reviewed With: patient, spouse, daughter  Progress: declining     Expected transfer to  for higher level of care.

## 2022-10-19 NOTE — H&P
ICU ADMISSION NOTE    Chief complaint Shortness of breath    Subjective     Patient is a 72 y.o. male past medical history A. fib on anticoagulation with Eliquis, CAD, CHF, CKD stage III, COPD, type 2 diabetes, moderate dementia, history of MI, hyperlipidemia, hypertension, FAUSTO on oxygen who presents to the emergency department on 10/18/2022 with 2-day history of shortness of breath.  According to limited reports patient lives at [facility]. Per reports from nursing home patient has had decreased mentation and SOB over the past 2 days. Today he was brought to [facility] ED for evaluation.    Vital signs on admission: Temp 98.3, heart rate 106, respiratory rate 24, blood pressure 116/83, pulse ox 94%.  Significant labs include white count of 114,000 with 80% blasts, hemoglobin of 10.6, platelets of 58, COVID and influenza is all negative, troponin 0.101, procalcitonin 0.65, proBNP 13,813, TSH 6.010, glucose 186, BUN 36, creatinine 1.29, sodium 127, , , alk phos 105, total bili 0.8, albumin 3.7, magnesium 1.5, lactate 2.2,.    Chest x-ray with chronic changes including diffuse interstitial prominence and cardiac enlargement.  No acute findings.    EKG shows atrial fibrillation with RVR    While in the ED he received DuoNeb treatment, 60 mg IV Lasix and 5 mg IV metoprolol.  Dr. Jewell with oncology was consulted and felt this likely represented acute leukemia.        I spent 20 minutes of time on this.  MARCO A Seals, AGACNP-BC, APRN    Electronically signed by OTILIA Gayle, 10/18/22, 10:32 PM EDT.    His 3 daughters are present at the bedside.  He had to be placed in a nursing home because of some dementia.  He did have a further decline in his functional status after admission to the nursing home and is incontinent and needing a diaper.  Prior to entering the nursing home he was confused but could feed himself bathe and dress himself and was continent.  Daughter was notified that he  was more short of air and had a low oxygen saturation and was being transferred to the emergency room.  In the emergency room he was hypoxic and was placed on initially nonrebreather mask and then BiPAP.  He was in atrial fibrillation with a rate of 110 after a dose of Lopressor.  White blood cell count was 100,000 suspicious for acute leukemia.  Emergency room physician spoke to hematology and they note that we do not treat acute leukemia here and recommended transporting to .   does not have any beds.    Review of Systems  Review of Systems   Unable to perform ROS: Mental status change        Home Medications  (Not in a hospital admission)    Eliquis 5 mg  Lipitor 40 mg  Digoxin 250 mcg  Aricept 10 mg  Jardiance 10 mg  Lexapro 20 mg  Amaryl 4 mg  Levemir 40 units nightly  Metformin 1000 mg twice daily  Metoprolol XL 50 mg daily  Floor raster 250 mg twice daily  Miconazole powder as needed    History  Past Medical History:   Diagnosis Date   • Atrial fibrillation (HCC)    • CAD (coronary artery disease)    • CHF (congestive heart failure) ([facility])    • Chronic anticoagulation    • CKD (chronic kidney disease) stage 3, GFR 30-59 ml/min ([facility]ly [facility])    • COPD (chronic obstructive pulmonary disease) ([facility]ly [facility])    • DM2 (diabetes mellitus, type 2) ([facility]ly [facility])    • GERD (gastroesophageal reflux disease)    • History of MI (myocardial infarction)    • HLD (hyperlipidemia)    • HTN (hypertension)    • Morbid obesity ([facility])    • Noncompliance    • FAUSTO (obstructive sleep apnea)    • Oxygen dependent      Past Surgical History:   Procedure Laterality Date   • APPENDECTOMY  1961   • CORONARY STENT PLACEMENT  2008   • HAND SURGERY Left 2002   • KNEE ARTHROSCOPY Left 1965     Family History   Problem Relation Age of Onset   • Diabetes Mother    • Heart disease Mother    • Diabetes Father    • Heart disease Father    • Heart disease Sister      Social History     Tobacco Use   • Smoking status: Former     Packs/day: 1.00     Years: 47.00     Pack  "years: 47.00     Types: Cigarettes     Quit date:      Years since quittin.8   • Smokeless tobacco: Never   Vaping Use   • Vaping Use: Never used   Substance Use Topics   • Alcohol use: No   • Drug use: No     (Not in a hospital admission)    Allergies:  Bee venom      Objective     Vital Signs  Blood pressure (!) 84/47, pulse 103, temperature 98.3 °F (36.8 °C), temperature source Oral, resp. rate 16, height 189.2 cm (74.49\"), weight 136 kg (300 lb), SpO2 98 %.    Physical Exam:  General Appearance:   Ill-appearing older gentleman   Head:   Atraumatic   Eyes:          Conjunctiva pink   Ears:     Throat:  Wearing full face BiPAP   Neck:  Trachea midline, unable to assess JVD, beard   Back:      Lungs:    Increased respiratory rate, breath sounds are bilateral with bibasilar crackles    Heart:   Irregular rhythm, S1, S2 auscultated   Abdomen:    Bowel sounds present, obese, soft   Rectal:     Deferred   Extremities:  No pretibial edema   Pulses:  Thready radial pulses, no pedal pulses palpated   Skin:  Cool and clammy   Lymph nodes:  No cervical adenopathy   Neurologic:  Very lethargic, he will arouse and tell me his name but will not answer any other questions and will not follow commands       Results Review:   Lab Results (last 24 hours)     Procedure Component Value Units Date/Time    Manual Differential [[id number]]  (Abnormal) Collected: 10/18/22 1742    Specimen: Blood Updated: 10/18/22 1909     Neutrophil % 4.0 %      Lymphocyte % 11.0 %      Monocyte % 1.0 %      Eosinophil % 0.0 %      Basophil % 0.0 %      Bands %  3.0 %      Metamyelocyte % 1.0 %      Blasts % 80.0 %      Neutrophils Absolute 8.05 10*3/mm3      Lymphocytes Absolute 12.65 10*3/mm3      Monocytes Absolute 1.15 10*3/mm3      Eosinophils Absolute 0.00 10*3/mm3      Basophils Absolute 0.00 10*3/mm3      RBC Morphology Normal     WBC Morphology Normal     Platelet Morphology Normal    CBC & Differential [[id number]]  (Abnormal) Collected: " 10/18/22 1742    Specimen: Blood Updated: 10/18/22 1909    Narrative:      The following orders were created for panel order CBC & Differential.  Procedure                               Abnormality         Status                     ---------                               -----------         ------                     CBC Auto Differential[[id number]]        Abnormal            Edited Result - FINAL      Scan Slide[[id number]]                                                                    Please view results for these tests on the individual orders.    CBC Auto Differential [[id number]]  (Abnormal) Collected: 10/18/22 1742    Specimen: Blood Updated: 10/18/22 1909     .99 10*3/mm3      RBC 3.60 10*6/mm3      Hemoglobin 10.6 g/dL      Hematocrit 31.4 %      MCV 87.2 fL      MCH 29.4 pg      MCHC 33.8 g/dL      RDW 15.6 %      RDW-SD 48.7 fl      MPV 12.6 fL      Platelets 58 10*3/mm3      Neutrophil % 9.4 %      Lymphocyte % 11.9 %      Monocyte % 74.4 %      Eosinophil % 0.0 %      Basophil % 0.2 %      Neutrophils, Absolute 10.78 10*3/mm3      Lymphocytes, Absolute 13.69 10*3/mm3      Monocytes, Absolute 85.58 10*3/mm3      Eosinophils, Absolute 0.04 10*3/mm3      Basophils, Absolute 0.24 10*3/mm3     Narrative:      Appended report. These results have been appended to a previously verified report.  The previously reported component Immature Gran % is no longer being reported. Previous result was 4.1 % (Reference Range: 0.0-0.5 %) on 10/18/2022 at 1815 EDT.  The previously reported component NRBC is no longer being reported. Previous result was 2.0 /100 WBC (Reference Range: 0.0-0.2 /100 WBC) on 10/18/2022 at 1815 EDT.  The previously reported component Absolute Immature Gran is no longer being reported. Previous result was 4.66 10*3/mm3 (Reference Range: 0.00-0.05 10*3/mm3) on 10/18/2022 at 1815 EDT.    COVID PRE-OP / PRE-PROCEDURE SCREENING ORDER (NO ISOLATION) - Swab, Nasal Cavity [[id number]]  (Normal)  Collected: 10/18/22 1818    Specimen: Swab from Nasal Cavity Updated: 10/18/22 1851    Narrative:      The following orders were created for panel order COVID PRE-OP / PRE-PROCEDURE SCREENING ORDER (NO ISOLATION) - Swab, Nasal Cavity.  Procedure                               Abnormality         Status                     ---------                               -----------         ------                     COVID-19 and FLU A/B PCR...[[id number]]  Normal              Final result                 Please view results for these tests on the individual orders.    COVID-19 and FLU A/B PCR - Swab, Nasopharynx [[id number]]  (Normal) Collected: 10/18/22 1818    Specimen: Swab from Nasopharynx Updated: 10/18/22 1851     COVID19 Not Detected     Influenza A PCR Not Detected     Influenza B PCR Not Detected    Narrative:      Fact sheet for providers: https://www.fda.gov/media/913345/download    Fact sheet for patients: https://www.fda.gov/media/703520/download    Test performed by PCR.    Troponin [[id number]]  (Abnormal) Collected: 10/18/22 1742    Specimen: Blood Updated: 10/18/22 1828     Troponin T 0.101 ng/mL     Narrative:      Troponin T Reference Range:  <= 0.03 ng/mL-   Negative for AMI  >0.03 ng/mL-     Abnormal for myocardial necrosis.  Clinicians would have to utilize clinical acumen, EKG, Troponin and serial changes to determine if it is an Acute Myocardial Infarction or myocardial injury due to an underlying chronic condition.       Results may be falsely decreased if patient taking Biotin.      Procalcitonin [[id number]]  (Abnormal) Collected: 10/18/22 1742    Specimen: Blood Updated: 10/18/22 1826     Procalcitonin 0.65 ng/mL     Narrative:      As a Marker for Sepsis (Non-Neonates):    1. <0.5 ng/mL represents a low risk of severe sepsis and/or septic shock.  2. >2 ng/mL represents a high risk of severe sepsis and/or septic shock.    As a Marker for Lower Respiratory Tract Infections that require antibiotic  "therapy:    PCT on Admission    Antibiotic Therapy       6-12 Hrs later    >0.5                Strongly Recommended  >0.25 - <0.5        Recommended   0.1 - 0.25          Discouraged              Remeasure/reassess PCT  <0.1                Strongly Discouraged     Remeasure/reassess PCT    As 28 day mortality risk marker: \"Change in Procalcitonin Result\" (>80% or <=80%) if Day 0 (or Day 1) and Day 4 values are available. Refer to http://www.[facility]-pct-calculator.com    Change in PCT <=80%  A decrease of PCT levels below or equal to 80% defines a positive change in PCT test result representing a higher risk for 28-day all-cause mortality of patients diagnosed with severe sepsis for septic shock.    Change in PCT >80%  A decrease of PCT levels of more than 80% defines a negative change in PCT result representing a lower risk for 28-day all-cause mortality of patients diagnosed with severe sepsis or septic shock.       proBNP [[id number]]  (Abnormal) Collected: 10/18/22 1742    Specimen: Blood Updated: 10/18/22 1826     proBNP 13,813.0 pg/mL     Narrative:      Among patients with dyspnea, NT-proBNP is highly sensitive for the detection of acute congestive heart failure. In addition NT-proBNP of <300 pg/ml effectively rules out acute congestive heart failure with 99% negative predictive value.    Results may be falsely decreased if patient taking Biotin.      TSH [[id number]]  (Abnormal) Collected: 10/18/22 1742    Specimen: Blood Updated: 10/18/22 1826     TSH 6.010 uIU/mL     Narrative:      Due to abnormal TSH results, suggest ordering Free T4.    Digoxin Level [[id number]]  (Normal) Collected: 10/18/22 1742    Specimen: Blood Updated: 10/18/22 1822     Digoxin 1.05 ng/mL     Comprehensive Metabolic Panel [[id number]]  (Abnormal) Collected: 10/18/22 1742    Specimen: Blood Updated: 10/18/22 1820     Glucose 186 mg/dL      BUN 36 mg/dL      Creatinine 1.29 mg/dL      Sodium 127 mmol/L      Potassium 5.0 mmol/L      " Comment: Slight hemolysis detected by analyzer. Results may be affected.        Chloride 90 mmol/L      CO2 22.0 mmol/L      Calcium 8.8 mg/dL      Total Protein 6.9 g/dL      Albumin 3.70 g/dL      ALT (SGPT) 238 U/L      AST (SGOT) 333 U/L      Alkaline Phosphatase 105 U/L      Total Bilirubin 0.8 mg/dL      Globulin 3.2 gm/dL      Comment: Calculated Result        A/G Ratio 1.2 g/dL      BUN/Creatinine Ratio 27.9     Anion Gap 15.0 mmol/L      eGFR 59.3 mL/min/1.73      Comment: National Kidney Foundation and American Society of Nephrology (ASN) Task Force recommended calculation based on the Chronic Kidney Disease Epidemiology Collaboration (CKD-EPI) equation refit without adjustment for race.       Narrative:      GFR Normal >60  Chronic Kidney Disease <60  Kidney Failure <15      Magnesium [[id number]]  (Abnormal) Collected: 10/18/22 1742    Specimen: Blood Updated: 10/18/22 1820     Magnesium 1.5 mg/dL     Lactic Acid, Plasma [[id number]]  (Abnormal) Collected: 10/18/22 1742    Specimen: Blood Updated: 10/18/22 1817     Lactate 2.2 mmol/L      Comment: Falsely depressed results may occur on samples drawn from patients receiving N-Acetylcysteine (NAC) or Metamizole.           Imaging Results (Last 24 Hours)     Procedure Component Value Units Date/Time    XR Chest 1 View [[id number]] Collected: 10/18/22 1828     Updated: 10/18/22 1832    Narrative:      DATE OF EXAM: 10/18/2022 5:49 PM     PROCEDURE: XR CHEST 1 VW-     INDICATIONS: shortness of breath     COMPARISON: 2/23/2022     TECHNIQUE: Single radiographic AP view of the chest was obtained.     FINDINGS:  Diffuse interstitial prominence is again noted, similar to comparison  radiographs and without evidence of new focal consolidation concerning  for pneumonia. There is unchanged cardiac enlargement. There is no  distinct pneumothorax.        Impression:      Chronic changes as above including diffuse interstitial prominence and  cardiac enlargement. No acute  findings otherwise.     This report was finalized on 10/18/2022 6:29 PM by William Mccarthy.              PROBLEM LIST    Suspect cardiogenic shock  Cardiomyopathy with EF 30%  Atrial fibrillation  Acute hypoxic respiratory failure  Right lower lobe, middle lobe pneumonia  New onset leukemia  Possible sepsis  Acute kidney injury  Diabetes mellitus  dementia      Assessment & Plan     Unfortunate 72-year-old gentleman with a known history of systolic heart failure, diabetes, previous tobacco use with COPD, chronic kidney disease, sleep apnea who is in a nursing home because of dementia and brought to the hospital for worsening shortness of air and hypoxia.  X-ray does look like congestive heart failure.  He has no peripheral edema.  He was in atrial fibrillation on presentation which he has had in the past.  He had a Holter monitor in 2019 that revealed chronic atrial fibrillation.  He was on metoprolol for rate control and Eliquis.  There is a history of coronary stent placement in 2008 but no recent heart catheterization.  Clinically he is hypotensive cold and clammy suspicious for cardiogenic shock.  However he also has new onset acute leukemia with a white count over 100,000 and cannot exclude a septic shock.  Procalcitonin is 0.65 which is only mildly elevated.  Urinalysis did not appear infected.  On CT scan he had evidence of a right lower lobe pneumonia.  He had no intra-abdominal source for fever.   He has a known history of diabetes mellitus and chronic kidney disease stage III.  His blood glucose in the emergency room was 186.  Serum creatinine is 1.29, compared to 0.87 March 6, 2022.  I did discuss his prognosis with his 3 daughters given his debilitated state, poor cardiac function and now acute leukemia.  I am not sure they would offer treatment at  because of his poor performance status.  Even with treatment this acute leukemia has a poor prognosis.  They are considering their options but he does  remain a full resuscitation.    -Place a central line  -Norepinephrine  -Echocardiogram  -Consider dobutamine  -LFTs are mildly elevated I would like to avoid amiodarone, may continue low-dose metoprolol or digoxin for rate control  -Support with BiPAP  -Sliding scale insulin  -Vancomycin and Zosyn  -Transfer to  when a bed is available to address his leukemia    I discussed the patients findings and my recommendations with daughters.    Patient is critically ill, hypotensive in shock with organ dysfunction, new onset leukemia, pneumonia, hypoxia on noninvasive ventilation and has the potential to deteriorate requiring intubation and is in need of vasopressors and therefore critical care monitoring    Aleshia Flynn MD  10/19/22  00:25 EDT    Time: Critical care 65 min    Please note that portions of this note were completed with voice recognition program.    Electronically signed by OTILIA Gayle, 10/18/22, 10:32 PM EDT.

## 2022-10-19 NOTE — PROCEDURES
"Insert Central Line At Bedside    Date/Time: 10/19/2022 2:11 AM  Performed by: Francisca Guillen APRN  Authorized by: Francisca Guillen APRN   Consent: Verbal consent obtained.  Consent given by: guardian  Patient understanding: patient states understanding of the procedure being performed  Patient consent: the patient's understanding of the procedure matches consent given  Procedure consent: procedure consent matches procedure scheduled  Relevant documents: relevant documents present and verified  Test results: test results available and properly labeled  Site marked: the operative site was marked  Imaging studies: imaging studies available  Required items: required blood products, implants, devices, and special equipment available  Patient identity confirmed: arm band and hospital-assigned identification number  Time out: Immediately prior to procedure a \"time out\" was called to verify the correct patient, procedure, equipment, support staff and site/side marked as required.  Indications: vascular access    Anesthesia:  Local Anesthetic: lidocaine 1% without epinephrine    Sedation:  Patient sedated: yes  Vitals: Vital signs were monitored during sedation.    Preparation: skin prepped with ChloraPrep  Skin prep agent dried: skin prep agent completely dried prior to procedure  Sterile barriers: all five maximum sterile barriers used - cap, mask, sterile gown, sterile gloves, and large sterile sheet  Hand hygiene: hand hygiene performed prior to central venous catheter insertion  Location details: right internal jugular  Patient position: flat  Catheter type: triple lumen  Catheter size: 7 Fr  Pre-procedure: landmarks identified  Ultrasound guidance: yes  Sterile ultrasound techniques: sterile gel and sterile probe covers were used  Number of attempts: 1  Successful placement: yes  Post-procedure: line sutured and dressing applied  Assessment: blood return through all ports,  free fluid flow,  placement verified by x-ray " and no pneumothorax on x-ray  Patient tolerance: Patient tolerated the procedure well with no immediate complications

## 2022-10-22 LAB
QT INTERVAL: 320 MS
QTC INTERVAL: 441 MS

## 2022-10-23 NOTE — PROGRESS NOTES
Enter Query Response Below      Query Response:   Unable to determine         If applicable, please update the problem list.         Patient: Chito Rod        : 1950  Account: [id number]           Admit Date:         How to Respond to this query:       a. Click New Note     b. Answer query within the yellow box.                c. Update the Problem List, if applicable.      If you have any questions about this query contact me at: [email or web address]    Francisca Guillen APRN    72 year old male with history of CKD stage III was diagnosed with MAGNO per discharge summary. Creatinine 1.29 on admission, noted on H&P to have been 0.87 2022. The patient received IV lasix and IV antibiotics prior to transfer to [facility].     After study, was acute kidney injury clinically supported during this admission?    Yes, please include additional clinical indicators:____________  No  Other- specify________  Unable to determine     MAGNO is defined by KDIGO as any of the following:  Increase in creatinine > 0.3 mg/dl within 48 hours or less; or    Increase in creatinine level to > 1.5x baseline (historical or measure), which is known or presumed to have occurred within the prior 7 days    Urine volume <0.5 ml/kg/h for 6 hours.    When baseline creatinine is unknown, KDIGO advises: The lowest SCr obtained during a hospitalization is usually equal to or greater than the baseline. This SCr should be used to diagnose (and stage) MAGNO     [email or web address]          By submitting this query, we are merely seeking further clarification of documentation to accurately reflect all conditions that you are monitoring, evaluating, treating or that extend the hospitalization or utilize additional resources of care. Please utilize your independent clinical judgment when addressing the question(s) above.     This query and your response, once completed, will be entered into the legal medical  record.    Sincerely,  Tyra Goldstein RN CCDS  Clinical Documentation Integrity Program

## 2022-10-23 NOTE — PROGRESS NOTES
"Enter Query Response Below      Query Response:   Please refer this to Dr. Flynn as she is the final say on this.        If applicable, please update the problem list.         Patient: Chito Rod        : 1950  Account: [id number]           Admit Date:         How to Respond to this query:       a. Click New Note     b. Answer query within the yellow box.                c. Update the Problem List, if applicable.      If you have any questions about this query contact me at: [email or web address]     Francisca Guillen APRN    72 year old male with known history of systolic heart failure was admitted with worsening shortness of air, peripheral edema, and hypoxia. H&P noted EF 30%, \"X-ray does look like congestive heart failure.\" The patient was treated with IV lasix 60mg x1 on 10/18.     Please specify the acuity of systolic heart failure for this admission as:    - acute on chronic systolic heart failure  - chronic systolic heart failure only  - other, please specify_______  - unable to clinically determine    By submitting this query, we are merely seeking further clarification of documentation to accurately reflect all conditions that you are monitoring, evaluating, treating or that extend the hospitalization or utilize additional resources of care. Please utilize your independent clinical judgment when addressing the question(s) above.     This query and your response, once completed, will be entered into the legal medical record.    Sincerely,  Tyra Goldstein RN CCDS  Clinical Documentation Integrity Program   "

## 2022-10-23 NOTE — PROGRESS NOTES
Enter Query Response Below      Query Response:     Because I'm not the final one making the problem list, Dr. Flynn is, I don't believe I should be making this decision. Please refer back to her.       If applicable, please update the problem list.         Patient: Chito Rod        : 1950  Account: [id number]           Admit Date:         How to Respond to this query:       a. Click New Note     b. Answer query within the yellow box.                c. Update the Problem List, if applicable.      If you have any questions about this query contact me at: [email or web address]    Francisca BINGHAM    72 year old male admitted with acute leukemia was diagnosed with possible sepsis on H&P.  Lactic acid 2.2, procalcitonin 0.65. Vital signs: temperature 98.3, blood pressure 116/83 dropped to 84/47 on 10/18 at 2230, respiratory rate 16 - 24, heart rate 118 in atrial fibrillation. The patient received IV vancomycin and IV zosyn x 1 dose prior to transfer to [facility]. Sepsis was not included on discharge summary    Please clarify sepsis as:    - sepsis was ruled out after study  - sepsis was present and treated on admission  - other, please specify________  - unable to clinically determine    By submitting this query, we are merely seeking further clarification of documentation to accurately reflect all conditions that you are monitoring, evaluating, treating or that extend the hospitalization or utilize additional resources of care. Please utilize your independent clinical judgment when addressing the question(s) above.     This query and your response, once completed, will be entered into the legal medical record.    Sincerely,  Tyra Goldstein RN CCDS  Clinical Documentation Integrity Program

## 2022-10-23 NOTE — PROGRESS NOTES
Enter Query Response Below      Query Response:     This should be referred to Dr. Flynn as well.         If applicable, please update the problem list.         Patient: Chito Rod        : 1950  Account: [id number]           Admit Date:         How to Respond to this query:       a. Click New Note     b. Answer query within the yellow box.                c. Update the Problem List, if applicable.      If you have any questions about this query contact me at: [email or web address]    Francisca Guillen APRN    72 year old man admitted with acute leukemia was diagnosed with right lower lobe, middle lobe pneumonia per H&P. The patient received IV vancomycin, and IV zosyn prior to transer to [facility]. Pneumonia is not mentioned on discharge summary.    Please clarify pneumonia as:    - Pneumonia was ruled out  - Pneumonia was present and treated on admission  - Other, please specify_____  - Unable to clinically determine    By submitting this query, we are merely seeking further clarification of documentation to accurately reflect all conditions that you are monitoring, evaluating, treating or that extend the hospitalization or utilize additional resources of care. Please utilize your independent clinical judgment when addressing the question(s) above.     This query and your response, once completed, will be entered into the legal medical record.    Sincerely,  Tyra Goldstein RN CCDS  Clinical Documentation Integrity Program

## 2022-10-23 NOTE — PROGRESS NOTES
"Enter Query Response Below      Query Response:     Because I'm not the final one making this decision, Dr. Flynn is, I don't believe I should be making this decision.         If applicable, please update the problem list.         Patient: Chito Rod        : 1950  Account: [id number]           Admit Date:         How to Respond to this query:       a. Click New Note     b. Answer query within the yellow box.                c. Update the Problem List, if applicable.      If you have any questions about this query contact me at: [email or web address]    Francisca Guillne APRN    72 year old male admitted 10/18 with acute leukemia described on H&P as hypotensive cold, and clammy, suspected cardiogenic shock.  H&P documented \"patient is critically ill, hypotensive in shock with organ dysfunction.\"   Blood pressure 129/72 on admission dropped to 84/47 at 2230 10/18- improved to 114/60 at 2300. Levophed was ordered but not initiated. The patient received IV vancomycin and IV zosyn prior to transfer to [facility]. Shock was not included on discharge summary.     Can the clinical findings above be clarified as:    - cardiogenic shock, present on admission  - unspecified shock, present on admission  - shock was ruled out after study  - other, please specify_________  - unable to clinically determine      By submitting this query, we are merely seeking further clarification of documentation to accurately reflect all conditions that you are monitoring, evaluating, treating or that extend the hospitalization or utilize additional resources of care. Please utilize your independent clinical judgment when addressing the question(s) above.     This query and your response, once completed, will be entered into the legal medical record.    Sincerely,  Tyra Goldstein RN CCDS  Clinical Documentation Integrity Program     "

## 2022-10-24 LAB
BACTERIA SPEC AEROBE CULT: NORMAL
BACTERIA SPEC AEROBE CULT: NORMAL

## 2022-10-25 NOTE — PROGRESS NOTES
Enter Query Response Below      Query Response:       Unable to clinically determine       If applicable, please update the problem list.         Patient: Chito Rod        : 1950  Account: [id number]           Admit Date: 10/18/2022        How to Respond to this query:       a. Click New Note     b. Answer query within the yellow box.                c. Update the Problem List, if applicable.      If you have any questions about this query contact me at: [email or web address]    Dr. Flynn    72 year old male admitted with acute leukemia was diagnosed with possible sepsis on H&P.  Lactic acid 2.2, procalcitonin 0.65. Vital signs: temperature 98.3, blood pressure 116/83 dropped to 84/47 on 10/18 at 2230, respiratory rate 16 - 24, heart rate 118 in atrial fibrillation. The patient received IV vancomycin and IV zosyn x 1 dose prior to transfer to [facility]. Sepsis was not included on discharge summary     Please clarify sepsis as:     - sepsis was ruled out after study  - sepsis was present and treated on admission  - other, please specify________  - unable to clinically determine    By submitting this query, we are merely seeking further clarification of documentation to accurately reflect all conditions that you are monitoring, evaluating, treating or that extend the hospitalization or utilize additional resources of care. Please utilize your independent clinical judgment when addressing the question(s) above.     This query and your response, once completed, will be entered into the legal medical record.    Sincerely,  Tyra Goldstein RN CCDS  Clinical Documentation Integrity Program

## 2022-10-25 NOTE — PROGRESS NOTES
Enter Query Response Below      Query Response:       Unable to clinically determine       If applicable, please update the problem list.         Patient: Chito Rod        : 1950  Account: [id number]           Admit Date: 10/18/2022        How to Respond to this query:       a. Click New Note     b. Answer query within the yellow box.                c. Update the Problem List, if applicable.      If you have any questions about this query contact me at: [email or web address]        72 year old man admitted with acute leukemia was diagnosed with right lower lobe, middle lobe pneumonia per H&P. The patient received IV vancomycin, and IV zosyn prior to transer to [facility]. Pneumonia is not mentioned on discharge summary.     Please clarify pneumonia as:     - Pneumonia was ruled out  - Pneumonia was present and treated on admission  - Other, please specify_____  - Unable to clinically determine      By submitting this query, we are merely seeking further clarification of documentation to accurately reflect all conditions that you are monitoring, evaluating, treating or that extend the hospitalization or utilize additional resources of care. Please utilize your independent clinical judgment when addressing the question(s) above.     This query and your response, once completed, will be entered into the legal medical record.    Sincerely,  Tyra Goldstein RN CCDS  Clinical Documentation Integrity Program

## 2022-10-25 NOTE — PROGRESS NOTES
"Enter Query Response Below      Query Response:       Unspecified shock present on admission       If applicable, please update the problem list.         Patient: Chito Rod        : 1950  Account: [id number]           Admit Date: 10/18/2022        How to Respond to this query:       a. Click New Note     b. Answer query within the yellow box.                c. Update the Problem List, if applicable.      If you have any questions about this query contact me at: [email or web address]    Dr. Flynn    72 year old male admitted 10/18 with acute leukemia described on H&P as hypotensive cold, and clammy, suspected cardiogenic shock.  H&P documented \"patient is critically ill, hypotensive in shock with organ dysfunction.\"   Blood pressure 129/72 on admission dropped to 84/47 at 2230 10/18- improved to 114/60 at 2300. Levophed was ordered but not initiated. The patient received IV vancomycin and IV zosyn prior to transfer to [facility]. Shock was not included on discharge summary.     Can the clinical findings above be clarified as:    - cardiogenic shock, present on admission  - unspecified shock, present on admission  - shock was ruled out after study  - other, please specify_________  - unable to clinically determine        By submitting this query, we are merely seeking further clarification of documentation to accurately reflect all conditions that you are monitoring, evaluating, treating or that extend the hospitalization or utilize additional resources of care. Please utilize your independent clinical judgment when addressing the question(s) above.     This query and your response, once completed, will be entered into the legal medical record.    Sincerely,  Tyra Goldstein RN CCDS  Clinical Documentation Integrity Program     "

## 2022-10-25 NOTE — PROGRESS NOTES
"Enter Query Response Below      Query Response:       Acute on chronic systolic heart failure       If applicable, please update the problem list.        Patient: Chito Rod        : 1950  Account: [id number]           Admit Date: 10/18/2022        How to Respond to this query:       a. Click New Note     b. Answer query within the yellow box.                c. Update the Problem List, if applicable.      If you have any questions about this query contact me at: [email or web address]    Dr. Flynn    72 year old male with known history of systolic heart failure was admitted with worsening shortness of air, peripheral edema, and hypoxia. H&P noted EF 30%, \"X-ray does look like congestive heart failure.\" The patient was treated with IV lasix 60mg x1 on 10/18.     Please specify the acuity of systolic heart failure for this admission as:    - acute on chronic systolic heart failure  - chronic systolic heart failure only  - other, please specify_______  - unable to clinically determine    By submitting this query, we are merely seeking further clarification of documentation to accurately reflect all conditions that you are monitoring, evaluating, treating or that extend the hospitalization or utilize additional resources of care. Please utilize your independent clinical judgment when addressing the question(s) above.     This query and your response, once completed, will be entered into the legal medical record.    Sincerely,  Tyra Goldstein RN CCDS  Clinical Documentation Integrity Program     "